# Patient Record
Sex: FEMALE | Race: WHITE | NOT HISPANIC OR LATINO | Employment: PART TIME
[De-identification: names, ages, dates, MRNs, and addresses within clinical notes are randomized per-mention and may not be internally consistent; named-entity substitution may affect disease eponyms.]

---

## 2017-06-17 ENCOUNTER — HEALTH MAINTENANCE LETTER (OUTPATIENT)
Age: 37
End: 2017-06-17

## 2017-06-26 ENCOUNTER — OFFICE VISIT (OUTPATIENT)
Dept: INTERNAL MEDICINE | Facility: CLINIC | Age: 37
End: 2017-06-26
Payer: COMMERCIAL

## 2017-06-26 VITALS
HEIGHT: 64 IN | WEIGHT: 293 LBS | OXYGEN SATURATION: 99 % | RESPIRATION RATE: 22 BRPM | DIASTOLIC BLOOD PRESSURE: 80 MMHG | HEART RATE: 79 BPM | TEMPERATURE: 99.1 F | BODY MASS INDEX: 50.02 KG/M2 | SYSTOLIC BLOOD PRESSURE: 110 MMHG

## 2017-06-26 DIAGNOSIS — F17.200 TOBACCO USE DISORDER: ICD-10-CM

## 2017-06-26 DIAGNOSIS — F32.0 MAJOR DEPRESSIVE DISORDER, SINGLE EPISODE, MILD (H): Primary | ICD-10-CM

## 2017-06-26 DIAGNOSIS — J45.20 INTERMITTENT ASTHMA, UNCOMPLICATED: ICD-10-CM

## 2017-06-26 PROCEDURE — 99213 OFFICE O/P EST LOW 20 MIN: CPT | Performed by: INTERNAL MEDICINE

## 2017-06-26 RX ORDER — PRAZOSIN HYDROCHLORIDE 1 MG/1
1 CAPSULE ORAL AT BEDTIME
COMMUNITY
End: 2017-07-18

## 2017-06-26 RX ORDER — LURASIDONE HYDROCHLORIDE 40 MG/1
40 TABLET, FILM COATED ORAL DAILY
COMMUNITY
End: 2017-07-18

## 2017-06-26 NOTE — MR AVS SNAPSHOT
After Visit Summary   6/26/2017    Cristina Dean    MRN: [id number]           Patient Information     Date Of Birth          1980        Visit Information        Provider Department      6/26/2017 3:20 PM Jermain Jaramillo MD [facility]        Today's Diagnoses     Depression    -  1    Intermittent asthma, uncomplicated        Tobacco use disorder           Follow-ups after your visit        Additional Services     MENTAL HEALTH REFERRAL       Your provider has referred you to: [facility]: [facility] - Counseling (Individual/Couples/Family) - [facility] [phone]   [email or web address] [facility]/   *Patient will be contacted by [facility] scheduling partner, [facility]), to schedule an appointment.  Patients may also call [facility] to schedule.    All scheduling is subject to the client's specific insurance plan & benefits, provider/location availability, and provider clinical specialities.  Please arrive 15 minutes early for your first appointment and bring your completed paperwork.    Please be aware that coverage of these services is subject to the terms and limitations of your health insurance plan.  Call member services at your health plan with any benefit or coverage questions.            MENTAL HEALTH REFERRAL       Your provider has referred you to: [facility]: [facility] [phone]   [email or web address] within [facility]/   *Referral from [facility] Primary Care Provider required - Consultation Model - medication management & future refills will be returned to [facility] PCP upon completion of evaluation  *Please call to schedule an appointment.    All scheduling is subject to the client's specific insurance plan & benefits, provider/location availability, and provider clinical specialities.  Please arrive 15  "minutes early for your first appointment and bring your completed paperwork.    Please be aware that coverage of these services is subject to the terms and limitations of your health insurance plan.  Call member services at your health plan with any benefit or coverage questions.                  Follow-up notes from your care team     Return if symptoms worsen or fail to improve.      Who to contact     If you have questions or need follow up information about today's clinic visit or your schedule please contact [facility] directly at [phone].  Normal or non-critical lab and imaging results will be communicated to you by Tumrihart, letter or phone within 4 business days after the clinic has received the results. If you do not hear from us within 7 days, please contact the clinic through Tumrihart or phone. If you have a critical or abnormal lab result, we will notify you by phone as soon as possible.  Submit refill requests through Webyog or call your pharmacy and they will forward the refill request to us. Please allow 3 business days for your refill to be completed.          Additional Information About Your Visit        TumriharPublicEngines Information     Webyog lets you send messages to your doctor, view your test results, renew your prescriptions, schedule appointments and more. To sign up, go to [email or web address] . Click on \"Log in\" on the left side of the screen, which will take you to the Welcome page. Then click on \"Sign up Now\" on the right side of the page.     You will be asked to enter the access code listed below, as well as some personal information. Please follow the directions to create your username and password.     Your access code is: [email or web address]  Expires: 2017  3:43 PM     Your access code will  in 90 days. If you need help or a new code, please call your [place] clinic or [phone].        Care EveryWhere ID     This is your Care EveryWhere ID. This could " "be used by other organizations to access your [facility] medical records  [id number]        Your Vitals Were     Pulse Temperature Respirations Height Last Period Pulse Oximetry    79 99.1  F (37.3  C) (Oral) 22 5' 4\" (1.626 m) 06/03/2017 99%    BMI (Body Mass Index)                   56.54 kg/m2            Blood Pressure from Last 3 Encounters:   06/26/17 110/80   12/04/15 142/85   09/04/15 142/89    Weight from Last 3 Encounters:   06/26/17 (!) 329 lb 6.4 oz (149.4 kg)   12/04/15 (!) 324 lb 11.2 oz (147.3 kg)   08/24/12 (!) 301 lb (136.5 kg)              We Performed the Following     MENTAL HEALTH REFERRAL     MENTAL HEALTH REFERRAL        Primary Care Provider Office Phone # Fax #    Joesph Olson [phone]       [facility] [address] [facility] [address]        Equal Access to Services     LARRY BULL : Hadii aad ku hadasho Soomaali, waaxda luqadaha, qaybta kaalmada adeegyada, waxay idiin haymansin jacob jones . So [facility] [phone].    ATENCIÓN: Si habla español, tiene a magaña disposición servicios gratuitos de asistencia lingüística. Llame al [phone].    We comply with applicable federal civil rights laws and [place] laws. We do not discriminate on the basis of race, color, national origin, age, disability sex, sexual orientation or gender identity.            Thank you!     Thank you for choosing [facility]  for your care. Our goal is always to provide you with excellent care. Hearing back from our patients is one way we can continue to improve our services. Please take a few minutes to complete the written survey that you may receive in the mail after your visit with us. Thank you!             Your Updated Medication List - Protect others around you: Learn how to safely use, store and throw away your medicines at www.disposemymeds.org.          This list is accurate as of: 6/26/17  3:43 PM.  Always use your most recent med " list.                   Brand Name Dispense Instructions for use Diagnosis    acyclovir 800 MG tablet    ZOVIRAX    35 tablet    Take 1 tablet (800 mg) by mouth 5 times daily    Herpes zoster without complication       albuterol 108 (90 BASE) MCG/ACT Inhaler    PROAIR HFA/PROVENTIL HFA/VENTOLIN HFA    1 Inhaler    Inhale 2 puffs into the lungs 4 times daily as needed for shortness of breath / dyspnea.    Intermittent asthma       diphenoxylate-atropine 2.5-0.025 MG per tablet    LOMOTIL    30 tablet    Take 2 tablets by mouth 4 times daily as needed for diarrhea.        LATUDA 40 MG Tabs tablet   Generic drug:  lurasidone      Take 40 mg by mouth daily        loratadine 10 MG tablet    CLARITIN    30 tablet    Take 1 tablet (10 mg) by mouth daily    Macular erythematous rash       prazosin 1 MG capsule    MINIPRESS     Take 1 mg by mouth At Bedtime

## 2017-06-26 NOTE — NURSING NOTE
"Chief Complaint   Patient presents with     Establish Care     Referral     Derm and Mental Health       Initial /80  Pulse 79  Temp 99.1  F (37.3  C) (Oral)  Resp 22  Ht 5' 4\" (1.626 m)  Wt (!) 329 lb 6.4 oz (149.4 kg)  LMP 06/03/2017  SpO2 99%  BMI 56.54 kg/m2 Estimated body mass index is 56.54 kg/(m^2) as calculated from the following:    Height as of this encounter: 5' 4\" (1.626 m).    Weight as of this encounter: 329 lb 6.4 oz (149.4 kg).  Blood pressure completed using cuff size: large    "

## 2017-06-26 NOTE — LETTER
[facility]  [address]   [address]  Tel. [phone]  Fax [phone]      Cristina Dean  [address] [address]            To Whom it May Concern:    Cristina Dean missed work due to recent illness.  Please excuse her from work during this time frame.  Cristina may return to work without restriction as per her work schedule..    Please contact me for questions or concerns.    Sincerely,       Jermain Jaramillo MD  [facility] INTERNAL MEDICINE        6/26/17
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
## 2017-07-18 DIAGNOSIS — F32.0 MAJOR DEPRESSIVE DISORDER, SINGLE EPISODE, MILD (H): Primary | ICD-10-CM

## 2017-07-18 RX ORDER — LURASIDONE HYDROCHLORIDE 40 MG/1
40 TABLET, FILM COATED ORAL DAILY
Qty: 30 TABLET | Refills: 0 | Status: SHIPPED | OUTPATIENT
Start: 2017-07-18 | End: 2017-08-07

## 2017-07-18 RX ORDER — PRAZOSIN HYDROCHLORIDE 1 MG/1
1 CAPSULE ORAL AT BEDTIME
Qty: 30 CAPSULE | Refills: 0 | Status: SHIPPED | OUTPATIENT
Start: 2017-07-18 | End: 2017-08-07

## 2017-07-18 NOTE — TELEPHONE ENCOUNTER
[place] [facility] [place] and prescribed these meds for her depression.      Has appt with psych on 8/2/17, soonest she could get in.  Will run out of meds before then and asking for refill from pcp

## 2017-08-07 ENCOUNTER — OFFICE VISIT (OUTPATIENT)
Dept: PSYCHIATRY | Facility: CLINIC | Age: 37
End: 2017-08-07
Attending: INTERNAL MEDICINE
Payer: COMMERCIAL

## 2017-08-07 VITALS
OXYGEN SATURATION: 96 % | SYSTOLIC BLOOD PRESSURE: 128 MMHG | BODY MASS INDEX: 50.02 KG/M2 | WEIGHT: 293 LBS | HEIGHT: 64 IN | TEMPERATURE: 98.4 F | HEART RATE: 96 BPM | DIASTOLIC BLOOD PRESSURE: 82 MMHG

## 2017-08-07 DIAGNOSIS — F41.1 GAD (GENERALIZED ANXIETY DISORDER): Primary | ICD-10-CM

## 2017-08-07 DIAGNOSIS — F32.0 MAJOR DEPRESSIVE DISORDER, SINGLE EPISODE, MILD (H): ICD-10-CM

## 2017-08-07 DIAGNOSIS — F39 MOOD DISORDER (H): ICD-10-CM

## 2017-08-07 PROBLEM — F33.9 DEPRESSION, MAJOR, RECURRENT (H): Status: ACTIVE | Noted: 2017-06-16

## 2017-08-07 PROCEDURE — 90792 PSYCH DIAG EVAL W/MED SRVCS: CPT | Performed by: NURSE PRACTITIONER

## 2017-08-07 RX ORDER — LURASIDONE HYDROCHLORIDE 80 MG/1
80 TABLET, FILM COATED ORAL DAILY
Qty: 30 TABLET | Refills: 1 | Status: SHIPPED | OUTPATIENT
Start: 2017-08-07 | End: 2017-10-02

## 2017-08-07 RX ORDER — GABAPENTIN 300 MG/1
300 CAPSULE ORAL 3 TIMES DAILY
Qty: 90 CAPSULE | Refills: 1 | Status: SHIPPED | OUTPATIENT
Start: 2017-08-07 | End: 2017-10-02

## 2017-08-07 ASSESSMENT — ANXIETY QUESTIONNAIRES
2. NOT BEING ABLE TO STOP OR CONTROL WORRYING: NEARLY EVERY DAY
1. FEELING NERVOUS, ANXIOUS, OR ON EDGE: NEARLY EVERY DAY
7. FEELING AFRAID AS IF SOMETHING AWFUL MIGHT HAPPEN: MORE THAN HALF THE DAYS
3. WORRYING TOO MUCH ABOUT DIFFERENT THINGS: NEARLY EVERY DAY
IF YOU CHECKED OFF ANY PROBLEMS ON THIS QUESTIONNAIRE, HOW DIFFICULT HAVE THESE PROBLEMS MADE IT FOR YOU TO DO YOUR WORK, TAKE CARE OF THINGS AT HOME, OR GET ALONG WITH OTHER PEOPLE: EXTREMELY DIFFICULT
5. BEING SO RESTLESS THAT IT IS HARD TO SIT STILL: MORE THAN HALF THE DAYS
6. BECOMING EASILY ANNOYED OR IRRITABLE: NEARLY EVERY DAY
GAD7 TOTAL SCORE: 18

## 2017-08-07 ASSESSMENT — PATIENT HEALTH QUESTIONNAIRE - PHQ9
SUM OF ALL RESPONSES TO PHQ QUESTIONS 1-9: 25
5. POOR APPETITE OR OVEREATING: MORE THAN HALF THE DAYS

## 2017-08-07 NOTE — Clinical Note
Agusto Jaramillo Thank you for the Psychiatry referral to the [facility] Psychiatry Service ([facility]). Our psychiatry providers act as a specialty service for Primary Care Providers in the [facility] that seek to optimize medications for unstable patients.  Once medications have been optimized, our providers discharge the patient back to the referring Primary Care Provider for ongoing medication management.  This type of system allows our providers to serve a high volume of patients.   Please see my Impression and Plan.  If you have any questions or concerns, please let me know.  Lisa

## 2017-08-07 NOTE — MR AVS SNAPSHOT
After Visit Summary   8/7/2017    Cristina Dean    MRN: [id number]           Patient Information     Date Of Birth          1980        Visit Information        Provider Department      8/7/2017 2:45 PM Lisa Conrad NP [facility]        Today's Diagnoses     STEPHENIE (generalized anxiety disorder)    -  1    Depression        Mood disorder (H)          Care Instructions    Treatment Plan:    Discontinue Minipress (prazosin).    Increase Latuda (lurasidone) to 60 mg daily for one week, then increase to 80 mg daily. For mood.     Start Neurontin (gabapentin) 300 mg by mouth 3 times per day for anxiety.     Continue all other medications as reviewed per electronic medical record today.     All questions addressed. Education and counseling completed regarding risks and benefits of medications and psychotherapy options.    Safety plan was reviewed. To the Emergency Department as needed or call after hours crisis line at [phone] or [phone].     To schedule individual therapy, call [facility] at [phone].     Continue group therapy as planned.     Schedule an appointment with me in 6-8 weeks or sooner as needed.  Call [facility] at [phone] to schedule.    Follow up with primary care provider as planned or for acute medical concerns.    Call the psychiatric nurse line with medication questions or concerns at [phone].    My Practice Policy was reviewed and signed: YES     MyChart may be used to communicate with your provider, but this is not intended to be used for emergencies.          Follow-ups after your visit        Follow-up notes from your care team     Return in about 6 weeks (around 9/18/2017).      Who to contact     If you have questions or need follow up information about today's clinic visit or your schedule please contact [facility] directly at [phone].  Normal or non-critical lab and imaging  "results will be communicated to you by MyChart, letter or phone within 4 business days after the clinic has received the results. If you do not hear from us within 7 days, please contact the clinic through e[employer] or phone. If you have a critical or abnormal lab result, we will notify you by phone as soon as possible.  Submit refill requests through [employer] or call your pharmacy and they will forward the refill request to us. Please allow 3 business days for your refill to be completed.          Additional Information About Your Visit        efish [employer] Information     [employer] lets you send messages to your doctor, view your test results, renew your prescriptions, schedule appointments and more. To sign up, go to [email or web address] . Click on \"Log in\" on the left side of the screen, which will take you to the Welcome page. Then click on \"Sign up Now\" on the right side of the page.     You will be asked to enter the access code listed below, as well as some personal information. Please follow the directions to create your username and password.     Your access code is: [email or web address]  Expires: 2017  3:43 PM     Your access code will  in 90 days. If you need help or a new code, please call your [place] clinic or [phone].        Care EveryWhere ID     This is your Care EveryWhere ID. This could be used by other organizations to access your [place] medical records  [id number]        Your Vitals Were     Pulse Temperature Height Pulse Oximetry Breastfeeding? BMI (Body Mass Index)    96 98.4  F (36.9  C) (Oral) 5' 4\" (1.626 m) 96% No 57.43 kg/m2       Blood Pressure from Last 3 Encounters:   17 128/82   17 110/80   12/04/15 142/85    Weight from Last 3 Encounters:   17 (!) 334 lb 9.6 oz (151.8 kg)   17 (!) 329 lb 6.4 oz (149.4 kg)   12/04/15 (!) 324 lb 11.2 oz (147.3 kg)              Today, you had the following     No orders found for display         Today's Medication Changes "          These changes are accurate as of: 8/7/17  3:33 PM.  If you have any questions, ask your nurse or doctor.               Start taking these medicines.        Dose/Directions    gabapentin 300 MG capsule   Commonly known as:  NEURONTIN   Used for:  STEPHENIE (generalized anxiety disorder), Mood disorder (H)   Started by:  Lisa Conrad NP        Dose:  300 mg   Take 1 capsule (300 mg) by mouth 3 times daily   Quantity:  90 capsule   Refills:  1         These medicines have changed or have updated prescriptions.        Dose/Directions    lurasidone 80 MG Tabs tablet   Commonly known as:  LATUDA   This may have changed:    - medication strength  - how much to take   Used for:  Major depressive disorder, single episode, mild (H)   Changed by:  Lisa Conrad NP        Dose:  80 mg   Take 1 tablet (80 mg) by mouth daily   Quantity:  30 tablet   Refills:  1            Where to get your medicines      These medications were sent to [facility] [address] [place] [address] [address] [place] [address] [address] [facility] [address]    Hours:  24-hours Phone:  [phone]     gabapentin 300 MG capsule    lurasidone 80 MG Tabs tablet                Primary Care Provider Office Phone # Fax #    Jermain Jaramillo [phone]       [facility] [address] [facility] [address]        Equal Access to Services     LARRY BULL AH: Hadii елена ku hadasho Soomaali, waaxda luqadaha, qaybta kaalmada adeegyada, shayy rush. So [facility] [phone].    ATENCIÓN: Si habla español, tiene a magaña disposición servicios gratuitos de asistencia lingüística. Reva knight [phone].    We comply with applicable federal civil rights laws and [place] laws. We do not discriminate on the basis of race, color, national origin, age, disability sex, sexual orientation or gender identity.            Thank you!     Thank you for choosing [facility]  [facility]  for your care. Our goal is always to provide you with excellent care. Hearing back from our patients is one way we can continue to improve our services. Please take a few minutes to complete the written survey that you may receive in the mail after your visit with us. Thank you!             Your Updated Medication List - Protect others around you: Learn how to safely use, store and throw away your medicines at www.disposemymeds.org.          This list is accurate as of: 8/7/17  3:33 PM.  Always use your most recent med list.                   Brand Name Dispense Instructions for use Diagnosis    acyclovir 800 MG tablet    ZOVIRAX    35 tablet    Take 1 tablet (800 mg) by mouth 5 times daily    Herpes zoster without complication       albuterol 108 (90 BASE) MCG/ACT Inhaler    PROAIR HFA/PROVENTIL HFA/VENTOLIN HFA    1 Inhaler    Inhale 2 puffs into the lungs 4 times daily as needed for shortness of breath / dyspnea.    Intermittent asthma       gabapentin 300 MG capsule    NEURONTIN    90 capsule    Take 1 capsule (300 mg) by mouth 3 times daily    STEPHENIE (generalized anxiety disorder), Mood disorder (H)       lurasidone 80 MG Tabs tablet    LATUDA    30 tablet    Take 1 tablet (80 mg) by mouth daily    Major depressive disorder, single episode, mild (H)

## 2017-08-07 NOTE — NURSING NOTE
"Chief Complaint   Patient presents with     Referral     Dr Dumont for anxiety and depression       Initial /82 (BP Location: Left arm, Patient Position: Sitting, Cuff Size: Adult Large)  Pulse 96  Temp 98.4  F (36.9  C) (Oral)  Ht 5' 4\" (1.626 m)  Wt (!) 334 lb 9.6 oz (151.8 kg)  SpO2 96%  Breastfeeding? No  BMI 57.43 kg/m2 Estimated body mass index is 57.43 kg/(m^2) as calculated from the following:    Height as of this encounter: 5' 4\" (1.626 m).    Weight as of this encounter: 334 lb 9.6 oz (151.8 kg).  Medication Reconciliation: complete   Bryon Cazares MA    "
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
## 2017-08-07 NOTE — PATIENT INSTRUCTIONS
Treatment Plan:    Discontinue Minipress (prazosin).    Increase Latuda (lurasidone) to 60 mg daily for one week, then increase to 80 mg daily. For mood.     Start Neurontin (gabapentin) 300 mg by mouth 3 times per day for anxiety.     Continue all other medications as reviewed per electronic medical record today.     All questions addressed. Education and counseling completed regarding risks and benefits of medications and psychotherapy options.    Safety plan was reviewed. To the Emergency Department as needed or call after hours crisis line at [phone] or [phone].     To schedule individual therapy, call [facility] at [phone].     Continue group therapy as planned.     Schedule an appointment with me in 6-8 weeks or sooner as needed.  Call [facility] at [phone] to schedule.    Follow up with primary care provider as planned or for acute medical concerns.    Call the psychiatric nurse line with medication questions or concerns at [phone].    My Practice Policy was reviewed and signed: YES     Esehart may be used to communicate with your provider, but this is not intended to be used for emergencies.

## 2017-08-08 ASSESSMENT — ANXIETY QUESTIONNAIRES: GAD7 TOTAL SCORE: 18

## 2017-10-02 ENCOUNTER — OFFICE VISIT (OUTPATIENT)
Dept: PSYCHIATRY | Facility: CLINIC | Age: 37
End: 2017-10-02
Payer: COMMERCIAL

## 2017-10-02 VITALS
HEART RATE: 84 BPM | OXYGEN SATURATION: 95 % | WEIGHT: 293 LBS | TEMPERATURE: 99 F | SYSTOLIC BLOOD PRESSURE: 122 MMHG | HEIGHT: 64 IN | DIASTOLIC BLOOD PRESSURE: 78 MMHG | BODY MASS INDEX: 50.02 KG/M2

## 2017-10-02 DIAGNOSIS — F39 MOOD DISORDER (H): ICD-10-CM

## 2017-10-02 DIAGNOSIS — F41.1 GAD (GENERALIZED ANXIETY DISORDER): ICD-10-CM

## 2017-10-02 DIAGNOSIS — F32.0 MAJOR DEPRESSIVE DISORDER, SINGLE EPISODE, MILD (H): ICD-10-CM

## 2017-10-02 PROCEDURE — 99214 OFFICE O/P EST MOD 30 MIN: CPT | Performed by: NURSE PRACTITIONER

## 2017-10-02 RX ORDER — GABAPENTIN 600 MG/1
600 TABLET ORAL 3 TIMES DAILY
Qty: 90 TABLET | Refills: 1 | Status: SHIPPED | OUTPATIENT
Start: 2017-10-02 | End: 2017-12-04

## 2017-10-02 RX ORDER — LURASIDONE HYDROCHLORIDE 80 MG/1
80 TABLET, FILM COATED ORAL DAILY
Qty: 90 TABLET | Refills: 1 | Status: SHIPPED | OUTPATIENT
Start: 2017-10-02 | End: 2018-01-18

## 2017-10-02 ASSESSMENT — ANXIETY QUESTIONNAIRES
GAD7 TOTAL SCORE: 6
7. FEELING AFRAID AS IF SOMETHING AWFUL MIGHT HAPPEN: NOT AT ALL
GAD7 TOTAL SCORE: 6
5. BEING SO RESTLESS THAT IT IS HARD TO SIT STILL: SEVERAL DAYS
3. WORRYING TOO MUCH ABOUT DIFFERENT THINGS: SEVERAL DAYS
7. FEELING AFRAID AS IF SOMETHING AWFUL MIGHT HAPPEN: NOT AT ALL
6. BECOMING EASILY ANNOYED OR IRRITABLE: SEVERAL DAYS
4. TROUBLE RELAXING: SEVERAL DAYS
GAD7 TOTAL SCORE: 6
1. FEELING NERVOUS, ANXIOUS, OR ON EDGE: SEVERAL DAYS
2. NOT BEING ABLE TO STOP OR CONTROL WORRYING: SEVERAL DAYS

## 2017-10-02 ASSESSMENT — PATIENT HEALTH QUESTIONNAIRE - PHQ9
SUM OF ALL RESPONSES TO PHQ QUESTIONS 1-9: 11
10. IF YOU CHECKED OFF ANY PROBLEMS, HOW DIFFICULT HAVE THESE PROBLEMS MADE IT FOR YOU TO DO YOUR WORK, TAKE CARE OF THINGS AT HOME, OR GET ALONG WITH OTHER PEOPLE: VERY DIFFICULT
SUM OF ALL RESPONSES TO PHQ QUESTIONS 1-9: 11

## 2017-10-02 NOTE — PATIENT INSTRUCTIONS
Treatment Plan:    Increase Neurontin (gabapentin) to 600 mg by mouth 3 times per day for anxiety.     Continue Latuda (lurasidone) 80 mg by mouth daily. For mood. Take with food.     Continue all other medications as reviewed per electronic medical record today.     Safety plan was reviewed. To the Emergency Department as needed or call after hours crisis line at [phone] or [phone].     To schedule individual or family therapy, call [facility] at [phone].     Schedule an appointment with me in 8 weeks or sooner as needed. Call [facility] at [phone] to schedule.    Follow up with primary care provider as planned or for acute medical concerns.    Call the psychiatric nurse line with medication questions or concerns at [phone].    My Practice Policy was reviewed and signed: YES     MyChart may be used to communicate with your provider, but this is not intended to be used for emergencies.

## 2017-10-02 NOTE — PROGRESS NOTES
"    Outpatient Psychiatric Progress Note    Name: Cristina Dean   : 1980                    Primary Care Provider: Jermain Jaramillo MD - last visit 2017  Therapist: None     PHQ-9 scores:  PHQ-9 SCORE 2013 2017 10/2/2017   Total Score MyChart 22 25 11 (Moderate depression)       STEPHENIE-7 scores:  STEPHENIE-7 SCORE 2012 2017 10/2/2017   Total Score 15 18 (minimal anxiety)     Answers for HPI/ROS submitted by the patient on 10/2/2017   If you checked off any problems, how difficult have these problems made it for you to do your work, take care of things at home, or get along with other people?: Very difficult    Patient Identification:  Patient is a 37 year old year old,   White  female  who presents for return visit with me.  Patient is currently employed part time. Patient attended the session alone. Patient prefers to be called: \"Cristina\".    Interim History:    I last saw Cristina Dean for outpatient psychiatry Consultation on 2017.    During that appointment, we Discontinue Minipress (prazosin).    Increase Latuda (lurasidone) to 60 mg daily for one week, then increase to 80 mg daily. For mood.    Start Neurontin (gabapentin) 300 mg by mouth 3 times per day for anxiety.      Current medications include:   Current Outpatient Prescriptions   Medication Sig     lurasidone (LATUDA) 80 MG TABS tablet Take 1 tablet (80 mg) by mouth daily     gabapentin (NEURONTIN) 300 MG capsule Take 1 capsule (300 mg) by mouth 3 times daily     acyclovir (ZOVIRAX) 800 MG tablet Take 1 tablet (800 mg) by mouth 5 times daily     albuterol (PROVENTIL HFA: VENTOLIN HFA) 108 (90 BASE) MCG/ACT inhaler Inhale 2 puffs into the lungs 4 times daily as needed for shortness of breath / dyspnea.     No current facility-administered medications for this visit.        The [place] Prescription Monitoring Program has been reviewed and there are no concerns about diversionary activity for controlled " "substances at this time.  Neurontin (gabapentin) 300 mg 90 tabs filled 8/7 and 9/12/2017 from me.     I was able to review most recent Primary Care Provider, specialty provider, and therapy visit notes that I have access to.     Cristina Dean reports mood has been: \"little better, not as emotional\"  Asked her  about his feelings and he reports he notes she has been better.  Work has been \"a struggle\" but has not missed work.   Anxiety has been: \"still worry a lot, but not as bad\" not as overwhelming or constant. Denies recent panic.   Sleep has been: \"more restless\" no nightmares recently. Just not sleeping as well.   Energy level is low. Still overeats daily.   PHQ9 and GAD7 scores were reviewed today.   Medication side effects: Denies  Current stressors include: Relationship Difficulties, Symptoms, \"Everything\", Financial Difficulties  Coping mechanisms and supports include: Family, Friends, Grief Group, Restoration, and Workplace    Past medication trials include but are not limited to:   Paxil (paroxetine) \"made me suicidal\"  Seroquel (quetiapine) \"was a zombie\"  Latuda (lurasidone)   Minipress (prazosin)   Neurontin (gabapentin)   Desyrel/Olepto (trazodone) was good for sleeping issues  Benadryl (diphenhydramine) was good for sleeping issues  Cannot remember other medications she has been on in the past    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    Social History:  Current Living situation:  [place], [place] with Spouse/Partner. Feels safe at home.  Current use of drugs or " "alcohol: Alcohol and Cannabis. Does not drink as much lately due to how it makes her feel- too tired to sleep. Still drinks about 3 - 5 products per week. Reports uses Marijuana to cope with anxiety- sometimes works and sometimes does not- reports still helps for relaxing.       Tobacco use: Yes Cigarettes when drinking  Caffeine:  Yes  3 cups/day of coffee- only on work days    Vital Signs:   /78 (BP Location: Right arm, Patient Position: Sitting, Cuff Size: Adult Large)  Pulse 84  Temp 99  F (37.2  C) (Oral)  Ht 5' 4\" (1.626 m)  Wt (!) 337 lb 3.2 oz (153 kg)  LMP 09/03/2017 (Approximate)  SpO2 95%  Breastfeeding? No  BMI 57.88 kg/m2    Labs:  Most recent laboratory results reviewed and no new labs.     Review of Systems:  10 systems (general, cardiovascular, respiratory, eyes, ENT, endocrine, GI, , M/S, neurological) were reviewed. Most pertinent finding(s) is/are: weight gain continues. The remaining systems are all unremarkable.    Mental Status Examination:  Appearance:  awake, alert, adequately groomed, appeared stated age, no apparent distress, morbidly obese, early, alone  Attitude:  cooperative   Eye Contact:  fair and wears glasses  Gait and Station: Normal, No assistive Devices used and No dizziness or falls  Psychomotor Behavior:  no evidence of tardive dyskinesia, dystonia, or tics  Oriented to:  time, person, and place  Attention Span and Concentration:  Easily distracted per report at work only, adequate to interview today  Speech:  clear, coherent, regular rate, regular rhythm and fluent  Mood: \"better\"  Affect:  mood congruent  Associations:  no loose associations  Thought Process:  logical, linear and goal oriented  Thought Content:  no evidence of psychotic thought, passive suicidal ideation present, no homicidal ideation and Appropriate to Interview  Recent and Remote Memory:  intact. Uses memory reminders. Not formally assessed. No amnesia.  Fund of Knowledge: " "appropriate  Insight:  adequate  Judgment:  fair- adequate to safety  Impulse Control:  fair     Suicide Risk Assessment:  Today Cristina Dean reports chronic thoughts of \"I don't want to be here anymore, but I do not want to kill myself\". In addition, there are notable risk factors for self-harm, including age, anxiety, substance abuse, suicidal ideation, anger/rage, hopelessness and mood change. However, risk is mitigated by commitment to family, spiritual/Denominational beliefs, absence of past attempts, ability to volunteer a safety plan, history of seeking help when needed, future oriented, no access to firearms or weapons, denies suicidal intent or plan, no family history of suicide and denies homicidal ideation, intent, or plan. Therefore, based on all available evidence including the factors cited above, Cristina Dean does not appear to be at imminent risk for self-harm, does not meet criteria for a 72-hr hold, and therefore remains appropriate for ongoing outpatient level of care.  A thorough assessment of risk factors related to suicide and self-harm have been reviewed and are noted above. The patient convincingly denies suicidality on several occasions. Local community safety resources reviewed and printed for patient to use if needed. There was no deceit detected, and the patient presented in a manner that was believable.      DSM5  Diagnosis:  296.33 (F33.2) Major Depressive Disorder, Recurrent Episode, Severe With anxious distress- moderate severity                                              Rule out Bipolar Disorder  300.02 (F41.1) Generalized Anxiety Disorder                        Rule out 307.51 (F50.8) Binge-Eating Disorder                         Rule out 301.83 (F60.3) Borderline Personality Disorder   Alcohol Use Disorder, partial remission  Cannabis Abuse   Obesity per BMI of 57.88    Medical comorbidities include:   Patient Active Problem List    Diagnosis Date Noted     Depression, " major, recurrent (H) 06/16/2017     Priority: Medium     CARDIOVASCULAR SCREENING; LDL GOAL LESS THAN 130 08/07/2012     Priority: Medium     Morbid obesity due to excess calories (H) 08/01/2012     Priority: Medium     BMI 53       Depression      Priority: Medium     STEPHENIE (generalized anxiety disorder)      Priority: Medium     Intermittent asthma      Priority: Medium     triggers include fall and spring allergy seasons       Herpes 07/09/2012     Priority: Medium     Bipolar affective disorder (H) 02/25/2009     Priority: Medium     Panic disorder with agoraphobia 02/25/2009     Priority: Medium       Psychosocial & Contextual Factors:  Occupational Difficulties, Financial Difficulties and Relationship Difficulties    Assessment:  Cristina Dean reports mood improvement, but anxiety remains. Medication side effects and alternatives were reviewed. Health promotion activities recommended and reviewed today. All questions addressed. Education and counseling completed regarding risks and benefits of medications and psychotherapy options.    With history of alcohol dependence and frequent Marijuana use, I will avoid benzodiazepine medications at this time. Can trial Neurontin (gabapentin) for mood stability and anxiety assistance. If needed, could trial Buspar (buspirone) or Topamax (topiramate). For more activation, may consider adding Wellbutrin (buproprion).     Discussed health promotion as has not had a physical recently, needs some updated immunizations, and needs a pap. May consider changing to a female provider in her clinic. Recommendations provided today.     Treatment Plan:    Increase Neurontin (gabapentin) to 600 mg by mouth 3 times per day for anxiety.     Continue Latuda (lurasidone) 80 mg by mouth daily. For mood. Take with food.     Continue all other medications as reviewed per electronic medical record today.     Safety plan was reviewed. To the Emergency Department as needed or call after hours  crisis line at [phone] or [phone].     To schedule individual or family therapy, call [facility] at [phone].     Schedule an appointment with me in 8 weeks or sooner as needed. Call [facility] at [phone] to schedule.    Follow up with primary care provider as planned or for acute medical concerns.    Call the psychiatric nurse line with medication questions or concerns at [phone].    My Practice Policy was reviewed and signed: YES     Atrecahart may be used to communicate with your provider, but this is not intended to be used for emergencies.    Administrative Billing:   Time spent with patient was 30 minutes and greater than 50% of time or 20 minutes was spent in counseling and coordination of care.    Patient Status:  Patient will continue to be seen for ongoing consultation and stabilization.    Signed:   Lisa Conrad, PhD, APRN, CNP   Psychiatry

## 2017-10-02 NOTE — NURSING NOTE
"Chief Complaint   Patient presents with     Consult       Initial /78 (BP Location: Right arm, Patient Position: Sitting, Cuff Size: Adult Large)  Pulse 84  Temp 99  F (37.2  C) (Oral)  Ht 5' 4\" (1.626 m)  Wt (!) 337 lb 3.2 oz (153 kg)  LMP 09/03/2017 (Approximate)  SpO2 95%  Breastfeeding? No  BMI 57.88 kg/m2 Estimated body mass index is 57.88 kg/(m^2) as calculated from the following:    Height as of this encounter: 5' 4\" (1.626 m).    Weight as of this encounter: 337 lb 3.2 oz (153 kg).  Medication Reconciliation: complete   Augusto ABDALLA      "

## 2017-10-02 NOTE — MR AVS SNAPSHOT
After Visit Summary   10/2/2017    Cristina Dean    MRN: [id number]           Patient Information     Date Of Birth          1980        Visit Information        Provider Department      10/2/2017 12:45 PM Lisa Conrad NP [facility]        Today's Diagnoses     Depression        STEPHENIE (generalized anxiety disorder)        Mood disorder (H)          Care Instructions    Treatment Plan:    Increase Neurontin (gabapentin) to 600 mg by mouth 3 times per day for anxiety.     Continue Latuda (lurasidone) 80 mg by mouth daily. For mood. Take with food.     Continue all other medications as reviewed per electronic medical record today.     Safety plan was reviewed. To the Emergency Department as needed or call after hours crisis line at [phone] or [phone].     To schedule individual or family therapy, call [facility] at [phone].     Schedule an appointment with me in 8 weeks or sooner as needed. Call [facility] at [phone] to schedule.    Follow up with primary care provider as planned or for acute medical concerns.    Call the psychiatric nurse line with medication questions or concerns at [phone].    My Practice Policy was reviewed and signed: YES     Spireonhart may be used to communicate with your provider, but this is not intended to be used for emergencies.          Follow-ups after your visit        Follow-up notes from your care team     Return in about 8 weeks (around 11/27/2017).      Who to contact     If you have questions or need follow up information about today's clinic visit or your schedule please contact [facility] directly at [phone].  Normal or non-critical lab and imaging results will be communicated to you by MyChart, letter or phone within 4 business days after the clinic has received the results. If you do not hear from us within 7 days, please contact the clinic through Xand or  "phone. If you have a critical or abnormal lab result, we will notify you by phone as soon as possible.  Submit refill requests through [employer] or call your pharmacy and they will forward the refill request to us. Please allow 3 business days for your refill to be completed.          Additional Information About Your Visit        mEgohart Information     [employer] lets you send messages to your doctor, view your test results, renew your prescriptions, schedule appointments and more. To sign up, go to [email or web address] . Click on \"Log in\" on the left side of the screen, which will take you to the Welcome page. Then click on \"Sign up Now\" on the right side of the page.     You will be asked to enter the access code listed below, as well as some personal information. Please follow the directions to create your username and password.     Your access code is: [email or web address]  Expires: 2017  1:14 PM     Your access code will  in 90 days. If you need help or a new code, please call your [facility] clinic or [phone].        Care EveryWhere ID     This is your Care EveryWhere ID. This could be used by other organizations to access your [facility] medical records  [id number]        Your Vitals Were     Pulse Temperature Height Last Period Pulse Oximetry Breastfeeding?    84 99  F (37.2  C) (Oral) 5' 4\" (1.626 m) 2017 (Approximate) 95% No    BMI (Body Mass Index)                   57.88 kg/m2            Blood Pressure from Last 3 Encounters:   10/02/17 122/78   17 128/82   17 110/80    Weight from Last 3 Encounters:   10/02/17 (!) 337 lb 3.2 oz (153 kg)   17 (!) 334 lb 9.6 oz (151.8 kg)   17 (!) 329 lb 6.4 oz (149.4 kg)              Today, you had the following     No orders found for display         Today's Medication Changes          These changes are accurate as of: 10/2/17  1:14 PM.  If you have any questions, ask your nurse or doctor.               These medicines have changed " or have updated prescriptions.        Dose/Directions    * gabapentin 300 MG capsule   Commonly known as:  NEURONTIN   This may have changed:  Another medication with the same name was added. Make sure you understand how and when to take each.   Used for:  STEPHENIE (generalized anxiety disorder), Mood disorder (H)   Changed by:  Lisa Conrad NP        Dose:  300 mg   Take 1 capsule (300 mg) by mouth 3 times daily   Quantity:  90 capsule   Refills:  1       * gabapentin 600 MG tablet   Commonly known as:  NEURONTIN   This may have changed:  You were already taking a medication with the same name, and this prescription was added. Make sure you understand how and when to take each.   Used for:  STEPHENIE (generalized anxiety disorder)   Changed by:  Lisa Cornad NP        Dose:  600 mg   Take 1 tablet (600 mg) by mouth 3 times daily Increased dose. For anxiety.   Quantity:  90 tablet   Refills:  1       lurasidone 80 MG Tabs tablet   Commonly known as:  LATUDA   This may have changed:  additional instructions   Used for:  Major depressive disorder, single episode, mild (H)   Changed by:  Lisa Conrad NP        Dose:  80 mg   Take 1 tablet (80 mg) by mouth daily Take with food.   Quantity:  90 tablet   Refills:  1       * Notice:  This list has 2 medication(s) that are the same as other medications prescribed for you. Read the directions carefully, and ask your doctor or other care provider to review them with you.         Where to get your medicines      These medications were sent to [facility] [address] [place] [address] [address] AT [facility] [address], [facility] [address]    Hours:  24-hours Phone:  [phone]     gabapentin 600 MG tablet    lurasidone 80 MG Tabs tablet                Primary Care Provider Office Phone # Fax #    Jermain Jaramillo [phone]       [address] [facility] [address]        Equal Access to Services     LARRY BULL AH:  Hadii aad ku hadlalitoo Soomaali, waaxda luqadaha, qaybta kaalmada whitney, shayy rush. So [facility] [phone].    ATENCIÓN: Si edwardo panchal, tiene a magaña disposición servicios gratuitos de asistencia lingüística. Llame al [phone].    We comply with applicable federal civil rights laws and [place] laws. We do not discriminate on the basis of race, color, national origin, age, disability, sex, sexual orientation, or gender identity.            Thank you!     Thank you for choosing [facility]  for your care. Our goal is always to provide you with excellent care. Hearing back from our patients is one way we can continue to improve our services. Please take a few minutes to complete the written survey that you may receive in the mail after your visit with us. Thank you!             Your Updated Medication List - Protect others around you: Learn how to safely use, store and throw away your medicines at www.disposemymeds.org.          This list is accurate as of: 10/2/17  1:14 PM.  Always use your most recent med list.                   Brand Name Dispense Instructions for use Diagnosis    acyclovir 800 MG tablet    ZOVIRAX    35 tablet    Take 1 tablet (800 mg) by mouth 5 times daily    Herpes zoster without complication       albuterol 108 (90 BASE) MCG/ACT Inhaler    PROAIR HFA/PROVENTIL HFA/VENTOLIN HFA    1 Inhaler    Inhale 2 puffs into the lungs 4 times daily as needed for shortness of breath / dyspnea.    Intermittent asthma       * gabapentin 300 MG capsule    NEURONTIN    90 capsule    Take 1 capsule (300 mg) by mouth 3 times daily    STEPHENIE (generalized anxiety disorder), Mood disorder (H)       * gabapentin 600 MG tablet    NEURONTIN    90 tablet    Take 1 tablet (600 mg) by mouth 3 times daily Increased dose. For anxiety.    STEPHENIE (generalized anxiety disorder)       lurasidone 80 MG Tabs tablet    LATUDA    90 tablet    Take 1 tablet (80 mg) by mouth daily Take  with food.    Major depressive disorder, single episode, mild (H)       * Notice:  This list has 2 medication(s) that are the same as other medications prescribed for you. Read the directions carefully, and ask your doctor or other care provider to review them with you.

## 2017-10-03 ASSESSMENT — ANXIETY QUESTIONNAIRES: GAD7 TOTAL SCORE: 6

## 2017-10-03 ASSESSMENT — PATIENT HEALTH QUESTIONNAIRE - PHQ9: SUM OF ALL RESPONSES TO PHQ QUESTIONS 1-9: 11

## 2017-10-09 ENCOUNTER — OFFICE VISIT (OUTPATIENT)
Dept: OBGYN | Facility: CLINIC | Age: 37
End: 2017-10-09
Payer: COMMERCIAL

## 2017-10-09 VITALS
DIASTOLIC BLOOD PRESSURE: 84 MMHG | BODY MASS INDEX: 50.02 KG/M2 | HEART RATE: 84 BPM | WEIGHT: 293 LBS | SYSTOLIC BLOOD PRESSURE: 128 MMHG | HEIGHT: 64 IN

## 2017-10-09 DIAGNOSIS — Z11.3 SCREEN FOR STD (SEXUALLY TRANSMITTED DISEASE): ICD-10-CM

## 2017-10-09 DIAGNOSIS — Z12.4 SCREENING FOR CERVICAL CANCER: ICD-10-CM

## 2017-10-09 DIAGNOSIS — Z00.00 ROUTINE GENERAL MEDICAL EXAMINATION AT A HEALTH CARE FACILITY: Primary | ICD-10-CM

## 2017-10-09 LAB — HIV 1+2 AB+HIV1 P24 AG SERPL QL IA: NONREACTIVE

## 2017-10-09 PROCEDURE — 87389 HIV-1 AG W/HIV-1&-2 AB AG IA: CPT | Performed by: ADVANCED PRACTICE MIDWIFE

## 2017-10-09 PROCEDURE — 36415 COLL VENOUS BLD VENIPUNCTURE: CPT | Performed by: ADVANCED PRACTICE MIDWIFE

## 2017-10-09 PROCEDURE — 86780 TREPONEMA PALLIDUM: CPT | Performed by: ADVANCED PRACTICE MIDWIFE

## 2017-10-09 PROCEDURE — 87491 CHLMYD TRACH DNA AMP PROBE: CPT | Performed by: ADVANCED PRACTICE MIDWIFE

## 2017-10-09 PROCEDURE — 99385 PREV VISIT NEW AGE 18-39: CPT | Performed by: ADVANCED PRACTICE MIDWIFE

## 2017-10-09 PROCEDURE — 87591 N.GONORRHOEAE DNA AMP PROB: CPT | Performed by: ADVANCED PRACTICE MIDWIFE

## 2017-10-09 PROCEDURE — G0145 SCR C/V CYTO,THINLAYER,RESCR: HCPCS | Performed by: ADVANCED PRACTICE MIDWIFE

## 2017-10-09 NOTE — MR AVS SNAPSHOT
After Visit Summary   10/9/2017    Cristina Dean    MRN: [id number]           Patient Information     Date Of Birth          1980        Visit Information        Provider Department      10/9/2017 9:00 AM Rosi Villalta APRN CNM [facility]        Today's Diagnoses     Routine general medical examination at a health care facility    -  1    Screen for STD (sexually transmitted disease)        Screening for cervical cancer           Follow-ups after your visit        Follow-up notes from your care team     Return in about 1 year (around 10/9/2018) for Physical Exam.      Your next 10 appointments already scheduled     Oct 23, 2017  8:20 AM CDT   PHYSICAL with Jermain Jaramillo MD   [facility] ([facility])    [address] [address] [phone]            Dec 04, 2017 12:45 PM CST   Return Visit with Lisa Conrad NP   [facility] ([facility])    [address]  [address]  [facility] [address] [phone]              Who to contact     If you have questions or need follow up information about today's clinic visit or your schedule please contact [facility] directly at [phone].  Normal or non-critical lab and imaging results will be communicated to you by Akredohart, letter or phone within 4 business days after the clinic has received the results. If you do not hear from us within 7 days, please contact the clinic through Akredohart or phone. If you have a critical or abnormal lab result, we will notify you by phone as soon as possible.  Submit refill requests through Hibernater or call your pharmacy and they will forward the refill request to us. Please allow 3 business days for your refill to be completed.          Additional Information About Your Visit        AkredoharMetaModix Information     Hibernater lets you send messages  "to your doctor, view your test results, renew your prescriptions, schedule appointments and more. To sign up, go to [email or web address] . Click on \"Log in\" on the left side of the screen, which will take you to the Welcome page. Then click on \"Sign up Now\" on the right side of the page.     You will be asked to enter the access code listed below, as well as some personal information. Please follow the directions to create your username and password.     Your access code is: [email or web address]  Expires: 2017  1:14 PM     Your access code will  in 90 days. If you need help or a new code, please call your [facility] clinic or [phone].        Care EveryWhere ID     This is your Care EveryWhere ID. This could be used by other organizations to access your [facility] medical records  [id number]        Your Vitals Were     Pulse Height Last Period BMI (Body Mass Index)          84 5' 4\" (1.626 m) 10/03/2017 (Exact Date) 57.81 kg/m2         Blood Pressure from Last 3 Encounters:   10/09/17 128/84   10/02/17 122/78   17 128/82    Weight from Last 3 Encounters:   10/09/17 (!) 336 lb 12.8 oz (152.8 kg)   10/02/17 (!) 337 lb 3.2 oz (153 kg)   17 (!) 334 lb 9.6 oz (151.8 kg)              We Performed the Following     Anti Treponema     Chlamydia trachomatis PCR     HIV Antigen Antibody Combo     Neisseria gonorrhoeae PCR     PAP imaged thin layer screen        Primary Care Provider Office Phone # Fax #    Jermain Jaramillo [phone]       [address] [place] [address]        Equal Access to Services     LARRY BULL : Vadim Gonzalez, colleen marques, anya kaalmashauna olson, shayy rush. So [facility] [phone].    ATENCIÓN: Si habla español, tiene a magaña disposición servicios gratuitos de asistencia lingüística. Llame al [phone].    We comply with applicable federal civil rights laws and [place] laws. We do not discriminate on " the basis of race, color, national origin, age, disability, sex, sexual orientation, or gender identity.            Thank you!     Thank you for choosing [facility]  for your care. Our goal is always to provide you with excellent care. Hearing back from our patients is one way we can continue to improve our services. Please take a few minutes to complete the written survey that you may receive in the mail after your visit with us. Thank you!             Your Updated Medication List - Protect others around you: Learn how to safely use, store and throw away your medicines at www.disposemymeds.org.          This list is accurate as of: 10/9/17 11:40 AM.  Always use your most recent med list.                   Brand Name Dispense Instructions for use Diagnosis    acyclovir 800 MG tablet    ZOVIRAX    35 tablet    Take 1 tablet (800 mg) by mouth 5 times daily    Herpes zoster without complication       albuterol 108 (90 BASE) MCG/ACT Inhaler    PROAIR HFA/PROVENTIL HFA/VENTOLIN HFA    1 Inhaler    Inhale 2 puffs into the lungs 4 times daily as needed for shortness of breath / dyspnea.    Intermittent asthma       gabapentin 600 MG tablet    NEURONTIN    90 tablet    Take 1 tablet (600 mg) by mouth 3 times daily Increased dose. For anxiety.    STEPHENIE (generalized anxiety disorder)       lurasidone 80 MG Tabs tablet    LATUDA    90 tablet    Take 1 tablet (80 mg) by mouth daily Take with food.    Major depressive disorder, single episode, mild (H)

## 2017-10-09 NOTE — NURSING NOTE
"Chief Complaint   Patient presents with     Gyn Exam     Pap smear, patient has physical scheduled with PCP for next week, she will get flu shot and tdap at that time. No questions or concerns       Initial /84 (BP Location: Right arm, Patient Position: Chair, Cuff Size: Adult Large)  Pulse 84  Ht 5' 4\" (1.626 m)  Wt (!) 336 lb 12.8 oz (152.8 kg)  LMP 10/03/2017 (Exact Date)  BMI 57.81 kg/m2 Estimated body mass index is 57.81 kg/(m^2) as calculated from the following:    Height as of this encounter: 5' 4\" (1.626 m).    Weight as of this encounter: 336 lb 12.8 oz (152.8 kg).  Medication Reconciliation: complete     Jed Nazario CMA      "

## 2017-10-09 NOTE — LETTER
October 17, 2017      Cristina Dean  [address] [address]    Dear MsMarlinJermaine,      I am happy to inform you that your recent cervical cancer screening test (PAP smear) was normal.      Preventative screenings such as this help to ensure your health for years to come. You should repeat a pap smear in 3 years, unless otherwise directed.      You will still need to return to the clinic every year for your annual exam and other preventive tests.     Please contact the clinic at [phone] if you have further questions.       Sincerely,      ELIN Villarreal CNM/kristina

## 2017-10-09 NOTE — PROGRESS NOTES
Cristina is a 37 year old  female who presents for annual exam.     Besides routine health maintenance, she has no other health concerns today .  Has a softball sized cyst/mass on her right mid back, says that she is seeing PCP next week to discuss the cyst. Has hx of large lipoma right flank, 2015.    HPI:  Here for Pap and GYN annual   Menses are regular q 28-30 days and normal lasting 5 days.   Menses flow: normal.    Patient's last menstrual period was 10/03/2017 (exact date)..   Using none for contraception.    She is not currently considering pregnancy. She is not interested in birth control options.     REPRODUCTIVE/GYNECOLOGIC HISTORY:  Cristina is sexually active with male partner(s) and is currently in monogamous relationship.   STI testing offered?  Accepted  History of abnormal Pap smear:  No  SOCIAL HISTORY  Abuse: does not report having previously been physical or sexually abused.    Do you feel safe in your environment? YES    She  reports that she has been smoking.  She has a 7.50 pack-year smoking history. She has never used smokeless tobacco.  Tobacco Cessation Action Plan: Information offered: Patient not interested at this time    Last PHQ-9 score on record =   PHQ-9 SCORE 10/2/2017   Total Score -   Total Score MyChart 11 (Moderate depression)   Total Score 11     Last GAD7 score on record =   STEPHENIE-7 SCORE 10/2/2017   Total Score -   Total Score 6 (mild anxiety)   Total Score 6       HEALTH MAINTENANCE:  Cholesterol: (  Cholesterol   Date Value Ref Range Status   2003 235 (H) <200 mg/dL Final     Comment:     Cholesterol Reference Range:   <200  The NCEP recommends further         evaluation of:         1.  Patients with cholesterol             greater than 200 mg/dL             if additional risk factors             are present.         2.  All patients with a             cholesterol greater than             240 mg/dL.    History of abnormal lipids: Yes, seeing PCP next week for fasting  visit   Mammo: no . History of abnormal Mammo: Not applicable.  Regular Self Breast Exams: Yes  TSH: (  TSH   Date Value Ref Range Status   2012 1.88 0.4 - 5.0 mU/L Final    )  Pap; (No results found for: PAP )  Immunizations up to date: yes  (Gardasil, Tdap, Flu)  Health maintenance updated:  yes    HEALTHY HABITS  Eating habits: eats regular meals  Calcium/Vitamin D intake: source:  dairy Adequate?   Exercise: How often do you exercise? 1-3 times/week;Walking  Have you had an eye exam in the last two years? YES  Do you routinely see the dentist once or twice yearly? 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, unspecified      Depression      Herpes      Intermittent asthma     triggers include  and spring allergy seasons     Obesity 12    BMI 53     Past Surgical History:   Procedure Laterality Date     TONSILLECTOMY  1984     Family History   Problem Relation Age of Onset     CANCER Father      acute leukemia (in remission)     Neurologic Disorder Father      neuropathy from chemotherapy     DIABETES Father      type II DM     Family History Negative Sister      Ovarian Cancer Paternal Aunt      Ovarian Cancer Paternal Aunt      Ovarian Cancer Paternal Aunt      Social History     Social History     Marital status:      Spouse name: N/A     Number of children: N/A     Years of education: N/A     Social History Main Topics     Smoking status: Current Every Day Smoker     Packs/day: 0.50     Years: 15.00     Smokeless tobacco: Never Used      Comment: ~ 1 pack per week - started at age 14      Alcohol use Yes      Comment: socially     Drug use: No     Sexual activity: Yes      "Partners: Male     Birth control/ protection: Condom     Other Topics Concern     None     Social History Narrative       Current Outpatient Prescriptions:      lurasidone (LATUDA) 80 MG TABS tablet, Take 1 tablet (80 mg) by mouth daily Take with food., Disp: 90 tablet, Rfl: 1     gabapentin (NEURONTIN) 600 MG tablet, Take 1 tablet (600 mg) by mouth 3 times daily Increased dose. For anxiety., Disp: 90 tablet, Rfl: 1     acyclovir (ZOVIRAX) 800 MG tablet, Take 1 tablet (800 mg) by mouth 5 times daily (Patient not taking: Reported on 10/9/2017), Disp: 35 tablet, Rfl: 0     albuterol (PROVENTIL HFA: VENTOLIN HFA) 108 (90 BASE) MCG/ACT inhaler, Inhale 2 puffs into the lungs 4 times daily as needed for shortness of breath / dyspnea. (Patient not taking: Reported on 10/9/2017), Disp: 1 Inhaler, Rfl: prn     Allergies   Allergen Reactions     No Known Drug Allergies        Past medical, surgical, social and family history were reviewed and updated in EPIC.    ROS:   C: NEGATIVE for fever, chills, change in weight  INTEGUMENTARY/SKIN: POSITIVE for x2 mole, patient concerned, cyst/lump on right mid back   E: NEGATIVE for vision changes or irritation  ENT: NEGATIVE for ear, mouth and throat problems  R: NEGATIVE for significant cough or SOB  B: NEGATIVE for masses, tenderness or discharge  CV: NEGATIVE for chest pain, palpitations or peripheral edema  GI: NEGATIVE for nausea, abdominal pain, heartburn, or change in bowel habits  : NEGATIVE for unusual urinary or vaginal symptoms. Periods are regular.  M: NEGATIVE for significant arthralgias or myalgia  N: NEGATIVE for weakness, dizziness or paresthesias  P: NEGATIVE for changes in mood or affect    PHYSICAL EXAM:  /84 (BP Location: Right arm, Patient Position: Chair, Cuff Size: Adult Large)  Pulse 84  Ht 5' 4\" (1.626 m)  Wt (!) 336 lb 12.8 oz (152.8 kg)  LMP 10/03/2017 (Exact Date)  BMI 57.81 kg/m2   BMI: Body mass index is 57.81 kg/(m^2).  Constitutional: " healthy, alert and no distress  Neck: symmetrical, thyroid normal size, no masses present, no lymphadenopathy present.   Cardiovascular: RRR, no murmurs present  Respiratory: breathing unlabored, lungs CTA bilaterally, softball sized cyst/lump on right mid back soft non tender  Breast:normal without masses, tenderness or nipple discharge and no palpable axillary masses or adenopathy  Gastrointestinal: abdomen soft, non-tender, bowel sounds present  PELVIC EXAM:  Vulva: No lesions, no adenopathy, BUS WNL  Vagina: Moist, pink, discharge normal  well rugated, no lesions  Cervix:smooth, pink, no visible lesions  Uterus: Normal size, non-tender, mobile  Ovaries: No masses palpated  Rectal exam: deferred    ASSESSMENT/PLAN:  (Z00.00) Routine general medical examination at a health care facility  (primary encounter diagnosis)  Comment: Has abnormal lump on right mid back, patient is seeing PCP next week.  Encouraged her to have this looked at. Has hx of large lipoma on this side of her back. She is also concerned about two moles, look not suspicious but encouraged her to see a dermatologist.   Plan: return to clinic 1 year for annual exam     (Z11.3) Screen for STD (sexually transmitted disease)  Comment: results pending   Plan: Anti Treponema, HIV Antigen Antibody Combo,         Chlamydia trachomatis PCR, Neisseria         gonorrhoeae PCR        Will provide appropriate follow up PRN     (Z12.4) Screening for cervical cancer  Comment: results pending   Plan: PAP imaged thin layer screen       Results pending, will notify patient of results and provide appropriate follow up PRN           Results for orders placed or performed during the hospital encounter of 09/04/15   US Abdomen Limited    Narrative    US ABDOMEN LIMITED  9/4/2015 3:37 PM    HISTORY: Right flank subcutaneous mass.    COMPARISON: None.    FINDINGS: 13.5 x 12.9 x 5.3 cm echogenic subcutaneous lesion in the  right flank in the region of the patient's  palpable lump. This has  echotexture consistent with fat and is consistent with a large lipoma.  No other significant abnormality identified.      Impression    IMPRESSION: Large probable lipoma in the subcutaneous tissues of the  right flank in the region of the patient's palpable lump.    SILVIA MURRY MD     COUNSELING:   Reviewed preventive health counseling, as reflected in patient instructions       Regular exercise       Healthy diet/nutrition       Contraception   reports that she has been smoking.  She has a 7.50 pack-year smoking history. She has never used smokeless tobacco.  Tobacco Cessation Action Plan: Information offered: Patient not interested at this time    Return to clinic 1 year for well woman exam.   ELIN Ken, CNM

## 2017-10-10 LAB
C TRACH DNA SPEC QL NAA+PROBE: NEGATIVE
N GONORRHOEA DNA SPEC QL NAA+PROBE: NEGATIVE
SPECIMEN SOURCE: NORMAL
SPECIMEN SOURCE: NORMAL
T PALLIDUM IGG+IGM SER QL: NEGATIVE

## 2017-10-11 LAB
COPATH REPORT: NORMAL
PAP: NORMAL

## 2017-10-23 ENCOUNTER — OFFICE VISIT (OUTPATIENT)
Dept: INTERNAL MEDICINE | Facility: CLINIC | Age: 37
End: 2017-10-23
Payer: COMMERCIAL

## 2017-10-23 VITALS
HEIGHT: 64 IN | SYSTOLIC BLOOD PRESSURE: 126 MMHG | HEART RATE: 87 BPM | WEIGHT: 293 LBS | TEMPERATURE: 98 F | DIASTOLIC BLOOD PRESSURE: 76 MMHG | BODY MASS INDEX: 50.02 KG/M2 | OXYGEN SATURATION: 95 %

## 2017-10-23 DIAGNOSIS — E66.01 MORBID OBESITY DUE TO EXCESS CALORIES (H): ICD-10-CM

## 2017-10-23 DIAGNOSIS — Z13.6 CARDIOVASCULAR SCREENING; LDL GOAL LESS THAN 130: ICD-10-CM

## 2017-10-23 DIAGNOSIS — F33.9 RECURRENT MAJOR DEPRESSIVE DISORDER, REMISSION STATUS UNSPECIFIED (H): ICD-10-CM

## 2017-10-23 DIAGNOSIS — D17.30 LIPOMA OF SKIN AND SUBCUTANEOUS TISSUE: ICD-10-CM

## 2017-10-23 DIAGNOSIS — Z23 NEED FOR TDAP VACCINATION: ICD-10-CM

## 2017-10-23 DIAGNOSIS — J45.20 INTERMITTENT ASTHMA WITHOUT COMPLICATION, UNSPECIFIED ASTHMA SEVERITY: ICD-10-CM

## 2017-10-23 DIAGNOSIS — Z00.00 ENCOUNTER FOR ROUTINE ADULT HEALTH EXAMINATION WITHOUT ABNORMAL FINDINGS: Primary | ICD-10-CM

## 2017-10-23 DIAGNOSIS — Z23 NEED FOR PROPHYLACTIC VACCINATION AND INOCULATION AGAINST INFLUENZA: ICD-10-CM

## 2017-10-23 LAB
ALBUMIN SERPL-MCNC: 3.5 G/DL (ref 3.4–5)
ALP SERPL-CCNC: 62 U/L (ref 40–150)
ALT SERPL W P-5'-P-CCNC: 34 U/L (ref 0–50)
ANION GAP SERPL CALCULATED.3IONS-SCNC: 5 MMOL/L (ref 3–14)
AST SERPL W P-5'-P-CCNC: 20 U/L (ref 0–45)
BILIRUB SERPL-MCNC: 0.4 MG/DL (ref 0.2–1.3)
BUN SERPL-MCNC: 11 MG/DL (ref 7–30)
CALCIUM SERPL-MCNC: 9 MG/DL (ref 8.5–10.1)
CHLORIDE SERPL-SCNC: 105 MMOL/L (ref 94–109)
CHOLEST SERPL-MCNC: 231 MG/DL
CO2 SERPL-SCNC: 28 MMOL/L (ref 20–32)
CREAT SERPL-MCNC: 0.8 MG/DL (ref 0.52–1.04)
GFR SERPL CREATININE-BSD FRML MDRD: 80 ML/MIN/1.7M2
GLUCOSE SERPL-MCNC: 100 MG/DL (ref 70–99)
HDLC SERPL-MCNC: 39 MG/DL
HGB BLD-MCNC: 14.6 G/DL (ref 11.7–15.7)
LDLC SERPL CALC-MCNC: 145 MG/DL
NONHDLC SERPL-MCNC: 192 MG/DL
POTASSIUM SERPL-SCNC: 4.1 MMOL/L (ref 3.4–5.3)
PROT SERPL-MCNC: 7.1 G/DL (ref 6.8–8.8)
SODIUM SERPL-SCNC: 138 MMOL/L (ref 133–144)
TRIGL SERPL-MCNC: 233 MG/DL

## 2017-10-23 PROCEDURE — 90686 IIV4 VACC NO PRSV 0.5 ML IM: CPT | Performed by: INTERNAL MEDICINE

## 2017-10-23 PROCEDURE — 99213 OFFICE O/P EST LOW 20 MIN: CPT | Mod: 25 | Performed by: INTERNAL MEDICINE

## 2017-10-23 PROCEDURE — 90471 IMMUNIZATION ADMIN: CPT | Performed by: INTERNAL MEDICINE

## 2017-10-23 PROCEDURE — 90715 TDAP VACCINE 7 YRS/> IM: CPT | Performed by: INTERNAL MEDICINE

## 2017-10-23 PROCEDURE — 80053 COMPREHEN METABOLIC PANEL: CPT | Performed by: INTERNAL MEDICINE

## 2017-10-23 PROCEDURE — 90472 IMMUNIZATION ADMIN EACH ADD: CPT | Performed by: INTERNAL MEDICINE

## 2017-10-23 PROCEDURE — 85018 HEMOGLOBIN: CPT | Performed by: INTERNAL MEDICINE

## 2017-10-23 PROCEDURE — 80061 LIPID PANEL: CPT | Performed by: INTERNAL MEDICINE

## 2017-10-23 PROCEDURE — 99395 PREV VISIT EST AGE 18-39: CPT | Mod: 25 | Performed by: INTERNAL MEDICINE

## 2017-10-23 PROCEDURE — 36415 COLL VENOUS BLD VENIPUNCTURE: CPT | Performed by: INTERNAL MEDICINE

## 2017-10-23 RX ORDER — ALBUTEROL SULFATE 90 UG/1
2 AEROSOL, METERED RESPIRATORY (INHALATION) 4 TIMES DAILY PRN
Qty: 1 INHALER | Status: SHIPPED | OUTPATIENT
Start: 2017-10-23 | End: 2020-01-08

## 2017-10-23 ASSESSMENT — PATIENT HEALTH QUESTIONNAIRE - PHQ9: SUM OF ALL RESPONSES TO PHQ QUESTIONS 1-9: 5

## 2017-10-23 NOTE — NURSING NOTE
"Chief Complaint   Patient presents with     Physical       Initial /86  Pulse 87  Temp 98  F (36.7  C) (Oral)  Ht 5' 4\" (1.626 m)  Wt (!) 338 lb 11.2 oz (153.6 kg)  LMP 10/03/2017 (Exact Date)  SpO2 95%  BMI 58.14 kg/m2 Estimated body mass index is 58.14 kg/(m^2) as calculated from the following:    Height as of this encounter: 5' 4\" (1.626 m).    Weight as of this encounter: 338 lb 11.2 oz (153.6 kg).  Medication Reconciliation: complete   Lisa Hooks, LIANNE      "

## 2017-10-23 NOTE — PROGRESS NOTES

## 2017-10-23 NOTE — PROGRESS NOTES
SUBJECTIVE:   CC: Cristina Dean is an 37 year old woman who presents for preventive health visit.     Answers for HPI/ROS submitted by the patient on 10/23/2017   Annual Exam:  Getting at least 3 servings of Calcium per day:: NO  Bi-annual eye exam:: NO  Dental care twice a year:: NO  Sleep apnea or symptoms of sleep apnea:: Excessive snoring  Frequency of exercise:: 1 day/week  Diet:: Regular (no restrictions)  Taking medications regularly:: Yes  Medication side effects:: Not applicable  Additional concerns today:: YES  PHQ-2 Score: 2  Duration of exercise:: Less than 15 minutes      PROBLEMS TO ADD ON...  1. Large cyst on back   2. Skin spot on L upper leg and left cheek     Today's PHQ-2 Score:   PHQ-2 ( 1999 Pfizer) 10/9/2017 8/23/2012   Q1: Little interest or pleasure in doing things 1 0   Q2: Feeling down, depressed or hopeless 1 1   PHQ-2 Score 2 1     Abuse: Current or Past(Physical, Sexual or Emotional)- No  Do you feel safe in your environment - Yes    Social History   Substance Use Topics     Smoking status: Current Every Day Smoker     Packs/day: 0.50     Years: 15.00     Smokeless tobacco: Never Used      Comment: ~ 1 pack per week - started at age 14      Alcohol use Yes      Comment: socially     The patient does not drink >3 drinks per day nor >7 drinks per week.    Reviewed orders with patient.  Reviewed health maintenance and updated orders accordingly - Yes    Pertinent mammograms are reviewed under the imaging tab.  History of abnormal Pap smear: NO - age 30- 65 PAP every 3 years recommended    Reviewed and updated as needed this visit by clinical staff  Tobacco  Allergies  Med Hx  Surg Hx  Fam Hx  Soc Hx        Reviewed and updated as needed this visit by Provider          ROS:  C: NEGATIVE for fever, chills, change in weight  I: NEGATIVE for worrisome rashes, moles or lesions  E: NEGATIVE for vision changes or irritation  ENT: NEGATIVE for ear, mouth and throat problems  R: NEGATIVE  "for significant cough or SOB  B: NEGATIVE for masses, tenderness or discharge  CV: NEGATIVE for chest pain, palpitations or peripheral edema  GI: NEGATIVE for nausea, abdominal pain, heartburn, or change in bowel habits  : NEGATIVE for unusual urinary or vaginal symptoms. Periods are regular.  M: NEGATIVE for significant arthralgias or myalgia  N: NEGATIVE for weakness, dizziness or paresthesias    OBJECTIVE:   /76  Pulse 87  Temp 98  F (36.7  C) (Oral)  Ht 5' 4\" (1.626 m)  Wt (!) 338 lb 11.2 oz (153.6 kg)  LMP 10/03/2017 (Exact Date)  SpO2 95%  BMI 58.14 kg/m2  EXAM:  GENERAL: alert and no distress  EYES: Eyes grossly normal to inspection, PERRL and conjunctivae and sclerae normal  HENT: ear canals and TM's normal, nose and mouth without ulcers or lesions  NECK: no adenopathy, no asymmetry, masses, or scars and thyroid normal to palpation  RESP: lungs clear to auscultation - no rales, rhonchi or wheezes  BREAST: deferred per patient  CV: regular rate and rhythm, normal S1 S2, no S3 or S4, no murmur, click or rub, no peripheral edema and peripheral pulses strong  ABDOMEN: morbidly obese, soft, nontender, no hepatosplenomegaly, no masses and bowel sounds normal  MS: no gross musculoskeletal defects noted, no edema  Large left upper back skin mass, suspect lipoma (10+ cm)  NEURO: No focal changes  PSYCH: mentation appears normal, affect normal/bright    ASSESSMENT/PLAN:   1. Encounter for routine adult health examination without abnormal findings  As ordered  - Hemoglobin  - Comprehensive metabolic panel  - Lipid Profile    2. Morbid obesity due to excess calories (H)  discussed options of care    3. Recurrent major depressive disorder, remission status unspecified (H)  Stable on therapy    4. CARDIOVASCULAR SCREENING; LDL GOAL LESS THAN 130  Labs as fasting  - Lipid Profile    5. Intermittent asthma without complication, unspecified asthma severity  Advised Flu shot, stop smoking  - albuterol (PROAIR " "HFA/PROVENTIL HFA/VENTOLIN HFA) 108 (90 BASE) MCG/ACT Inhaler; Inhale 2 puffs into the lungs 4 times daily as needed for shortness of breath / dyspnea  Dispense: 1 Inhaler; Refill: prn    6. Lipoma of skin and subcutaneous tissue  advised excision per surgery  - GENERAL SURG ADULT REFERRAL  - OFFICE/OUTPT VISIT,EST,LEVL II    COUNSELING:   Reviewed preventive health counseling, as reflected in patient instructions       Regular exercise       Healthy diet/nutrition     reports that she has been smoking.  She has a 7.50 pack-year smoking history. She has never used smokeless tobacco.    Estimated body mass index is 57.81 kg/(m^2) as calculated from the following:    Height as of 10/9/17: 5' 4\" (1.626 m).    Weight as of 10/9/17: 336 lb 12.8 oz (152.8 kg).   Weight management plan: Discussed healthy diet and exercise guidelines and patient will follow up in 12 months in clinic to re-evaluate.    Counseling Resources:  ATP IV Guidelines  Pooled Cohorts Equation Calculator  Breast Cancer Risk Calculator  FRAX Risk Assessment  ICSI Preventive Guidelines  Dietary Guidelines for Americans, 2010  USDA's MyPlate  ASA Prophylaxis  Lung CA Screening    Jermain Jaramillo MD  [facility]    THE MEDICATION LIST HAS BEEN FULLY RECONCILED BY THE M.D. AND THE NURSING STAFF.    "

## 2017-10-23 NOTE — LETTER
My Depression Action Plan  Name: Cristina Dean   Date of Birth 1980  Date: 10/23/2017    My doctor: Jermain Jaramillo   My clinic: [address] [address] [phone]          GREEN    ZONE   Good Control    What it looks like:     Things are going generally well. You have normal up s and down s. You may even feel depressed from time to time, but bad moods usually last less than a day.   What you need to do:  1. Continue to care for yourself (see self care plan)  2. Check your depression survival kit and update it as needed  3. Follow your physician s recommendations including any medication.  4. Do not stop taking medication unless you consult with your physician first.           YELLOW         ZONE Getting Worse    What it looks like:     Depression is starting to interfere with your life.     It may be hard to get out of bed; you may be starting to isolate yourself from others.    Symptoms of depression are starting to last most all day and this has happened for several days.     You may have suicidal thoughts but they are not constant.   What you need to do:     1. Call your care team, your response to treatment will improve if you keep your care team informed of your progress. Yellow periods are signs an adjustment may need to be made.     2. Continue your self-care, even if you have to fake it!    3. Talk to someone in your support network    4. Open up your depression survival kit           RED    ZONE Medical Alert - Get Help    What it looks like:     Depression is seriously interfering with your life.     You may experience these or other symptoms: You can t get out of bed most days, can t work or engage in other necessary activities, you have trouble taking care of basic hygiene, or basic responsibilities, thoughts of suicide or death that will not go away, self-injurious behavior.     What you need to do:  1. Call your  care team and request a same-day appointment. If they are not available (weekends or after hours) call your local crisis line, emergency room or 911.      Electronically signed by: Lisa Hooks, October 23, 2017    Depression Self Care Plan / Survival Kit    Self-Care for Depression  Here s the deal. Your body and mind are really not as separate as most people think.  What you do and think affects how you feel and how you feel influences what you do and think. This means if you do things that people who feel good do, it will help you feel better.  Sometimes this is all it takes.  There is also a place for medication and therapy depending on how severe your depression is, so be sure to consult with your medical provider and/ or Behavioral Health Consultant if your symptoms are worsening or not improving.     In order to better manage my stress, I will:    Exercise  Get some form of exercise, every day. This will help reduce pain and release endorphins, the  feel good  chemicals in your brain. This is almost as good as taking antidepressants!  This is not the same as joining a gym and then never going! (they count on that by the way ) It can be as simple as just going for a walk or doing some gardening, anything that will get you moving.      Hygiene   Maintain good hygiene (Get out of bed in the morning, Make your bed, Brush your teeth, Take a shower, and Get dressed like you were going to work, even if you are unemployed).  If your clothes don't fit try to get ones that do.    Diet  I will strive to eat foods that are good for me, drink plenty of water, and avoid excessive sugar, caffeine, alcohol, and other mood-altering substances.  Some foods that are helpful in depression are: complex carbohydrates, B vitamins, flaxseed, fish or fish oil, fresh fruits and vegetables.    Psychotherapy  I agree to participate in Individual Therapy (if recommended).    Medication  If prescribed medications, I agree to take  them.  Missing doses can result in serious side effects.  I understand that drinking alcohol, or other illicit drug use, may cause potential side effects.  I will not stop my medication abruptly without first discussing it with my provider.    Staying Connected With Others  I will stay in touch with my friends, family members, and my primary care provider/team.    Use your imagination  Be creative.  We all have a creative side; it doesn t matter if it s oil painting, sand castles, or mud pies! This will also kick up the endorphins.    Witness Beauty  (AKA stop and smell the roses) Take a look outside, even in mid-winter. Notice colors, textures. Watch the squirrels and birds.     Service to others  Be of service to others.  There is always someone else in need.  By helping others we can  get out of ourselves  and remember the really important things.  This also provides opportunities for practicing all the other parts of the program.    Humor  Laugh and be silly!  Adjust your TV habits for less news and crime-drama and more comedy.    Control your stress  Try breathing deep, massage therapy, biofeedback, and meditation. Find time to relax each day.     My support system    Clinic Contact:  Phone number:    Contact 1:  Phone number:    Contact 2:  Phone number:    Oriental orthodox/:  Phone number:    Therapist:  Phone number:    Local crisis center:    Phone number:    Other community support:  Phone number:

## 2017-10-23 NOTE — MR AVS SNAPSHOT
After Visit Summary   10/23/2017    Cristina Dean    MRN: [id number]           Patient Information     Date Of Birth          1980        Visit Information        Provider Department      10/23/2017 8:20 AM Jermain Jaramillo MD [facility]        Today's Diagnoses     Encounter for routine adult health examination without abnormal findings    -  1    Morbid obesity due to excess calories (H)        Recurrent major depressive disorder, remission status unspecified (H)        CARDIOVASCULAR SCREENING; LDL GOAL LESS THAN 130        Intermittent asthma without complication, unspecified asthma severity        Lipoma of skin and subcutaneous tissue          Care Instructions      Preventive Health Recommendations  Female Ages 26 - 39  Yearly exam:   See your health care provider every year in order to    Review health changes.     Discuss preventive care.      Review your medicines if you your doctor has prescribed any.    Until age 30: Get a Pap test every three years (more often if you have had an abnormal result).    After age 30: Talk to your doctor about whether you should have a Pap test every 3 years or have a Pap test with HPV screening every 5 years.   You do not need a Pap test if your uterus was removed (hysterectomy) and you have not had cancer.  You should be tested each year for STDs (sexually transmitted diseases), if you're at risk.   Talk to your provider about how often to have your cholesterol checked.  If you are at risk for diabetes, you should have a diabetes test (fasting glucose).  Shots: Get a flu shot each year. Get a tetanus shot every 10 years.   Nutrition:     Eat at least 5 servings of fruits and vegetables each day.    Eat whole-grain bread, whole-wheat pasta and brown rice instead of white grains and rice.    Talk to your provider about Calcium and Vitamin D.     Lifestyle    Exercise at least 150 minutes a week (30 minutes a day, 5 days of the  week). This will help you control your weight and prevent disease.    Limit alcohol to one drink per day.    No smoking.     Wear sunscreen to prevent skin cancer.    See your dentist every six months for an exam and cleaning.            Follow-ups after your visit        Additional Services     GENERAL SURG ADULT REFERRAL       Your provider has referred you to: [place]: [facility] Rex Phelan [phone]   [email or web address]    Please be aware that coverage of these services is subject to the terms and limitations of your health insurance plan.  Call member services at your health plan with any benefit or coverage questions.      Please bring the following with you to your appointment:    (1) Any X-Rays, CTs or MRIs which have been performed.  Contact the facility where they were done to arrange for  prior to your scheduled appointment.   (2) List of current medications   (3) This referral request   (4) Any documents/labs given to you for this referral                  Your next 10 appointments already scheduled     Dec 04, 2017 12:45 PM CST   [facility] Visit with Lisa Conrad NP   [facility]    [address]  [address]  [facility] [address] [phone]              Who to contact     If you have questions or need follow up information about today's clinic visit or your schedule please contact [facility] directly at [phone].  Normal or non-critical lab and imaging results will be communicated to you by MyChart, letter or phone within 4 business days after the clinic has received the results. If you do not hear from us within 7 days, please contact the clinic through MyChart or phone. If you have a critical or abnormal lab result, we will notify you by phone as soon as possible.  Submit refill requests through [employer] or call your pharmacy and they will forward the  "refill request to us. Please allow 3 business days for your refill to be completed.          Additional Information About Your Visit        Opality[employer]Mind[employer]o Information     [employer] lets you send messages to your doctor, view your test results, renew your prescriptions, schedule appointments and more. To sign up, go to [email or web address] . Click on \"Log in\" on the left side of the screen, which will take you to the Welcome page. Then click on \"Sign up Now\" on the right side of the page.     You will be asked to enter the access code listed below, as well as some personal information. Please follow the directions to create your username and password.     Your access code is: [email or web address]  Expires: 2017  1:14 PM     Your access code will  in 90 days. If you need help or a new code, please call your [place] clinic or [phone].        Care EveryWhere ID     This is your Care EveryWhere ID. This could be used by other organizations to access your [place] medical records  [id number]        Your Vitals Were     Pulse Temperature Height Last Period Pulse Oximetry BMI (Body Mass Index)    87 98  F (36.7  C) (Oral) 5' 4\" (1.626 m) 10/03/2017 (Exact Date) 95% 58.14 kg/m2       Blood Pressure from Last 3 Encounters:   10/23/17 126/86   10/09/17 128/84   10/02/17 122/78    Weight from Last 3 Encounters:   10/23/17 (!) 338 lb 11.2 oz (153.6 kg)   10/09/17 (!) 336 lb 12.8 oz (152.8 kg)   10/02/17 (!) 337 lb 3.2 oz (153 kg)              We Performed the Following     Comprehensive metabolic panel     DEPRESSION ACTION PLAN (DAP)     GENERAL SURG ADULT REFERRAL     Hemoglobin     Lipid Profile     OFFICE/OUTPT VISIT,EST,LEVL II          Where to get your medicines      These medications were sent to [facility] [address] [place], [place] - [address] AT [facility] [address], [facility] [address]     Phone:  [phone]     albuterol 108 (90 BASE) MCG/ACT Inhaler          Primary " Care Provider Office Phone # Fax #    Jermain Jaramillo [phone]       [address] [facility] [address]        Equal Access to Services     LARRY BULL : Hadii aad ku cro Solaraali, waaxda luqadaha, qaybta kaalmada adesouleymane, shayy renteriayola adri. So [facility] [phone].    ATENCIÓN: Si habla español, tiene a magaña disposición servicios gratuitos de asistencia lingüística. Llame al [phone].    We comply with applicable federal civil rights laws and [place] laws. We do not discriminate on the basis of race, color, national origin, age, disability, sex, sexual orientation, or gender identity.            Thank you!     Thank you for choosing [facility]  for your care. Our goal is always to provide you with excellent care. Hearing back from our patients is one way we can continue to improve our services. Please take a few minutes to complete the written survey that you may receive in the mail after your visit with us. Thank you!             Your Updated Medication List - Protect others around you: Learn how to safely use, store and throw away your medicines at www.disposemymeds.org.          This list is accurate as of: 10/23/17  8:32 AM.  Always use your most recent med list.                   Brand Name Dispense Instructions for use Diagnosis    albuterol 108 (90 BASE) MCG/ACT Inhaler    PROAIR HFA/PROVENTIL HFA/VENTOLIN HFA    1 Inhaler    Inhale 2 puffs into the lungs 4 times daily as needed for shortness of breath / dyspnea    Intermittent asthma without complication, unspecified asthma severity       gabapentin 600 MG tablet    NEURONTIN    90 tablet    Take 1 tablet (600 mg) by mouth 3 times daily Increased dose. For anxiety.    STEPHENIE (generalized anxiety disorder)       lurasidone 80 MG Tabs tablet    LATUDA    90 tablet    Take 1 tablet (80 mg) by mouth daily Take with food.    Major depressive disorder, single episode, mild (H)

## 2017-10-23 NOTE — LETTER
[facility]  [address] [address]  [phone]      10/23/2017       Cristina Dean  [address] [address]        Dear Cristina,    I am pleased to inform you that your routine blood work including your hemoglobin, sodium, potassium, calcium, kidney and liver function tests are all normal.    Your cholesterol is high and could be treated more aggressively.  Please follow-up with me to discuss your further medication options if you are interested.    Your blood sugar function test is just slightly abnormal and elevated and should be rechecked here in the clinic in 12 months with a follow-up visit with me, fasting.  I will look forward to seeing you at that time and please call to make an appointment.  In the meantime, please work on your diet limiting your carbohydrates and getting some good, regular exercise.    Sincerely,      Jermain Jaramillo MD  Internal Medicine

## 2017-10-24 ASSESSMENT — ASTHMA QUESTIONNAIRES: ACT_TOTALSCORE: 22

## 2017-10-30 ENCOUNTER — OFFICE VISIT (OUTPATIENT)
Dept: SURGERY | Facility: CLINIC | Age: 37
End: 2017-10-30
Payer: COMMERCIAL

## 2017-10-30 VITALS
WEIGHT: 293 LBS | SYSTOLIC BLOOD PRESSURE: 145 MMHG | BODY MASS INDEX: 58.1 KG/M2 | HEART RATE: 86 BPM | TEMPERATURE: 98 F | OXYGEN SATURATION: 93 % | DIASTOLIC BLOOD PRESSURE: 92 MMHG

## 2017-10-30 DIAGNOSIS — R22.2 MASS ON BACK: Primary | ICD-10-CM

## 2017-10-30 PROCEDURE — 99203 OFFICE O/P NEW LOW 30 MIN: CPT | Performed by: SURGERY

## 2017-10-30 NOTE — LETTER
2017    Re: Cristina Dean : 1980    This is a 37-year-old patient I was asked by Dr. Jermain Jaramillo to see regarding a large mass on her right chest/back.  The patient has noticed this over the last couple of years, and has gotten progressively larger.  It is becoming increasingly painful as well.  The patient believes there is been some skin color change over the lesion.     Physical exam:  The patient is in no apparent distress.  She is morbidly obese.  Breathing is nonlabored.  The right back reveals a 20 x 25 cm mass.  This is soft and of fatty consistency.  Skin reveals some mild hyperemia consistent with neovascularization over the mass.     This is a patient with a very large subcutaneous mass.  This is been increasing in size and symptoms. I recommended excision under a general anesthetic.  We discussed the procedure, along with its risks and complications, and detail.  We discussed the fairly strong possibility that a drain would be required. We discussed the possibility of postoperative seroma.  The patient has agreed to proceed.  We will work on getting this scheduled for her in the near future.     Vicente Pérez MD

## 2017-10-30 NOTE — PROGRESS NOTES
This is a 37-year-old patient I was asked by Dr. Jermain Jaramillo to see regarding a large mass on her right chest/back.  The patient has noticed this over the last couple of years, and has gotten progressively larger.  It is becoming increasingly painful as well.  The patient believes there is been some skin color change over the lesion.    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, unspecified      Depression      Herpes      Intermittent asthma     triggers include fall and spring allergy seasons     Obesity 8/1/12    BMI 53     Past Surgical History:   Procedure Laterality Date     TONSILLECTOMY  1984     Family History   Problem Relation Age of Onset     CANCER Father      acute leukemia (in remission)     Neurologic Disorder Father      neuropathy from chemotherapy     DIABETES Father      type II DM     Family History Negative Sister      Ovarian Cancer Paternal Aunt      Ovarian Cancer Paternal Aunt      Ovarian Cancer Paternal Aunt      Social History     Social History     Marital status:      Spouse name: N/A     Number of children: N/A     Years of education: N/A     Occupational History     Not on file.     Social History Main Topics     Smoking status: Current Every Day Smoker     Packs/day: 0.50     Years: 15.00     Smokeless tobacco: Never Used      Comment: ~ 1 pack per week - started at age 14      Alcohol use Yes      Comment: socially     Drug use: No     Sexual activity: Yes     Partners: Male     Birth control/ protection: Condom     Other Topics Concern     Not on file     Social History Narrative     Physical exam:  The patient is in no apparent distress.  She is morbidly obese.  Breathing is nonlabored.  The right back reveals a 20 x 25 cm mass.  This is soft and of fatty consistency.  Skin reveals some mild hyperemia consistent with neovascularization over the mass.    This is a patient with a  very large subcutaneous mass.  This is been increasing in size and symptoms.  I recommended excision under a general anesthetic.  We discussed the procedure, along with its risks and complications, and detail.  We discussed the fairly strong possibility that a drain would be required.  We discussed the possibility of postoperative seroma.  The patient has agreed to proceed.  We will work on getting this scheduled for her in the near future.    Vicente Pérez MD  [facility]    Please route or send letter to:  Primary Care Provider (PCP)

## 2017-10-30 NOTE — MR AVS SNAPSHOT
After Visit Summary   10/30/2017    Cristina Dean    MRN: [id number]           Patient Information     Date Of Birth          1980        Visit Information        Provider Department      10/30/2017 1:30 PM Vicente Pérez MD [facility] [employer] [facility]      Today's Diagnoses     Mass on back    -  1       Follow-ups after your visit        Your next 10 appointments already scheduled     Nov 13, 2017  1:40 PM CST   Office Visit with Jermain Jaramillo MD   [facility]    [address] [id number] [phone]           Bring a current list of meds and any records pertaining to this visit. For Physicals, please bring immunization records and any forms needing to be filled out. Please arrive 10 minutes early to complete paperwork.            Dec 04, 2017 12:45 PM CST   [facility] Visit with Lisa Conrad NP   [facility]    [address]  [address]  [facility] [address] [phone]              Who to contact     If you have questions or need follow up information about today's clinic visit or your schedule please contact [facility] directly at [phone].  Normal or non-critical lab and imaging results will be communicated to you by MyChart, letter or phone within 4 business days after the clinic has received the results. If you do not hear from us within 7 days, please contact the clinic through [email or web address] or phone. If you have a critical or abnormal lab result, we will notify you by phone as soon as possible.  Submit refill requests through [employer] or call your pharmacy and they will forward the refill request to us. Please allow 3 business days for your refill to be completed.          Additional Information About Your Visit        [employer] Information     [employer] lets you send  "messages to your doctor, view your test results, renew your prescriptions, schedule appointments and more. To sign up, go to [email or web address] . Click on \"Log in\" on the left side of the screen, which will take you to the Welcome page. Then click on \"Sign up Now\" on the right side of the page.     You will be asked to enter the access code listed below, as well as some personal information. Please follow the directions to create your username and password.     Your access code is: [email or web address]  Expires: 2017  1:14 PM     Your access code will  in 90 days. If you need help or a new code, please call your [place] clinic or [phone].        Care EveryWhere ID     This is your Care EveryWhere ID. This could be used by other organizations to access your [place] medical records  [id number]        Your Vitals Were     Pulse Temperature Last Period Pulse Oximetry BMI (Body Mass Index)       86 98  F (36.7  C) 10/03/2017 (Exact Date) 93% 58.1 kg/m2        Blood Pressure from Last 3 Encounters:   10/30/17 (!) 145/92   10/23/17 126/76   10/09/17 128/84    Weight from Last 3 Encounters:   10/30/17 (!) 338 lb 8 oz (153.5 kg)   10/23/17 (!) 338 lb 11.2 oz (153.6 kg)   10/09/17 (!) 336 lb 12.8 oz (152.8 kg)              Today, you had the following     No orders found for display       Primary Care Provider Office Phone # Fax #    Jermain Jaramillo [phone]       [address] [facility] [address]        Equal Access to Services     ZAHRA [facility] Hospital[facility]SAR : Vadim Gonzalez, waisamar marques, anya kaalmashauna olson, shayy rush. So [facility] [phone].    ATENCIÓN: Si habla español, tiene a magaña disposición servicios gratuitos de asistencia lingüística. Llame al [phone].    We comply with applicable federal civil rights laws and [place] laws. We do not discriminate on the basis of race, color, national origin, age, disability, sex, sexual " orientation, or gender identity.            Thank you!     Thank you for choosing [facility]  for your care. Our goal is always to provide you with excellent care. Hearing back from our patients is one way we can continue to improve our services. Please take a few minutes to complete the written survey that you may receive in the mail after your visit with us. Thank you!             Your Updated Medication List - Protect others around you: Learn how to safely use, store and throw away your medicines at www.disposemymeds.org.          This list is accurate as of: 10/30/17  2:04 PM.  Always use your most recent med list.                   Brand Name Dispense Instructions for use Diagnosis    albuterol 108 (90 BASE) MCG/ACT Inhaler    PROAIR HFA/PROVENTIL HFA/VENTOLIN HFA    1 Inhaler    Inhale 2 puffs into the lungs 4 times daily as needed for shortness of breath / dyspnea    Intermittent asthma without complication, unspecified asthma severity       gabapentin 600 MG tablet    NEURONTIN    90 tablet    Take 1 tablet (600 mg) by mouth 3 times daily Increased dose. For anxiety.    STEPHENIE (generalized anxiety disorder)       lurasidone 80 MG Tabs tablet    LATUDA    90 tablet    Take 1 tablet (80 mg) by mouth daily Take with food.    Major depressive disorder, single episode, mild (H)

## 2017-11-13 ENCOUNTER — OFFICE VISIT (OUTPATIENT)
Dept: INTERNAL MEDICINE | Facility: CLINIC | Age: 37
End: 2017-11-13
Payer: COMMERCIAL

## 2017-11-13 VITALS
TEMPERATURE: 98.6 F | SYSTOLIC BLOOD PRESSURE: 130 MMHG | HEART RATE: 85 BPM | OXYGEN SATURATION: 95 % | WEIGHT: 293 LBS | BODY MASS INDEX: 58.26 KG/M2 | DIASTOLIC BLOOD PRESSURE: 84 MMHG

## 2017-11-13 DIAGNOSIS — F31.9 BIPOLAR AFFECTIVE DISORDER, REMISSION STATUS UNSPECIFIED (H): ICD-10-CM

## 2017-11-13 DIAGNOSIS — Z13.6 CARDIOVASCULAR SCREENING; LDL GOAL LESS THAN 130: Primary | ICD-10-CM

## 2017-11-13 DIAGNOSIS — E66.01 MORBID OBESITY DUE TO EXCESS CALORIES (H): ICD-10-CM

## 2017-11-13 PROCEDURE — 99214 OFFICE O/P EST MOD 30 MIN: CPT | Performed by: INTERNAL MEDICINE

## 2017-11-13 RX ORDER — ATORVASTATIN CALCIUM 10 MG/1
10 TABLET, FILM COATED ORAL DAILY
Qty: 90 TABLET | Refills: 3 | Status: SHIPPED | OUTPATIENT
Start: 2017-11-13 | End: 2018-11-26

## 2017-11-13 NOTE — MR AVS SNAPSHOT
After Visit Summary   11/13/2017    Cristina Dean    MRN: [id number]           Patient Information     Date Of Birth          1980        Visit Information        Provider Department      11/13/2017 1:40 PM Jermain Jaramillo MD [facility]        Today's Diagnoses     CARDIOVASCULAR SCREENING; LDL GOAL LESS THAN 130    -  1       Follow-ups after your visit        Your next 10 appointments already scheduled     Dec 04, 2017 12:45 PM CST   [facility]    [address]  [address]  [facility] [address]   [phone]            Dec 28, 2017  7:30 AM CST   [facility] Same Day Surgery with Vicente Pérez MD, Antonio D eSouza PA-C   [facility])    [facility] ([facility])   [phone]            Dec 28, 2017   Procedure with Vicente Pérez MD   [facility] (--)    [address]  [facility] [address]   [phone]              Future tests that were ordered for you today     Open Future Orders        Priority Expected Expires Ordered    Hepatic panel Routine 2/1/2018 2/28/2018 11/13/2017    Lipid Profile Routine 2/1/2018 2/28/2018 11/13/2017            Who to contact     If you have questions or need follow up information about today's clinic visit or your schedule please contact [facility] directly at [phone].  Normal or non-critical lab and imaging results will be communicated to you by MyChart, letter or phone within 4 business days after the clinic has received the results. If you do not hear from us within 7 days, please contact the clinic through MyChart or phone. If you have a critical or abnormal lab result, we will notify you by phone as soon as possible.  Submit refill requests through  "MyChart or call your pharmacy and they will forward the refill request to us. Please allow 3 business days for your refill to be completed.          Additional Information About Your Visit        MyChart Information     Fanattachart lets you send messages to your doctor, view your test results, renew your prescriptions, schedule appointments and more. To sign up, go to [email or web address] . Click on \"Log in\" on the left side of the screen, which will take you to the Welcome page. Then click on \"Sign up Now\" on the right side of the page.     You will be asked to enter the access code listed below, as well as some personal information. Please follow the directions to create your username and password.     Your access code is: [email or web address]  Expires: 2017 12:14 PM     Your access code will  in 90 days. If you need help or a new code, please call your [place] clinic or [phone].        Care EveryWhere ID     This is your Care EveryWhere ID. This could be used by other organizations to access your [place] medical records  [id number]        Your Vitals Were     Pulse Temperature Last Period Pulse Oximetry Breastfeeding? BMI (Body Mass Index)    85 98.6  F (37  C) (Oral) 2017 (Exact Date) 95% No 58.26 kg/m2       Blood Pressure from Last 3 Encounters:   17 130/84   10/30/17 (!) 145/92   10/23/17 126/76    Weight from Last 3 Encounters:   17 (!) 339 lb 6.4 oz (154 kg)   10/30/17 (!) 338 lb 8 oz (153.5 kg)   10/23/17 (!) 338 lb 11.2 oz (153.6 kg)                 Today's Medication Changes          These changes are accurate as of: 17  1:52 PM.  If you have any questions, ask your nurse or doctor.               Start taking these medicines.        Dose/Directions    atorvastatin 10 MG tablet   Commonly known as:  LIPITOR   Used for:  CARDIOVASCULAR SCREENING; LDL GOAL LESS THAN 130   Started by:  Jermain Jaramillo MD        Dose:  10 mg   Take 1 tablet (10 mg) by mouth daily "   Quantity:  90 tablet   Refills:  3            Where to get your medicines      These medications were sent to [id number] - [facility] [address] AT [facility] [address], [facility] [address]    Hours:  24-hours Phone:  [phone]     atorvastatin 10 MG tablet                Primary Care Provider Office Phone # Fax #    Jermain Jaramillo [phone]       [address] [facility] [address]        Equal Access to Services     LARRY BULL : Hadii aad ku hadasho Soomaali, waaxda luqadaha, qaybta kaalmada adeegyada, waxay idiin hayaan adeeg kharash lakj ah. So [facility] [phone].    ATENCIÓN: Si habla español, tiene a magaña disposición servicios gratuitos de asistencia lingüística. [facility] [phone].    We comply with applicable federal civil rights laws and [place] laws. We do not discriminate on the basis of race, color, national origin, age, disability, sex, sexual orientation, or gender identity.            Thank you!     Thank you for choosing [facility]  for your care. Our goal is always to provide you with excellent care. Hearing back from our patients is one way we can continue to improve our services. Please take a few minutes to complete the written survey that you may receive in the mail after your visit with us. Thank you!             Your Updated Medication List - Protect others around you: Learn how to safely use, store and throw away your medicines at www.disposemymeds.org.          This list is accurate as of: 11/13/17  1:52 PM.  Always use your most recent med list.                   Brand Name Dispense Instructions for use Diagnosis    albuterol 108 (90 BASE) MCG/ACT Inhaler    PROAIR HFA/PROVENTIL HFA/VENTOLIN HFA    1 Inhaler    Inhale 2 puffs into the lungs 4 times daily as needed for shortness of breath / dyspnea    Intermittent asthma without complication, unspecified asthma severity        atorvastatin 10 MG tablet    LIPITOR    90 tablet    Take 1 tablet (10 mg) by mouth daily    CARDIOVASCULAR SCREENING; LDL GOAL LESS THAN 130       gabapentin 600 MG tablet    NEURONTIN    90 tablet    Take 1 tablet (600 mg) by mouth 3 times daily Increased dose. For anxiety.    STEPHENIE (generalized anxiety disorder)       lurasidone 80 MG Tabs tablet    LATUDA    90 tablet    Take 1 tablet (80 mg) by mouth daily Take with food.    Major depressive disorder, single episode, mild (H)

## 2017-11-13 NOTE — PROGRESS NOTES
SUBJECTIVE:   Cristina Dean is a 37 year old female who presents to clinic today for the following health issues:    Follow up 10/23/17 cholesterol results     LDL Cholesterol Calculated   Date Value Ref Range Status   10/23/2017 145 (H) <100 mg/dL Final     Comment:     Above desirable:  100-129 mg/dl  Borderline High:  130-159 mg/dL  High:             160-189 mg/dL  Very high:       >189 mg/dl       Problem list and histories reviewed & adjusted, as indicated.  Additional history: as documented    Patient Active Problem List   Diagnosis     STEPHENIE (generalized anxiety disorder)     Morbid obesity due to excess calories (H)     CARDIOVASCULAR SCREENING; LDL GOAL LESS THAN 130     Bipolar affective disorder (H)     Herpes     Panic disorder with agoraphobia     Recurrent major depressive disorder, remission status unspecified (H)     Intermittent asthma without complication, unspecified asthma severity     Past Surgical History:   Procedure Laterality Date     TONSILLECTOMY  1984       Social History   Substance Use Topics     Smoking status: Current Every Day Smoker     Packs/day: 0.50     Years: 15.00     Smokeless tobacco: Never Used      Comment: ~ 1 pack per week - started at age 14      Alcohol use Yes      Comment: socially     Family History   Problem Relation Age of Onset     CANCER Father      acute leukemia (in remission)     Neurologic Disorder Father      neuropathy from chemotherapy     DIABETES Father      type II DM     Family History Negative Sister      Ovarian Cancer Paternal Aunt      Ovarian Cancer Paternal Aunt      Ovarian Cancer Paternal Aunt          Current Outpatient Prescriptions   Medication Sig Dispense Refill     albuterol (PROAIR HFA/PROVENTIL HFA/VENTOLIN HFA) 108 (90 BASE) MCG/ACT Inhaler Inhale 2 puffs into the lungs 4 times daily as needed for shortness of breath / dyspnea 1 Inhaler prn     lurasidone (LATUDA) 80 MG TABS tablet Take 1 tablet (80 mg) by mouth daily Take with  food. 90 tablet 1     gabapentin (NEURONTIN) 600 MG tablet Take 1 tablet (600 mg) by mouth 3 times daily Increased dose. For anxiety. 90 tablet 1     Allergies   Allergen Reactions     No Known Drug Allergies      BP Readings from Last 3 Encounters:   10/30/17 (!) 145/92   10/23/17 126/76   10/09/17 128/84    Wt Readings from Last 3 Encounters:   10/30/17 (!) 338 lb 8 oz (153.5 kg)   10/23/17 (!) 338 lb 11.2 oz (153.6 kg)   10/09/17 (!) 336 lb 12.8 oz (152.8 kg)           Reviewed and updated as needed this visit by clinical staff     Reviewed and updated as needed this visit by Provider       ROS:  C: NEGATIVE for fever, chills, change in weight  E/M: NEGATIVE for ear, mouth and throat problems  R: NEGATIVE for significant cough or SOB  CV: NEGATIVE for chest pain, palpitations or peripheral edema  GI: NEGATIVE for nausea, abdominal pain, heartburn, or change in bowel habits  : NEGATIVE for frequency, dysuria, or hematuria  M: NEGATIVE for significant arthralgias or myalgia  H: NEGATIVE for bleeding problems  P: NEGATIVE for changes in mood or affect    OBJECTIVE:                                                    /84  Pulse 85  Temp 98.6  F (37  C) (Oral)  Wt (!) 339 lb 6.4 oz (154 kg)  LMP 11/05/2017 (Exact Date)  SpO2 95%  Breastfeeding? No  BMI 58.26 kg/m2  Body mass index is 58.26 kg/(m^2).  GENERAL: alert and no distress  EYES: Eyes grossly normal to inspection, extraocular movements - intact, and PERRL  HENT: ear canals- normal; TMs- normal; Nose- normal; Mouth- no ulcers, no lesions  NECK: no tenderness, no adenopathy, no asymmetry, no masses, no stiffness; thyroid- normal to palpation  RESP: lungs clear to auscultation - no rales, no rhonchi, no wheezes  CV: regular rates and rhythm, normal S1 S2, no S3 or S4 and no murmur, no click or rub -  ABDOMEN: obese  MS: extremities- no gross deformities noted, no edema  PSYCH: Alert and oriented times 3; speech- coherent , normal rate and volume;  able to articulate logical thoughts, able to abstract reason, no tangential thoughts, no hallucinations or delusions, affect- normal       ASSESSMENT/PLAN:                                                      (Z13.6) CARDIOVASCULAR SCREENING; LDL GOAL LESS THAN 130  (primary encounter diagnosis)  Comment: discussed options of care and treatment options discussed with patient  Plan: elected to start atorvastatin (LIPITOR) 10 MG tablet, Hepatic         panel, Lipid Profile        After discussion of the treatment of hyperlipidemia, a statin-drug is chosen; prescription for Lipitor once daily is written. The patient is informed that this type of drug is usually highly effective to lower LDL cholesterol and is usually very well tolerated. However, statin-type drugs can potentially injure the liver. Blood tests will be required in 3 months Damage to the liver, if detected early, can be reversed by stopping the drug.  Be alert for persistent nausea, abdominal pain, or yellow jaundice, and report such to me directly. Statin drugs may also cause skeletal muscle injury in rare cases. Be alert for pronounced persistent diffuse muscle pain and discontinue the drug immediately should such symptoms develop. Grapefruit juice may increase the blood levels and side effects of some HMG Co-A reductase inhibitors (Zocor, Lipitor and Mevacor but not Pravachol or Lescol). Patient is counseled in this regard.      (E66.01) Morbid obesity due to excess calories (H)  Comment: discussed benefit of weight loss in treating lipids  Plan:     (F31.9) Bipolar affective disorder, remission status unspecified (H)  Comment: stable on therapy as noted  Plan:       See Patient Instructions    Jermain Jaramillo MD  [facility]

## 2017-11-13 NOTE — NURSING NOTE
"Chief Complaint   Patient presents with     Lipids       Initial /84  Pulse 85  Temp 98.6  F (37  C) (Oral)  Wt (!) 339 lb 6.4 oz (154 kg)  LMP 11/05/2017 (Exact Date)  SpO2 95%  Breastfeeding? No  BMI 58.26 kg/m2 Estimated body mass index is 58.26 kg/(m^2) as calculated from the following:    Height as of 10/23/17: 5' 4\" (1.626 m).    Weight as of this encounter: 339 lb 6.4 oz (154 kg).  Medication Reconciliation: complete   Lisa Hooks CMA      "

## 2017-12-04 ENCOUNTER — OFFICE VISIT (OUTPATIENT)
Dept: PSYCHIATRY | Facility: CLINIC | Age: 37
End: 2017-12-04
Payer: COMMERCIAL

## 2017-12-04 VITALS
BODY MASS INDEX: 50.02 KG/M2 | HEART RATE: 88 BPM | OXYGEN SATURATION: 95 % | SYSTOLIC BLOOD PRESSURE: 130 MMHG | DIASTOLIC BLOOD PRESSURE: 78 MMHG | TEMPERATURE: 98.6 F | WEIGHT: 293 LBS | HEIGHT: 64 IN

## 2017-12-04 DIAGNOSIS — F41.1 GAD (GENERALIZED ANXIETY DISORDER): ICD-10-CM

## 2017-12-04 DIAGNOSIS — F33.1 DEPRESSION, MAJOR, RECURRENT, MODERATE (H): Primary | ICD-10-CM

## 2017-12-04 PROBLEM — F10.90 ALCOHOL USE DISORDER: Status: ACTIVE | Noted: 2017-12-04

## 2017-12-04 PROBLEM — F12.10 CANNABIS USE DISORDER, MILD, ABUSE: Status: ACTIVE | Noted: 2017-12-04

## 2017-12-04 PROCEDURE — 99214 OFFICE O/P EST MOD 30 MIN: CPT | Performed by: NURSE PRACTITIONER

## 2017-12-04 RX ORDER — BUPROPION HYDROCHLORIDE 150 MG/1
TABLET ORAL
Qty: 60 TABLET | Refills: 1 | Status: SHIPPED | OUTPATIENT
Start: 2017-12-04 | End: 2018-01-18

## 2017-12-04 RX ORDER — GABAPENTIN 600 MG/1
600 TABLET ORAL 3 TIMES DAILY
Qty: 90 TABLET | Refills: 3 | Status: SHIPPED | OUTPATIENT
Start: 2017-12-04 | End: 2018-01-18 | Stop reason: DRUGHIGH

## 2017-12-04 ASSESSMENT — ANXIETY QUESTIONNAIRES
GAD7 TOTAL SCORE: 12
1. FEELING NERVOUS, ANXIOUS, OR ON EDGE: MORE THAN HALF THE DAYS
4. TROUBLE RELAXING: MORE THAN HALF THE DAYS
7. FEELING AFRAID AS IF SOMETHING AWFUL MIGHT HAPPEN: SEVERAL DAYS
3. WORRYING TOO MUCH ABOUT DIFFERENT THINGS: MORE THAN HALF THE DAYS
GAD7 TOTAL SCORE: 12
2. NOT BEING ABLE TO STOP OR CONTROL WORRYING: MORE THAN HALF THE DAYS
7. FEELING AFRAID AS IF SOMETHING AWFUL MIGHT HAPPEN: SEVERAL DAYS
6. BECOMING EASILY ANNOYED OR IRRITABLE: MORE THAN HALF THE DAYS
GAD7 TOTAL SCORE: 12
5. BEING SO RESTLESS THAT IT IS HARD TO SIT STILL: SEVERAL DAYS

## 2017-12-04 ASSESSMENT — PATIENT HEALTH QUESTIONNAIRE - PHQ9
SUM OF ALL RESPONSES TO PHQ QUESTIONS 1-9: 16
SUM OF ALL RESPONSES TO PHQ QUESTIONS 1-9: 16
10. IF YOU CHECKED OFF ANY PROBLEMS, HOW DIFFICULT HAVE THESE PROBLEMS MADE IT FOR YOU TO DO YOUR WORK, TAKE CARE OF THINGS AT HOME, OR GET ALONG WITH OTHER PEOPLE: VERY DIFFICULT

## 2017-12-04 NOTE — MR AVS SNAPSHOT
After Visit Summary   12/4/2017    Cristina Dean    MRN: [id number]           Patient Information     Date Of Birth          1980        Visit Information        Provider Department      12/4/2017 12:45 PM Lisa Conrad NP [facility]        Today's Diagnoses     Depression, major, recurrent, moderate (H)    -  1    STEPHENIE (generalized anxiety disorder)          Care Instructions    Treatment Plan:    Continue Neurontin (gabapentin) 600 mg by mouth 3 times per day for anxiety.     Continue Latuda (lurasidone) 80 mg by mouth daily. For mood. Take with food.     Start Wellbutrin (buproprion)  mg daily in AM for mood and energy for 2 weeks then increase to 300 mg daily in AM.     Continue all other medications as reviewed per electronic medical record today.     Safety plan reviewed. To the Emergency Department as needed or call after hours crisis line at [phone] or [phone].     To schedule individual or family therapy, call [facility] at [phone].     Schedule an appointment with me in 8-10 weeks or sooner as needed. Call [facility] at [phone] to schedule.    Follow up with primary care provider as planned or for acute medical concerns.    Call the psychiatric nurse line with medication questions or concerns at [phone].    MyChart may be used to communicate with your provider, but this is not intended to be used for emergencies.          Follow-ups after your visit        Follow-up notes from your care team     Return in about 8 weeks (around 1/29/2018).      Your next 10 appointments already scheduled     Dec 28, 2017  7:30 AM [facility] Same Day Surgery with Vicente Pérez MD, Antonio De Souza PA-C   [facility] ([facility])    [facility])   [phone]            Dec 28, 2017   Procedure with  "Vicente Pérez MD   [facility] (--)    [address] [facility] [address] [phone]              Who to contact     If you have questions or need follow up information about today's clinic visit or your schedule please contact [facility] directly at [phone].  Normal or non-critical lab and imaging results will be communicated to you by MyChart, letter or phone within 4 business days after the clinic has received the results. If you do not hear from us within 7 days, please contact the clinic through MyChart or phone. If you have a critical or abnormal lab result, we will notify you by phone as soon as possible.  Submit refill requests through My-Hammer or call your pharmacy and they will forward the refill request to us. Please allow 3 business days for your refill to be completed.          Additional Information About Your Visit        [employer]har[employer]arFind Information     My-Hammer lets you send messages to your doctor, view your test results, renew your prescriptions, schedule appointments and more. To sign up, go to [email or web address] . Click on \"Log in\" on the left side of the screen, which will take you to the Welcome page. Then click on \"Sign up Now\" on the right side of the page.     You will be asked to enter the access code listed below, as well as some personal information. Please follow the directions to create your username and password.     Your access code is: [email or web address]  Expires: 2017 12:14 PM     Your access code will  in 90 days. If you need help or a new code, please call your [place] clinic or [phone].        Care EveryWhere ID     This is your Care EveryWhere ID. This could be used by other organizations to access your [place] medical records  [id number]        Your Vitals Were     Pulse Temperature Height Last Period Pulse Oximetry Breastfeeding?    88 98.6  F (37  C) (Oral) 5' 4\" (1.626 m) 2017 (Exact Date) 95% No    " BMI (Body Mass Index)                   58.82 kg/m2            Blood Pressure from Last 3 Encounters:   12/04/17 130/78   11/13/17 130/84   10/30/17 (!) 145/92    Weight from Last 3 Encounters:   12/04/17 (!) 342 lb 11.2 oz (155.4 kg)   11/13/17 (!) 339 lb 6.4 oz (154 kg)   10/30/17 (!) 338 lb 8 oz (153.5 kg)              Today, you had the following     No orders found for display         Today's Medication Changes          These changes are accurate as of: 12/4/17  1:16 PM.  If you have any questions, ask your nurse or doctor.               Start taking these medicines.        Dose/Directions    buPROPion 150 MG 24 hr tablet   Commonly known as:  WELLBUTRIN XL   Used for:  Depression, major, recurrent, moderate (H), STEPHENIE (generalized anxiety disorder)   Started by:  Lisa Conrad NP        Start 1 tablet daily for 2 weeks, then increase to 2 tablets daily.   Quantity:  60 tablet   Refills:  1         These medicines have changed or have updated prescriptions.        Dose/Directions    gabapentin 600 MG tablet   Commonly known as:  NEURONTIN   This may have changed:  additional instructions   Used for:  STEPHENIE (generalized anxiety disorder)   Changed by:  Lisa Conrad NP        Dose:  600 mg   Take 1 tablet (600 mg) by mouth 3 times daily For anxiety.   Quantity:  90 tablet   Refills:  3            Where to get your medicines      These medications were sent to [facility] [address] [place] [address] [address] AT [place] [address], [facility] [address]    Hours:  24-hours Phone:  [phone]     buPROPion 150 MG 24 hr tablet    gabapentin 600 MG tablet                Primary Care Provider Office Phone # Fax #    Jermain Jaramillo [phone]       [address] [address]        Equal Access to Services     LARRY BULL AH: Vadim mendieta Sojudie, waaxda luqadaha, qaybta kaalmadeleine olson, shayy rush.  So [facility] [phone].    ATENCIÓN: Si habla ansley, tiene a magaña disposición servicios gratuitos de asistencia lingüística. Reva knight [phone].    We comply with applicable federal civil rights laws and [place] laws. We do not discriminate on the basis of race, color, national origin, age, disability, sex, sexual orientation, or gender identity.            Thank you!     Thank you for choosing [facility]  for your care. Our goal is always to provide you with excellent care. Hearing back from our patients is one way we can continue to improve our services. Please take a few minutes to complete the written survey that you may receive in the mail after your visit with us. Thank you!             Your Updated Medication List - Protect others around you: Learn how to safely use, store and throw away your medicines at www.disposemymeds.org.          This list is accurate as of: 12/4/17  1:16 PM.  Always use your most recent med list.                   Brand Name Dispense Instructions for use Diagnosis    albuterol 108 (90 BASE) MCG/ACT Inhaler    PROAIR HFA/PROVENTIL HFA/VENTOLIN HFA    1 Inhaler    Inhale 2 puffs into the lungs 4 times daily as needed for shortness of breath / dyspnea    Intermittent asthma without complication, unspecified asthma severity       atorvastatin 10 MG tablet    LIPITOR    90 tablet    Take 1 tablet (10 mg) by mouth daily    CARDIOVASCULAR SCREENING; LDL GOAL LESS THAN 130       buPROPion 150 MG 24 hr tablet    WELLBUTRIN XL    60 tablet    Start 1 tablet daily for 2 weeks, then increase to 2 tablets daily.    Depression, major, recurrent, moderate (H), STEPHENIE (generalized anxiety disorder)       gabapentin 600 MG tablet    NEURONTIN    90 tablet    Take 1 tablet (600 mg) by mouth 3 times daily For anxiety.    STEPHENIE (generalized anxiety disorder)       lurasidone 80 MG Tabs tablet    LATUDA    90 tablet    Take 1 tablet (80 mg) by mouth daily Take with food.    Major depressive  disorder, single episode, mild (H)

## 2017-12-04 NOTE — PATIENT INSTRUCTIONS
Treatment Plan:    Continue Neurontin (gabapentin) 600 mg by mouth 3 times per day for anxiety.     Continue Latuda (lurasidone) 80 mg by mouth daily. For mood. Take with food.     Start Wellbutrin (buproprion)  mg daily in AM for mood and energy for 2 weeks then increase to 300 mg daily in AM.     Continue all other medications as reviewed per electronic medical record today.     Safety plan reviewed. To the Emergency Department as needed or call after hours crisis line at [phone] or [phone].     To schedule individual or family therapy, call [facility] at [phone].     Schedule an appointment with me in 8-10 weeks or sooner as needed. Call [facility] at [phone] to schedule.    Follow up with primary care provider as planned or for acute medical concerns.    Call the psychiatric nurse line with medication questions or concerns at [phone].    Genophenhart may be used to communicate with your provider, but this is not intended to be used for emergencies.

## 2017-12-04 NOTE — NURSING NOTE
"Chief Complaint   Patient presents with     Consult       Initial /78 (BP Location: Left arm, Patient Position: Sitting, Cuff Size: Adult Large)  Pulse 88  Temp 98.6  F (37  C) (Oral)  Ht 5' 4\" (1.626 m)  Wt (!) 342 lb 11.2 oz (155.4 kg)  LMP 11/05/2017 (Exact Date)  SpO2 95%  Breastfeeding? No  BMI 58.82 kg/m2 Estimated body mass index is 58.82 kg/(m^2) as calculated from the following:    Height as of this encounter: 5' 4\" (1.626 m).    Weight as of this encounter: 342 lb 11.2 oz (155.4 kg).  Medication Reconciliation: complete   Augusto ABDALLA      "

## 2017-12-04 NOTE — PROGRESS NOTES
"    Outpatient Psychiatric Progress Note    Name: Cristina Dean   : 1980                    Primary Care Provider: Jermain Jaramillo MD - last visit 2017  Therapist: None    PHQ-9 scores:  PHQ-9 SCORE 10/2/2017 10/23/2017 2017   Total Score 11 5 16       STEPHENIE-7 scores:  STEPHENIE-7 SCORE 2017 10/2/2017 2017   Total Score 18 6 12     Answers for HPI/ROS submitted by the patient on 2017   If you checked off any problems, how difficult have these problems made it for you to do your work, take care of things at home, or get along with other people?: Very difficult    Patient Identification:  Patient is a 37 year old year old,   White  female  who presents for return visit with me.  Patient is currently employed part time. Patient attended the session alone. Patient prefers to be called: \"Cristina\".    Interim History:    I last saw Cristina Dean for outpatient psychiatry Return Visit on 10/2/2017.     During that appointment, we Increase Neurontin (gabapentin) to 600 mg by mouth 3 times per day for anxiety.     Continue Latuda (lurasidone) 80 mg by mouth daily. For mood. Take with food.      Current medications include:   Current Outpatient Prescriptions   Medication Sig     atorvastatin (LIPITOR) 10 MG tablet Take 1 tablet (10 mg) by mouth daily     albuterol (PROAIR HFA/PROVENTIL HFA/VENTOLIN HFA) 108 (90 BASE) MCG/ACT Inhaler Inhale 2 puffs into the lungs 4 times daily as needed for shortness of breath / dyspnea     lurasidone (LATUDA) 80 MG TABS tablet Take 1 tablet (80 mg) by mouth daily Take with food.     gabapentin (NEURONTIN) 600 MG tablet Take 1 tablet (600 mg) by mouth 3 times daily Increased dose. For anxiety.     No current facility-administered medications for this visit.        The [place] Prescription Monitoring Program has been reviewed and there are no concerns about diversionary activity for controlled substances at this time.      I was able to review " "most recent Primary Care Provider, specialty provider, and therapy visit notes that I have access to. Neurontin (gabapentin) 300 mg 90 tabs filled 8/7, 9/12, 10/2, and 11/13/2017 from me. No other controlled substances.    Cristina Dean reports mood has been: \"not good\" reports her period affects her mood. Patient reports that her period does not always affect her mood this much. Reports irritability has returned. Felt like she was doing good for awhile.   Anxiety has been: \"up there\" specific to her period starting.   Sleep has been: \"more\" no nightmares or bad dreams.   PHQ9 and GAD7 scores were reviewed today.   Medication side effects: Denies  Current stressors include: Relationship Difficulties, Symptoms, \"Everything\", Financial Difficulties  Coping mechanisms and supports include: Family, Friends, Grief Group, [facility], Workplace     Past medication trials include but are not limited to:   Paxil (paroxetine) \"made me suicidal\"  Seroquel (quetiapine) \"was a zombie\"  Latuda (lurasidone)   Minipress (prazosin)   Neurontin (gabapentin)   Desyrel/Olepto (trazodone) was good for sleeping issues  Benadryl (diphenhydramine) was good for sleeping issues  Cannot remember other medications she has been on in the past  Neurontin (gabapentin)     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    Social History:  Current Living situation:  [place], [place] with Spouse/Partner. Feels safe at home.  Current use of drugs or alcohol: Alcohol and Cannabis. Does not drink as much lately due to how it " "makes her feel. Still drinks about 2 products per week. Reports uses Marijuana to cope with anxiety- sometimes works and sometimes does not- reports still helps for relaxing.  Has not used as much due to fatigue.   Tobacco use: Yes Cigarettes when drinking  Caffeine:  Yes  3 cups/day of coffee- only on work days    Vital Signs:   /78 (BP Location: Left arm, Patient Position: Sitting, Cuff Size: Adult Large)  Pulse 88  Temp 98.6  F (37  C) (Oral)  Ht 5' 4\" (1.626 m)  Wt (!) 342 lb 11.2 oz (155.4 kg)  LMP 11/05/2017 (Exact Date)  SpO2 95%  Breastfeeding? No  BMI 58.82 kg/m2    Labs:  Most recent laboratory results reviewed and the pertinent results include:   Office Visit on 10/23/2017   Component Date Value Ref Range Status     Hemoglobin 10/23/2017 14.6  11.7 - 15.7 g/dL Final     Sodium 10/23/2017 138  133 - 144 mmol/L Final     Potassium 10/23/2017 4.1  3.4 - 5.3 mmol/L Final     Chloride 10/23/2017 105  94 - 109 mmol/L Final     Carbon Dioxide 10/23/2017 28  20 - 32 mmol/L Final     Anion Gap 10/23/2017 5  3 - 14 mmol/L Final     Glucose 10/23/2017 100* 70 - 99 mg/dL Final    Fasting specimen     Urea Nitrogen 10/23/2017 11  7 - 30 mg/dL Final     Creatinine 10/23/2017 0.80  0.52 - 1.04 mg/dL Final     GFR Estimate 10/23/2017 80  >60 mL/min/1.7m2 Final    Non  GFR Calc     GFR Estimate If Black 10/23/2017 >90  >60 mL/min/1.7m2 Final    African American GFR Calc     Calcium 10/23/2017 9.0  8.5 - 10.1 mg/dL Final     Bilirubin Total 10/23/2017 0.4  0.2 - 1.3 mg/dL Final     Albumin 10/23/2017 3.5  3.4 - 5.0 g/dL Final     Protein Total 10/23/2017 7.1  6.8 - 8.8 g/dL Final     Alkaline Phosphatase 10/23/2017 62  40 - 150 U/L Final     ALT 10/23/2017 34  0 - 50 U/L Final     AST 10/23/2017 20  0 - 45 U/L Final     Cholesterol 10/23/2017 231* <200 mg/dL Final    Desirable:       <200 mg/dl     Triglycerides 10/23/2017 233* <150 mg/dL Final    Comment: Borderline high:  150-199 " "mg/dl  High:             200-499 mg/dl  Very high:       >499 mg/dl  Fasting specimen       HDL Cholesterol 10/23/2017 39* >49 mg/dL Final     LDL Cholesterol Calculated 10/23/2017 145* <100 mg/dL Final    Comment: Above desirable:  100-129 mg/dl  Borderline High:  130-159 mg/dL  High:             160-189 mg/dL  Very high:       >189 mg/dl       Non HDL Cholesterol 10/23/2017 192* <130 mg/dL Final    Comment: Above Desirable:  130-159 mg/dl  Borderline high:  160-189 mg/dl  High:             190-219 mg/dl  Very high:       >219 mg/dl          Review of Systems:  10 systems (general, cardiovascular, respiratory, eyes, ENT, endocrine, GI, , M/S, neurological) were reviewed. Most pertinent finding(s) is/are: weight gain continues, dry mouth, no rashes. The remaining systems are all unremarkable.     Mental Status Examination:  Appearance:  awake, alert, adequately groomed, appeared stated age, no apparent distress, morbidly obese, early, alone  Attitude:  cooperative   Eye Contact:  fair and wears glasses  Gait and Station: Normal, No assistive Devices used and No dizziness or falls  Psychomotor Behavior:  no evidence of tardive dyskinesia, dystonia, or tics  Oriented to:  time, person, and place  Attention Span and Concentration:  Easily distracted per report at work only, adequate to interview today  Speech:  clear, coherent, regular rate, regular rhythm and fluent  Mood: \"not good\"  Affect:  blunted, tearful   Associations:  no loose associations  Thought Process:  logical, linear and goal oriented  Thought Content:  no evidence of psychotic thought, passive suicidal ideation present, no homicidal ideation and Appropriate to Interview  Recent and Remote Memory:  intact to interview. Uses memory reminders. Not formally assessed. No amnesia.  Fund of Knowledge: appropriate  Insight:  adequate  Judgment:  fair but adequate to safety  Impulse Control:  fair      Suicide Risk Assessment:  Today Cristina Dean " "reports chronic thoughts of \"I don't want to be here anymore, but I do not want to kill myself\". Continues to endorse that she has several days when she thinks she would be better off dead, but denies thoughts of suicide. In addition, there are notable risk factors for self-harm, including age, anxiety, substance abuse, suicidal ideation, anger/rage, hopelessness and mood change. However, risk is mitigated by commitment to family, spiritual/Anglican beliefs, absence of past attempts, ability to volunteer a safety plan, history of seeking help when needed, future oriented, no access to firearms or weapons, denies suicidal intent or plan, no family history of suicide and denies homicidal ideation, intent, or plan. Therefore, based on all available evidence including the factors cited above, Cristina Dean does not appear to be at imminent risk for self-harm, does not meet criteria for a 72-hr hold, and therefore remains appropriate for ongoing outpatient level of care.  A thorough assessment of risk factors related to suicide and self-harm have been reviewed and are noted above. Local community safety resources reviewed and printed for patient to use if needed. There was no deceit detected, and the patient presented in a manner that was believable.       DSM5  Diagnosis:  296.33 (F33.2) Major Depressive Disorder, Recurrent Episode, Severe With anxious distress- moderate severity                       Rule out Bipolar Disorder  300.02 (F41.1) Generalized Anxiety Disorder                        Rule out 307.51 (F50.8) Binge-Eating Disorder                         Rule out 301.83 (F60.3) Borderline Personality Disorder   Alcohol Use Disorder, partial remission  Cannabis Abuse   Obesity per BMI of 58.82    Medical comorbidities include:   Patient Active Problem List    Diagnosis Date Noted     Cannabis use disorder, mild, abuse 12/04/2017     Priority: Medium     Alcohol use disorder (H) 12/04/2017     Priority: " Medium     Bipolar affective disorder, remission status unspecified (H) 11/13/2017     Priority: Medium     Depression, major, recurrent, moderate (H) 10/23/2017     Priority: Medium     Intermittent asthma without complication, unspecified asthma severity 10/23/2017     Priority: Medium     CARDIOVASCULAR SCREENING; LDL GOAL LESS THAN 130 08/07/2012     Priority: Medium     Morbid obesity due to excess calories (H) 08/01/2012     Priority: Medium     BMI 53       STEPHENIE (generalized anxiety disorder)      Priority: Medium     Herpes 07/09/2012     Priority: Medium       Psychosocial & Contextual Factors: Occupational Difficulties, Financial Difficulties and Relationship Difficulties    Assessment:  Cristina Dean reports worsening mood and hypersomnia. Medication side effects and alternatives were reviewed. Health promotion activities recommended and reviewed today. All questions addressed. Education and counseling completed regarding risks and benefits of medications and psychotherapy options. Again reviewed the importance of therapy as patient has ongoing chronic psychosocial stressors that continue to affect her mood and anxiety. Patient reports she is not looking forward to the holidays as she does not like them, but she reports that she gets through them.     With history of alcohol dependence and frequent Marijuana use, I will avoid benzodiazepine medications at this time. Can trial Neurontin (gabapentin) for mood stability and anxiety assistance. If needed, could trial Buspar (buspirone) or Topamax (topiramate). For more activation, will consider adding Wellbutrin (buproprion).     Patient was started on Lipitor by her Primary Care Provider.     Treatment Plan:    Continue Neurontin (gabapentin) 600 mg by mouth 3 times per day for anxiety.     Continue Latuda (lurasidone) 80 mg by mouth daily. For mood. Take with food.     Start Wellbutrin (buproprion)  mg daily in AM for mood and energy for 2 weeks  then increase to 300 mg daily in AM.     Continue all other medications as reviewed per electronic medical record today.     Safety plan reviewed. To the Emergency Department as needed or call after hours crisis line at [phone] or [phone].     To schedule individual or family therapy, call [facility] at [phone].     Schedule an appointment with me in 8-10 weeks or sooner as needed. Call [facility] at [phone] to schedule.    Follow up with primary care provider as planned or for acute medical concerns.    Call the psychiatric nurse line with medication questions or concerns at [phone].    iMov[employer] may be used to communicate with your provider, but this is not intended to be used for emergencies.    Administrative Billing:   Time spent with patient was 30 minutes and greater than 50% of time or 20 minutes was spent in counseling and coordination of care regarding above diagnoses and treatment plan.    Patient Status:  Patient will continue to be seen for ongoing consultation and stabilization.    Signed:   Lisa Conrad, PhD, APRN, CNP   Psychiatry

## 2017-12-05 ASSESSMENT — ANXIETY QUESTIONNAIRES: GAD7 TOTAL SCORE: 12

## 2017-12-05 ASSESSMENT — PATIENT HEALTH QUESTIONNAIRE - PHQ9: SUM OF ALL RESPONSES TO PHQ QUESTIONS 1-9: 16

## 2017-12-21 ENCOUNTER — OFFICE VISIT (OUTPATIENT)
Dept: INTERNAL MEDICINE | Facility: CLINIC | Age: 37
End: 2017-12-21
Payer: COMMERCIAL

## 2017-12-21 VITALS
OXYGEN SATURATION: 96 % | HEART RATE: 89 BPM | WEIGHT: 293 LBS | DIASTOLIC BLOOD PRESSURE: 82 MMHG | TEMPERATURE: 97.7 F | BODY MASS INDEX: 50.02 KG/M2 | HEIGHT: 64 IN | SYSTOLIC BLOOD PRESSURE: 142 MMHG

## 2017-12-21 DIAGNOSIS — F31.9 BIPOLAR AFFECTIVE DISORDER, REMISSION STATUS UNSPECIFIED (H): ICD-10-CM

## 2017-12-21 DIAGNOSIS — E66.01 MORBID OBESITY DUE TO EXCESS CALORIES (H): ICD-10-CM

## 2017-12-21 DIAGNOSIS — F10.90 ALCOHOL USE DISORDER: ICD-10-CM

## 2017-12-21 DIAGNOSIS — D17.30 LIPOMA OF SKIN AND SUBCUTANEOUS TISSUE: ICD-10-CM

## 2017-12-21 DIAGNOSIS — J45.20 INTERMITTENT ASTHMA WITHOUT COMPLICATION, UNSPECIFIED ASTHMA SEVERITY: ICD-10-CM

## 2017-12-21 DIAGNOSIS — F12.10 CANNABIS USE DISORDER, MILD, ABUSE: ICD-10-CM

## 2017-12-21 DIAGNOSIS — Z01.818 PREOP GENERAL PHYSICAL EXAM: Primary | ICD-10-CM

## 2017-12-21 PROCEDURE — 99215 OFFICE O/P EST HI 40 MIN: CPT | Performed by: INTERNAL MEDICINE

## 2017-12-21 NOTE — MR AVS SNAPSHOT
After Visit Summary   12/21/2017    Cristina Dean    MRN: [id number]           Patient Information     Date Of Birth          1980        Visit Information        Provider Department      12/21/2017 2:20 PM [place]ermain Jaram[place] [facility]        Today's Diagnoses     Preop general physical exam    -  1    Lipoma of skin and subcutaneous tissue        Morbid obesity due to excess calories (H)        Alcohol use disorder (H)        Intermittent asthma without complication, unspecified asthma severity        Bipolar affective disorder, remission status unspecified (H)        Cannabis use disorder, mild, abuse          Care Instructions      Before Your Surgery      Call your surgeon if there is any change in your health. This includes signs of a cold or flu (such as a sore throat, runny nose, cough, rash or fever).    Do not smoke, drink alcohol or take over the counter medicine (unless your surgeon or primary care doctor tells you to) for the 24 hours before and after surgery.    If you take prescribed drugs: Follow your doctor s orders about which medicines to take and which to stop until after surgery.    Eating and drinking prior to surgery: follow the instructions from your surgeon    Take a shower or bath the night before surgery. Use the soap your surgeon gave you to gently clean your skin. If you do not have soap from your surgeon, use your regular soap. Do not shave or scrub the surgery site.  Wear clean pajamas and have clean sheets on your bed.           Follow-ups after your visit        Follow-up notes from your care team     Return if symptoms worsen or fail to improve.      Your next 10 appointments already scheduled     Dec 28, 2017  7:30 AM CST   [facility] Same Day Surgery with Vicente Pérez MD, Antonio De Souza PA-C   [facility] ([facility])    [facility]  "([facility])   [phone]            Dec 28, 2017   Procedure with Vicente Pérez MD   [facility] (--)    [address] [facility] [id number] [phone]              Who to contact     If you have questions or need follow up information about today's clinic visit or your schedule please contact [facility] directly at [phone].  Normal or non-critical lab and imaging results will be communicated to you by MyChart, letter or phone within 4 business days after the clinic has received the results. If you do not hear from us within 7 days, please contact the clinic through MyChart or phone. If you have a critical or abnormal lab result, we will notify you by phone as soon as possible.  Submit refill requests through [employer] or call your pharmacy and they will forward the refill request to us. Please allow 3 business days for your refill to be completed.          Additional Information About Your Visit        [facility] Information     [employer] lets you send messages to your doctor, view your test results, renew your prescriptions, schedule appointments and more. To sign up, go to [email or web address] . Click on \"Log in\" on the left side of the screen, which will take you to the Welcome page. Then click on \"Sign up Now\" on the right side of the page.     You will be asked to enter the access code listed below, as well as some personal information. Please follow the directions to create your username and password.     Your access code is: [email or web address]  Expires: 2017 12:14 PM     Your access code will  in 90 days. If you need help or a new code, please call your [facility] clinic or [phone].        Care EveryWhere ID     This is your Care EveryWhere ID. This could be used by other organizations to access your [facility] medical records  [id number]        Your Vitals Were     Pulse Temperature Height Last Period Pulse Oximetry " "Breastfeeding?    89 97.7  F (36.5  C) (Oral) 5' 4\" (1.626 m) 12/20/2017 (Exact Date) 96% No    BMI (Body Mass Index)                   58.46 kg/m2            Blood Pressure from Last 3 Encounters:   12/21/17 142/82   12/04/17 130/78   11/13/17 130/84    Weight from Last 3 Encounters:   12/21/17 (!) 340 lb 9.6 oz (154.5 kg)   12/04/17 (!) 342 lb 11.2 oz (155.4 kg)   11/13/17 (!) 339 lb 6.4 oz (154 kg)              Today, you had the following     No orders found for display       Primary Care Provider Office Phone # Fax #    Jermain Jaramillo [phone]       [address] [place] [phone]        Equal Access to Services     [facility]: Hadii aad ku hadasho Soomaali, waaxda luqadaha, qaybta kaalmada adeegyada, waxay yeein hayaan jacob jones . So [facility] [phone].    ATENCIÓN: Si habla español, tiene a magaña disposición servicios gratuitos de asistencia lingüística. Llame al [phone].    We comply with applicable federal civil rights laws and [place] laws. We do not discriminate on the basis of race, color, national origin, age, disability, sex, sexual orientation, or gender identity.            Thank you!     Thank you for choosing [facility]  for your care. Our goal is always to provide you with excellent care. Hearing back from our patients is one way we can continue to improve our services. Please take a few minutes to complete the written survey that you may receive in the mail after your visit with us. Thank you!             Your Updated Medication List - Protect others around you: Learn how to safely use, store and throw away your medicines at www.disposemymeds.org.          This list is accurate as of: 12/21/17  2:52 PM.  Always use your most recent med list.                   Brand Name Dispense Instructions for use Diagnosis    albuterol 108 (90 BASE) MCG/ACT Inhaler    PROAIR HFA/PROVENTIL HFA/VENTOLIN HFA    1 Inhaler    Inhale 2 puffs " into the lungs 4 times daily as needed for shortness of breath / dyspnea    Intermittent asthma without complication, unspecified asthma severity       atorvastatin 10 MG tablet    LIPITOR    90 tablet    Take 1 tablet (10 mg) by mouth daily    CARDIOVASCULAR SCREENING; LDL GOAL LESS THAN 130       buPROPion 150 MG 24 hr tablet    WELLBUTRIN XL    60 tablet    Start 1 tablet daily for 2 weeks, then increase to 2 tablets daily.    Depression, major, recurrent, moderate (H), STEPHENIE (generalized anxiety disorder)       gabapentin 600 MG tablet    NEURONTIN    90 tablet    Take 1 tablet (600 mg) by mouth 3 times daily For anxiety.    STEPHENIE (generalized anxiety disorder)       lurasidone 80 MG Tabs tablet    LATUDA    90 tablet    Take 1 tablet (80 mg) by mouth daily Take with food.    Major depressive disorder, single episode, mild (H)

## 2017-12-21 NOTE — PROGRESS NOTES
[facility]  [address] [address]  [phone]  Dept: [phone]    PRE-OP EVALUATION:  Today's date: 2017    Cristina Dean (: 1980) presents for pre-operative evaluation assessment as requested by Dr. Pérez.  She requires evaluation and anesthesia risk assessment prior to undergoing surgery/procedure for treatment of back mass.  Proposed procedure: Excision right back mass     Date of Surgery/ Procedure: 17  Time of Surgery/ Procedure: 7:30 am  Hospital/Surgical Facility: [facility]  Primary Physician: Jermain Jaramillo  Type of Anesthesia Anticipated: General    Patient has a Health Care Directive or Living Will:  NO    HPI:                                                      Brief HPI related to upcoming procedure: treatment of back mass.  Proposed procedure: Excision right back mass     Cristina Dean is a 37 year old female here for preoperative assessment for treatment of back mass.  Proposed procedure: Excision right back mass     See problem list for active medical problems.  Problems all longstanding and stable, except as noted/documented.  See ROS for pertinent symptoms related to these conditions.                                                                                                  .  MEDICAL HISTORY:                                                      Patient Active Problem List    Diagnosis Date Noted     Cannabis use disorder, mild, abuse 2017     Priority: Medium     Alcohol use disorder (H) 2017     Priority: Medium     Bipolar affective disorder, remission status unspecified (H) 2017     Priority: Medium     Depression, major, recurrent, moderate (H) 10/23/2017     Priority: Medium     Intermittent asthma without complication, unspecified asthma severity 10/23/2017     Priority: Medium     CARDIOVASCULAR SCREENING; LDL GOAL LESS THAN 130 2012     Priority: Medium     Morbid obesity due to  excess calories (H) 08/01/2012     Priority: Medium     BMI 53       STEPHENIE (generalized anxiety disorder)      Priority: Medium     Herpes 07/09/2012     Priority: Medium      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 90 tablet 3     buPROPion (WELLBUTRIN XL) 150 MG 24 hr tablet Start 1 tablet daily for 2 weeks, then increase to 2 tablets daily. 60 tablet 1     atorvastatin (LIPITOR) 10 MG tablet Take 1 tablet (10 mg) by mouth daily 90 tablet 3     albuterol (PROAIR HFA/PROVENTIL HFA/VENTOLIN HFA) 108 (90 BASE) MCG/ACT Inhaler Inhale 2 puffs into the lungs 4 times daily as needed for shortness of breath / dyspnea 1 Inhaler prn     lurasidone (LATUDA) 80 MG TABS tablet Take 1 tablet (80 mg) by mouth daily Take with food. 90 tablet 1     OTC products: None, except as noted above    Allergies   Allergen Reactions     No Known Drug Allergies       Latex Allergy: NO    Social History   Substance Use Topics     Smoking status: Current Every Day Smoker     Packs/day: 0.50     Years: 15.00     Smokeless tobacco: Never Used      Comment: ~ 1 pack per week - started at age 14      Alcohol use Yes      Comment: socially     History   Drug Use No       REVIEW OF SYSTEMS:                                                    C: NEGATIVE for fever, chills, change in weight  E/M: NEGATIVE for ear, mouth and throat problems  R: NEGATIVE for significant cough or SOB  CV: NEGATIVE for chest pain, palpitations or peripheral edema  GI: NEGATIVE for nausea,  "abdominal pain, heartburn, or change in bowel habits  : NEGATIVE for frequency, dysuria, or hematuria  N: NEGATIVE for weakness, dizziness or paresthesias  H: NEGATIVE for bleeding problems    EXAM:                                                    /82  Pulse 89  Temp 97.7  F (36.5  C) (Oral)  Ht 5' 4\" (1.626 m)  Wt (!) 340 lb 9.6 oz (154.5 kg)  LMP 12/20/2017 (Exact Date)  SpO2 96%  Breastfeeding? No  BMI 58.46 kg/m2    GENERAL APPEARANCE: alert and no distress     EYES: EOMI, PERRL     HENT: ear canals and TM's normal and nose and mouth without ulcers or lesions     NECK: no adenopathy, no asymmetry, masses, or scars and thyroid normal to palpation     RESP: lungs clear to auscultation - no rales, rhonchi or wheezes     CV: regular rates and rhythm, normal S1 S2, no S3 or S4 and no murmur, click or rub     ABDOMEN: obese, morbid     MS: extremities normal- no gross deformities noted.     SKIN: There is a very large subcutaneous soft tissue mass noted to the right mid back along the bra strap line.  This measures roughly 22 x 26 cm in size.  It appears to have a consistency with that of a lipoma or fatty consistency.  There is some mild superficial hyperemia and neovascularization noted over the mass.     NEURO: No focal changes.     PSYCH: mentation appears normal and affect normal/bright    DIAGNOSTICS:                                                    No EKG required for low risk surgery (cataract, skin procedure, breast biopsy, etc)    Recent Labs   Lab Test  10/23/17   0837  07/30/12   1405   HGB  14.6  15.8*   PLT   --   310   NA  138  142   POTASSIUM  4.1  4.2   CR  0.80  0.79      IMPRESSION:                                                    Reason for surgery/procedure: treatment of back mass.  Proposed procedure: Excision right back mass     The proposed surgical procedure is considered LOW risk.   Noted morbid obesity.    REVISED CARDIAC RISK INDEX  The patient has the following " serious cardiovascular risks for perioperative complications such as (MI, PE, VFib and 3  AV Block):  No serious cardiac risks  INTERPRETATION: 1 risks: Class II (low risk - 0.9% complication rate)    The patient has the following additional risks for perioperative complications:  No identified additional risks      ICD-10-CM    1. Preop general physical exam Z01.818    2. Lipoma of skin and subcutaneous tissue D17.30    3. Morbid obesity due to excess calories (H) E66.01    4. Alcohol use disorder (H) F10.99    5. Intermittent asthma without complication, unspecified asthma severity J45.20    6. Bipolar affective disorder, remission status unspecified (H) F31.9    7. Cannabis use disorder, mild, abuse F12.10        (Z01.818) Preop general physical exam  (primary encounter diagnosis)  Comment: Patient is considered of low risk for the surgical procedure despite her noted morbid obesity.  She has had lab work done within 90 days which I feel is adequate for this type of procedure,  her age and low risk.  Plan:     (D17.30) Lipoma of skin and subcutaneous tissue  Comment:   Plan: Surgery as scheduled per Dr. Pérez.    (E66.01) Morbid obesity due to excess calories (H)  Comment: Would note morbid obesity has complicating factor to surgical procedure  Plan:     (F10.99) Alcohol use disorder (H)  Comment:   Plan: Patient denies that she is using any alcohol at present but there is a notable history I would be aware per anesthesia concern.    (J45.20) Intermittent asthma without complication, unspecified asthma severity  Comment: Reports her asthma is stable.  She has been advised to use her albuterol inhaler the morning of her procedure.  Plan:     (F31.9) Bipolar affective disorder, remission status unspecified (H)  Comment: Currently stable on medical therapy as defined per her psychiatrist.  Plan: We will hold her dosing the a.m. of procedure and resume postoperatively.    (F12.10) Cannabis use disorder, mild,  abuse  Comment: Of note the patient states that she currently is still smoking marijuana on a daily basis and would make awareness of this per anesthesia concerns  Plan:         RECOMMENDATIONS:                                                      --Consult hospital rounder / IM to assist post-op medical management    --Patient is to take all scheduled medications on the day of surgery EXCEPT for modifications listed below.    APPROVAL GIVEN to proceed with proposed procedure, without further diagnostic evaluation       Signed Electronically by: Jermain Jaramillo MD    Copy of this evaluation report is provided to requesting physician, Dr. Pérez.    [place] Preop Guidelines

## 2017-12-27 NOTE — H&P (VIEW-ONLY)
[facility]  [address] [address]  [phone]  Dept: [phone]    PRE-OP EVALUATION:  Today's date: 2017    Cristina Dean (: 1980) presents for pre-operative evaluation assessment as requested by Dr. Pérez.  She requires evaluation and anesthesia risk assessment prior to undergoing surgery/procedure for treatment of back mass.  Proposed procedure: Excision right back mass     Date of Surgery/ Procedure: 17  Time of Surgery/ Procedure: 7:30 am  Hospital/Surgical Facility: [facility]  Primary Physician: Jermain Jaramillo  Type of Anesthesia Anticipated: General    Patient has a Health Care Directive or Living Will:  NO    HPI:                                                      Brief HPI related to upcoming procedure: treatment of back mass.  Proposed procedure: Excision right back mass     Cristina Dean is a 37 year old female here for preoperative assessment for treatment of back mass.  Proposed procedure: Excision right back mass     See problem list for active medical problems.  Problems all longstanding and stable, except as noted/documented.  See ROS for pertinent symptoms related to these conditions.                                                                                                  .  MEDICAL HISTORY:                                                      Patient Active Problem List    Diagnosis Date Noted     Cannabis use disorder, mild, abuse 2017     Priority: Medium     Alcohol use disorder (H) 2017     Priority: Medium     Bipolar affective disorder, remission status unspecified (H) 2017     Priority: Medium     Depression, major, recurrent, moderate (H) 10/23/2017     Priority: Medium     Intermittent asthma without complication, unspecified asthma severity 10/23/2017     Priority: Medium     CARDIOVASCULAR SCREENING; LDL GOAL LESS THAN 130 2012     Priority: Medium     Morbid obesity due to  excess calories (H) 08/01/2012     Priority: Medium     BMI 53       STEPHENIE (generalized anxiety disorder)      Priority: Medium     Herpes 07/09/2012     Priority: Medium      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 90 tablet 3     buPROPion (WELLBUTRIN XL) 150 MG 24 hr tablet Start 1 tablet daily for 2 weeks, then increase to 2 tablets daily. 60 tablet 1     atorvastatin (LIPITOR) 10 MG tablet Take 1 tablet (10 mg) by mouth daily 90 tablet 3     albuterol (PROAIR HFA/PROVENTIL HFA/VENTOLIN HFA) 108 (90 BASE) MCG/ACT Inhaler Inhale 2 puffs into the lungs 4 times daily as needed for shortness of breath / dyspnea 1 Inhaler prn     lurasidone (LATUDA) 80 MG TABS tablet Take 1 tablet (80 mg) by mouth daily Take with food. 90 tablet 1     OTC products: None, except as noted above    Allergies   Allergen Reactions     No Known Drug Allergies       Latex Allergy: NO    Social History   Substance Use Topics     Smoking status: Current Every Day Smoker     Packs/day: 0.50     Years: 15.00     Smokeless tobacco: Never Used      Comment: ~ 1 pack per week - started at age 14      Alcohol use Yes      Comment: socially     History   Drug Use No       REVIEW OF SYSTEMS:                                                    C: NEGATIVE for fever, chills, change in weight  E/M: NEGATIVE for ear, mouth and throat problems  R: NEGATIVE for significant cough or SOB  CV: NEGATIVE for chest pain, palpitations or peripheral edema  GI: NEGATIVE for nausea,  "abdominal pain, heartburn, or change in bowel habits  : NEGATIVE for frequency, dysuria, or hematuria  N: NEGATIVE for weakness, dizziness or paresthesias  H: NEGATIVE for bleeding problems    EXAM:                                                    /82  Pulse 89  Temp 97.7  F (36.5  C) (Oral)  Ht 5' 4\" (1.626 m)  Wt (!) 340 lb 9.6 oz (154.5 kg)  LMP 12/20/2017 (Exact Date)  SpO2 96%  Breastfeeding? No  BMI 58.46 kg/m2    GENERAL APPEARANCE: alert and no distress     EYES: EOMI, PERRL     HENT: ear canals and TM's normal and nose and mouth without ulcers or lesions     NECK: no adenopathy, no asymmetry, masses, or scars and thyroid normal to palpation     RESP: lungs clear to auscultation - no rales, rhonchi or wheezes     CV: regular rates and rhythm, normal S1 S2, no S3 or S4 and no murmur, click or rub     ABDOMEN: obese, morbid     MS: extremities normal- no gross deformities noted.     SKIN: There is a very large subcutaneous soft tissue mass noted to the right mid back along the bra strap line.  This measures roughly 22 x 26 cm in size.  It appears to have a consistency with that of a lipoma or fatty consistency.  There is some mild superficial hyperemia and neovascularization noted over the mass.     NEURO: No focal changes.     PSYCH: mentation appears normal and affect normal/bright    DIAGNOSTICS:                                                    No EKG required for low risk surgery (cataract, skin procedure, breast biopsy, etc)    Recent Labs   Lab Test  10/23/17   0837  07/30/12   1405   HGB  14.6  15.8*   PLT   --   310   NA  138  142   POTASSIUM  4.1  4.2   CR  0.80  0.79      IMPRESSION:                                                    Reason for surgery/procedure: treatment of back mass.  Proposed procedure: Excision right back mass     The proposed surgical procedure is considered LOW risk.   Noted morbid obesity.    REVISED CARDIAC RISK INDEX  The patient has the following " serious cardiovascular risks for perioperative complications such as (MI, PE, VFib and 3  AV Block):  No serious cardiac risks  INTERPRETATION: 1 risks: Class II (low risk - 0.9% complication rate)    The patient has the following additional risks for perioperative complications:  No identified additional risks      ICD-10-CM    1. Preop general physical exam Z01.818    2. Lipoma of skin and subcutaneous tissue D17.30    3. Morbid obesity due to excess calories (H) E66.01    4. Alcohol use disorder (H) F10.99    5. Intermittent asthma without complication, unspecified asthma severity J45.20    6. Bipolar affective disorder, remission status unspecified (H) F31.9    7. Cannabis use disorder, mild, abuse F12.10        (Z01.818) Preop general physical exam  (primary encounter diagnosis)  Comment: Patient is considered of low risk for the surgical procedure despite her noted morbid obesity.  She has had lab work done within 90 days which I feel is adequate for this type of procedure,  her age and low risk.  Plan:     (D17.30) Lipoma of skin and subcutaneous tissue  Comment:   Plan: Surgery as scheduled per Dr. Pérez.    (E66.01) Morbid obesity due to excess calories (H)  Comment: Would note morbid obesity has complicating factor to surgical procedure  Plan:     (F10.99) Alcohol use disorder (H)  Comment:   Plan: Patient denies that she is using any alcohol at present but there is a notable history I would be aware per anesthesia concern.    (J45.20) Intermittent asthma without complication, unspecified asthma severity  Comment: Reports her asthma is stable.  She has been advised to use her albuterol inhaler the morning of her procedure.  Plan:     (F31.9) Bipolar affective disorder, remission status unspecified (H)  Comment: Currently stable on medical therapy as defined per her psychiatrist.  Plan: We will hold her dosing the a.m. of procedure and resume postoperatively.    (F12.10) Cannabis use disorder, mild,  abuse  Comment: Of note the patient states that she currently is still smoking marijuana on a daily basis and would make awareness of this per anesthesia concerns  Plan:         RECOMMENDATIONS:                                                      --Consult hospital rounder / IM to assist post-op medical management    --Patient is to take all scheduled medications on the day of surgery EXCEPT for modifications listed below.    APPROVAL GIVEN to proceed with proposed procedure, without further diagnostic evaluation       Signed Electronically by: Jermain Jaramillo MD    Copy of this evaluation report is provided to requesting physician, Dr. Pérez.    [place] Preop Guidelines

## 2017-12-28 ENCOUNTER — ANESTHESIA EVENT (OUTPATIENT)
Dept: SURGERY | Facility: CLINIC | Age: 37
End: 2017-12-28
Payer: COMMERCIAL

## 2017-12-28 ENCOUNTER — HOSPITAL ENCOUNTER (OUTPATIENT)
Facility: CLINIC | Age: 37
Discharge: HOME | End: 2017-12-28
Attending: SURGERY | Admitting: SURGERY
Payer: COMMERCIAL

## 2017-12-28 ENCOUNTER — ANESTHESIA (OUTPATIENT)
Dept: SURGERY | Facility: CLINIC | Age: 37
End: 2017-12-28
Payer: COMMERCIAL

## 2017-12-28 ENCOUNTER — APPOINTMENT (OUTPATIENT)
Dept: SURGERY | Facility: PHYSICIAN GROUP | Age: 37
End: 2017-12-28
Payer: COMMERCIAL

## 2017-12-28 VITALS
RESPIRATION RATE: 16 BRPM | SYSTOLIC BLOOD PRESSURE: 144 MMHG | WEIGHT: 293 LBS | BODY MASS INDEX: 50.02 KG/M2 | TEMPERATURE: 97.2 F | DIASTOLIC BLOOD PRESSURE: 86 MMHG | HEIGHT: 64 IN | OXYGEN SATURATION: 94 %

## 2017-12-28 DIAGNOSIS — R22.2 MASS OF SUBCUTANEOUS TISSUE OF BACK: Primary | ICD-10-CM

## 2017-12-28 LAB — HCG UR QL: NEGATIVE

## 2017-12-28 PROCEDURE — 88304 TISSUE EXAM BY PATHOLOGIST: CPT | Performed by: SURGERY

## 2017-12-28 PROCEDURE — 25000128 H RX IP 250 OP 636: Performed by: ANESTHESIOLOGY

## 2017-12-28 PROCEDURE — 88304 TISSUE EXAM BY PATHOLOGIST: CPT | Mod: 26 | Performed by: SURGERY

## 2017-12-28 PROCEDURE — 27210995 ZZH RX 272: Performed by: SURGERY

## 2017-12-28 PROCEDURE — 36000052 ZZH SURGERY LEVEL 2 EA 15 ADDTL MIN: Performed by: SURGERY

## 2017-12-28 PROCEDURE — 25000128 H RX IP 250 OP 636: Performed by: SURGERY

## 2017-12-28 PROCEDURE — 25000125 ZZHC RX 250: Performed by: NURSE ANESTHETIST, CERTIFIED REGISTERED

## 2017-12-28 PROCEDURE — 71000012 ZZH RECOVERY PHASE 1 LEVEL 1 FIRST HR: Performed by: SURGERY

## 2017-12-28 PROCEDURE — 25000566 ZZH SEVOFLURANE, EA 15 MIN: Performed by: SURGERY

## 2017-12-28 PROCEDURE — 25000132 ZZH RX MED GY IP 250 OP 250 PS 637: Performed by: SURGERY

## 2017-12-28 PROCEDURE — 21933 EXC BACK TUM DEEP 5 CM/>: CPT | Performed by: SURGERY

## 2017-12-28 PROCEDURE — 25000125 ZZHC RX 250: Performed by: SURGERY

## 2017-12-28 PROCEDURE — 71000027 ZZH RECOVERY PHASE 2 EACH 15 MINS: Performed by: SURGERY

## 2017-12-28 PROCEDURE — 37000009 ZZH ANESTHESIA TECHNICAL FEE, EACH ADDTL 15 MIN: Performed by: SURGERY

## 2017-12-28 PROCEDURE — 36000050 ZZH SURGERY LEVEL 2 1ST 30 MIN: Performed by: SURGERY

## 2017-12-28 PROCEDURE — 25000132 ZZH RX MED GY IP 250 OP 250 PS 637: Performed by: ANESTHESIOLOGY

## 2017-12-28 PROCEDURE — 27210794 ZZH OR GENERAL SUPPLY STERILE: Performed by: SURGERY

## 2017-12-28 PROCEDURE — 81025 URINE PREGNANCY TEST: CPT | Performed by: ANESTHESIOLOGY

## 2017-12-28 PROCEDURE — 37000008 ZZH ANESTHESIA TECHNICAL FEE, 1ST 30 MIN: Performed by: SURGERY

## 2017-12-28 PROCEDURE — 25000128 H RX IP 250 OP 636: Performed by: NURSE ANESTHETIST, CERTIFIED REGISTERED

## 2017-12-28 PROCEDURE — 40000306 ZZH STATISTIC PRE PROC ASSESS II: Performed by: SURGERY

## 2017-12-28 RX ORDER — BUPIVACAINE HYDROCHLORIDE 5 MG/ML
INJECTION, SOLUTION EPIDURAL; INTRACAUDAL PRN
Status: DISCONTINUED | OUTPATIENT
Start: 2017-12-28 | End: 2017-12-28 | Stop reason: HOSPADM

## 2017-12-28 RX ORDER — FENTANYL CITRATE 50 UG/ML
25-50 INJECTION, SOLUTION INTRAMUSCULAR; INTRAVENOUS
Status: DISCONTINUED | OUTPATIENT
Start: 2017-12-28 | End: 2017-12-28 | Stop reason: HOSPADM

## 2017-12-28 RX ORDER — SODIUM CHLORIDE, SODIUM LACTATE, POTASSIUM CHLORIDE, CALCIUM CHLORIDE 600; 310; 30; 20 MG/100ML; MG/100ML; MG/100ML; MG/100ML
INJECTION, SOLUTION INTRAVENOUS CONTINUOUS
Status: DISCONTINUED | OUTPATIENT
Start: 2017-12-28 | End: 2017-12-28 | Stop reason: HOSPADM

## 2017-12-28 RX ORDER — DEXAMETHASONE SODIUM PHOSPHATE 4 MG/ML
4 INJECTION, SOLUTION INTRA-ARTICULAR; INTRALESIONAL; INTRAMUSCULAR; INTRAVENOUS; SOFT TISSUE EVERY 10 MIN PRN
Status: DISCONTINUED | OUTPATIENT
Start: 2017-12-28 | End: 2017-12-28 | Stop reason: HOSPADM

## 2017-12-28 RX ORDER — HYDROMORPHONE HYDROCHLORIDE 1 MG/ML
.3-.5 INJECTION, SOLUTION INTRAMUSCULAR; INTRAVENOUS; SUBCUTANEOUS EVERY 10 MIN PRN
Status: DISCONTINUED | OUTPATIENT
Start: 2017-12-28 | End: 2017-12-28 | Stop reason: HOSPADM

## 2017-12-28 RX ORDER — LABETALOL HYDROCHLORIDE 5 MG/ML
10 INJECTION, SOLUTION INTRAVENOUS
Status: DISCONTINUED | OUTPATIENT
Start: 2017-12-28 | End: 2017-12-28 | Stop reason: HOSPADM

## 2017-12-28 RX ORDER — HYDRALAZINE HYDROCHLORIDE 20 MG/ML
2.5-5 INJECTION INTRAMUSCULAR; INTRAVENOUS EVERY 10 MIN PRN
Status: DISCONTINUED | OUTPATIENT
Start: 2017-12-28 | End: 2017-12-28 | Stop reason: HOSPADM

## 2017-12-28 RX ORDER — CEFAZOLIN SODIUM 1 G/3ML
1 INJECTION, POWDER, FOR SOLUTION INTRAMUSCULAR; INTRAVENOUS SEE ADMIN INSTRUCTIONS
Status: DISCONTINUED | OUTPATIENT
Start: 2017-12-28 | End: 2017-12-28 | Stop reason: HOSPADM

## 2017-12-28 RX ORDER — MAGNESIUM HYDROXIDE 1200 MG/15ML
LIQUID ORAL PRN
Status: DISCONTINUED | OUTPATIENT
Start: 2017-12-28 | End: 2017-12-28 | Stop reason: HOSPADM

## 2017-12-28 RX ORDER — DIMENHYDRINATE 50 MG/ML
25 INJECTION, SOLUTION INTRAMUSCULAR; INTRAVENOUS
Status: DISCONTINUED | OUTPATIENT
Start: 2017-12-28 | End: 2017-12-28 | Stop reason: HOSPADM

## 2017-12-28 RX ORDER — OXYCODONE HYDROCHLORIDE 5 MG/1
5-10 TABLET ORAL
Qty: 30 TABLET | Refills: 0 | Status: SHIPPED | OUTPATIENT
Start: 2017-12-28 | End: 2018-01-18

## 2017-12-28 RX ORDER — PROPOFOL 10 MG/ML
INJECTION, EMULSION INTRAVENOUS PRN
Status: DISCONTINUED | OUTPATIENT
Start: 2017-12-28 | End: 2017-12-28

## 2017-12-28 RX ORDER — LIDOCAINE 40 MG/G
CREAM TOPICAL
Status: DISCONTINUED | OUTPATIENT
Start: 2017-12-28 | End: 2017-12-28 | Stop reason: HOSPADM

## 2017-12-28 RX ORDER — METOCLOPRAMIDE HYDROCHLORIDE 5 MG/ML
10 INJECTION INTRAMUSCULAR; INTRAVENOUS EVERY 6 HOURS PRN
Status: DISCONTINUED | OUTPATIENT
Start: 2017-12-28 | End: 2017-12-28 | Stop reason: HOSPADM

## 2017-12-28 RX ORDER — ONDANSETRON 4 MG/1
4 TABLET, ORALLY DISINTEGRATING ORAL EVERY 30 MIN PRN
Status: DISCONTINUED | OUTPATIENT
Start: 2017-12-28 | End: 2017-12-28 | Stop reason: HOSPADM

## 2017-12-28 RX ORDER — DEXAMETHASONE SODIUM PHOSPHATE 4 MG/ML
INJECTION, SOLUTION INTRA-ARTICULAR; INTRALESIONAL; INTRAMUSCULAR; INTRAVENOUS; SOFT TISSUE PRN
Status: DISCONTINUED | OUTPATIENT
Start: 2017-12-28 | End: 2017-12-28

## 2017-12-28 RX ORDER — NEOSTIGMINE METHYLSULFATE 1 MG/ML
VIAL (ML) INJECTION PRN
Status: DISCONTINUED | OUTPATIENT
Start: 2017-12-28 | End: 2017-12-28

## 2017-12-28 RX ORDER — PROMETHAZINE HYDROCHLORIDE 25 MG/ML
12.5 INJECTION, SOLUTION INTRAMUSCULAR; INTRAVENOUS
Status: DISCONTINUED | OUTPATIENT
Start: 2017-12-28 | End: 2017-12-28 | Stop reason: HOSPADM

## 2017-12-28 RX ORDER — KETOROLAC TROMETHAMINE 30 MG/ML
30 INJECTION, SOLUTION INTRAMUSCULAR; INTRAVENOUS EVERY 6 HOURS PRN
Status: DISCONTINUED | OUTPATIENT
Start: 2017-12-28 | End: 2017-12-28 | Stop reason: HOSPADM

## 2017-12-28 RX ORDER — ONDANSETRON 2 MG/ML
4 INJECTION INTRAMUSCULAR; INTRAVENOUS EVERY 30 MIN PRN
Status: DISCONTINUED | OUTPATIENT
Start: 2017-12-28 | End: 2017-12-28 | Stop reason: HOSPADM

## 2017-12-28 RX ORDER — MEPERIDINE HYDROCHLORIDE 25 MG/ML
12.5 INJECTION INTRAMUSCULAR; INTRAVENOUS; SUBCUTANEOUS
Status: DISCONTINUED | OUTPATIENT
Start: 2017-12-28 | End: 2017-12-28 | Stop reason: HOSPADM

## 2017-12-28 RX ORDER — OXYCODONE HYDROCHLORIDE 5 MG/1
5 TABLET ORAL
Status: COMPLETED | OUTPATIENT
Start: 2017-12-28 | End: 2017-12-28

## 2017-12-28 RX ORDER — ACETAMINOPHEN 325 MG/1
975 TABLET ORAL ONCE
Status: COMPLETED | OUTPATIENT
Start: 2017-12-28 | End: 2017-12-28

## 2017-12-28 RX ORDER — NALOXONE HYDROCHLORIDE 0.4 MG/ML
.1-.4 INJECTION, SOLUTION INTRAMUSCULAR; INTRAVENOUS; SUBCUTANEOUS
Status: DISCONTINUED | OUTPATIENT
Start: 2017-12-28 | End: 2017-12-28 | Stop reason: HOSPADM

## 2017-12-28 RX ORDER — GLYCOPYRROLATE 0.2 MG/ML
INJECTION, SOLUTION INTRAMUSCULAR; INTRAVENOUS PRN
Status: DISCONTINUED | OUTPATIENT
Start: 2017-12-28 | End: 2017-12-28

## 2017-12-28 RX ORDER — CEFAZOLIN SODIUM 1 G/50ML
3 SOLUTION INTRAVENOUS
Status: COMPLETED | OUTPATIENT
Start: 2017-12-28 | End: 2017-12-28

## 2017-12-28 RX ORDER — LIDOCAINE HYDROCHLORIDE 10 MG/ML
INJECTION, SOLUTION INFILTRATION; PERINEURAL PRN
Status: DISCONTINUED | OUTPATIENT
Start: 2017-12-28 | End: 2017-12-28

## 2017-12-28 RX ORDER — FENTANYL CITRATE 50 UG/ML
INJECTION, SOLUTION INTRAMUSCULAR; INTRAVENOUS PRN
Status: DISCONTINUED | OUTPATIENT
Start: 2017-12-28 | End: 2017-12-28

## 2017-12-28 RX ORDER — METOCLOPRAMIDE 10 MG/1
10 TABLET ORAL EVERY 6 HOURS PRN
Status: DISCONTINUED | OUTPATIENT
Start: 2017-12-28 | End: 2017-12-28 | Stop reason: HOSPADM

## 2017-12-28 RX ADMIN — ROCURONIUM BROMIDE 50 MG: 10 INJECTION INTRAVENOUS at 07:57

## 2017-12-28 RX ADMIN — MIDAZOLAM 2 MG: 1 INJECTION INTRAMUSCULAR; INTRAVENOUS at 07:45

## 2017-12-28 RX ADMIN — FENTANYL CITRATE 150 MCG: 50 INJECTION, SOLUTION INTRAMUSCULAR; INTRAVENOUS at 07:57

## 2017-12-28 RX ADMIN — GLYCOPYRROLATE 0.2 MG: 0.2 INJECTION, SOLUTION INTRAMUSCULAR; INTRAVENOUS at 07:57

## 2017-12-28 RX ADMIN — SODIUM CHLORIDE, POTASSIUM CHLORIDE, SODIUM LACTATE AND CALCIUM CHLORIDE: 600; 310; 30; 20 INJECTION, SOLUTION INTRAVENOUS at 08:30

## 2017-12-28 RX ADMIN — DEXAMETHASONE SODIUM PHOSPHATE 4 MG: 4 INJECTION, SOLUTION INTRA-ARTICULAR; INTRALESIONAL; INTRAMUSCULAR; INTRAVENOUS; SOFT TISSUE at 07:57

## 2017-12-28 RX ADMIN — ROCURONIUM BROMIDE 20 MG: 10 INJECTION INTRAVENOUS at 08:30

## 2017-12-28 RX ADMIN — Medication 3 MG: at 08:45

## 2017-12-28 RX ADMIN — SODIUM CHLORIDE, POTASSIUM CHLORIDE, SODIUM LACTATE AND CALCIUM CHLORIDE: 600; 310; 30; 20 INJECTION, SOLUTION INTRAVENOUS at 07:41

## 2017-12-28 RX ADMIN — GLYCOPYRROLATE 0.3 MG: 0.2 INJECTION, SOLUTION INTRAMUSCULAR; INTRAVENOUS at 08:45

## 2017-12-28 RX ADMIN — Medication 3 G: at 07:41

## 2017-12-28 RX ADMIN — OXYCODONE HYDROCHLORIDE 5 MG: 5 TABLET ORAL at 09:49

## 2017-12-28 RX ADMIN — FENTANYL CITRATE 25 MCG: 50 INJECTION, SOLUTION INTRAMUSCULAR; INTRAVENOUS at 10:26

## 2017-12-28 RX ADMIN — PROPOFOL 200 MG: 10 INJECTION, EMULSION INTRAVENOUS at 07:57

## 2017-12-28 RX ADMIN — FENTANYL CITRATE 50 MCG: 50 INJECTION, SOLUTION INTRAMUSCULAR; INTRAVENOUS at 09:36

## 2017-12-28 RX ADMIN — FENTANYL CITRATE 50 MCG: 50 INJECTION, SOLUTION INTRAMUSCULAR; INTRAVENOUS at 08:30

## 2017-12-28 RX ADMIN — LIDOCAINE HYDROCHLORIDE 50 MG: 10 INJECTION, SOLUTION INFILTRATION; PERINEURAL at 07:57

## 2017-12-28 RX ADMIN — FENTANYL CITRATE 50 MCG: 50 INJECTION, SOLUTION INTRAMUSCULAR; INTRAVENOUS at 08:00

## 2017-12-28 RX ADMIN — ACETAMINOPHEN 975 MG: 325 TABLET, FILM COATED ORAL at 10:26

## 2017-12-28 RX ADMIN — FENTANYL CITRATE 50 MCG: 50 INJECTION, SOLUTION INTRAMUSCULAR; INTRAVENOUS at 09:46

## 2017-12-28 NOTE — IP AVS SNAPSHOT
MRN:[id number]                      After Visit Summary   12/28/2017    Cristina Dean    MRN: [id number]           Thank you!     Thank you for choosing [facility] for your care. Our goal is always to provide you with excellent care. Hearing back from our patients is one way we can continue to improve our services. Please take a few minutes to complete the written survey that you may receive in the mail after you visit. If you would like to speak to someone directly about your visit please contact Patient Relations at [phone]. Thank you!          Patient Information     Date Of Birth          1980        About your hospital stay     You were admitted on:  December 28, 2017 You last received care in the:  [facility] PreOP/PostOP    You were discharged on:  December 28, 2017       Who to Call     For medical emergencies, please call 911.  For non-urgent questions about your medical care, please call your primary care provider or clinic, [phone]  For questions related to your surgery, please call your surgery clinic        Attending Provider     Provider Specialty    Vicente Pérez MD General Surgery       Primary Care Provider Office Phone # Fax #    Jermain Jaramillo [phone]      Further instructions from your care team       HOME CARE FOLLOWING MINOR SURGERY  BRANDON Richardson, RADHA Dominguez D. Maurer, LAVINIA Mills    RESULTS:  If a biopsy of tissue was done, you may call for your final pathology report after 1p.m. two working days after surgery.  Your results will also be reviewed with you at your postoperative appointment.    INCISIONAL CARE:    If you have a dressing in place, keep clean and dry for 48 hours; you may replace the gauze if it becomes soiled.    After 48 hours you may remove the dressing and shower.  Do not submerse incision in water for 1 week.    If you have a Dermabond dressing (a type  of skin glue), you may shower immediately.    Sutures which are beneath the skin will absorb and do not need to be removed.    Sutures you can see should be removed at your surgeon's office in 10-14 days at the latest.    If present, leave the steri-strips (white paper tapes) in place for 14 days after surgery.    If present, leave Dermabond glue in place until it wears/flakes off.    You may expect a small amount of drainage from your incision.    A lump/ridge under the incision is normal and will gradually resolve.  If it becomes red or very uncomfortable, contact the nurse at your surgeon's office to discuss whether this needs to be evaluated.    DRAIN:  The drain should be removed when there is less than 30 cc (1 ounce) coming out per day for 2 days.  Please call the office to arrange removal at [phone].    ACTIVITY:  Cautiously resume exercise and strenuous activities such as jogging, tennis, aerobics, etc. Also, be careful of stretching activities which affect the area of surgery for two weeks.    DIET:  No restrictions.  Increased fluid intake is recommended. While taking pain medications, increase dietary fiber or add a fiber supplementation like Metamucil or Citrucel to help prevent constipation - a possible side effect of pain medications.    DISCOMFORT:  Local anesthetic placed at surgery should provide relief for 4-8 hours.  Begin taking pain pills before discomfort is severe.  Take the pain medication with some food, when possible, to minimize side effects.  Intermittent use of ice packs may help during the first 48 hours.  Expect gradual improvement.  You may slowly transition to use of over-the-counter medications for pain relief when you are no longer requiring narcotics for pain control.    RETURN APPOINTMENT:  Schedule a follow-up visit 2-3 weeks post-op.  Office Phone:  [phone]     CONTACT US IF THE FOLLOWING DEVELOPS:   1. A fever that is above 101     2. If there is a large amount of  drainage, bleeding, or swelling.   3. Severe pain that is not relieved by your prescription.   4. Drainage that is thick, cloudy, yellow, green or white.   5. Any other questions not answered by  Frequently Asked Questions  sheet.      FREQUENTLY ASKED QUESTIONS:    Q:  How should my incision look?    A:  Normally your incision will appear slightly swollen with light redness directly along the incision itself as it heals.  It may feel like a bump or ridge as the healing/scarring happens, and over time (3-4 months) this bump or ridge feeling should slowly go away.  In general, clear or pink watery drainage can be normal at first as your incision heals, but should decrease over time.    Q:  How do I know if my incision is infected?  A:  Look at your incision for signs of infection, like redness around the incision spreading to surrounding skin, or drainage of cloudy or foul-smelling drainage.  If you feel warm, check your temperature to see if you are running a fever.    **If any of these things occur, please notify the nurse at our office.  We may need you to come into the office for an incision check.      Q:  How do I take care of my incision?  A:  If you have a dressing in place - Starting the day after surgery, replace the dressing 1-2 times a day until there is no further drainage from the incision.  At that time, a dressing is no longer needed.  Try to minimize tape on the skin if irritation is occurring at the tape sites.  If you have significant irritation from tape on the skin, please call the office to discuss other method of dressing your incision.    Small pieces of tape called  steri-strips  may be present directly overlying your incision; these may be removed 10 days after surgery unless otherwise specified by your surgeon.  If these tapes start to loosen at the ends, you may trim them back until they fall off or are removed.    A:  If you had  Dermabond  tissue glue used as a dressing (this causes your  incision to look shiny with a clear covering over it) - This type of dressing wears off with time and does not require more dressings over the top unless it is draining around the glue as it wears off.  Do not apply ointments or lotions over the incisions until the glue has completely worn off.    Q:  There is a piece of tape or a sticky  lead  still on my skin.  Can I remove this?  A:  Sometimes the sticky  leads  used for monitoring during surgery or for evaluation in the emergency department are not all removed while you are in the hospital.  These sometimes have a tab or metal dot on them.  You can easily remove these on your own, like taking off a band-aid.  If there is a gel substance under the  lead , simply wipe/clean it off with a washcloth or paper towel.      Q:  What can I do to minimize constipation (very hard stools, or lack of stools)?  A:  Stay well hydrated.  Increase your dietary fiber intake or take a fiber supplement -with plenty of water.  Walk around frequently.  You may consider an over-the-counter stool-softener.  Your Pharmacist can assist you with choosing one that is stocked at your pharmacy.  Constipation is also one of the most common side effects of pain medication.  If you are using pain medication, be pro-active and try to PREVENT problems with constipation by taking the steps above BEFORE constipation becomes a problem.    Q:  What do I do if I need more pain medications?  A:  Call the office to receive refills.  Be aware that certain pain meds cannot be called into a pharmacy and actually require a paper prescription.  A change may be made in your pain med as you progress thru your recovery period or if you have side effects to certain meds.    --Pain meds are NOT refilled after 5pm on weekdays, and NOT AT ALL on the weekends, so please look ahead to prevent problems.      Q:  Why am I having a hard time sleeping now that I am at home?  A:  Many medications you receive while you are  in the hospital can impact your sleep for a number of days after your surgery/hospitalization.  Decreased level of activity and naps during the day may also make sleeping at night difficult.  Try to minimize day-time naps, and get up frequently during the day to walk around your home during your recovery time.  Sleep aides may be of some help, but are not recommended for long-term use.      Q:  I am having some back discomfort.  What should I do?  A:  This may be related to certain positioning that was required for your surgery, extended periods of time in bed, or other changes in your overall activity level.  You may try ice, heat, acetaminophen, or ibuprofen to treat this temporarily.  Note that many pain medications have acetaminophen in them and would state this on the prescription bottle.  Be sure not to exceed the maximum of 4000mg per day of acetaminophen.     **If the pain you are having does not resolve, is severe, or is a flare of back pain you have had on other occasions prior to surgery, please contact your primary physician for further recommendations or for an appointment to be examined at their office.    Q:  Why am I having headaches?  A:  Headaches can be caused by many things:  caffeine withdrawal, use of pain meds, dehydration, high blood pressure, lack of sleep, over-activity/exhaustion, flare-up of usual migraine headaches.  If you feel this is related to muscle tension (a band-like feeling around the head, or a pressure at the low-back of the head) you may try ice or heat to this area.  You may need to drink more fluids (try electrolyte drink like Gatorade), rest, or take your usual migraine medications.   **If your headaches do not resolve, worsen, are accompanied by other symptoms, or if your blood pressure is high, please call your primary physician for recommendation and/or examination.    Q:  I am unable to urinate.  What do I do?  A:  A small percentage of people can have difficulty  urinating initially after surgery.  This includes being able to urinate only a very small amount at a time and feeling discomfort or pressure in the very low abdomen.  This is called  urinary retention , and is actually an urgent situation.  Proceed to your nearest Emergency department for evaluation (not an Urgent Care Center).  Sometimes the bladder does not work correctly after certain medications you receive during surgery, or related to certain procedures.  You may need to have a catheter placed until your bladder recovers.  When planning to go to an Emergency department, it may help to call the ER to let them know you are coming in for this problem after a surgery.  This may help you get in quicker to be evaluated.  **If you have symptoms of a urinary tract infection, please contact your primary physician for the proper evaluation and treatment.          If you have other questions, please call the office Monday thru Friday between 8am and 5pm to discuss with the nurse or physician assistant.  #[phone]    There is a surgeon ON CALL on weekday evenings and over the weekend in case of urgent need only, and may be contacted at the same number.    If you are having an emergency, call 911 or proceed to your nearest emergency department.    GENERAL ANESTHESIA OR SEDATION ADULT DISCHARGE INSTRUCTIONS   SPECIAL PRECAUTIONS FOR 24 HOURS AFTER SURGERY    IT IS NOT UNUSUAL TO FEEL LIGHT-HEADED OR FAINT, UP TO 24 HOURS AFTER SURGERY OR WHILE TAKING PAIN MEDICATION.  IF YOU HAVE THESE SYMPTOMS; SIT FOR A FEW MINUTES BEFORE STANDING AND HAVE SOMEONE ASSIST YOU WHEN YOU GET UP TO WALK OR USE THE BATHROOM.    YOU SHOULD REST AND RELAX FOR THE NEXT 24 HOURS AND YOU MUST MAKE ARRANGEMENTS TO HAVE SOMEONE STAY WITH YOU FOR AT LEAST 24 HOURS AFTER YOUR DISCHARGE.  AVOID HAZARDOUS AND STRENUOUS ACTIVITIES.  DO NOT MAKE IMPORTANT DECISIONS FOR 24 HOURS.    DO NOT DRIVE ANY VEHICLE OR OPERATE MECHANICAL EQUIPMENT FOR 24 HOURS  "FOLLOWING THE END OF YOUR SURGERY.  EVEN THOUGH YOU MAY FEEL NORMAL, YOUR REACTIONS MAY BE AFFECTED BY THE MEDICATION YOU HAVE RECEIVED.    DO NOT DRINK ALCOHOLIC BEVERAGES FOR 24 HOURS FOLLOWING YOUR SURGERY.    DRINK CLEAR LIQUIDS (APPLE JUICE, GINGER ALE, 7-UP, BROTH, ETC.).  PROGRESS TO YOUR REGULAR DIET AS YOU FEEL ABLE.    YOU MAY HAVE A DRY MOUTH, A SORE THROAT, MUSCLES ACHES OR TROUBLE SLEEPING.  THESE SHOULD GO AWAY AFTER 24 HOURS.    CALL YOUR DOCTOR FOR ANY OF THE FOLLOWING:  SIGNS OF INFECTION (FEVER, GROWING TENDERNESS AT THE SURGERY SITE, A LARGE AMOUNT OF DRAINAGE OR BLEEDING, SEVERE PAIN, FOUL-SMELLING DRAINAGE, REDNESS OR SWELLING.    IT HAS BEEN OVER 8 TO 10 HOURS SINCE SURGERY AND YOU ARE STILL NOT ABLE TO URINATE (PASS WATER).           Maximum acetaminophen (Tylenol) dose from all sources should not exceed 4 grams (4000 mg) per day.  You had 975 mg today at 1027 am. You may take tylenol again at 2:20 pm            Pending Results     Date and Time Order Name Status Description    12/28/2017 0835 Surgical pathology exam In process             Admission Information     Date & Time Provider Department Dept. Phone    12/28/2017 Vicente Pérez MD [facility] PreOP/PostOP [phone]      Your Vitals Were     Blood Pressure Temperature Respirations Height Weight Last Period    139/82 97.2  F (36.2  C) (Temporal) 16 1.626 m (5' 4.02\") 155.6 kg (343 lb) 12/20/2017 (Exact Date)    Pulse Oximetry BMI (Body Mass Index)                94% 58.85 kg/m2          Bitstamp Information     Bit[employer]amp lets you send messages to your doctor, view your test results, renew your prescriptions, schedule appointments and more. To sign up, go to [email or web address] . Click on \"Log in\" on the left side of the screen, which will take you to the Welcome page. Then click on \"Sign up Now\" on the right side of the page.     You will be asked to enter the access code listed below, as well as some personal information. " Please follow the directions to create your username and password.     Your access code is: [email or web address]  Expires: 2017 12:14 PM     Your access code will  in 90 days. If you need help or a new code, please call your [facility] clinic or [phone].        Care EveryWhere ID     This is your Care EveryWhere ID. This could be used by other organizations to access your [facility] medical records  [id number]        Equal Access to Services     LARRY BULL : Hadii aad ku hadasho Soomaali, waaxda luqadaha, qaybta kaalmada adeegyada, waxay yeein haymansin jacob jones . So [facility] [phone].    ATENCIÓN: Si habla español, tiene a magaña disposición servicios gratuitos de asistencia lingüística. Llame al [phone].    We comply with applicable federal civil rights laws and [place] laws. We do not discriminate on the basis of race, color, national origin, age, disability, sex, sexual orientation, or gender identity.               Review of your medicines      START taking        Dose / Directions    oxyCODONE IR 5 MG tablet   Commonly known as:  ROXICODONE   Used for:  Mass of subcutaneous tissue of back        Dose:  5-10 mg   Take 1-2 tablets (5-10 mg) by mouth every 3 hours as needed for pain or other (Moderate to Severe)   Quantity:  30 tablet   Refills:  0         CONTINUE these medicines which have NOT CHANGED        Dose / Directions    albuterol 108 (90 BASE) MCG/ACT Inhaler   Commonly known as:  PROAIR HFA/PROVENTIL HFA/VENTOLIN HFA   Used for:  Intermittent asthma without complication, unspecified asthma severity        Dose:  2 puff   Inhale 2 puffs into the lungs 4 times daily as needed for shortness of breath / dyspnea   Quantity:  1 Inhaler   Refills:  prn       atorvastatin 10 MG tablet   Commonly known as:  LIPITOR   Used for:  CARDIOVASCULAR SCREENING; LDL GOAL LESS THAN 130        Dose:  10 mg   Take 1 tablet (10 mg) by mouth daily   Quantity:  90 tablet   Refills:  3       buPROPion 150  MG 24 hr tablet   Commonly known as:  WELLBUTRIN XL   Used for:  Depression, major, recurrent, moderate (H), STEPHENIE (generalized anxiety disorder)        Start 1 tablet daily for 2 weeks, then increase to 2 tablets daily.   Quantity:  60 tablet   Refills:  1       gabapentin 600 MG tablet   Commonly known as:  NEURONTIN   Used for:  STEPHENIE (generalized anxiety disorder)        Dose:  600 mg   Take 1 tablet (600 mg) by mouth 3 times daily For anxiety.   Quantity:  90 tablet   Refills:  3       lurasidone 80 MG Tabs tablet   Commonly known as:  LATUDA   Used for:  Major depressive disorder, single episode, mild (H)        Dose:  80 mg   Take 1 tablet (80 mg) by mouth daily Take with food.   Quantity:  90 tablet   Refills:  1            Where to get your medicines      Some of these will need a paper prescription and others can be bought over the counter. Ask your nurse if you have questions.     Bring a paper prescription for each of these medications     oxyCODONE IR 5 MG tablet                Protect others around you: Learn how to safely use, store and throw away your medicines at www.disposemymeds.org.             Medication List: This is a list of all your medications and when to take them. Check marks below indicate your daily home schedule. Keep this list as a reference.      Medications           Morning Afternoon Evening Bedtime As Needed    albuterol 108 (90 BASE) MCG/ACT Inhaler   Commonly known as:  PROAIR HFA/PROVENTIL HFA/VENTOLIN HFA   Inhale 2 puffs into the lungs 4 times daily as needed for shortness of breath / dyspnea                                atorvastatin 10 MG tablet   Commonly known as:  LIPITOR   Take 1 tablet (10 mg) by mouth daily                                buPROPion 150 MG 24 hr tablet   Commonly known as:  WELLBUTRIN XL   Start 1 tablet daily for 2 weeks, then increase to 2 tablets daily.                                gabapentin 600 MG tablet   Commonly known as:  NEURONTIN   Take 1  tablet (600 mg) by mouth 3 times daily For anxiety.                                lurasidone 80 MG Tabs tablet   Commonly known as:  LATUDA   Take 1 tablet (80 mg) by mouth daily Take with food.                                oxyCODONE IR 5 MG tablet   Commonly known as:  ROXICODONE   Take 1-2 tablets (5-10 mg) by mouth every 3 hours as needed for pain or other (Moderate to Severe)   Last time this was given:  5 mg on 12/28/2017  9:49 AM                                          More Information        Discharge Instructions: Caring for Your [place]im-[place] Drainage Tube  Your doctor discharges you with a [place] drainage tube. Doctors commonly leave this drain within the abdominal cavity after surgery. It helps drain and collect blood and body fluid after surgery. This can prevent swelling and reduces the risk for infection. The tube is held in place by a few stitches. It is covered with a bandage. Your doctor will remove the drain when he or she determines you no longer need it.  Home care    Don t sleep on the same side as the tube.    Secure the tube and bag inside your clothing with a safety pin. This helps keep the tube from being pulled out.    Empty your drain at least twice a day. Empty it more often if the drain is full. Wash  and dry your hands before emptying the drain.    Lift the opening on the drain.    Drain the fluid into a measuring cup.    Record the amount of fluid each time you empty the drain. Include the date and time it was emptied. Share this information with your doctor on your next visit.    Squeeze the bulb with your hands until you hear air coming out of the bulb if your doctor has instructed you to do so (sometimes the bulb is used as a reservoir without suction). Check with your doctor about specific drain instructions.    Close the opening.    Change the dressing around the tube every day.    Wash your hands.    Remove the old bandage.    Wash your hands again.    Wet a cotton  swab and clean the skin around the incision and tube site. Use normal saline solution (salt and water). Or, you can use warm, soapy water.    Put a new bandage on the incision and tube site. Make the bandage large enough to cover the whole incision area.    Tape the bandage in place.    Keep the bandage and tube site dry when you shower. Ask your healthcare provider about the best way to do this.     Stripping  the tube helps keep blood clots from blocking the tube. Ask your nurse how often you should strip the tube. Stripping may not be needed, depending on where and why your doctor placed the tube. It may even be dangerous in some cases.     Hold the tubing where it leaves the skin, with one hand. This keeps it from pulling on the skin.    Pinch the tubing with the thumb and first finger of your other hand.    Slowly and firmly pull your thumb and first finger down the tubing. You may find it helpful to hold an alcohol swab between your fingers and the tube to lubricate the tubing.    If the pulling hurts or feels like it s coming out of the skin, stop. Begin again more gently.  Follow-up care  Make a follow-up appointment as directed by our staff.     When to seek medical care  Call your healthcare provider right away if you have any of the following:    New or increased pain around the tube    Redness, swelling, or warmth around the incision or tube    Drainage that is foul-smelling    Vomiting    Fever of 100.4 F (38 C)    Fluid leaking around the tube    Incision seems not to be healing    Stitches become loose    Tube falls out or breaks    Drainage that changes from light pink to dark red    Blood clots in the drainage bulb    A sudden increase or decrease in the amount of drainage (over 30 mL)   Date Last Reviewed: 2/1/2017 2000-2017 [employer]. [address] [place] [address], [place] [address]. All rights reserved. This information is not intended as a substitute for professional medical care.  Always follow your healthcare professional's instructions.                Step-by-Step:  How to Empty Your Drain After Surgery    Date Last Reviewed: 10/1/2016    [id number] [employer]. [address] [place] [address], [place] [address]. All rights reserved. This information is not intended as a substitute for professional medical care. Always follow your healthcare professional's instructions.

## 2017-12-28 NOTE — ANESTHESIA PREPROCEDURE EVALUATION
Anesthesia Evaluation     .             ROS/MED HX    ENT/Pulmonary:     (+)Intermittent asthma Treatment: Inhaler prn,  , . .    Neurologic:       Cardiovascular:  - neg cardiovascular ROS       METS/Exercise Tolerance:     Hematologic:  - neg hematologic  ROS       Musculoskeletal:   (+) , , other musculoskeletal- back lipoma      GI/Hepatic:  - neg GI/hepatic ROS       Renal/Genitourinary:  - ROS Renal section negative       Endo:     (+) Obesity, .      Psychiatric:     (+) psychiatric history anxiety, bipolar and depression      Infectious Disease:  - neg infectious disease ROS       Malignancy:      - no malignancy   Other:    (+) No chance of pregnancy C-spine cleared: N/A, no H/O Chronic Pain,no other significant disability                    Physical Exam  Normal systems: cardiovascular, pulmonary and dental    Airway   Mallampati: II  TM distance: >3 FB  Neck ROM: full    Dental     Cardiovascular       Pulmonary                     Anesthesia Plan      History & Physical Review  History and physical reviewed and following examination; no interval change.    ASA Status:  3 .    NPO Status:  > 8 hours    Plan for General and ETT with Intravenous induction. Maintenance will be Balanced.    PONV prophylaxis:  Ondansetron (or other 5HT-3) and Dexamethasone or Solumedrol       Postoperative Care  Postoperative pain management:  IV analgesics.      Consents  Anesthetic plan, risks, benefits and alternatives discussed with:  Patient.  Use of blood products discussed: Yes.   Use of blood products discussed with Patient.  Consented to blood products.  .                          .

## 2017-12-28 NOTE — ANESTHESIA POSTPROCEDURE EVALUATION
Patient: Cristina Dean    Procedure(s):  Excision Right Back Mass - Wound Class: I-Clean    Diagnosis:Right back mass  Diagnosis Additional Information: Right back mass (15 x 21 cm)    Anesthesia Type:  General, ETT    Note:  Anesthesia Post Evaluation    Patient location during evaluation: PACU  Patient participation: Able to fully participate in evaluation  Level of consciousness: awake and alert  Pain management: adequate  Airway patency: patent  Cardiovascular status: acceptable  Respiratory status: acceptable  Hydration status: acceptable  PONV: controlled     Anesthetic complications: None          Last vitals:  Vitals:    12/28/17 1026 12/28/17 1035 12/28/17 1056   BP:   144/86   Resp: 16  16   Temp:      SpO2: 94% 95% 94%         Electronically Signed By: Lucas Dobson MD  December 28, 2017  1:43 PM

## 2017-12-28 NOTE — ANESTHESIA CARE TRANSFER NOTE
Patient: Cristina Dean    Procedure(s):  Excision Right Back Mass - Wound Class: I-Clean    Diagnosis: Right back mass  Diagnosis Additional Information: No value filed.    Anesthesia Type:   General, ETT     Note:  Airway :Face Mask  Patient transferred to:PACU  Comments: Did wellHandoff Report: Identifed the Patient, Identified the Reponsible Provider, Reviewed the pertinent medical history, Discussed the surgical course, Reviewed Intra-OP anesthesia mangement and issues during anesthesia, Set expectations for post-procedure period and Allowed opportunity for questions and acknowledgement of understanding      Vitals: (Last set prior to Anesthesia Care Transfer)    CRNA VITALS  12/28/2017 0834 - 12/28/2017 0909      12/28/2017             Pulse: 100    SpO2: (!)  89 %                Electronically Signed By: ELIN Birch CRNA  December 28, 2017  9:09 AM

## 2017-12-28 NOTE — IP AVS SNAPSHOT
[facility] PreOP/PostOP    [address]    [facility] [address]    Phone:  [phone]    Fax:  [phone]                                       After Visit Summary   12/28/2017    Cristina Dean    MRN: [id number]           After Visit Summary Signature Page     I have received my discharge instructions, and my questions have been answered. I have discussed any challenges I see with this plan with the nurse or doctor.    ..........................................................................................................................................  Patient/Patient Representative Signature      ..........................................................................................................................................  Patient Representative Print Name and Relationship to Patient    ..................................................               ................................................  Date                                            Time    ..........................................................................................................................................  Reviewed by Signature/Title    ...................................................              ..............................................  Date                                                            Time

## 2017-12-28 NOTE — DISCHARGE INSTRUCTIONS
HOME CARE FOLLOWING MINOR SURGERY  BRANDON Richardson E. Gavin, N. Guttormson, D. Maurer, LAVINIA Mills    RESULTS:  If a biopsy of tissue was done, you may call for your final pathology report after 1p.m. two working days after surgery.  Your results will also be reviewed with you at your postoperative appointment.    INCISIONAL CARE:    If you have a dressing in place, keep clean and dry for 48 hours; you may replace the gauze if it becomes soiled.    After 48 hours you may remove the dressing and shower.  Do not submerse incision in water for 1 week.    If you have a Dermabond dressing (a type of skin glue), you may shower immediately.    Sutures which are beneath the skin will absorb and do not need to be removed.    Sutures you can see should be removed at your surgeon's office in 10-14 days at the latest.    If present, leave the steri-strips (white paper tapes) in place for 14 days after surgery.    If present, leave Dermabond glue in place until it wears/flakes off.    You may expect a small amount of drainage from your incision.    A lump/ridge under the incision is normal and will gradually resolve.  If it becomes red or very uncomfortable, contact the nurse at your surgeon's office to discuss whether this needs to be evaluated.    DRAIN:  The drain should be removed when there is less than 30 cc (1 ounce) coming out per day for 2 days.  Please call the office to arrange removal at [phone].    ACTIVITY:  Cautiously resume exercise and strenuous activities such as jogging, tennis, aerobics, etc. Also, be careful of stretching activities which affect the area of surgery for two weeks.    DIET:  No restrictions.  Increased fluid intake is recommended. While taking pain medications, increase dietary fiber or add a fiber supplementation like Metamucil or Citrucel to help prevent constipation - a possible side effect of pain medications.    DISCOMFORT:  Local anesthetic placed at  surgery should provide relief for 4-8 hours.  Begin taking pain pills before discomfort is severe.  Take the pain medication with some food, when possible, to minimize side effects.  Intermittent use of ice packs may help during the first 48 hours.  Expect gradual improvement.  You may slowly transition to use of over-the-counter medications for pain relief when you are no longer requiring narcotics for pain control.    RETURN APPOINTMENT:  Schedule a follow-up visit 2-3 weeks post-op.  Office Phone:  [phone]     CONTACT US IF THE FOLLOWING DEVELOPS:   1. A fever that is above 101     2. If there is a large amount of drainage, bleeding, or swelling.   3. Severe pain that is not relieved by your prescription.   4. Drainage that is thick, cloudy, yellow, green or white.   5. Any other questions not answered by  Frequently Asked Questions  sheet.      FREQUENTLY ASKED QUESTIONS:    Q:  How should my incision look?    A:  Normally your incision will appear slightly swollen with light redness directly along the incision itself as it heals.  It may feel like a bump or ridge as the healing/scarring happens, and over time (3-4 months) this bump or ridge feeling should slowly go away.  In general, clear or pink watery drainage can be normal at first as your incision heals, but should decrease over time.    Q:  How do I know if my incision is infected?  A:  Look at your incision for signs of infection, like redness around the incision spreading to surrounding skin, or drainage of cloudy or foul-smelling drainage.  If you feel warm, check your temperature to see if you are running a fever.    **If any of these things occur, please notify the nurse at our office.  We may need you to come into the office for an incision check.      Q:  How do I take care of my incision?  A:  If you have a dressing in place - Starting the day after surgery, replace the dressing 1-2 times a day until there is no further drainage from the  incision.  At that time, a dressing is no longer needed.  Try to minimize tape on the skin if irritation is occurring at the tape sites.  If you have significant irritation from tape on the skin, please call the office to discuss other method of dressing your incision.    Small pieces of tape called  steri-strips  may be present directly overlying your incision; these may be removed 10 days after surgery unless otherwise specified by your surgeon.  If these tapes start to loosen at the ends, you may trim them back until they fall off or are removed.    A:  If you had  Dermabond  tissue glue used as a dressing (this causes your incision to look shiny with a clear covering over it) - This type of dressing wears off with time and does not require more dressings over the top unless it is draining around the glue as it wears off.  Do not apply ointments or lotions over the incisions until the glue has completely worn off.    Q:  There is a piece of tape or a sticky  lead  still on my skin.  Can I remove this?  A:  Sometimes the sticky  leads  used for monitoring during surgery or for evaluation in the emergency department are not all removed while you are in the hospital.  These sometimes have a tab or metal dot on them.  You can easily remove these on your own, like taking off a band-aid.  If there is a gel substance under the  lead , simply wipe/clean it off with a washcloth or paper towel.      Q:  What can I do to minimize constipation (very hard stools, or lack of stools)?  A:  Stay well hydrated.  Increase your dietary fiber intake or take a fiber supplement -with plenty of water.  Walk around frequently.  You may consider an over-the-counter stool-softener.  Your Pharmacist can assist you with choosing one that is stocked at your pharmacy.  Constipation is also one of the most common side effects of pain medication.  If you are using pain medication, be pro-active and try to PREVENT problems with constipation by  taking the steps above BEFORE constipation becomes a problem.    Q:  What do I do if I need more pain medications?  A:  Call the office to receive refills.  Be aware that certain pain meds cannot be called into a pharmacy and actually require a paper prescription.  A change may be made in your pain med as you progress thru your recovery period or if you have side effects to certain meds.    --Pain meds are NOT refilled after 5pm on weekdays, and NOT AT ALL on the weekends, so please look ahead to prevent problems.      Q:  Why am I having a hard time sleeping now that I am at home?  A:  Many medications you receive while you are in the hospital can impact your sleep for a number of days after your surgery/hospitalization.  Decreased level of activity and naps during the day may also make sleeping at night difficult.  Try to minimize day-time naps, and get up frequently during the day to walk around your home during your recovery time.  Sleep aides may be of some help, but are not recommended for long-term use.      Q:  I am having some back discomfort.  What should I do?  A:  This may be related to certain positioning that was required for your surgery, extended periods of time in bed, or other changes in your overall activity level.  You may try ice, heat, acetaminophen, or ibuprofen to treat this temporarily.  Note that many pain medications have acetaminophen in them and would state this on the prescription bottle.  Be sure not to exceed the maximum of 4000mg per day of acetaminophen.     **If the pain you are having does not resolve, is severe, or is a flare of back pain you have had on other occasions prior to surgery, please contact your primary physician for further recommendations or for an appointment to be examined at their office.    Q:  Why am I having headaches?  A:  Headaches can be caused by many things:  caffeine withdrawal, use of pain meds, dehydration, high blood pressure, lack of sleep,  over-activity/exhaustion, flare-up of usual migraine headaches.  If you feel this is related to muscle tension (a band-like feeling around the head, or a pressure at the low-back of the head) you may try ice or heat to this area.  You may need to drink more fluids (try electrolyte drink like Gatorade), rest, or take your usual migraine medications.   **If your headaches do not resolve, worsen, are accompanied by other symptoms, or if your blood pressure is high, please call your primary physician for recommendation and/or examination.    Q:  I am unable to urinate.  What do I do?  A:  A small percentage of people can have difficulty urinating initially after surgery.  This includes being able to urinate only a very small amount at a time and feeling discomfort or pressure in the very low abdomen.  This is called  urinary retention , and is actually an urgent situation.  Proceed to your nearest Emergency department for evaluation (not an Urgent Care Center).  Sometimes the bladder does not work correctly after certain medications you receive during surgery, or related to certain procedures.  You may need to have a catheter placed until your bladder recovers.  When planning to go to an Emergency department, it may help to call the ER to let them know you are coming in for this problem after a surgery.  This may help you get in quicker to be evaluated.  **If you have symptoms of a urinary tract infection, please contact your primary physician for the proper evaluation and treatment.          If you have other questions, please call the office Monday thru Friday between 8am and 5pm to discuss with the nurse or physician assistant.  #[phone]    There is a surgeon ON CALL on weekday evenings and over the weekend in case of urgent need only, and may be contacted at the same number.    If you are having an emergency, call 911 or proceed to your nearest emergency department.    GENERAL ANESTHESIA OR SEDATION ADULT  DISCHARGE INSTRUCTIONS   SPECIAL PRECAUTIONS FOR 24 HOURS AFTER SURGERY    IT IS NOT UNUSUAL TO FEEL LIGHT-HEADED OR FAINT, UP TO 24 HOURS AFTER SURGERY OR WHILE TAKING PAIN MEDICATION.  IF YOU HAVE THESE SYMPTOMS; SIT FOR A FEW MINUTES BEFORE STANDING AND HAVE SOMEONE ASSIST YOU WHEN YOU GET UP TO WALK OR USE THE BATHROOM.    YOU SHOULD REST AND RELAX FOR THE NEXT 24 HOURS AND YOU MUST MAKE ARRANGEMENTS TO HAVE SOMEONE STAY WITH YOU FOR AT LEAST 24 HOURS AFTER YOUR DISCHARGE.  AVOID HAZARDOUS AND STRENUOUS ACTIVITIES.  DO NOT MAKE IMPORTANT DECISIONS FOR 24 HOURS.    DO NOT DRIVE ANY VEHICLE OR OPERATE MECHANICAL EQUIPMENT FOR 24 HOURS FOLLOWING THE END OF YOUR SURGERY.  EVEN THOUGH YOU MAY FEEL NORMAL, YOUR REACTIONS MAY BE AFFECTED BY THE MEDICATION YOU HAVE RECEIVED.    DO NOT DRINK ALCOHOLIC BEVERAGES FOR 24 HOURS FOLLOWING YOUR SURGERY.    DRINK CLEAR LIQUIDS (APPLE JUICE, GINGER ALE, 7-UP, BROTH, ETC.).  PROGRESS TO YOUR REGULAR DIET AS YOU FEEL ABLE.    YOU MAY HAVE A DRY MOUTH, A SORE THROAT, MUSCLES ACHES OR TROUBLE SLEEPING.  THESE SHOULD GO AWAY AFTER 24 HOURS.    CALL YOUR DOCTOR FOR ANY OF THE FOLLOWING:  SIGNS OF INFECTION (FEVER, GROWING TENDERNESS AT THE SURGERY SITE, A LARGE AMOUNT OF DRAINAGE OR BLEEDING, SEVERE PAIN, FOUL-SMELLING DRAINAGE, REDNESS OR SWELLING.    IT HAS BEEN OVER 8 TO 10 HOURS SINCE SURGERY AND YOU ARE STILL NOT ABLE TO URINATE (PASS WATER).           Maximum acetaminophen (Tylenol) dose from all sources should not exceed 4 grams (4000 mg) per day.  You had 975 mg today at 1027 am. You may take tylenol again at 2:20 pm

## 2017-12-29 LAB — COPATH REPORT: NORMAL

## 2018-01-02 ENCOUNTER — TELEPHONE (OUTPATIENT)
Dept: SURGERY | Facility: CLINIC | Age: 38
End: 2018-01-02

## 2018-01-02 ENCOUNTER — OFFICE VISIT (OUTPATIENT)
Dept: SURGERY | Facility: CLINIC | Age: 38
End: 2018-01-02
Payer: COMMERCIAL

## 2018-01-02 VITALS
WEIGHT: 293 LBS | HEIGHT: 64 IN | DIASTOLIC BLOOD PRESSURE: 84 MMHG | HEART RATE: 100 BPM | SYSTOLIC BLOOD PRESSURE: 132 MMHG | OXYGEN SATURATION: 98 % | BODY MASS INDEX: 50.02 KG/M2 | RESPIRATION RATE: 16 BRPM

## 2018-01-02 DIAGNOSIS — Z48.03 CHANGE OR REMOVAL OF DRAINS: Primary | ICD-10-CM

## 2018-01-02 PROCEDURE — 99024 POSTOP FOLLOW-UP VISIT: CPT | Performed by: PHYSICIAN ASSISTANT

## 2018-01-02 NOTE — TELEPHONE ENCOUNTER
Name of caller: Patient    Reason for Call:  Possible HA drain pull    Surgeon:  Dr. Pérez    Recent Surgery:  Yes.    If yes, when & what type:  12/28/17 Excision of large right back mass      Best phone number to reach pt at is: [phone]  Ok to leave a message with medical info? Yes    Pharmacy preferred (if calling for a refill): na

## 2018-01-02 NOTE — TELEPHONE ENCOUNTER
"GENERAL SURGERY NURSE PHONE TRIAGE   Cristina Dean    MRN# [id number]  AGE:  37 year old  YOB: 1980  [phone]      Surgeon: Dr. Pérez  Surgical Assist:  Antonio De Souza PA-C     Surgery type: Excision of large right back mass     Surgery Date: December / 28 / 2017     POD: 5     CHIEF CONCERN:  Drain     HISTORY OF PRESENT ILLNESS:   RN Phone Triage    CHIEF CONCERN: Request to have HA drain removed.    Patient reports she was instructed to call for appointment to have drain removed  When output is less than 30 ml for 24 hours x 2 days.  Reports 15 ml for the last 2 days.  \"light red in color\"  Patient has not been stripping tubing, but has been compressing drain bulb.    PLAN   Appointment with clinic [facility] today at 2 pm. Assess incision, wound for possible drain removal.  Sybil Batres RN      "

## 2018-01-02 NOTE — PROGRESS NOTES
[facility] Note 1/2/2018  Subjective:  Cristina Dean is here for her first postoperative visit.  She underwent excision of lipoma by Dr. Pérez on December/28; this was confirmed on final pathology.  She is now 5 days postop.  There was a Khurram drain placed at the time of repair.  Output has decreased appropriately and is ready for removal.  She is fairly anxious and tearful about getting the drain removed.  Her recent pain medications include: OTC prn.        Objective:  Inc - skin glue intact, healing well, no erythema, minimal bruising  Drain - serous fluid in bulb.  Mild erythema/irritation noted around drain exit point, no leakage around drain.  Entire Khurram drain removed without difficulty.  Bacitracin/gauze dressing applied.    Assessment:  Drain removal; s/p excision lipoma.    Plan:  I reviewed care of the previous drain site with the patient.  I also reviewed recommendations for continued use of pain meds as needed.  Minimize overuse of right UE to minimize risk of fluid collection.  Cristina will RTC prn for post-operative appointment.      Mara Pratt PA-C    Please route or send letter to:  *None*

## 2018-01-02 NOTE — LETTER
2018    Re: Cristina Dean - 1980    Cristina Dean is here for her first postoperative visit.  She underwent excision of lipoma by Dr. Pérez on ; this was confirmed on final pathology.  She is now 5 days postop.  There was a Khurram drain placed at the time of repair.  Output has decreased appropriately and is ready for removal.  She is fairly anxious and tearful about getting the drain removed.  Her recent pain medications include: OTC prn.         Objective:  Inc - skin glue intact, healing well, no erythema, minimal bruising  Drain - serous fluid in bulb.  Mild erythema/irritation noted around drain exit point, no leakage around drain.  Entire Khurram drain removed without difficulty.  Bacitracin/gauze dressing applied.     Assessment:  Drain removal; s/p excision lipoma.     Plan:  I reviewed care of the previous drain site with the patient.  I also reviewed recommendations for continued use of pain meds as needed.  Minimize overuse of right UE to minimize risk of fluid collection.  Cristina will RTC prn for post-operative appointment.       Mara Pratt PA-C

## 2018-01-02 NOTE — MR AVS SNAPSHOT
"              After Visit Summary   2018    Cristina Dean    MRN: [id number]           Patient Information     Date Of Birth          1980        Visit Information        Provider Department      2018 2:00 PM Mara Pratt PA-C [facility] General Surgery       Follow-ups after your visit        Follow-up notes from your care team     Return if symptoms worsen or fail to improve.      Who to contact     If you have questions or need follow up information about today's clinic visit or your schedule please contact [facility] directly at [phone].  Normal or non-critical lab and imaging results will be communicated to you by ValuNethart, letter or phone within 4 business days after the clinic has received the results. If you do not hear from us within 7 days, please contact the clinic through ValuNethart or phone. If you have a critical or abnormal lab result, we will notify you by phone as soon as possible.  Submit refill requests through 2Checkout or call your pharmacy and they will forward the refill request to us. Please allow 3 business days for your refill to be completed.          Additional Information About Your Visit        MyChart Information     2Checkout lets you send messages to your doctor, view your test results, renew your prescriptions, schedule appointments and more. To sign up, go to [email or web address] . Click on \"Log in\" on the left side of the screen, which will take you to the Welcome page. Then click on \"Sign up Now\" on the right side of the page.     You will be asked to enter the access code listed below, as well as some personal information. Please follow the directions to create your username and password.     Your access code is: [email or web address]  Expires: 2018  2:20 PM     Your access code will  in 90 days. If you need help or a new code, please call your [place] clinic or [phone].      " "  Care EveryWhere ID     This is your Care EveryWhere ID. This could be used by other organizations to access your [place] medical records  [id number]        Your Vitals Were     Pulse Respirations Height Last Period Pulse Oximetry BMI (Body Mass Index)    100 16 5' 4\" (1.626 m) 12/20/2017 (Exact Date) 98% 58.88 kg/m2       Blood Pressure from Last 3 Encounters:   01/02/18 132/84   12/28/17 144/86   12/21/17 142/82    Weight from Last 3 Encounters:   01/02/18 (!) 343 lb (155.6 kg)   12/28/17 (!) 343 lb (155.6 kg)   12/21/17 (!) 340 lb 9.6 oz (154.5 kg)              Today, you had the following     No orders found for display       Primary Care Provider Office Phone # Fax #    Jermain Jaramillo [phone]       [address] [place] [id number]        Equal Access to Services     [facility] Joseph[facility] : Hadii aad ku hadasho Soomaali, waaxda luqadaha, qaybta kaalmada adeegyada, waxay yeein haymercy jones . So [facility] [phone].    ATENCIÓN: Si habla español, tiene a magaña disposición servicios gratuitos de asistencia lingüística. Ll[facility] [phone].    We comply with applicable federal civil rights laws and [place] laws. We do not discriminate on the basis of race, color, national origin, age, disability, sex, sexual orientation, or gender identity.            Thank you!     Thank you for choosing [facility]  for your care. Our goal is always to provide you with excellent care. Hearing back from our patients is one way we can continue to improve our services. Please take a few minutes to complete the written survey that you may receive in the mail after your visit with us. Thank you!             Your Updated Medication List - Protect others around you: Learn how to safely use, store and throw away your medicines at www.disposemymeds.org.          This list is accurate as of: 1/2/18  2:20 PM.  Always use your most recent med list.                   Brand Name " Dispense Instructions for use Diagnosis    albuterol 108 (90 BASE) MCG/ACT Inhaler    PROAIR HFA/PROVENTIL HFA/VENTOLIN HFA    1 Inhaler    Inhale 2 puffs into the lungs 4 times daily as needed for shortness of breath / dyspnea    Intermittent asthma without complication, unspecified asthma severity       atorvastatin 10 MG tablet    LIPITOR    90 tablet    Take 1 tablet (10 mg) by mouth daily    CARDIOVASCULAR SCREENING; LDL GOAL LESS THAN 130       buPROPion 150 MG 24 hr tablet    WELLBUTRIN XL    60 tablet    Start 1 tablet daily for 2 weeks, then increase to 2 tablets daily.    Depression, major, recurrent, moderate (H), STEPHENIE (generalized anxiety disorder)       gabapentin 600 MG tablet    NEURONTIN    90 tablet    Take 1 tablet (600 mg) by mouth 3 times daily For anxiety.    STEPHENIE (generalized anxiety disorder)       lurasidone 80 MG Tabs tablet    LATUDA    90 tablet    Take 1 tablet (80 mg) by mouth daily Take with food.    Major depressive disorder, single episode, mild (H)       oxyCODONE IR 5 MG tablet    ROXICODONE    30 tablet    Take 1-2 tablets (5-10 mg) by mouth every 3 hours as needed for pain or other (Moderate to Severe)    Mass of subcutaneous tissue of back

## 2018-01-18 ENCOUNTER — OFFICE VISIT (OUTPATIENT)
Dept: PSYCHIATRY | Facility: CLINIC | Age: 38
End: 2018-01-18
Payer: COMMERCIAL

## 2018-01-18 VITALS
OXYGEN SATURATION: 96 % | BODY MASS INDEX: 58.7 KG/M2 | SYSTOLIC BLOOD PRESSURE: 118 MMHG | TEMPERATURE: 98.4 F | DIASTOLIC BLOOD PRESSURE: 78 MMHG | WEIGHT: 293 LBS | HEART RATE: 95 BPM

## 2018-01-18 DIAGNOSIS — F41.1 GAD (GENERALIZED ANXIETY DISORDER): ICD-10-CM

## 2018-01-18 DIAGNOSIS — F33.1 DEPRESSION, MAJOR, RECURRENT, MODERATE (H): ICD-10-CM

## 2018-01-18 DIAGNOSIS — F32.0 MAJOR DEPRESSIVE DISORDER, SINGLE EPISODE, MILD (H): ICD-10-CM

## 2018-01-18 PROCEDURE — 99214 OFFICE O/P EST MOD 30 MIN: CPT | Performed by: NURSE PRACTITIONER

## 2018-01-18 RX ORDER — LURASIDONE HYDROCHLORIDE 80 MG/1
80 TABLET, FILM COATED ORAL DAILY
Qty: 30 TABLET | Refills: 3 | Status: SHIPPED | OUTPATIENT
Start: 2018-01-18 | End: 2018-07-22

## 2018-01-18 RX ORDER — GABAPENTIN 800 MG/1
800 TABLET ORAL 3 TIMES DAILY
Qty: 90 TABLET | Refills: 3 | Status: SHIPPED | OUTPATIENT
Start: 2018-01-18 | End: 2018-05-10

## 2018-01-18 RX ORDER — BUPROPION HYDROCHLORIDE 300 MG/1
300 TABLET ORAL EVERY MORNING
Qty: 30 TABLET | Refills: 3 | Status: SHIPPED | OUTPATIENT
Start: 2018-01-18 | End: 2018-05-10

## 2018-01-18 ASSESSMENT — ANXIETY QUESTIONNAIRES
GAD7 TOTAL SCORE: 15
GAD7 TOTAL SCORE: 15
2. NOT BEING ABLE TO STOP OR CONTROL WORRYING: NEARLY EVERY DAY
4. TROUBLE RELAXING: MORE THAN HALF THE DAYS
7. FEELING AFRAID AS IF SOMETHING AWFUL MIGHT HAPPEN: MORE THAN HALF THE DAYS
1. FEELING NERVOUS, ANXIOUS, OR ON EDGE: SEVERAL DAYS
3. WORRYING TOO MUCH ABOUT DIFFERENT THINGS: NEARLY EVERY DAY
GAD7 TOTAL SCORE: 15
7. FEELING AFRAID AS IF SOMETHING AWFUL MIGHT HAPPEN: MORE THAN HALF THE DAYS
5. BEING SO RESTLESS THAT IT IS HARD TO SIT STILL: SEVERAL DAYS
6. BECOMING EASILY ANNOYED OR IRRITABLE: NEARLY EVERY DAY

## 2018-01-18 ASSESSMENT — PATIENT HEALTH QUESTIONNAIRE - PHQ9
SUM OF ALL RESPONSES TO PHQ QUESTIONS 1-9: 17
SUM OF ALL RESPONSES TO PHQ QUESTIONS 1-9: 17
10. IF YOU CHECKED OFF ANY PROBLEMS, HOW DIFFICULT HAVE THESE PROBLEMS MADE IT FOR YOU TO DO YOUR WORK, TAKE CARE OF THINGS AT HOME, OR GET ALONG WITH OTHER PEOPLE: EXTREMELY DIFFICULT

## 2018-01-18 NOTE — PROGRESS NOTES
"    Outpatient Psychiatric Progress Note    Name: Cristina Dean   : 1980                    Primary Care Provider: Jermain Jaramillo MD - last visit 2017  Therapist: None    PHQ-9 scores:  PHQ-9 SCORE 10/23/2017 2017 2018   Total Score 5 16 17       STEPHENIE-7 scores:  STEPHENIE-7 SCORE 10/2/2017 2017 2018   Total Score 6 12 15     Answers for HPI/ROS submitted by the patient on 2018   If you checked off any problems, how difficult have these problems made it for you to do your work, take care of things at home, or get along with other people?: Extremely difficult    Patient Identification:  Patient is a 37 year old year old,   White  female  who presents for return visit with me.  Patient is currently employed part time. Patient attended the session alone. Patient prefers to be called: \"Cristina\".    Interim History:    I last saw Cristina Dean for outpatient psychiatry Return Visit on 2017.     During that appointment, we Continue Neurontin (gabapentin) 600 mg by mouth 3 times per day for anxiety.     Continue Latuda (lurasidone) 80 mg by mouth daily. For mood. Take with food.     Start Wellbutrin (buproprion)  mg daily in AM for mood and energy for 2 weeks then increase to 300 mg daily in AM.     Current medications include:   Current Outpatient Prescriptions   Medication Sig     gabapentin (NEURONTIN) 600 MG tablet Take 1 tablet (600 mg) by mouth 3 times daily For anxiety.     buPROPion (WELLBUTRIN XL) 150 MG 24 hr tablet Start 1 tablet daily for 2 weeks, then increase to 2 tablets daily.     atorvastatin (LIPITOR) 10 MG tablet Take 1 tablet (10 mg) by mouth daily     albuterol (PROAIR HFA/PROVENTIL HFA/VENTOLIN HFA) 108 (90 BASE) MCG/ACT Inhaler Inhale 2 puffs into the lungs 4 times daily as needed for shortness of breath / dyspnea     lurasidone (LATUDA) 80 MG TABS tablet Take 1 tablet (80 mg) by mouth daily Take with food.     oxyCODONE IR " "(ROXICODONE) 5 MG tablet Take 1-2 tablets (5-10 mg) by mouth every 3 hours as needed for pain or other (Moderate to Severe) (Patient not taking: Reported on 1/2/2018)     No current facility-administered medications for this visit.        The [place] Prescription Monitoring Program has been reviewed and there are no concerns about diversionary activity for controlled substances at this time.  Neurontin (gabapentin) 600 mg 90 tabs filled 11/13/2017, 12/06/2018, and 1/8/2018 from me. Oxycodone 30 tabs filled 12/18/20107 after surgery.     I was able to review most recent Primary Care Provider, specialty provider, and therapy visit notes that I have access to.     Cristina Dean reports mood has been: \"not good, more depressed\" no overt hypomania or yadi  Anxiety has been: \"a lot worse\" no panic.   Sleep has been: \"restless\" no nightrmares  PHQ9 and GAD7 scores were reviewed today.   Medication side effects: Denies  Current stressors include: Relationship Difficulties, Symptoms, \"Everything\", Financial Difficulties  Coping mechanisms and supports include: Family, Friends, Grief Group, Faith, Workplace, Crying      Past medication trials include but are not limited to:   Paxil (paroxetine) \"made me suicidal\"  Seroquel (quetiapine) \"was a zombie\"  Latuda (lurasidone)   Minipress (prazosin)   Neurontin (gabapentin)   Desyrel/Olepto (trazodone) was good for sleeping issues  Benadryl (diphenhydramine) was good for sleeping issues  Cannot remember other medications she has been on in the past  Neurontin (gabapentin)   Wellbutrin (buproprion)     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 She also has no past medical history of Chronic infection; Complication of anesthesia; Diabetes (H); History of blood transfusion; Malignant hyperthermia; Other chronic pain; PONV (postoperative nausea and vomiting); Sleep apnea; or Thyroid disease.    Social History:  Current Living situation:  [place], [place] with Spouse/Partner. Feels safe at home.  Current use of drugs or alcohol: Alcohol and Cannabis. Does not drink as much lately due to how it makes her feel. Still drinks about 2 products per week. Reports uses Marijuana to cope with anxiety- sometimes works and sometimes does not- reports still helps for relaxing.    Tobacco use: Yes Cigarettes when drinking  Caffeine:  Yes  3 cups/day of coffee- only on work days    Vital Signs:   /78 (BP Location: Right arm, Patient Position: Chair, Cuff Size: Adult Large)  Pulse 95  Temp 98.4  F (36.9  C) (Oral)  Wt (!) 342 lb (155.1 kg)  LMP 12/20/2017 (Exact Date)  SpO2 96%  BMI 58.7 kg/m2    Labs:  Most recent laboratory results reviewed and no new labs.     Review of Systems:  10 systems (general, cardiovascular, respiratory, eyes, ENT, endocrine, GI, , M/S, neurological) were reviewed. Most pertinent finding(s) is/are: dry mouth. The remaining systems are all unremarkable.      Mental Status Examination:  Appearance:  awake, alert, adequately groomed, appeared stated age, no apparent distress, morbidly obese, early, alone  Attitude:  cooperative   Eye Contact:  fair and wears glasses  Gait and Station: Normal, No assistive Devices used and No dizziness or falls  Psychomotor Behavior:  no evidence of tardive dyskinesia, dystonia, or tics  Oriented to:  time, person, and place  Attention Span and Concentration:  Easily distracted per report at work only, adequate to interview today  Speech:  clear, coherent, regular rate, regular rhythm and fluent  Mood:  "\"more depressed\"  Affect:  blunted, tearful   Associations:  no loose associations  Thought Process:  logical, linear and goal oriented  Thought Content:  no evidence of psychotic thought, passive suicidal ideation present, no homicidal ideation and Appropriate to Interview  Recent and Remote Memory:  intact to interview. Uses memory reminders. Not formally assessed. No amnesia.  Fund of Knowledge: appropriate  Insight:  adequate  Judgment:  fair but adequate to safety  Impulse Control:  fair      Suicide Risk Assessment:  Today Cristina Dean reports chronic thoughts of \"I don't want to be here anymore, but I do not want to kill myself\". Continues to endorse that she has several days when she thinks she would be better off dead, but denies thoughts of suicide. In addition, there are notable risk factors for self-harm, including age, anxiety, substance abuse, suicidal ideation, anger/rage, hopelessness and mood change. However, risk is mitigated by commitment to family, spiritual/Christian beliefs, absence of past attempts, ability to volunteer a safety plan, history of seeking help when needed, future oriented, no access to firearms or weapons, denies suicidal intent or plan, no family history of suicide and denies homicidal ideation, intent, or plan. Therefore, based on all available evidence including the factors cited above, Cristina Dean does not appear to be at imminent risk for self-harm, does not meet criteria for a 72-hr hold, and therefore remains appropriate for ongoing outpatient level of care.  A thorough assessment of risk factors related to suicide and self-harm have been reviewed and are noted above. Local community safety resources reviewed and printed for patient to use if needed. There was no deceit detected, and the patient presented in a manner that was believable.       DSM5  Diagnosis:  296.33 (F33.2) Major Depressive Disorder, Recurrent Episode, Severe With anxious distress- moderate " severity                       Rule out Bipolar Disorder  300.02 (F41.1) Generalized Anxiety Disorder                        Rule out 307.51 (F50.8) Binge-Eating Disorder                         Rule out 301.83 (F60.3) Borderline Personality Disorder   Alcohol Use Disorder, partial remission  Cannabis Abuse   Obesity per BMI of 58.70    Medical comorbidities include:   Patient Active Problem List    Diagnosis Date Noted     Cannabis use disorder, mild, abuse 12/04/2017     Priority: Medium     Alcohol use disorder (H) 12/04/2017     Priority: Medium     Bipolar affective disorder, remission status unspecified (H) 11/13/2017     Priority: Medium     Depression, major, recurrent, moderate (H) 10/23/2017     Priority: Medium     Intermittent asthma without complication, unspecified asthma severity 10/23/2017     Priority: Medium     CARDIOVASCULAR SCREENING; LDL GOAL LESS THAN 130 08/07/2012     Priority: Medium     Morbid obesity due to excess calories (H) 08/01/2012     Priority: Medium     BMI 53       STEPHENIE (generalized anxiety disorder)      Priority: Medium     Herpes 07/09/2012     Priority: Medium       Psychosocial & Contextual Factors:  Occupational Difficulties, Financial Difficulties and Relationship Difficulties    Assessment:  Cristina Dean reports worsening depression. Medication side effects and alternatives were reviewed. Health promotion activities recommended and reviewed today. All questions addressed. Education and counseling completed regarding risks and benefits of medications and psychotherapy options.    With history of alcohol dependence and frequent Marijuana use, I will avoid benzodiazepine medications at this time. Can trial Neurontin (gabapentin) for mood stability and anxiety assistance. If needed, could trial Buspar (buspirone) or Topamax (topiramate).     Patient reports that she cannot fill her medications.  Change to one month at a time for finances and send to pharmacy.   Has not  been able to  Wellbutrin (buproprion) in 4 days. Patient is not sure why she cannot get medications refilled. Patient has refills. Reviewed her updated pharmacy and refilled medications. Also recommended patient contact us if she needs a prior authorization for any medications. Patient is stressed and more depressed related to frustrations with appointments and medications. Active listening provided today. Patient has to go back to work today in [place]. Letter was created to show up late today due to appointment time. Discussed return back to Primary Care Provider.     Treatment Plan:    Continue Latuda (lurasidone) 80 mg by mouth daily. For mood. Take with food.     Continue Wellbutrin (buproprion)  mg by mouth daily.    Increase Neurontin (gabapentin) to 800 mg by mouth 3 times per day for anxiety.     Continue all other medications as reviewed per electronic medical record today.     Safety plan reviewed. To the Emergency Department as needed or call after hours crisis line at [phone] or [phone].     To schedule individual or family therapy, call [facility] at [phone].   Thank you for our work together in the Psychiatry Collaborative Care Model at [facility]. This is our last visit and I am returning your care back to your Primary Care Provider Jermain Jaramillo MD . If you are not doing well, please contact your Primary Care Provider office.     Follow up with primary care provider as planned or for acute medical concerns.    [employer] may be used to communicate with your provider, but this is not intended to be used for emergencies.    Administrative Billing:   Time spent with patient was 20 minutes and greater than 50% of time or 15 minutes was spent in counseling and coordination of care regarding above diagnoses and treatment plan.    Patient Status:  The patient is being returned to the referring provider for ongoing care and medication prescribing.   The patient can be referred back to this service for further consultation as needed.    Signed:   Lisa Conrad, PhD, APRN, CNP   Psychiatry

## 2018-01-18 NOTE — LETTER
[facility]  [address]  [address]  [place]  [address]  [phone]      January 18, 2018      Please excuse Ms. Cristina Dean from work today due to medical appointment.    Please contact me with concerns.      Sincerely,          Lisa Conrad, PhD, APRN, CNP

## 2018-01-18 NOTE — NURSING NOTE
"Chief Complaint   Patient presents with     RECHECK     pt crying upset, wanting refills        Initial /78 (BP Location: Right arm, Patient Position: Chair, Cuff Size: Adult Large)  Pulse 95  Temp 98.4  F (36.9  C) (Oral)  Wt (!) 342 lb (155.1 kg)  LMP 12/20/2017 (Exact Date)  SpO2 96%  BMI 58.7 kg/m2 Estimated body mass index is 58.7 kg/(m^2) as calculated from the following:    Height as of 1/2/18: 5' 4\" (1.626 m).    Weight as of this encounter: 342 lb (155.1 kg).  Medication Reconciliation: complete    "

## 2018-01-18 NOTE — PATIENT INSTRUCTIONS
Treatment Plan:    Continue Latuda (lurasidone) 80 mg by mouth daily. For mood. Take with food.     Continue Wellbutrin (buproprion)  mg by mouth daily.    Increase Neurontin (gabapentin) to 800 mg by mouth 3 times per day for anxiety.     Continue all other medications as reviewed per electronic medical record today.     Safety plan reviewed. To the Emergency Department as needed or call after hours crisis line at [phone] or [phone].     To schedule individual or family therapy, call [facility] at [phone].   Thank you for our work together in the Psychiatry Collaborative Care Model at [facility]. This is our last visit and I am returning your care back to your Primary Care Provider Jermain Jaramillo MD . If you are not doing well, please contact your Primary Care Provider office.     Follow up with primary care provider as planned or for acute medical concerns.    MyChart may be used to communicate with your provider, but this is not intended to be used for emergencies.

## 2018-01-18 NOTE — MR AVS SNAPSHOT
After Visit Summary   1/18/2018    Cristina Dean    MRN: [id number]           Patient Information     Date Of Birth          1980        Visit Information        Provider Department      1/18/2018 9:45 AM Lisa Conrad NP [facility]        Today's Diagnoses     Depression, major, recurrent, moderate (H)        STEPHENIE (generalized anxiety disorder)        Depression          Care Instructions    Treatment Plan:    Continue Latuda (lurasidone) 80 mg by mouth daily. For mood. Take with food.     Continue Wellbutrin (buproprion)  mg by mouth daily.    Increase Neurontin (gabapentin) to 800 mg by mouth 3 times per day for anxiety.     Continue all other medications as reviewed per electronic medical record today.     Safety plan reviewed. To the Emergency Department as needed or call after hours crisis line at [phone] or [phone].     To schedule individual or family therapy, call [facility] at [phone].   Thank you for our work together in the Psychiatry Collaborative Care Model at [facility]. This is our last visit and I am returning your care back to your Primary Care Provider Jermain Jaramillo MD . If you are not doing well, please contact your Primary Care Provider office.     Follow up with primary care provider as planned or for acute medical concerns.    [employer] may be used to communicate with your provider, but this is not intended to be used for emergencies.          Follow-ups after your visit        Who to contact     If you have questions or need follow up information about today's clinic visit or your schedule please contact [facility] directly at [phone].  Normal or non-critical lab and imaging results will be communicated to you by MyChart, letter or phone within 4 business days after the clinic has received the results. If you do not hear from us within 7 days, please contact the clinic through  "[facility] [employer] [facility] or phone. If you have a critical or abnormal lab result, we will notify you by phone as soon as possible.  Submit refill requests through [employer] or call your pharmacy and they will forward the refill request to us. Please allow 3 business days for your refill to be completed.          Additional Information About Your Visit        [facility] [employer] Information     [employer] lets you send messages to your doctor, view your test results, renew your prescriptions, schedule appointments and more. To sign up, go to [email or web address] . Click on \"Log in\" on the left side of the screen, which will take you to the Welcome page. Then click on \"Sign up Now\" on the right side of the page.     You will be asked to enter the access code listed below, as well as some personal information. Please follow the directions to create your username and password.     Your access code is: [email or web address]  Expires: 2018  2:20 PM     Your access code will  in 90 days. If you need help or a new code, please call your [place] clinic or [phone].        Care EveryWhere ID     This is your Care EveryWhere ID. This could be used by other organizations to access your [place] medical records  [id number]        Your Vitals Were     Pulse Temperature Last Period Pulse Oximetry BMI (Body Mass Index)       95 98.4  F (36.9  C) (Oral) 2017 (Exact Date) 96% 58.7 kg/m2        Blood Pressure from Last 3 Encounters:   18 118/78   18 132/84   17 144/86    Weight from Last 3 Encounters:   18 (!) 342 lb (155.1 kg)   18 (!) 343 lb (155.6 kg)   17 (!) 343 lb (155.6 kg)              Today, you had the following     No orders found for display         Today's Medication Changes          These changes are accurate as of: 18 10:16 AM.  If you have any questions, ask your nurse or doctor.               These medicines have changed or have updated prescriptions.        Dose/Directions    buPROPion 300 MG " 24 hr tablet   Commonly known as:  WELLBUTRIN XL   This may have changed:    - medication strength  - how much to take  - how to take this  - when to take this  - additional instructions   Used for:  Depression, major, recurrent, moderate (H), STEPHENIE (generalized anxiety disorder)   Changed by:  Lisa Conrad NP        Dose:  300 mg   Take 1 tablet (300 mg) by mouth every morning   Quantity:  30 tablet   Refills:  3       gabapentin 800 MG tablet   Commonly known as:  NEURONTIN   This may have changed:    - medication strength  - how much to take  - additional instructions   Used for:  STEPHENIE (generalized anxiety disorder)   Changed by:  Lisa Conrad NP        Dose:  800 mg   Take 1 tablet (800 mg) by mouth 3 times daily Increased dose.   Quantity:  90 tablet   Refills:  3            Where to get your medicines      These medications were sent to [facility]/pharmacy #[id number] - [place] [id number] [address]     Phone:  [phone]     buPROPion 300 MG 24 hr tablet    gabapentin 800 MG tablet    lurasidone 80 MG Tabs tablet                Primary Care Provider Office Phone # Fax #    Jermain Jaramillo [phone]       [address] [facility] [address]        Equal Access to Services     LARRY BULL : Hadii aad ku hadasho Soomaali, waaxda luqadaha, qaybta kaalmada adeegyada, shayy rush. So [facility] [phone].    ATENCIÓN: Si habla español, tiene a magaña disposición servicios gratuitos de asistencia lingüística. [facility] [phone].    We comply with applicable federal civil rights laws and [place] laws. We do not discriminate on the basis of race, color, national origin, age, disability, sex, sexual orientation, or gender identity.            Thank you!     Thank you for choosing [facility]  for your care. Our goal is always to provide you with excellent care. Hearing back from our patients is one way we  can continue to improve our services. Please take a few minutes to complete the written survey that you may receive in the mail after your visit with us. Thank you!             Your Updated Medication List - Protect others around you: Learn how to safely use, store and throw away your medicines at www.disposemymeds.org.          This list is accurate as of: 1/18/18 10:16 AM.  Always use your most recent med list.                   Brand Name Dispense Instructions for use Diagnosis    albuterol 108 (90 BASE) MCG/ACT Inhaler    PROAIR HFA/PROVENTIL HFA/VENTOLIN HFA    1 Inhaler    Inhale 2 puffs into the lungs 4 times daily as needed for shortness of breath / dyspnea    Intermittent asthma without complication, unspecified asthma severity       atorvastatin 10 MG tablet    LIPITOR    90 tablet    Take 1 tablet (10 mg) by mouth daily    CARDIOVASCULAR SCREENING; LDL GOAL LESS THAN 130       buPROPion 300 MG 24 hr tablet    WELLBUTRIN XL    30 tablet    Take 1 tablet (300 mg) by mouth every morning    Depression, major, recurrent, moderate (H), STEPHENIE (generalized anxiety disorder)       gabapentin 800 MG tablet    NEURONTIN    90 tablet    Take 1 tablet (800 mg) by mouth 3 times daily Increased dose.    STEPHENIE (generalized anxiety disorder)       lurasidone 80 MG Tabs tablet    LATUDA    30 tablet    Take 1 tablet (80 mg) by mouth daily Take with food.    Major depressive disorder, single episode, mild (H)

## 2018-01-19 ASSESSMENT — ANXIETY QUESTIONNAIRES: GAD7 TOTAL SCORE: 15

## 2018-01-19 ASSESSMENT — PATIENT HEALTH QUESTIONNAIRE - PHQ9: SUM OF ALL RESPONSES TO PHQ QUESTIONS 1-9: 17

## 2018-02-21 DIAGNOSIS — Z13.6 CARDIOVASCULAR SCREENING; LDL GOAL LESS THAN 130: ICD-10-CM

## 2018-02-21 LAB
ALBUMIN SERPL-MCNC: 3.6 G/DL (ref 3.4–5)
ALP SERPL-CCNC: 83 U/L (ref 40–150)
ALT SERPL W P-5'-P-CCNC: 26 U/L (ref 0–50)
AST SERPL W P-5'-P-CCNC: 16 U/L (ref 0–45)
BILIRUB DIRECT SERPL-MCNC: <0.1 MG/DL (ref 0–0.2)
BILIRUB SERPL-MCNC: 0.3 MG/DL (ref 0.2–1.3)
CHOLEST SERPL-MCNC: 152 MG/DL
HDLC SERPL-MCNC: 41 MG/DL
LDLC SERPL CALC-MCNC: 63 MG/DL
NONHDLC SERPL-MCNC: 111 MG/DL
PROT SERPL-MCNC: 7 G/DL (ref 6.8–8.8)
TRIGL SERPL-MCNC: 238 MG/DL

## 2018-02-21 PROCEDURE — 80061 LIPID PANEL: CPT | Performed by: INTERNAL MEDICINE

## 2018-02-21 PROCEDURE — 36415 COLL VENOUS BLD VENIPUNCTURE: CPT | Performed by: INTERNAL MEDICINE

## 2018-02-21 PROCEDURE — 80076 HEPATIC FUNCTION PANEL: CPT | Performed by: INTERNAL MEDICINE

## 2018-03-08 ENCOUNTER — TELEPHONE (OUTPATIENT)
Dept: PSYCHIATRY | Facility: CLINIC | Age: 38
End: 2018-03-08

## 2018-03-08 NOTE — TELEPHONE ENCOUNTER
Reason for call:  Other   Patient called regarding (reason for call): prescription  Additional comments: Patient called and said that her insurance recently changed and they are no longer covering the rx for Latuda.  I let the patient know that she was referred back to her PCP for all ongoing medication management.  She said she called her PCP and they cannot do anything because Lisa was the last person to prescribe Latuda.  The pharmacy is submitting an appeal to her insurance company, but they told her it would take up to 5 days.  She very concerned about being off the medication for that long.  She was hoping to speak with Lisa or a nurse about the situation.      Phone number to reach patient:  Home number on file [phone] (home)    Best Time:  any    Can we leave a detailed message on this number?  YES

## 2018-03-14 NOTE — TELEPHONE ENCOUNTER
Central Prior Authorization Team   Phone: [phone]      PA Initiation    Medication: LATUDA  Insurance Company: [facility] - Phone [phone] Fax [phone]  Pharmacy Filling the Rx: [id number] - [place], [place] - [address]  Filling Pharmacy Phone: [phone]  Filling Pharmacy Fax: [phone]  Start Date: 3/14/2018

## 2018-03-14 NOTE — TELEPHONE ENCOUNTER
RN spoke to pharmacy. They do not complete PA's, this needs to be done by the care team's PA staff. Patient is currently under the care of her PCP. Will route to PCP's PA team.

## 2018-03-14 NOTE — TELEPHONE ENCOUNTER
Please note as stated prior this patient had this medicine prescribed by mental health provider.  Alternate care in regards to medication or PAs need to be done through her primary mental health provider we are not the prescribing physician

## 2018-03-16 NOTE — TELEPHONE ENCOUNTER
Excellent work, thank you Bonita!    RN also attempted to call patient. Call went directly to .  Message left with the news of PA approval.

## 2018-03-16 NOTE — TELEPHONE ENCOUNTER
Called insurance rep again to ask if we could treat this as an urgent request from original request date of 3/14/2018.  She is checking with her supervisor and asked if I could send in more notes on patient's tried/failed medications.  I sent in the information in Jil's note below.  I also gave insurance rep my direct number so she could inform me when PA request was processed.

## 2018-03-16 NOTE — TELEPHONE ENCOUNTER
Original request was sent to correct insurance.  Spoke with insurance rep, [phone], to inquire on PA request.  She stated PA request was received on 3/14/2018 and review could take up to 15 days.  I explained this was an urgent request and asked for it to be expedited.  Insurance rep spoke to her supervisor who stated they could put the request in as urgent from today's date then it will take 72 hours.

## 2018-03-16 NOTE — TELEPHONE ENCOUNTER
Insurance rep called back to inform me they will put in an override so patient can fill for 3 months while they review the PA request.  Spoke with [facility], pharmacist processed through insurance for a $35 copay and stated medication will be ready for  in an hour.  Unable to reach patient.  VM left for patient to call her pharmacy and if she had any questions to call PA team.  Will document in this telephone encounter when final determination is received from insurance.

## 2018-03-16 NOTE — TELEPHONE ENCOUNTER
"Patient called to check on the status of the PA. It was done on 3/14/2018 however we have not received a response.  RN called pharmacy. PA was was submitted to incorrect plan. [facility] doesn't have patient's current plan information.  RN obtained this info from the pharmacy.    RN spoke to patient. She was distraught, as evidenced by continuous sobbing throughout the several minute call. She has been off Latuda for almost a week. She stated her 's employer changed insurance providers.     Patient's current drug coverage:    [employer] [phone]  ID: [id number] Person Code [id number]  BIN [id number]  Latuda 80 mg 30 for 30    Will route to PA team with request to expedite this PA. Patient has been on this medication for several months and needs to continue therapy.     Past medication trials include but are not limited to:   Paxil (paroxetine) \"made me suicidal\"  Seroquel (quetiapine) \"was a zombie\"  Latuda (lurasidone)   Minipress (prazosin)   Neurontin (gabapentin)   Desyrel/Olepto (trazodone) was good for sleeping issues  Benadryl (diphenhydramine) was good for sleeping issues  Cannot remember other medications she has been on in the past  Neurontin (gabapentin)   Wellbutrin (buproprion)        "

## 2018-03-26 NOTE — TELEPHONE ENCOUNTER
Prior Authorization Approval    Authorization Effective Date: 3/16/2018  Authorization Expiration Date: 3/16/2019  Medication: LATUDA  Approved Dose/Quantity:    Reference #: [id number]   Insurance Company: [facility] [employer] [facility] - Phone [phone] Fax [phone]  Expected CoPay: $35     CoPay Card Available:      Foundation Assistance Needed:    Which Pharmacy is filling the prescription (Not needed for infusion/clinic administered): [id number] - [place], [place]  [address] [place] [address]  Pharmacy Notified: Yes  Patient Notified: Yes    PATIENT NOTIFIED.  NO DETERMINATION FAX FROM INSURANCE.

## 2018-05-10 ENCOUNTER — TELEPHONE (OUTPATIENT)
Dept: PSYCHIATRY | Facility: CLINIC | Age: 38
End: 2018-05-10

## 2018-05-10 DIAGNOSIS — F41.1 GAD (GENERALIZED ANXIETY DISORDER): ICD-10-CM

## 2018-05-10 DIAGNOSIS — F33.1 DEPRESSION, MAJOR, RECURRENT, MODERATE (H): ICD-10-CM

## 2018-05-10 RX ORDER — BUPROPION HYDROCHLORIDE 300 MG/1
300 TABLET ORAL EVERY MORNING
Qty: 90 TABLET | Refills: 1 | Status: SHIPPED | OUTPATIENT
Start: 2018-05-10 | End: 2018-12-08

## 2018-05-10 RX ORDER — GABAPENTIN 800 MG/1
800 TABLET ORAL 3 TIMES DAILY
Qty: 90 TABLET | Refills: 3 | Status: SHIPPED | OUTPATIENT
Start: 2018-05-10 | End: 2018-10-05

## 2018-05-10 NOTE — TELEPHONE ENCOUNTER
Gabapentin 800 mg      Last Written Prescription Date:  1/18/2018  Last Fill Quantity: 30,   # refills: 3  Last Office Visit: 12/21/2017  Future Office visit:       Routing refill request to provider for review/approval because:  Drug not on the [facility], [facility] or [facility] refill protocol or controlled substance    buPROPion (WELLBUTRIN XL) 300 MG 24 hr tablet     Last Written Prescription Date:  01/18/2018  Last Fill Quantity: 30,   # refills: 3  Last Office Visit: 12/21/2017  Future Office visit:        Routing refill request to provider for review/approval because:  Drug not on the [facility], [facility] or [facility] refill protocol or controlled substance

## 2018-05-10 NOTE — TELEPHONE ENCOUNTER
If this is a refill request then these medications need to be entered according to refill protocol for cosign

## 2018-05-10 NOTE — TELEPHONE ENCOUNTER
Contact patient and advised she should contact Primary Care provider for refills.  Forwarded message to PCP.

## 2018-05-10 NOTE — TELEPHONE ENCOUNTER
Reason for call:  Other   Patient called regarding (reason for call): prescription  Additional comments: Requesting refill of gabapentin.  Will need refill soon for welbutrin, as well.  [facility] pharmacy made this request a week ago and there has been no follow up.    Phone number to reach patient:  Home number on file [phone] (home)    Best Time:  anytime    Can we leave a detailed message on this number?  YES

## 2018-06-15 ENCOUNTER — APPOINTMENT (OUTPATIENT)
Dept: GENERAL RADIOLOGY | Facility: CLINIC | Age: 38
End: 2018-06-15
Payer: COMMERCIAL

## 2018-06-15 ENCOUNTER — HOSPITAL ENCOUNTER (EMERGENCY)
Facility: CLINIC | Age: 38
Discharge: HOME | End: 2018-06-15
Attending: EMERGENCY MEDICINE | Admitting: EMERGENCY MEDICINE
Payer: COMMERCIAL

## 2018-06-15 VITALS
BODY MASS INDEX: 51.91 KG/M2 | OXYGEN SATURATION: 95 % | TEMPERATURE: 97.8 F | SYSTOLIC BLOOD PRESSURE: 136 MMHG | DIASTOLIC BLOOD PRESSURE: 72 MMHG | WEIGHT: 293 LBS | RESPIRATION RATE: 10 BRPM | HEIGHT: 63 IN

## 2018-06-15 DIAGNOSIS — R07.89 ATYPICAL CHEST PAIN: ICD-10-CM

## 2018-06-15 LAB
ANION GAP SERPL CALCULATED.3IONS-SCNC: 6 MMOL/L (ref 3–14)
B-HCG SERPL-ACNC: <1 IU/L (ref 0–5)
BASOPHILS # BLD AUTO: 0.1 10E9/L (ref 0–0.2)
BASOPHILS NFR BLD AUTO: 0.4 %
BUN SERPL-MCNC: 17 MG/DL (ref 7–30)
CALCIUM SERPL-MCNC: 8.7 MG/DL (ref 8.5–10.1)
CHLORIDE SERPL-SCNC: 106 MMOL/L (ref 94–109)
CO2 SERPL-SCNC: 26 MMOL/L (ref 20–32)
CREAT SERPL-MCNC: 0.94 MG/DL (ref 0.52–1.04)
DIFFERENTIAL METHOD BLD: ABNORMAL
EOSINOPHIL # BLD AUTO: 0.2 10E9/L (ref 0–0.7)
EOSINOPHIL NFR BLD AUTO: 1.7 %
ERYTHROCYTE [DISTWIDTH] IN BLOOD BY AUTOMATED COUNT: 13.8 % (ref 10–15)
GFR SERPL CREATININE-BSD FRML MDRD: 66 ML/MIN/1.7M2
GLUCOSE SERPL-MCNC: 94 MG/DL (ref 70–99)
HCT VFR BLD AUTO: 45.1 % (ref 35–47)
HGB BLD-MCNC: 14.6 G/DL (ref 11.7–15.7)
IMM GRANULOCYTES # BLD: 0 10E9/L (ref 0–0.4)
IMM GRANULOCYTES NFR BLD: 0.3 %
LYMPHOCYTES # BLD AUTO: 2.8 10E9/L (ref 0.8–5.3)
LYMPHOCYTES NFR BLD AUTO: 19.7 %
MCH RBC QN AUTO: 30.2 PG (ref 26.5–33)
MCHC RBC AUTO-ENTMCNC: 32.4 G/DL (ref 31.5–36.5)
MCV RBC AUTO: 93 FL (ref 78–100)
MONOCYTES # BLD AUTO: 0.9 10E9/L (ref 0–1.3)
MONOCYTES NFR BLD AUTO: 6.6 %
NEUTROPHILS # BLD AUTO: 9.9 10E9/L (ref 1.6–8.3)
NEUTROPHILS NFR BLD AUTO: 71.3 %
NRBC # BLD AUTO: 0 10*3/UL
NRBC BLD AUTO-RTO: 0 /100
PLATELET # BLD AUTO: 352 10E9/L (ref 150–450)
POTASSIUM SERPL-SCNC: 4.7 MMOL/L (ref 3.4–5.3)
RBC # BLD AUTO: 4.84 10E12/L (ref 3.8–5.2)
SODIUM SERPL-SCNC: 138 MMOL/L (ref 133–144)
TROPONIN I SERPL-MCNC: <0.015 UG/L (ref 0–0.04)
TROPONIN I SERPL-MCNC: <0.015 UG/L (ref 0–0.04)
WBC # BLD AUTO: 14 10E9/L (ref 4–11)

## 2018-06-15 PROCEDURE — 84484 ASSAY OF TROPONIN QUANT: CPT | Performed by: EMERGENCY MEDICINE

## 2018-06-15 PROCEDURE — 99285 EMERGENCY DEPT VISIT HI MDM: CPT | Mod: 25

## 2018-06-15 PROCEDURE — 85025 COMPLETE CBC W/AUTO DIFF WBC: CPT | Performed by: PHYSICIAN ASSISTANT

## 2018-06-15 PROCEDURE — 80048 BASIC METABOLIC PNL TOTAL CA: CPT | Performed by: PHYSICIAN ASSISTANT

## 2018-06-15 PROCEDURE — 93005 ELECTROCARDIOGRAM TRACING: CPT | Mod: 76

## 2018-06-15 PROCEDURE — 93005 ELECTROCARDIOGRAM TRACING: CPT

## 2018-06-15 PROCEDURE — 84702 CHORIONIC GONADOTROPIN TEST: CPT | Performed by: PHYSICIAN ASSISTANT

## 2018-06-15 PROCEDURE — 84484 ASSAY OF TROPONIN QUANT: CPT | Performed by: PHYSICIAN ASSISTANT

## 2018-06-15 PROCEDURE — 25000128 H RX IP 250 OP 636: Performed by: EMERGENCY MEDICINE

## 2018-06-15 PROCEDURE — 71046 X-RAY EXAM CHEST 2 VIEWS: CPT

## 2018-06-15 PROCEDURE — 96374 THER/PROPH/DIAG INJ IV PUSH: CPT

## 2018-06-15 RX ORDER — LORAZEPAM 2 MG/ML
1 INJECTION INTRAMUSCULAR ONCE
Status: COMPLETED | OUTPATIENT
Start: 2018-06-15 | End: 2018-06-15

## 2018-06-15 RX ADMIN — LORAZEPAM 1 MG: 2 INJECTION INTRAMUSCULAR; INTRAVENOUS at 18:43

## 2018-06-15 ASSESSMENT — ENCOUNTER SYMPTOMS
NUMBNESS: 0
FEVER: 0
FREQUENCY: 0
WHEEZING: 0
CHEST TIGHTNESS: 0
PALPITATIONS: 0
NAUSEA: 0
VOMITING: 0
DIAPHORESIS: 1
SHORTNESS OF BREATH: 0
WEAKNESS: 1
CHILLS: 0

## 2018-06-15 NOTE — ED AVS SNAPSHOT
[facility] Emergency Department    [address]    [facility] [address]    Phone:  [phone]    Fax:  [phone]                                       Cristina Dean   MRN: [id number]    Department:  [facility] Emergency Department   Date of Visit:  6/15/2018           After Visit Summary Signature Page     I have received my discharge instructions, and my questions have been answered. I have discussed any challenges I see with this plan with the nurse or doctor.    ..........................................................................................................................................  Patient/Patient Representative Signature      ..........................................................................................................................................  Patient Representative Print Name and Relationship to Patient    ..................................................               ................................................  Date                                            Time    ..........................................................................................................................................  Reviewed by Signature/Title    ...................................................              ..............................................  Date                                                            Time

## 2018-06-15 NOTE — ED AVS SNAPSHOT
[facility] Emergency Department    [address]    [facility] [address]    Phone:  [phone]    Fax:  [phone]                                       Cristina Dean   MRN: [id number]    Department:  [facility] Emergency Department   Date of Visit:  6/15/2018           Patient Information     Date Of Birth          1980        Your diagnoses for this visit were:     Atypical chest pain        You were seen by Jesse Garrido MD.      Follow-up Information     Go to [facility] Emergency Department.    Specialty:  EMERGENCY MEDICINE    Why:  If symptoms worsen    Contact information:    [address]  [facility] [id number] [phone]        Follow up with Jermain Jaramillo MD In 1 week.    Specialty:  Internal Medicine    Why:  For recheck    Contact information:    [address] [facility] [id number] [phone]          Discharge Instructions       Complete the outpatient stress test for further evaluation of your chest pain. Follow-up with your primary care provider within the next week for recheck. Return to the ED for worsening chest pain, shortness of breath, or new concerns.     Discharge Instructions  Chest Pain    You have been seen today for chest pain or discomfort.  At this time, your doctor has found no signs that your chest pain is due to a serious or life-threatening condition, (or you have declined more testing and/or admission to the hospital). However, sometimes there is a serious problem that does not show up right away. Your evaluation today may not be complete and you may need further testing and evaluation.     You need to follow-up with your regular doctor within 3 days.    Return to the Emergency Department if:    Your chest pain changes, gets worse, starts to happen more often, or comes with less activity.    You are short of breath.    You get very weak or tired.    You pass out or  faint.    You have any new symptoms, like fever, cough, numb legs, or you cough up blood.    You have anything else that worries you.    Until you follow-up with your regular doctor please do the following:    Take one aspirin daily unless you have an allergy or are told not to by your doctor.    If a stress test appointment has been made, go to the appointment.    If you have questions, contact your regular doctor.    If your doctor today has told you to follow-up with your regular doctor, it is very important that you make an appointment with your clinic and go to the appointment.  If you do not follow-up with your primary doctor, it may result in missing an important development which could result in permanent injury or disability and/or lasting pain.  If there is any problem keeping your appointment, call your doctor or return to the Emergency Department.    If you were given a prescription for medicine here today, be sure to read all of the information (including the package insert) that comes with your prescription.  This will include important information about the medicine, its side effects, and any warnings that you need to know about.  The pharmacist who fills the prescription can provide more information and answer questions you may have about the medicine.  If you have questions or concerns that the pharmacist cannot address, please call or return to the Emergency Department.     Opioid Medication Information    Pain medications are among the most commonly prescribed medicines, so we are including this information for all our patients. If you did not receive pain medication or get a prescription for pain medicine, you can ignore it.     You may have been given a prescription for an opioid (narcotic) pain medicine and/or have received a pain medicine while here in the Emergency Department. These medicines can make you drowsy or impaired. You must not drive, operate dangerous equipment, or engage in any  other dangerous activities while taking these medications. If you drive while taking these medications, you could be arrested for DUI, or driving under the influence. Do not drink any alcohol while you are taking these medications.     Opioid pain medications can cause addiction. If you have a history of chemical dependency of any type, you are at a higher risk of becoming addicted to pain medications.  Only take these prescribed medications to treat your pain when all other options have been tried. Take it for as short a time and as few doses as possible. Store your pain pills in a secure place, as they are frequently stolen and provide a dangerous opportunity for children or visitors in your house to start abusing these powerful medications. We will not replace any lost or stolen medicine.  As soon as your pain is better, you should flush all your remaining medication.     Many prescription pain medications contain Tylenol  (acetaminophen), including Vicodin , Tylenol #3 , Norco , Lortab , and Percocet .  You should not take any extra pills of Tylenol  if you are using these prescription medications or you can get very sick.  Do not ever take more than 3000 mg of acetaminophen in any 24 hour period.    All opioids tend to cause constipation. Drink plenty of water and eat foods that have a lot of fiber, such as fruits, vegetables, prune juice, apple juice and high fiber cereal.  Take a laxative if you don t move your bowels at least every other day. Miralax , Milk of Magnesia, Colace , or Senna  can be used to keep you regular.      Remember that you can always come back to the Emergency Department if you are not able to see your regular doctor in the amount of time listed above, if you get any new symptoms, or if there is anything that worries you.          Discharge References/Attachments     CHEST PAIN, UNCERTAIN CAUSE (ENGLISH)      Your next 10 appointments already scheduled     Jun 18, 2018  1:40 PM CDT    Office Visit with Varsha Aguirre PA-C   [facility] ([facility])    [address] [address] [phone]           Bring a current list of meds and any records pertaining to this visit. For Physicals, please bring immunization records and any forms needing to be filled out. Please arrive 10 minutes early to complete paperwork.              24 Hour Appointment Hotline       To make an appointment at any [facility] call [phone] ([phone]). If you don't have a family doctor or clinic, we will help you find one. [place] clinics are conveniently located to serve the needs of you and your family.          ED Discharge Orders     Exercise Stress Echocardiogram       Administration of IV contrast will be tailored to this examination per the appropriate written protocol listed in the Echocardiography department Protocol Book, or by the supervising Cardiologist. This may result in an order change.    Use of contrast is at the discretion of the supervising Cardiologist.                     Review of your medicines      Our records show that you are taking the medicines listed below. If these are incorrect, please call your family doctor or clinic.        Dose / Directions Last dose taken    albuterol 108 (90 Base) MCG/ACT Inhaler   Commonly known as:  PROAIR HFA/PROVENTIL HFA/VENTOLIN HFA   Dose:  2 puff   Quantity:  1 Inhaler        Inhale 2 puffs into the lungs 4 times daily as needed for shortness of breath / dyspnea   Refills:  prn        atorvastatin 10 MG tablet   Commonly known as:  LIPITOR   Dose:  10 mg   Quantity:  90 tablet        Take 1 tablet (10 mg) by mouth daily   Refills:  3        buPROPion 300 MG 24 hr tablet   Commonly known as:  WELLBUTRIN XL   Dose:  300 mg   Quantity:  90 tablet        Take 1 tablet (300 mg) by mouth every morning   Refills:  1        gabapentin 800 MG tablet   Commonly known as:   NEURONTIN   Dose:  800 mg   Quantity:  90 tablet        Take 1 tablet (800 mg) by mouth 3 times daily Increased dose.   Refills:  3        lurasidone 80 MG Tabs tablet   Commonly known as:  LATUDA   Dose:  80 mg   Quantity:  30 tablet        Take 1 tablet (80 mg) by mouth daily Take with food.   Refills:  3                Procedures and tests performed during your visit     Procedure/Test Number of Times Performed    Basic metabolic panel 1    CBC with platelets differential 1    EKG 12 lead 1    EKG 12-lead, tracing only 1    HCG quantitative pregnancy 1    Peripheral IV catheter 1    Troponin I 2    XR Chest 2 Views 1      Orders Needing Specimen Collection     None      Pending Results     Date and Time Order Name Status Description    6/15/2018 1646 EKG 12 lead Preliminary             Pending Culture Results     No orders found from 6/13/2018 to 6/16/2018.            Pending Results Instructions     If you had any lab results that were not finalized at the time of your Discharge, you can call the ED Lab Result RN at [phone]. You will be contacted by this team for any positive Lab results or changes in treatment. The nurses are available 7 days a week from 10A to 6:30P.  You can leave a message 24 hours per day and they will return your call.        Test Results From Your Hospital Stay        6/15/2018  5:19 PM      Component Results     Component Value Ref Range & Units Status    WBC 14.0 (H) 4.0 - 11.0 10e9/L Final    RBC Count 4.84 3.8 - 5.2 10e12/L Final    Hemoglobin 14.6 11.7 - 15.7 g/dL Final    Hematocrit 45.1 35.0 - 47.0 % Final    MCV 93 78 - 100 fl Final    MCH 30.2 26.5 - 33.0 pg Final    MCHC 32.4 31.5 - 36.5 g/dL Final    RDW 13.8 10.0 - 15.0 % Final    Platelet Count 352 150 - 450 10e9/L Final    Diff Method Automated Method  Final    % Neutrophils 71.3 % Final    % Lymphocytes 19.7 % Final    % Monocytes 6.6 % Final    % Eosinophils 1.7 % Final    % Basophils 0.4 % Final    % Immature  Granulocytes 0.3 % Final    Nucleated RBCs 0 0 /100 Final    Absolute Neutrophil 9.9 (H) 1.6 - 8.3 10e9/L Final    Absolute Lymphocytes 2.8 0.8 - 5.3 10e9/L Final    Absolute Monocytes 0.9 0.0 - 1.3 10e9/L Final    Absolute Eosinophils 0.2 0.0 - 0.7 10e9/L Final    Absolute Basophils 0.1 0.0 - 0.2 10e9/L Final    Abs Immature Granulocytes 0.0 0 - 0.4 10e9/L Final    Absolute Nucleated RBC 0.0  Final         6/15/2018  5:47 PM      Component Results     Component Value Ref Range & Units Status    Sodium 138 133 - 144 mmol/L Final    Potassium 4.7 3.4 - 5.3 mmol/L Final    Specimen slightly hemolyzed, potassium may be falsely elevated    Chloride 106 94 - 109 mmol/L Final    Carbon Dioxide 26 20 - 32 mmol/L Final    Anion Gap 6 3 - 14 mmol/L Final    Glucose 94 70 - 99 mg/dL Final    Urea Nitrogen 17 7 - 30 mg/dL Final    Creatinine 0.94 0.52 - 1.04 mg/dL Final    GFR Estimate 66 >60 mL/min/1.7m2 Final    Non  GFR Calc    GFR Estimate If Black 80 >60 mL/min/1.7m2 Final    African American GFR Calc    Calcium 8.7 8.5 - 10.1 mg/dL Final         6/15/2018  5:47 PM      Component Results     Component Value Ref Range & Units Status    HCG Quantitative Serum <1 0 - 5 IU/L Final         6/15/2018  6:53 PM      Narrative     CHEST TWO VIEWS  6/15/2018 5:53 PM     COMPARISON: None.    HISTORY: Chest pain, shortness of breath.      FINDINGS: The cardiac silhouette, pulmonary vasculature, lungs and  pleural spaces are within normal limits.        Impression     IMPRESSION: Clear lungs.    SILVIA ANGELES MD         6/15/2018  5:47 PM      Component Results     Component Value Ref Range & Units Status    Troponin I ES <0.015 0.000 - 0.045 ug/L Final    The 99th percentile for upper reference range is 0.045 ug/L.  Troponin values   in the range of 0.045 - 0.120 ug/L may be associated with risks of adverse   clinical events.           6/15/2018  8:14 PM      Component Results     Component Value Ref Range & Units  Status    Troponin I ES <0.015 0.000 - 0.045 ug/L Final    The 99th percentile for upper reference range is 0.045 ug/L.  Troponin values   in the range of 0.045 - 0.120 ug/L may be associated with risks of adverse   clinical events.                  Clinical Quality Measure: Blood Pressure Screening     Your blood pressure was checked while you were in the emergency department today. The last reading we obtained was  BP: (!) 140/91 . Please read the guidelines below about what these numbers mean and what you should do about them.  If your systolic blood pressure (the top number) is less than 120 and your diastolic blood pressure (the bottom number) is less than 80, then your blood pressure is normal. There is nothing more that you need to do about it.  If your systolic blood pressure (the top number) is 120-139 or your diastolic blood pressure (the bottom number) is 80-89, your blood pressure may be higher than it should be. You should have your blood pressure rechecked within a year by a primary care provider.  If your systolic blood pressure (the top number) is 140 or greater or your diastolic blood pressure (the bottom number) is 90 or greater, you may have high blood pressure. High blood pressure is treatable, but if left untreated over time it can put you at risk for heart attack, stroke, or kidney failure. You should have your blood pressure rechecked by a primary care provider within the next 4 weeks.  If your provider in the emergency department today gave you specific instructions to follow-up with your doctor or provider even sooner than that, you should follow that instruction and not wait for up to 4 weeks for your follow-up visit.        Thank you for choosing [facility]       Thank you for choosing [facility] for your care. Our goal is always to provide you with excellent care. Hearing back from our patients is one way we can continue to improve our services. Please take a few minutes to complete the  "written survey that you may receive in the mail after you visit with us. Thank you!        [employer] Information     [employer] lets you send messages to your doctor, view your test results, renew your prescriptions, schedule appointments and more. To sign up, go to [email or web address] . Click on \"Log in\" on the left side of the screen, which will take you to the Welcome page. Then click on \"Sign up Now\" on the right side of the page.     You will be asked to enter the access code listed below, as well as some personal information. Please follow the directions to create your username and password.     Your access code is: [email or web address]  Expires: 2018  4:50 PM     Your access code will  in 90 days. If you need help or a new code, please call your [facility] clinic or [phone].        Care EveryWhere ID     This is your Care EveryWhere ID. This could be used by other organizations to access your [facility] medical records  [id number]        Equal Access to Services     ZAHRA BULL : Hadii елена hankinso Sojudie, waaxda luqadaha, qaybta kaalmashauna olson, shayy jones . So [facility] [phone].    ATENCIÓN: Si habla español, tiene a magaña disposición servicios gratuitos de asistencia lingüística. Llame al [phone].    We comply with applicable federal civil rights laws and [place] laws. We do not discriminate on the basis of race, color, national origin, age, disability, sex, sexual orientation, or gender identity.            After Visit Summary       This is your record. Keep this with you and show to your community pharmacist(s) and doctor(s) at your next visit.                  "

## 2018-06-15 NOTE — ED TRIAGE NOTES
"Pt presents to ED with chest pain that started 45 min ago. Pain sharp that was worse with inhalation.  States \"I couldn't breathe while this was going on.\"  Also reports sweating profusely when it happened.  The pain has been intermittent, denies any at this time.  Endorses chest pain a few weeks ago that she was not seen for.  ABCs intact, alert and orientated.  "

## 2018-06-15 NOTE — ED PROVIDER NOTES
History     Chief Complaint:  Chest Pain     HPI   Cristina Dean is a 38 year old female with history of hyperlipidemia, generalized anxiety disorder, depression, asthma, bipolar disorder, alcohol abuse, cannabis disorder, who presents with sharp left-sided chest pain that started 45 minutes prior to arrival.  Patient states that she was sitting, resting at home when the pain began suddenly.  Discomfort was rated 10/10 and lasted for approximately 45 seconds.  Pain was nonradiating, but the patient had associated shortness of breath and diaphoresis. A moment of generalized weakness was also present.  She took nothing for her discomfort and came immediately to the ED.  She has never had an episode similar in the past.  She does note that a few weeks ago she had intermittent chest discomfort in the same location with less intensity.  Episodes would last a few seconds and then resolve did not seem to be related to activity. Denies nausea, vomiting, abdominal pain.  Denies recent fever, chills, cough, illness. The patient has a history of hyperlipidemia but denies history of hypertension or previous cardiac events. Denies recent travel, injury/surgery, calf pain or swelling. She reports family history of myocardial infarction with her maternal grandparents. Denies cardiac event history in her parents. Denies personal or family history of DVT/PE.       The patient reports that this pain does not appear similar to her prior episodes of asthma exacerbations or panic attacks.  She endorses tobacco use, 3 cigarettes per day.  Also endorses cannabis use, daily.  Reports occasional alcohol use.    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  She has a 7.50 pack-year smoking history. She has never used smokeless tobacco. She reports that she drinks alcohol. She reports that she uses illicit drugs, including Marijuana.    PCP: Jermain Jaramillo     Review of Systems   Constitutional: Positive for diaphoresis. Negative for chills and fever.   Respiratory: Negative for chest tightness, shortness of breath and wheezing.    Cardiovascular: Positive for chest pain. Negative for palpitations and leg swelling.   Gastrointestinal: Negative for nausea and vomiting.   Genitourinary: Negative for frequency and urgency.   Skin: Negative for rash.   Neurological: Positive for weakness. Negative for syncope and numbness.   All other systems reviewed and are negative.    Physical Exam     Patient Vitals for the past 24 hrs:   BP Temp Temp src Heart Rate Resp SpO2 Height Weight   06/15/18 1900 (!) 140/91 - - 73 - 97 % - -   06/15/18 1845 (!) 164/93 - - 70 - 98 % - -   06/15/18 1830 153/83 - - 73 - 95 % - -   06/15/18 1815 139/89 - - 69 - 99 % - -   06/15/18 1800 147/79 - - - - 99 % - -   06/15/18 1730 155/86 - - 65 10 - - -   06/15/18 1715 (!) 155/103 - - 76 9 97 % - -   06/15/18 1700 (!) 184/107 - - 70 - 98 % - -   06/15/18 1653 (!) 186/101 97.8  F (36.6  C) Oral 78 18 98 % 1.6 m (5' 3\") 145.2 kg (320 lb)      Physical Exam   General: Obese habitus. Is tearful upon exam but appropriate answering questions. Oriented x 3, normal mood and affect.  Skin: Good turgor, no rash, no unusual bruising or prominent lesions.  HEENT: Head: Normocephalic, atraumatic, no visible masses. Eyes: Visual acuity intact, conjunctiva clear, sclera non-icteric.  Cardiac: No cardiomegaly or thrills; regular rate and rhythm, no murmur or gallop.   Lungs: Clear to auscultation and percussion   Abdomen: Bowel " sounds normal, no tenderness, organomegaly, masses, or hernia. No guarding or rebound tenderness.   Musculoskeletal: Normal gait and station. No calf tenderness or swelling.   Neurologic: Sensation to pain, touch, and proprioception normal.   Psychiatric: Intact recent and remote memory, judgment and insight, normal mood and affect.     Emergency Department Course     ECG 1:  ECG taken at 1654, ECG read at 1721  Normal sinus rhythm  Normal ECG  Rate 73 bpm. NM interval 148 ms. QRS duration 82 ms. QT/QTc 390/429 ms. P-R-T axes 33 22 40.    ECG 2:  ECG taken at 2007, ECG read at 2011  Normal sinus rhythm   Normal ECG   Rate 68 bpm. NM interval 164 ms. QRS duration 84 ms. QT/QTc 410/435 ms. P-R-T axes 35 24 44.    Imaging:  Radiology findings were communicated with the patient who voiced understanding of the findings.    XR Chest 2 Views  Clear lungs.  Reading per radiology.     Laboratory:  Laboratory findings were communicated with the patient who voiced understanding of the findings.    CBC: WBC 14.0 (H), HGB 14.6,   BMP: WNL (Creatinine 0.94)  HCG quantitative pregnancy: <1  Troponin (Collected 1709): <0.015   Troponin (Collected 1949): <0.015     Interventions  1840 Ativan 1 mg IV     Emergency Department Course:     Nursing notes and vitals reviewed.     I performed an exam of the patient as documented above.     IV was inserted and blood was drawn for laboratory testing, results above.     The patient was sent for an x-ray while in the emergency department, results above.    1823 I checked on and updated the patient. Dr. Garrido evaluated the patient. We discussed the repeat troponin and ECG, three hours after initial testing.     2025 I reevaluated the patient and discussed her discharge.     I personally reviewed the laboratory, imaging, and EKG results with the patient and answered all related questions prior to discharge.    Impression & Plan      Medical Decision Making:  Cristina Dean is a 38  year old female who presents to the emergency department today with an episode of chest pain that began 45 minutes prior to her arrival.  Associated with the chest pain included shortness of breath, diaphoresis, generalized weakness.  Further details of the patient's history can be seen in HPI.  Differential diagnosis included CAD, MI, panic attack, aortic dissection, asthma attack, pneumothorax, costochondritis, pericarditis, etc. Upon exam the patient, was hypertensive and tearful.  There were no abnormalities seen on exam.  Due to the patient's presentation, cardiac workup was pursued.  Initial ECG was within normal limits.  Initial troponin was also negative.  CBC showed mild leukocytosis, this is likely due to a stress reaction as she has no other signs of acute infection.  BMP was within normal limits.  Chest x-ray was negative.  The patient's heart score qualified her as low risk, score of 3 with risk factors of hyperlipidemia, obesity, smoking.  Due to the patient's risk factors, I felt that repeat ECG and troponin at 3 hours was necessary.  Upon further conversation throughout the patient's stay, she did admit to increased recent anxiety. It is possible that some of her chest discomfort is due to anxiety.  She was provided and Ativan here in the ED. She felt much more calm after the ativan and had no repeat episodes of chest pain. Repeat troponin and ECG were within normal limits. Tropon in had to elevation changes. Her blood pressure remained slightly elevated but decreased from her pressures at there arrival. At this point, I felt comfortable with her discharge, close follow-up with her PCP, an outpatient stress test. She will follow-up with her primary in the next week. The patient can also have blood pressure recheck at her primary. She was told to return to the ED if chest pain worsens, other symptoms develop, or new concerns arise.  All questions were answered prior to the patient's discharge.  She  agreed with the plan above.    Diagnosis:    ICD-10-CM    1. Atypical chest pain R07.89 Troponin I     Disposition:   The patient was discharged home.     Discharge Medications:  None - outpatient cardiac echo stress test referral placed    Scribe Disclosure:  I, Linda Inge, am serving as a scribe at 5:45 PM on 6/15/2018 to document services personally performed by Pauly Fowler PA-C, based on my observations and the provider's statements to me.    Pauly Morin PA  06/15/18 2040      Emergency Department Attending Supervision Note  6/16/2018  1:58 AM      I evaluated this patient in conjunction with Pauly Fowler PA      Briefly, the patient presented with intermittent non-exertional chest pain for the past couple of weeks. She had a more severe, longer (45 seconds or so) episode this afternoon.     No evidence of palpitations, syncope or other cardiac dysrhythmia.  Differential was broad.  We consider possible ACS, however workup with EKG and delta troponins are negative.  Given time since onset of symptoms, I do not think the patient needs to be admitted for further sets of enzymes. Will arrange EKG shows no evidence for pericarditis.     Chest x-ray shows no evidence for pneumonia, pneumothorax, pulmonary edema, pleural effusion, rib fracture, cardiomegaly.  Mediastinum is normal on the x-ray and the patient has no pain through the back and symmetric pulses on exam so I doubt aortic dissection. With only intermittent symptoms, stable vitals, no hypoxia, PE seems exceedingly unlikely.     Pt also somewhat anxious here in the ER. Endorses recent increasing anxiety. Family also wondering if anxiety playing a role. Seems possible that anxiety may have some role in her chest pains.      Diagnosis  1. Chest pain     Jesse Garrido MD  06/16/18 3282

## 2018-06-16 LAB
INTERPRETATION ECG - MUSE: NORMAL
INTERPRETATION ECG - MUSE: NORMAL

## 2018-06-16 NOTE — DISCHARGE INSTRUCTIONS
Complete the outpatient stress test for further evaluation of your chest pain. Follow-up with your primary care provider within the next week for recheck. Return to the ED for worsening chest pain, shortness of breath, or new concerns.     Discharge Instructions  Chest Pain    You have been seen today for chest pain or discomfort.  At this time, your doctor has found no signs that your chest pain is due to a serious or life-threatening condition, (or you have declined more testing and/or admission to the hospital). However, sometimes there is a serious problem that does not show up right away. Your evaluation today may not be complete and you may need further testing and evaluation.     You need to follow-up with your regular doctor within 3 days.    Return to the Emergency Department if:    Your chest pain changes, gets worse, starts to happen more often, or comes with less activity.    You are short of breath.    You get very weak or tired.    You pass out or faint.    You have any new symptoms, like fever, cough, numb legs, or you cough up blood.    You have anything else that worries you.    Until you follow-up with your regular doctor please do the following:    Take one aspirin daily unless you have an allergy or are told not to by your doctor.    If a stress test appointment has been made, go to the appointment.    If you have questions, contact your regular doctor.    If your doctor today has told you to follow-up with your regular doctor, it is very important that you make an appointment with your clinic and go to the appointment.  If you do not follow-up with your primary doctor, it may result in missing an important development which could result in permanent injury or disability and/or lasting pain.  If there is any problem keeping your appointment, call your doctor or return to the Emergency Department.    If you were given a prescription for medicine here today, be sure to read all of the information  (including the package insert) that comes with your prescription.  This will include important information about the medicine, its side effects, and any warnings that you need to know about.  The pharmacist who fills the prescription can provide more information and answer questions you may have about the medicine.  If you have questions or concerns that the pharmacist cannot address, please call or return to the Emergency Department.     Opioid Medication Information    Pain medications are among the most commonly prescribed medicines, so we are including this information for all our patients. If you did not receive pain medication or get a prescription for pain medicine, you can ignore it.     You may have been given a prescription for an opioid (narcotic) pain medicine and/or have received a pain medicine while here in the Emergency Department. These medicines can make you drowsy or impaired. You must not drive, operate dangerous equipment, or engage in any other dangerous activities while taking these medications. If you drive while taking these medications, you could be arrested for DUI, or driving under the influence. Do not drink any alcohol while you are taking these medications.     Opioid pain medications can cause addiction. If you have a history of chemical dependency of any type, you are at a higher risk of becoming addicted to pain medications.  Only take these prescribed medications to treat your pain when all other options have been tried. Take it for as short a time and as few doses as possible. Store your pain pills in a secure place, as they are frequently stolen and provide a dangerous opportunity for children or visitors in your house to start abusing these powerful medications. We will not replace any lost or stolen medicine.  As soon as your pain is better, you should flush all your remaining medication.     Many prescription pain medications contain Tylenol  (acetaminophen), including  Vicodin , Tylenol #3 , Norco , Lortab , and Percocet .  You should not take any extra pills of Tylenol  if you are using these prescription medications or you can get very sick.  Do not ever take more than 3000 mg of acetaminophen in any 24 hour period.    All opioids tend to cause constipation. Drink plenty of water and eat foods that have a lot of fiber, such as fruits, vegetables, prune juice, apple juice and high fiber cereal.  Take a laxative if you don t move your bowels at least every other day. Miralax , Milk of Magnesia, Colace , or Senna  can be used to keep you regular.      Remember that you can always come back to the Emergency Department if you are not able to see your regular doctor in the amount of time listed above, if you get any new symptoms, or if there is anything that worries you.

## 2018-06-21 ENCOUNTER — HOSPITAL ENCOUNTER (OUTPATIENT)
Dept: CARDIOLOGY | Facility: CLINIC | Age: 38
Discharge: HOME | End: 2018-06-21
Attending: EMERGENCY MEDICINE | Admitting: EMERGENCY MEDICINE
Payer: COMMERCIAL

## 2018-06-21 ENCOUNTER — OFFICE VISIT (OUTPATIENT)
Dept: INTERNAL MEDICINE | Facility: CLINIC | Age: 38
End: 2018-06-21
Payer: COMMERCIAL

## 2018-06-21 VITALS
DIASTOLIC BLOOD PRESSURE: 84 MMHG | WEIGHT: 293 LBS | OXYGEN SATURATION: 92 % | SYSTOLIC BLOOD PRESSURE: 120 MMHG | RESPIRATION RATE: 17 BRPM | TEMPERATURE: 98.7 F | HEART RATE: 79 BPM | BODY MASS INDEX: 61.4 KG/M2

## 2018-06-21 DIAGNOSIS — E66.01 MORBID OBESITY DUE TO EXCESS CALORIES (H): ICD-10-CM

## 2018-06-21 DIAGNOSIS — R07.89 ATYPICAL CHEST PAIN: ICD-10-CM

## 2018-06-21 DIAGNOSIS — F33.1 DEPRESSION, MAJOR, RECURRENT, MODERATE (H): ICD-10-CM

## 2018-06-21 DIAGNOSIS — R07.89 ATYPICAL CHEST PAIN: Primary | ICD-10-CM

## 2018-06-21 LAB — TSH SERPL DL<=0.005 MIU/L-ACNC: 1.92 MU/L (ref 0.4–4)

## 2018-06-21 PROCEDURE — 93321 DOPPLER ECHO F-UP/LMTD STD: CPT | Mod: 26 | Performed by: INTERNAL MEDICINE

## 2018-06-21 PROCEDURE — 25500064 ZZH RX 255 OP 636: Performed by: EMERGENCY MEDICINE

## 2018-06-21 PROCEDURE — 93018 CV STRESS TEST I&R ONLY: CPT | Performed by: INTERNAL MEDICINE

## 2018-06-21 PROCEDURE — 36415 COLL VENOUS BLD VENIPUNCTURE: CPT | Performed by: INTERNAL MEDICINE

## 2018-06-21 PROCEDURE — 93016 CV STRESS TEST SUPVJ ONLY: CPT | Performed by: INTERNAL MEDICINE

## 2018-06-21 PROCEDURE — 93350 STRESS TTE ONLY: CPT | Mod: 26 | Performed by: INTERNAL MEDICINE

## 2018-06-21 PROCEDURE — 93325 DOPPLER ECHO COLOR FLOW MAPG: CPT | Mod: 26 | Performed by: INTERNAL MEDICINE

## 2018-06-21 PROCEDURE — 93325 DOPPLER ECHO COLOR FLOW MAPG: CPT | Mod: TC

## 2018-06-21 PROCEDURE — 99214 OFFICE O/P EST MOD 30 MIN: CPT | Performed by: INTERNAL MEDICINE

## 2018-06-21 PROCEDURE — 84443 ASSAY THYROID STIM HORMONE: CPT | Performed by: INTERNAL MEDICINE

## 2018-06-21 RX ADMIN — HUMAN ALBUMIN MICROSPHERES AND PERFLUTREN 7 ML: 10; .22 INJECTION, SOLUTION INTRAVENOUS at 08:45

## 2018-06-21 ASSESSMENT — PAIN SCALES - GENERAL: PAINLEVEL: NO PAIN (0)

## 2018-06-21 NOTE — MR AVS SNAPSHOT
After Visit Summary   6/21/2018    Cristina Dean    MRN: [id number]           Patient Information     Date Of Birth          1980        Visit Information        Provider Department      6/21/2018 1:40 PM Jermain Jaramillo MD [facility]        Today's Diagnoses     Atypical chest pain    -  1    Morbid obesity due to excess calories (H)        Depression, major, recurrent, moderate (H)           Follow-ups after your visit        Additional Services     BARIATRIC ADULT REFERRAL       Your provider has referred you to: [facility]: [facility]  [place] [phone]  [email or web address]    Please be aware that coverage of these services is subject to the terms and limitations of your health insurance plan.  Call member services at your health plan with any benefit or coverage questions.      Please bring the following with you to your appointment:      (1) List of current medications   (2) This referral request   (3) Any documents/labs given to you for this referral            MENTAL HEALTH REFERRAL  - Adult; Psychiatry and Medication Management; Psychiatry; [facility]: Psychiatry Clinic [phone]; We will contact you to schedule the appointment or please call with any questions       All scheduling is subject to the client's specific insurance plan & benefits, provider/location availability, and provider clinical specialities.  Please arrive 15 minutes early for your first appointment and bring your completed paperwork.    Please be aware that coverage of these services is subject to the terms and limitations of your health insurance plan.  Call member services at your health plan with any benefit or coverage questions.                            Follow-up notes from your care team     Return if symptoms worsen or fail to improve.      Future tests that were  "ordered for you today     Open Future Orders        Priority Expected Expires Ordered    ECHO STRESS WITH OPTISON ASAP  2018 6/15/2018            Who to contact     If you have questions or need follow up information about today's clinic visit or your schedule please contact [facility] directly at [phone].  Normal or non-critical lab and imaging results will be communicated to you by MyChart, letter or phone within 4 business days after the clinic has received the results. If you do not hear from us within 7 days, please contact the clinic through [employer] or phone. If you have a critical or abnormal lab result, we will notify you by phone as soon as possible.  Submit refill requests through [employer] or call your pharmacy and they will forward the refill request to us. Please allow 3 business days for your refill to be completed.          Additional Information About Your Visit        MyChart Information     [employer] lets you send messages to your doctor, view your test results, renew your prescriptions, schedule appointments and more. To sign up, go to [email or web address] . Click on \"Log in\" on the left side of the screen, which will take you to the Welcome page. Then click on \"Sign up Now\" on the right side of the page.     You will be asked to enter the access code listed below, as well as some personal information. Please follow the directions to create your username and password.     Your access code is: [email or web address]  Expires: 2018  4:50 PM     Your access code will  in 90 days. If you need help or a new code, please call your [place] clinic or [phone].        Care EveryWhere ID     This is your Care EveryWhere ID. This could be used by other organizations to access your [place] medical records  [id number]        Your Vitals Were     Pulse Temperature Respirations Last Period Pulse Oximetry Breastfeeding?    79 98.7  F (37.1  C) (Oral) 17 2018 92% No "    BMI (Body Mass Index)                   61.4 kg/m2            Blood Pressure from Last 3 Encounters:   06/21/18 120/84   06/15/18 136/72   01/18/18 118/78    Weight from Last 3 Encounters:   06/21/18 (!) 346 lb 9.6 oz (157.2 kg)   06/15/18 320 lb (145.2 kg)   01/18/18 (!) 342 lb (155.1 kg)              We Performed the Following     BARIATRIC ADULT REFERRAL     MENTAL HEALTH REFERRAL  - Adult; Psychiatry and Medication Management; Psychiatry; [facility]: Psychiatry Clinic [phone]; We will contact you to schedule the appointment or please call with any questions     TSH with free T4 reflex        Primary Care Provider Office Phone # Fax #    Jermain Jaramillo [phone] [address] [place] [phone]        Equal Access to Services     [facility] HospitalCÉSAR : Hadii aad ku hadasho Sojudie, waaxda luqadaha, qaybta kaalmada jacobyashauna, shayy jones . So [facility] [phone].    ATENCIÓN: Si habla español, tiene a magaña disposición servicios gratuitos de asistencia lingüística. Llame al [phone].    We comply with applicable federal civil rights laws and [place] laws. We do not discriminate on the basis of race, color, national origin, age, disability, sex, sexual orientation, or gender identity.            Thank you!     [place] you [place] [facility]. Our goal is always to provide you with excellent care. Hearing back from our patients is one way we can continue to improve our services. Please take a few minutes to complete the written survey that you may receive in the mail after your visit with us. Thank you!             Your Updated Medication List - Protect others around you: Learn how to safely use, store and throw away your medicines at www.disposemymeds.org.          This list is accurate as of 6/21/18  1:40 PM.  Always use your most recent med list.                   Brand Name Dispense Instructions for use  Diagnosis    albuterol 108 (90 Base) MCG/ACT Inhaler    PROAIR HFA/PROVENTIL HFA/VENTOLIN HFA    1 Inhaler    Inhale 2 puffs into the lungs 4 times daily as needed for shortness of breath / dyspnea    Intermittent asthma without complication, unspecified asthma severity       atorvastatin 10 MG tablet    LIPITOR    90 tablet    Take 1 tablet (10 mg) by mouth daily    CARDIOVASCULAR SCREENING; LDL GOAL LESS THAN 130       buPROPion 300 MG 24 hr tablet    WELLBUTRIN XL    90 tablet    Take 1 tablet (300 mg) by mouth every morning    Depression, major, recurrent, moderate (H), STEPHENIE (generalized anxiety disorder)       gabapentin 800 MG tablet    NEURONTIN    90 tablet    Take 1 tablet (800 mg) by mouth 3 times daily Increased dose.    STEPHENIE (generalized anxiety disorder)       lurasidone 80 MG Tabs tablet    LATUDA    30 tablet    Take 1 tablet (80 mg) by mouth daily Take with food.    Major depressive disorder, single episode, mild (H)

## 2018-06-21 NOTE — PROGRESS NOTES
SUBJECTIVE:   Cristina Dean is a 38 year old female who presents to clinic today for the following health issues:    ED/UC Followup:    Facility:  [facility] ER  Date of visit: 6/15/18  Reason for visit: chest pain  Current Status: Stable. Feeling very fatigued since ER visit        Medical Decision Making:  Cristina Dean is a 38 year old female who presents to the emergency department today with an episode of chest pain that began 45 minutes prior to her arrival.  Associated with the chest pain included shortness of breath, diaphoresis, generalized weakness.  Further details of the patient's history can be seen in HPI.  Differential diagnosis included CAD, MI, panic attack, aortic dissection, asthma attack, pneumothorax, costochondritis, pericarditis, etc. Upon exam the patient, was hypertensive and tearful.  There were no abnormalities seen on exam.  Due to the patient's presentation, cardiac workup was pursued.  Initial ECG was within normal limits.  Initial troponin was also negative.  CBC showed mild leukocytosis, this is likely due to a stress reaction as she has no other signs of acute infection.  BMP was within normal limits.  Chest x-ray was negative.  The patient's heart score qualified her as low risk, score of 3 with risk factors of hyperlipidemia, obesity, smoking.  Due to the patient's risk factors, I felt that repeat ECG and troponin at 3 hours was necessary.  Upon further conversation throughout the patient's stay, she did admit to increased recent anxiety. It is possible that some of her chest discomfort is due to anxiety.  She was provided and Ativan here in the ED. She felt much more calm after the ativan and had no repeat episodes of chest pain. Repeat troponin and ECG were within normal limits. Tropon in had to elevation changes. Her blood pressure remained slightly elevated but decreased from her pressures at there arrival. At this point, I felt comfortable with her discharge, close  follow-up with her PCP, an outpatient stress test. She will follow-up with her primary in the next week. The patient can also have blood pressure recheck at her primary. She was told to return to the ED if chest pain worsens, other symptoms develop, or new concerns arise.  All questions were answered prior to the patient's discharge.  She agreed with the plan above.     Diagnosis:      ICD-10-CM     1. Atypical chest pain R07.89 Troponin I      Patient is here today for follow-up after having a stress test done this morning was normal.  It is that she is not doing as well as she could be doing in regards to her depression and apparently was seen by the [facility] provider not too long ago and sent back to me for primary care issues.  She states that since seeing the primary psychiatry provider she has not been doing as well as she could as per PHQ 9.  Patient is tearful at times and states that some of her issues have been related to her underlying mental health concerns as well as her weight problems.  Her PHQ 9 score is 20 and she has no obvious active thoughts or plan for harm to herself or others.    Problem list and histories reviewed & adjusted, as indicated.  Additional history: as documented    Patient Active Problem List   Diagnosis     STEPHENIE (generalized anxiety disorder)     Morbid obesity due to excess calories (H)     CARDIOVASCULAR SCREENING; LDL GOAL LESS THAN 130     Herpes     Depression, major, recurrent, moderate (H)     Intermittent asthma without complication, unspecified asthma severity     Bipolar affective disorder, remission status unspecified (H)     Cannabis use disorder, mild, abuse     Alcohol use disorder (H)     Past Surgical History:   Procedure Laterality Date     EXCISE MASS BACK Right 12/28/2017    Procedure: EXCISE MASS BACK;  Excision Right Back Mass;  Surgeon: Vicente Pérez MD;  Location: [facility] OR     TONSILLECTOMY  1984       Social History   Substance Use Topics     Smoking  status: Current Every Day Smoker     Packs/day: 0.50     Years: 15.00     Smokeless tobacco: Never Used      Comment: ~ 1 pack per week - started at age 14      Alcohol use Yes      Comment: 6 drinks per month     Family History   Problem Relation Age of Onset     Cancer Father      acute leukemia (in remission)     Neurologic Disorder Father      neuropathy from chemotherapy     Diabetes Father      type II DM     Family History Negative Sister      Ovarian Cancer Paternal Aunt      Ovarian Cancer Paternal Aunt      Ovarian Cancer Paternal Aunt          Current Outpatient Prescriptions   Medication Sig Dispense Refill     albuterol (PROAIR HFA/PROVENTIL HFA/VENTOLIN HFA) 108 (90 BASE) MCG/ACT Inhaler Inhale 2 puffs into the lungs 4 times daily as needed for shortness of breath / dyspnea 1 Inhaler prn     atorvastatin (LIPITOR) 10 MG tablet Take 1 tablet (10 mg) by mouth daily 90 tablet 3     buPROPion (WELLBUTRIN XL) 300 MG 24 hr tablet Take 1 tablet (300 mg) by mouth every morning 90 tablet 1     gabapentin (NEURONTIN) 800 MG tablet Take 1 tablet (800 mg) by mouth 3 times daily Increased dose. 90 tablet 3     lurasidone (LATUDA) 80 MG TABS tablet Take 1 tablet (80 mg) by mouth daily Take with food. 30 tablet 3     Allergies   Allergen Reactions     No Known Drug Allergies      BP Readings from Last 3 Encounters:   06/15/18 136/72   01/18/18 118/78   01/02/18 132/84    Wt Readings from Last 3 Encounters:   06/15/18 320 lb (145.2 kg)   01/18/18 (!) 342 lb (155.1 kg)   01/02/18 (!) 343 lb (155.6 kg)            Reviewed and updated as needed this visit by clinical staff       Reviewed and updated as needed this visit by Provider       ROS:  CONSTITUTIONAL: NEGATIVE for fever, chills, change in weight  EYES: NEGATIVE for vision changes or irritation  ENT/MOUTH: NEGATIVE for ear, mouth and throat problems  RESP: NEGATIVE for significant cough or SOB  CV: NEGATIVE for  palpitations or peripheral edema  GI: NEGATIVE for  nausea, abdominal pain, heartburn, or change in bowel habits  : NEGATIVE for frequency, dysuria, or hematuria  MUSCULOSKELETAL: NEGATIVE for significant arthralgias or myalgia  HEME: NEGATIVE for bleeding problems    OBJECTIVE:                                                    /84  Pulse 79  Temp 98.7  F (37.1  C) (Oral)  Resp 17  Wt (!) 346 lb 9.6 oz (157.2 kg)  LMP 06/14/2018  SpO2 92%  Breastfeeding? No  BMI 61.4 kg/m2  Body mass index is 61.4 kg/(m^2).  GENERAL: alert   EYES: Eyes grossly normal to inspection, extraocular movements - intact, and PERRL  HENT: ear canals- normal; TMs- normal; Nose- normal; Mouth- no ulcers, no lesions  NECK: no tenderness, no adenopathy, no asymmetry, no masses, no stiffness; thyroid- normal to palpation  Obese.  RESP: lungs clear to auscultation - no rales, no rhonchi, no wheezes  CV: regular rates and rhythm, normal S1 S2, no S3 or S4 and no click or rub   MS: extremities- no gross deformities noted  NEURO: no focal changes  PSYCH: flat affect, tearful.  No SI.       Stress test done:  Interpretation Summary  This was a normal stress echocardiogram with no evidence of stress-induced ischemia.  Fair exercise tolerance ( 05:01 Vance protocol / 7.0 Mets)    Exercise was stopped due to fatigue.  The patient exhibited no chest pain during exercise.  There was a borderline hypertensive BP response to exercise.  RPP 31,400.  Target Heart Rate was achieved.  A low workload was achieved.  The Duke treadmill score was low risk ( >5 Duke score).  There was no chest pain or significant ST changes with exercise.  This was a normal stress echocardiogram with no evidence of stress-induced ischemia.  The visual ejection fraction is estimated at >70%.  Left ventricular cavity size decreases with exercise.  Global LV systolic function augments with exercise.  Normal resting wall motion and no stress-induced wall motion abnormality.    ASSESSMENT/PLAN:                                                       (R07.89) Atypical chest pain  (primary encounter diagnosis)  Comment: Obvious his symptoms at present time with negative stress test.  Suggest observation  Plan:     (E66.01) Morbid obesity due to excess calories (H)  Comment: Encouraged weight loss and discuss options available suggest [place] [facility] clinic for further assess  Plan: BARIATRIC ADULT REFERRAL, TSH with free T4         reflex            (F33.1) Depression, major, recurrent, moderate (H)  Comment: This patient really needs to be seen by her primary mental health provider.  He has been on numerous medications in the past and has a complex mental health issue and needs to be managed by a primary mental health provider.  I have sent a referral again on the patient's behalf and will forward a copy of this message to her last mental health provider seen.  Plan: MENTAL HEALTH REFERRAL  - Adult; Psychiatry and        Medication Management; Psychiatry; [facility]:         Psychiatry Clinic [phone]; We will         contact you to schedule the appointment or         please call with any questions, TSH with free         T4 reflex          See Patient Instructions    Jermain Jaramillo MD  [facility]    THE MEDICATION LIST HAS BEEN FULLY RECONCILED BY THE M.D. AND THE NURSING STAFF.      25 minutes spent with this patient, face to face, discussing treatment options for listed problems above as well as side effects of appropriate medications.  Counseling time extended beyond 50% of the clinic visit.  Medication dosing, treatment plan and follow-up were discussed. Also reviewed need for primary care testing for patient.

## 2018-06-21 NOTE — LETTER
[facility]  [address] [id number] [phone]      6/21/2018       Cristina Dean  [address] [place] [id number]        Dear Cristina,    Your thyroid function tests look good and thus I would not change anything at this point.    Sincerely,      Jermain Jaramillo MD  Internal Medicine

## 2018-06-21 NOTE — Clinical Note
Lisa Evans was seen in the clinic today for follow-up from an ER visit for chest pain.  She has had a negative stress test and her primary issues today really center around her mental health issues which have not been very well controlled.  She has a complex mental health history and really is best served by probably seeing somebody back in the clinic for follow-up with a discussion of need for further intervention and therapy.  I would appreciate some contact with her via you or your staff to arrange a follow-up appointment.  Thanks  Jermain Jaramillo MD  [facility] internal medicine

## 2018-06-22 ASSESSMENT — PATIENT HEALTH QUESTIONNAIRE - PHQ9: SUM OF ALL RESPONSES TO PHQ QUESTIONS 1-9: 20

## 2018-06-22 ASSESSMENT — ASTHMA QUESTIONNAIRES: ACT_TOTALSCORE: 20

## 2018-07-22 DIAGNOSIS — F32.0 MAJOR DEPRESSIVE DISORDER, SINGLE EPISODE, MILD (H): ICD-10-CM

## 2018-07-23 RX ORDER — LURASIDONE HYDROCHLORIDE 80 MG/1
TABLET, FILM COATED ORAL
Qty: 30 TABLET | Refills: 3 | Status: SHIPPED | OUTPATIENT
Start: 2018-07-23 | End: 2018-12-02

## 2018-07-23 NOTE — TELEPHONE ENCOUNTER
Patient was returned to referring provider for ongoing medication management after appointment on 01.18.2018.  Will forward to PCP to review and address refill.

## 2018-07-30 ENCOUNTER — APPOINTMENT (OUTPATIENT)
Dept: SURGERY | Facility: CLINIC | Age: 38
End: 2018-07-30
Payer: COMMERCIAL

## 2018-07-30 ENCOUNTER — OFFICE VISIT (OUTPATIENT)
Dept: SURGERY | Facility: CLINIC | Age: 38
End: 2018-07-30
Payer: COMMERCIAL

## 2018-07-30 VITALS
OXYGEN SATURATION: 93 % | WEIGHT: 293 LBS | SYSTOLIC BLOOD PRESSURE: 130 MMHG | TEMPERATURE: 98.5 F | RESPIRATION RATE: 12 BRPM | HEART RATE: 75 BPM | HEIGHT: 63 IN | BODY MASS INDEX: 51.91 KG/M2 | DIASTOLIC BLOOD PRESSURE: 84 MMHG

## 2018-07-30 DIAGNOSIS — N39.3 STRESS INCONTINENCE: Primary | ICD-10-CM

## 2018-07-30 DIAGNOSIS — E66.01 MORBID OBESITY DUE TO EXCESS CALORIES (H): ICD-10-CM

## 2018-07-30 DIAGNOSIS — E78.5 HYPERLIPIDEMIA, UNSPECIFIED HYPERLIPIDEMIA TYPE: ICD-10-CM

## 2018-07-30 DIAGNOSIS — R06.83 SNORING: ICD-10-CM

## 2018-07-30 DIAGNOSIS — J45.20 INTERMITTENT ASTHMA WITHOUT COMPLICATION, UNSPECIFIED ASTHMA SEVERITY: ICD-10-CM

## 2018-07-30 PROCEDURE — 99203 OFFICE O/P NEW LOW 30 MIN: CPT | Performed by: PHYSICIAN ASSISTANT

## 2018-07-30 NOTE — MR AVS SNAPSHOT
After Visit Summary   7/30/2018    Cristina Dean    MRN: [id number]           Patient Information     Date Of Birth          1980        Visit Information        Provider Department      7/30/2018 1:00 PM Concha Zavala PA-C [facility]      Today's Diagnoses     Stress incontinence    -  1    Intermittent asthma without complication, unspecified asthma severity        Morbid obesity due to excess calories (H)        Hyperlipidemia, unspecified hyperlipidemia type        Snoring           Follow-ups after your visit        Who to contact     If you have questions or need follow up information about today's clinic visit or your schedule please contact [facility] directly at [phone].  Normal or non-critical lab and imaging results will be communicated to you by Taamkruhart, letter or phone within 4 business days after the clinic has received the results. If you do not hear from us within 7 days, please contact the clinic through [employer] or phone. If you have a critical or abnormal lab result, we will notify you by phone as soon as possible.  Submit refill requests through [employer] or call your pharmacy and they will forward the refill request to us. Please allow 3 business days for your refill to be completed.          Additional Information About Your Visit        MyChart Information     [employer] gives you secure access to your electronic health record. If you see a primary care provider, you can also send messages to your care team and make appointments. If you have questions, please call your primary care clinic.  If you do not have a primary care provider, please call [phone] and they will assist you.        Care EveryWhere ID     This is your Care EveryWhere ID. This could be used by other organizations to access your [facility] medical records  [id number]        Your  "Vitals Were     Pulse Temperature Respirations Height Pulse Oximetry BMI (Body Mass Index)    75 98.5  F (36.9  C) (Oral) 12 5' 3\" (1.6 m) 93% 60.05 kg/m2       Blood Pressure from Last 3 Encounters:   07/30/18 130/84   06/21/18 120/84   06/15/18 136/72    Weight from Last 3 Encounters:   07/30/18 (!) 339 lb (153.8 kg)   06/21/18 (!) 346 lb 9.6 oz (157.2 kg)   06/15/18 320 lb (145.2 kg)              Today, you had the following     No orders found for display       Primary Care Provider Office Phone # Fax #    Jermain Jaramillo [phone]       [address] [place] [id number]        Equal Access to Services     ZAHRA BULL : Hadii aad ku hadasho Soomaali, waaxda luqadaha, qaybta kaalmada jacobyashauna, shayy jones . So [facility] [phone].    ATENCIÓN: Si habla español, tiene a magaña disposición servicios gratuitos de asistencia lingüística. Reva al [phone].    We comply with applicable federal civil rights laws and [place] laws. We do not discriminate on the basis of race, color, national origin, age, disability, sex, sexual orientation, or gender identity.            Thank you!     Thank you for choosing [facility]  for your care. Our goal is always to provide you with excellent care. Hearing back from our patients is one way we can continue to improve our services. Please take a few minutes to complete the written survey that you may receive in the mail after your visit with us. Thank you!             Your Updated Medication List - Protect others around you: Learn how to safely use, store and throw away your medicines at www.disposemymeds.org.          This list is accurate as of 7/30/18  4:04 PM.  Always use your most recent med list.                   Brand Name Dispense Instructions for use Diagnosis    albuterol 108 (90 Base) MCG/ACT Inhaler    PROAIR HFA/PROVENTIL HFA/VENTOLIN HFA    1 Inhaler    Inhale 2 puffs into the " lungs 4 times daily as needed for shortness of breath / dyspnea    Intermittent asthma without complication, unspecified asthma severity       atorvastatin 10 MG tablet    LIPITOR    90 tablet    Take 1 tablet (10 mg) by mouth daily    CARDIOVASCULAR SCREENING; LDL GOAL LESS THAN 130       buPROPion 300 MG 24 hr tablet    WELLBUTRIN XL    90 tablet    Take 1 tablet (300 mg) by mouth every morning    Depression, major, recurrent, moderate (H), STEPHENIE (generalized anxiety disorder)       gabapentin 800 MG tablet    NEURONTIN    90 tablet    Take 1 tablet (800 mg) by mouth 3 times daily Increased dose.    STEPHENIE (generalized anxiety disorder)       LATUDA 80 MG Tabs tablet   Generic drug:  lurasidone     30 tablet    TAKE 1 TABLET (80 MG) BY MOUTH DAILY TAKE WITH FOOD.    Major depressive disorder, single episode, mild (H)
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
## 2018-07-30 NOTE — Clinical Note
Thank you for referring this patient to our bariatric clinic for an initial evaluation. After evaluation and screening, Cristina was found to have symptoms of binge eating disorder.  I have referred her for a formal eating disorder evaluation before continuing our process.   She does have daily symptoms of heartburn.  We discussed starting ranitidine 150 mg twice daily.  She was wondering if you would be willing to start her on this?  She is going to try some OTC in the interim.    Here is a copy of my visit.    Sincerely,   Concha Zavala PA-C

## 2018-10-05 DIAGNOSIS — F41.1 GAD (GENERALIZED ANXIETY DISORDER): ICD-10-CM

## 2018-10-05 RX ORDER — GABAPENTIN 800 MG/1
TABLET ORAL
Qty: 90 TABLET | Refills: 3 | Status: SHIPPED | OUTPATIENT
Start: 2018-10-05 | End: 2019-02-13

## 2018-10-05 NOTE — TELEPHONE ENCOUNTER
Requested Prescriptions   Pending Prescriptions Disp Refills     gabapentin (NEURONTIN) 800 MG tablet [Pharmacy Med Name: GABAPENTIN 800 MG TABLET] 90 tablet 3     Sig: TAKE 1 TABLET (800 MG) BY MOUTH 3 TIMES DAILY INCREASED DOSE.    There is no refill protocol information for this order        Last Written Prescription Date:  5/10/2018  Last Fill Quantity: 90,  # refills: 3   Last office visit: 1/18/2018 with prescribing provider:  6/21/2018   Future Office Visit:

## 2018-10-29 ENCOUNTER — OFFICE VISIT (OUTPATIENT)
Dept: URGENT CARE | Facility: URGENT CARE | Age: 38
End: 2018-10-29
Payer: COMMERCIAL

## 2018-10-29 ENCOUNTER — RADIANT APPOINTMENT (OUTPATIENT)
Dept: GENERAL RADIOLOGY | Facility: CLINIC | Age: 38
End: 2018-10-29
Attending: FAMILY MEDICINE
Payer: COMMERCIAL

## 2018-10-29 VITALS
TEMPERATURE: 98.5 F | RESPIRATION RATE: 16 BRPM | HEART RATE: 88 BPM | SYSTOLIC BLOOD PRESSURE: 118 MMHG | DIASTOLIC BLOOD PRESSURE: 94 MMHG | OXYGEN SATURATION: 95 %

## 2018-10-29 DIAGNOSIS — R05.8 PRODUCTIVE COUGH: ICD-10-CM

## 2018-10-29 DIAGNOSIS — J45.901 EXACERBATION OF ASTHMA, UNSPECIFIED ASTHMA SEVERITY, UNSPECIFIED WHETHER PERSISTENT: Primary | ICD-10-CM

## 2018-10-29 DIAGNOSIS — Z87.09 HISTORY OF ASTHMA: ICD-10-CM

## 2018-10-29 DIAGNOSIS — R07.0 THROAT PAIN: ICD-10-CM

## 2018-10-29 DIAGNOSIS — Z72.0 TOBACCO ABUSE: ICD-10-CM

## 2018-10-29 DIAGNOSIS — R04.2 BLOOD IN SPUTUM: ICD-10-CM

## 2018-10-29 LAB
DEPRECATED S PYO AG THROAT QL EIA: NORMAL
SPECIMEN SOURCE: NORMAL

## 2018-10-29 PROCEDURE — 87081 CULTURE SCREEN ONLY: CPT | Performed by: FAMILY MEDICINE

## 2018-10-29 PROCEDURE — 94640 AIRWAY INHALATION TREATMENT: CPT | Performed by: FAMILY MEDICINE

## 2018-10-29 PROCEDURE — 87880 STREP A ASSAY W/OPTIC: CPT | Performed by: FAMILY MEDICINE

## 2018-10-29 PROCEDURE — 99214 OFFICE O/P EST MOD 30 MIN: CPT | Mod: 25 | Performed by: FAMILY MEDICINE

## 2018-10-29 PROCEDURE — 71046 X-RAY EXAM CHEST 2 VIEWS: CPT | Mod: FY

## 2018-10-29 RX ORDER — PREDNISONE 20 MG/1
20 TABLET ORAL 2 TIMES DAILY
Qty: 8 TABLET | Refills: 0 | Status: SHIPPED | OUTPATIENT
Start: 2018-10-30 | End: 2019-02-19

## 2018-10-29 RX ORDER — AZITHROMYCIN 250 MG/1
TABLET, FILM COATED ORAL
Qty: 6 TABLET | Refills: 0 | Status: SHIPPED | OUTPATIENT
Start: 2018-10-29 | End: 2019-02-19

## 2018-10-29 RX ORDER — PREDNISONE 20 MG/1
TABLET ORAL
Qty: 2 TABLET | Refills: 0
Start: 2018-10-29 | End: 2019-02-19

## 2018-10-29 RX ORDER — ALBUTEROL SULFATE 0.83 MG/ML
SOLUTION RESPIRATORY (INHALATION)
Qty: 1 VIAL | Refills: 0
Start: 2018-10-29 | End: 2019-02-28

## 2018-10-29 NOTE — LETTER
[facility]  [address] [id number] [phone]      October 29, 2018    RE:  Cristina Dean                                                                                                                                                       [address] [facility] [address]            To whom it may concern:    Cristina Dean is under my professional care for Medical.   She  may return to work with the following: No working or lifting restrictions on or about 10-31-18.          Sincerely,        Jermain Ramos    [facility]

## 2018-10-29 NOTE — MR AVS SNAPSHOT
After Visit Summary   10/29/2018    Cristina Dean    MRN: [id number]           Patient Information     Date Of Birth          1980        Visit Information        Provider Department      10/29/2018 9:50 AM Jermain Ramos,  [facility]        Today's Diagnoses     Exacerbation of asthma, unspecified asthma severity, unspecified whether persistent    -  1    Productive cough        Blood in sputum        Tobacco abuse        Throat pain        History of asthma           Follow-ups after your visit        Who to contact     If you have questions or need follow up information about today's clinic visit or your schedule please contact [facility] directly at [phone].  Normal or non-critical lab and imaging results will be communicated to you by MyChart, letter or phone within 4 business days after the clinic has received the results. If you do not hear from us within 7 days, please contact the clinic through [employer] or phone. If you have a critical or abnormal lab result, we will notify you by phone as soon as possible.  Submit refill requests through [employer] or call your pharmacy and they will forward the refill request to us. Please allow 3 business days for your refill to be completed.          Additional Information About Your Visit        MyChart Information     [employer] gives you secure access to your electronic health record. If you see a primary care provider, you can also send messages to your care team and make appointments. If you have questions, please call your primary care clinic.  If you do not have a primary care provider, please call [phone] and they will assist you.        Care EveryWhere ID     This is your Care EveryWhere ID. This could be used by other organizations to access your [facility] medical records  [id number]        Your Vitals Were     Pulse Temperature Respirations Pulse Oximetry          88 98.5   F (36.9  C) (Oral) 16 95%         Blood Pressure from Last 3 Encounters:   10/29/18 (!) 118/94   07/30/18 130/84   06/21/18 120/84    Weight from Last 3 Encounters:   07/30/18 (!) 339 lb (153.8 kg)   06/21/18 (!) 346 lb 9.6 oz (157.2 kg)   06/15/18 320 lb (145.2 kg)              We Performed the Following     Beta strep group A culture     INHALATION/NEBULIZER TREATMENT, INITIAL     Strep, Rapid Screen     XR Chest 2 Views          Today's Medication Changes          These changes are accurate as of 10/29/18 11:38 AM.  If you have any questions, ask your nurse or doctor.               Start taking these medicines.        Dose/Directions    azithromycin 250 MG tablet   Commonly known as:  ZITHROMAX   Used for:  Productive cough, Blood in sputum   Started by:  Jermain Ramos DO        Two tablets first day, then one tablet daily for four days.   Quantity:  6 tablet   Refills:  0       * predniSONE 20 MG tablet   Commonly known as:  DELTASONE   Used for:  Exacerbation of asthma, unspecified asthma severity, unspecified whether persistent   Started by:  Jermain Ramos DO        40mg po in clinic   Quantity:  2 tablet   Refills:  0       * predniSONE 20 MG tablet   Commonly known as:  DELTASONE   Used for:  Exacerbation of asthma, unspecified asthma severity, unspecified whether persistent   Started by:  Jermain Ramos DO        Dose:  20 mg   Start taking on:  10/30/2018   Take 1 tablet (20 mg) by mouth 2 times daily for 4 days   Quantity:  8 tablet   Refills:  0       * Notice:  This list has 2 medication(s) that are the same as other medications prescribed for you. Read the directions carefully, and ask your doctor or other care provider to review them with you.      These medicines have changed or have updated prescriptions.        Dose/Directions    * albuterol 108 (90 Base) MCG/ACT inhaler   Commonly known as:  PROAIR HFA/PROVENTIL HFA/VENTOLIN HFA   This may have changed:  Another medication with the  same name was added. Make sure you understand how and when to take each.   Used for:  Intermittent asthma without complication, unspecified asthma severity   Changed by:  Jermain Ramos DO        Dose:  2 puff   Inhale 2 puffs into the lungs 4 times daily as needed for shortness of breath / dyspnea   Quantity:  1 Inhaler   Refills:  prn       * albuterol (2.5 MG/3ML) 0.083% neb solution   This may have changed:  You were already taking a medication with the same name, and this prescription was added. Make sure you understand how and when to take each.   Used for:  Exacerbation of asthma, unspecified asthma severity, unspecified whether persistent   Changed by:  Jermain Ramos DO        1 neb in clinic   Quantity:  1 vial   Refills:  0       * Notice:  This list has 2 medication(s) that are the same as other medications prescribed for you. Read the directions carefully, and ask your doctor or other care provider to review them with you.         Where to get your medicines      These medications were sent to [facility]/pharmacy [address] [place], [place] - [address] [address]     Phone:  [phone]     azithromycin 250 MG tablet    predniSONE 20 MG tablet         Some of these will need a paper prescription and others can be bought over the counter.  Ask your nurse if you have questions.     You don't need a prescription for these medications     albuterol (2.5 MG/3ML) 0.083% neb solution    predniSONE 20 MG tablet                Primary Care Provider Office Phone # Fax #    Jermain Jaramillo [phone]       [address] [place] [address]        Equal Access to Services     [facility]: Hadii елена steele hadasho Soomaali, waaxda luqadaha, qaybta kaalmada adesouleymane, shayy jones . [facility] [phone].    ATENCIÓN: Si habla español, tiene a magaña disposición servicios gratuitos de asistencia lingüística. Llame al  [phone].    We comply with applicable federal civil rights laws and [place] laws. We do not discriminate on the basis of race, color, national origin, age, disability, sex, sexual orientation, or gender identity.            Thank you!     Thank you for choosing [facility]  for your care. Our goal is always to provide you with excellent care. Hearing back from our patients is one way we can continue to improve our services. Please take a few minutes to complete the written survey that you may receive in the mail after your visit with us. Thank you!             Your Updated Medication List - Protect others around you: Learn how to safely use, store and throw away your medicines at www.disposemymeds.org.          This list is accurate as of 10/29/18 11:38 AM.  Always use your most recent med list.                   Brand Name Dispense Instructions for use Diagnosis    * albuterol 108 (90 Base) MCG/ACT inhaler    PROAIR HFA/PROVENTIL HFA/VENTOLIN HFA    1 Inhaler    Inhale 2 puffs into the lungs 4 times daily as needed for shortness of breath / dyspnea    Intermittent asthma without complication, unspecified asthma severity       * albuterol (2.5 MG/3ML) 0.083% neb solution     1 vial    1 neb in clinic    Exacerbation of asthma, unspecified asthma severity, unspecified whether persistent       atorvastatin 10 MG tablet    LIPITOR    90 tablet    Take 1 tablet (10 mg) by mouth daily    CARDIOVASCULAR SCREENING; LDL GOAL LESS THAN 130       azithromycin 250 MG tablet    ZITHROMAX    6 tablet    Two tablets first day, then one tablet daily for four days.    Productive cough, Blood in sputum       buPROPion 300 MG 24 hr tablet    WELLBUTRIN XL    90 tablet    Take 1 tablet (300 mg) by mouth every morning    Depression, major, recurrent, moderate (H), STEPHENIE (generalized anxiety disorder)       gabapentin 800 MG tablet    NEURONTIN    90 tablet    TAKE 1 TABLET (800 MG) BY MOUTH 3 TIMES  DAILY INCREASED DOSE.    STEPHENIE (generalized anxiety disorder)       LATUDA 80 MG Tabs tablet   Generic drug:  lurasidone     30 tablet    TAKE 1 TABLET (80 MG) BY MOUTH DAILY TAKE WITH FOOD.    Major depressive disorder, single episode, mild (H)       * predniSONE 20 MG tablet    DELTASONE    2 tablet    40mg po in clinic    Exacerbation of asthma, unspecified asthma severity, unspecified whether persistent       * predniSONE 20 MG tablet   Start taking on:  10/30/2018    DELTASONE    8 tablet    Take 1 tablet (20 mg) by mouth 2 times daily for 4 days    Exacerbation of asthma, unspecified asthma severity, unspecified whether persistent       * Notice:  This list has 4 medication(s) that are the same as other medications prescribed for you. Read the directions carefully, and ask your doctor or other care provider to review them with you.

## 2018-10-29 NOTE — PROGRESS NOTES
SUBJECTIVE: Cristina Dean is a 38 year old female presenting with a chief complaint of cough  and wheeze.  Onset of symptoms was 8-9 day(s) ago.  Course of illness is worsening.    Severity moderate  Current and Associated symptoms: runny nose, stuffy nose, cough - productive and facial pain/pressure  Treatment measures tried include Inhaler (name: alb).  Predisposing factors include tobacco use and HX of asthma.    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, unspecified      Depression      Herpes      Intermittent asthma     triggers include fall and spring allergy seasons     Obesity 8/1/12    BMI 53     Urinary incontinence 2/1/2017     Allergies   Allergen Reactions     No Known Drug Allergies      Social History   Substance Use Topics     Smoking status: Former Smoker     Packs/day: 0.50     Years: 15.00     Types: Cigarettes     Start date: 6/1/2018     Quit date: 6/1/2018     Smokeless tobacco: Never Used      Comment: ~ 1 pack per week - started at age 14      Alcohol use Yes      Comment: 6 drinks per month       ROS:  SKIN: no rash  GI: no vomiting    OBJECTIVE:  BP (!) 118/94  Pulse 88  Temp 98.5  F (36.9  C) (Oral)  Resp 16  SpO2 95%GENERAL APPEARANCE: healthy, alert and no distress  EYES: EOMI,  PERRL, conjunctiva clear  HENT: ear canals and TM's normal.  Nose and mouth without ulcers, erythema or lesions  NECK: supple, nontender, no lymphadenopathy  RESP: expiratory wheezes throughout  CV: regular rates and rhythm, normal S1 S2, no murmur noted  SKIN: no suspicious lesions or rashes    Xray without acute findings, read by Jermain Ramos D.O.      ICD-10-CM    1. Exacerbation of asthma, unspecified asthma severity, unspecified whether persistent J45.901 albuterol (2.5 MG/3ML) 0.083% neb solution     INHALATION/NEBULIZER TREATMENT, INITIAL     predniSONE (DELTASONE) 20 MG tablet     predniSONE (DELTASONE) 20  MG tablet   2. Productive cough R05 XR Chest 2 Views     azithromycin (ZITHROMAX) 250 MG tablet   3. Blood in sputum R04.2 XR Chest 2 Views     azithromycin (ZITHROMAX) 250 MG tablet   4. Tobacco abuse Z72.0 XR Chest 2 Views   5. Throat pain R07.0 Strep, Rapid Screen     Beta strep group A culture   6. History of asthma Z87.09 XR Chest 2 Views     Felt better after neb and Prednisone  Declines nebulizer for home, has inhaler  Quit tobacco  Fluids/Rest, f/u if worse/not any better

## 2018-10-30 LAB
BACTERIA SPEC CULT: NORMAL
SPECIMEN SOURCE: NORMAL

## 2018-11-26 DIAGNOSIS — Z13.6 CARDIOVASCULAR SCREENING; LDL GOAL LESS THAN 130: ICD-10-CM

## 2018-11-26 RX ORDER — ATORVASTATIN CALCIUM 10 MG/1
10 TABLET, FILM COATED ORAL DAILY
Qty: 90 TABLET | Refills: 1 | Status: SHIPPED | OUTPATIENT
Start: 2018-11-26 | End: 2019-05-09

## 2018-11-26 NOTE — TELEPHONE ENCOUNTER
"Prescription approved per [facility] Refill Protocol.    Requested Prescriptions   Pending Prescriptions Disp Refills     atorvastatin (LIPITOR) 10 MG tablet 90 tablet 3     Sig: Take 1 tablet (10 mg) by mouth daily    Statins Protocol Passed    11/26/2018  4:25 PM       Passed - LDL on file in past 12 months    Recent Labs   Lab Test  02/21/18   0855   LDL  63            Passed - No abnormal creatine kinase in past 12 months    No lab results found.            Passed - Recent (12 mo) or future (30 days) visit within the authorizing provider's specialty    Patient had office visit in the last 12 months or has a visit in the next 30 days with authorizing provider or within the authorizing provider's specialty.  See \"Patient Info\" tab in inbasket, or \"Choose Columns\" in Meds & Orders section of the refill encounter.             Passed - Patient is age 18 or older       Passed - No active pregnancy on record       Passed - No positive pregnancy test in past 12 months          "

## 2018-12-02 DIAGNOSIS — F32.0 MAJOR DEPRESSIVE DISORDER, SINGLE EPISODE, MILD (H): ICD-10-CM

## 2018-12-02 NOTE — TELEPHONE ENCOUNTER
Requested Prescriptions   Pending Prescriptions Disp Refills     LATUDA 80 MG TABS tablet [Pharmacy Med Name: LATUDA 80 MG TABLET] 30 tablet 3     Sig: TAKE 1 TABLET (80 MG) BY MOUTH DAILY TAKE WITH FOOD.    There is no refill protocol information for this order        Last Written Prescription Date:  7/23/2018  Last Fill Quantity: 30,  # refills: 3   Last office visit: 1/18/2018 with prescribing provider:  1/18/2018   Future Office Visit:

## 2018-12-04 RX ORDER — LURASIDONE HYDROCHLORIDE 80 MG/1
TABLET, FILM COATED ORAL
Qty: 30 TABLET | Refills: 3 | Status: SHIPPED | OUTPATIENT
Start: 2018-12-04 | End: 2019-03-20

## 2018-12-08 DIAGNOSIS — F33.1 DEPRESSION, MAJOR, RECURRENT, MODERATE (H): ICD-10-CM

## 2018-12-08 DIAGNOSIS — F41.1 GAD (GENERALIZED ANXIETY DISORDER): ICD-10-CM

## 2018-12-08 NOTE — TELEPHONE ENCOUNTER
"Requested Prescriptions   Pending Prescriptions Disp Refills     buPROPion (WELLBUTRIN XL) 300 MG 24 hr tablet [Pharmacy Med Name: BUPROPION HCL  MG TABLET]  Last Written Prescription Date:  5/10/18  Last Fill Quantity: 90 TABLET,  # refills: 1   Last office visit: 10/29/2018 with prescribing provider:  GAUTAM   Future Office Visit:     90 tablet 1     Sig: TAKE 1 TABLET (300 MG) BY MOUTH EVERY MORNING    SSRIs Protocol Failed    12/8/2018 12:50 AM       Failed - PHQ-9 score less than 5 in past 6 months    Please review last PHQ-9 score.   PHQ-9 SCORE 12/4/2017 1/18/2018 6/21/2018   PHQ-9 Total Score - - -   PHQ-9 Total Score MyChart 16 (Moderately severe depression) 17 (Moderately severe depression) -   PHQ-9 Total Score 16 17 20     STEPHENIE-7 SCORE 10/2/2017 12/4/2017 1/18/2018   Total Score - - -   Total Score 6 (mild anxiety) 12 (moderate anxiety) 15 (severe anxiety)   Total Score 6 12 15                Passed - Medication is Bupropion    If the medication is Bupropion (Wellbutrin), and the patient is taking for smoking cessation; OK to refill.         Passed - Patient is age 18 or older       Passed - No active pregnancy on record       Passed - No positive pregnancy test in last 12 months       Passed - Recent (6 mo) or future (30 days) visit within the authorizing provider's specialty    Patient had office visit in the last 6 months or has a visit in the next 30 days with authorizing provider or within the authorizing provider's specialty.  See \"Patient Info\" tab in inbasket, or \"Choose Columns\" in Meds & Orders section of the refill encounter.              "

## 2018-12-11 RX ORDER — BUPROPION HYDROCHLORIDE 300 MG/1
TABLET ORAL
Qty: 90 TABLET | Refills: 1 | Status: SHIPPED | OUTPATIENT
Start: 2018-12-11 | End: 2019-06-29

## 2019-01-14 ENCOUNTER — OFFICE VISIT (OUTPATIENT)
Dept: INTERNAL MEDICINE | Facility: CLINIC | Age: 39
End: 2019-01-14
Payer: COMMERCIAL

## 2019-01-14 VITALS
WEIGHT: 293 LBS | HEART RATE: 89 BPM | DIASTOLIC BLOOD PRESSURE: 80 MMHG | OXYGEN SATURATION: 95 % | TEMPERATURE: 99.1 F | SYSTOLIC BLOOD PRESSURE: 118 MMHG | BODY MASS INDEX: 51.91 KG/M2 | HEIGHT: 63 IN

## 2019-01-14 DIAGNOSIS — J45.20 INTERMITTENT ASTHMA WITHOUT COMPLICATION, UNSPECIFIED ASTHMA SEVERITY: ICD-10-CM

## 2019-01-14 DIAGNOSIS — M25.542 ARTHRALGIA OF LEFT HAND: ICD-10-CM

## 2019-01-14 DIAGNOSIS — E66.01 MORBID OBESITY DUE TO EXCESS CALORIES (H): ICD-10-CM

## 2019-01-14 DIAGNOSIS — Z12.31 VISIT FOR SCREENING MAMMOGRAM: ICD-10-CM

## 2019-01-14 DIAGNOSIS — K21.9 GASTROESOPHAGEAL REFLUX DISEASE WITHOUT ESOPHAGITIS: ICD-10-CM

## 2019-01-14 DIAGNOSIS — Z13.6 CARDIOVASCULAR SCREENING; LDL GOAL LESS THAN 130: ICD-10-CM

## 2019-01-14 DIAGNOSIS — F33.1 DEPRESSION, MAJOR, RECURRENT, MODERATE (H): Primary | ICD-10-CM

## 2019-01-14 PROCEDURE — 99214 OFFICE O/P EST MOD 30 MIN: CPT | Performed by: INTERNAL MEDICINE

## 2019-01-14 ASSESSMENT — PATIENT HEALTH QUESTIONNAIRE - PHQ9: SUM OF ALL RESPONSES TO PHQ QUESTIONS 1-9: 6

## 2019-01-14 ASSESSMENT — MIFFLIN-ST. JEOR: SCORE: 2336.95

## 2019-01-14 NOTE — PROGRESS NOTES
SUBJECTIVE:   Cristina Dean is a 38 year old female who presents to clinic today for the following health issues:    She is primarily here due to the fact that she is having some pain and discomfort in her left thumb and forearm area.  This is been ongoing for several weeks.  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  She describes pain and discomfort in the thumb oftentimes radiating up to the left elbow area.      Joint Pain    Onset: 2 TO 3 WEEKS    Description:   Location: left elbow  Character: Gnawing    Intensity: moderate    Progression of Symptoms: worse    Accompanying Signs & Symptoms:  Other symptoms: radiation of pain to THUMB    History:   Previous similar pain: no       Precipitating factors:   Trauma or overuse: YES    Alleviating factors:  Improved by: Aleeve    Therapies Tried and outcome: Aleve dulls the pain    Patient would also like a referral to a Psychiatrist  To discuss the Medication Latuda as apparently her insurance is changing and not covering her Latuda and she wants to apparently see the rheumatologist so they can discuss other options that are available at this point.  She states her final insurance change will occur around March    Problem list and histories reviewed & adjusted, as indicated.  Additional history: as documented    Patient Active Problem List   Diagnosis     STEPHENIE (generalized anxiety disorder)     Morbid obesity due to excess calories (H)     CARDIOVASCULAR SCREENING; LDL GOAL LESS THAN 130     Herpes     Depression, major, recurrent, moderate (H)     Intermittent asthma without complication, unspecified asthma severity     Bipolar affective disorder, remission status unspecified (H)     Cannabis use disorder, mild, abuse     Alcohol use disorder     Past Surgical History:   Procedure Laterality  Date     EXCISE MASS BACK Right 2017    Procedure: EXCISE MASS BACK;  Excision Right Back Mass;  Surgeon: Vicente Pérez MD;  Location: [facility] OR     TONSILLECTOMY         Social History     Tobacco Use     Smoking status: Former Smoker     Packs/day: 0.50     Years: 15.00     Pack years: 7.50     Types: Cigarettes     Start date: 2018     Last attempt to quit: 2018     Years since quittin.6     Smokeless tobacco: Never Used     Tobacco comment: ~ 1 pack per week - started at age 14    Substance Use Topics     Alcohol use: Yes     Comment: 6 drinks per month     Family History   Problem Relation Age of Onset     Cancer Father         acute leukemia (in remission)     Neurologic Disorder Father         neuropathy from chemotherapy     Diabetes Father         type II DM     Other Cancer Father      Depression Father      Anxiety Disorder Father      Family History Negative Sister      Ovarian Cancer Paternal Aunt      Ovarian Cancer Paternal Aunt      Ovarian Cancer Paternal Aunt          Current Outpatient Medications   Medication Sig Dispense Refill     albuterol (PROAIR HFA/PROVENTIL HFA/VENTOLIN HFA) 108 (90 BASE) MCG/ACT Inhaler Inhale 2 puffs into the lungs 4 times daily as needed for shortness of breath / dyspnea 1 Inhaler prn     atorvastatin (LIPITOR) 10 MG tablet Take 1 tablet (10 mg) by mouth daily 90 tablet 1     buPROPion (WELLBUTRIN XL) 300 MG 24 hr tablet TAKE 1 TABLET (300 MG) BY MOUTH EVERY MORNING 90 tablet 1     gabapentin (NEURONTIN) 800 MG tablet TAKE 1 TABLET (800 MG) BY MOUTH 3 TIMES DAILY INCREASED DOSE. 90 tablet 3     LATUDA 80 MG TABS tablet TAKE 1 TABLET (80 MG) BY MOUTH DAILY TAKE WITH FOOD. 30 tablet 3     albuterol (2.5 MG/3ML) 0.083% neb solution 1 neb in clinic 1 vial 0     Allergies   Allergen Reactions     No Known Drug Allergies      BP Readings from Last 3 Encounters:   19 118/80   10/29/18 (!) 118/94   18 130/84    Wt Readings from Last 3  "Encounters:   01/14/19 (!) 169.3 kg (373 lb 3.2 oz)   07/30/18 (!) 153.8 kg (339 lb)   06/21/18 (!) 157.2 kg (346 lb 9.6 oz)           Reviewed and updated as needed this visit by clinical staff  Tobacco  Allergies  Meds       Reviewed and updated as needed this visit by Provider         ROS:  CONSTITUTIONAL: NEGATIVE for fever, chills, change in weight  ENT/MOUTH: NEGATIVE for ear, mouth and throat problems  RESP: NEGATIVE for significant cough or SOB  CV: NEGATIVE for chest pain, palpitations or peripheral edema  GI: NEGATIVE for nausea, abdominal pain, + heartburn that is now better controlled with acid blockers that she has purchased over-the-counter, no change in bowel habits  : NEGATIVE for frequency, dysuria, or hematuria  HEME: NEGATIVE for bleeding problems  PSYCHIATRIC: NEGATIVE for changes in mood or affect    OBJECTIVE:                                                    /80   Pulse 89   Temp 99.1  F (37.3  C) (Oral)   Ht 1.6 m (5' 3\")   Wt (!) 169.3 kg (373 lb 3.2 oz)   SpO2 95%   BMI 66.11 kg/m    Body mass index is 66.11 kg/m .  GENERAL:  alert and no distress  RESP: lungs clear to auscultation - no rales, no rhonchi, no wheezes  Morbid obesity is noted  CV: regular rates and rhythm, normal S1 S2, no S3 or S4 and no click or rub -  MS: There is no obvious swelling or joint erythema or classic osteoarthritic or rheumatoid arthritic changes noted to the hands.  There is mild tenderness noted to the left greater than the right thumb at its base.  There is also mild tenderness noted at the left olecranon at the epicondyle space.  NEURO: The strength is relatively preserved although is reduced secondary to pain with rotation internally and externally of the forearm against resistance.  There is point tenderness noted along the tendon of the supinator muscle of the left forearm  PSYCH: Alert and oriented times 3; speech- coherent , normal rate and volume; able to articulate logical thoughts, " able to abstract reason, no tangential thoughts, no hallucinations or delusions, affect- normal       ASSESSMENT/PLAN:                                                      (F33.1) Depression, major, recurrent, moderate (H)  (primary encounter diagnosis)  Comment: Her PHQ 9 score has improved from baseline but I have offered in a referral for her to see 1 of our psychiatrist to determine the need for medication adjustment based on insurance changes.  Plan: MENTAL HEALTH REFERRAL  - Adult; Psychiatry and        Medication Management; Psychiatry; [facility]:         [facility] Psychiatry Service [phone].  Medication management & future         refills will be returned to [facility] PCP upon         completion of evaluation; We alisha...            (E66.01) Morbid obesity due to excess calories (H)  Comment: Encouraged ongoing weight loss as this may be contributing to some of her symptoms.  Plan:     (Z13.6) CARDIOVASCULAR SCREENING; LDL GOAL LESS THAN 130  Comment: Labs have been ordered as fasting and patient will schedule appointment  Plan: Comprehensive metabolic panel, Lipid Profile            (J45.20) Intermittent asthma without complication, unspecified asthma severity  Comment: Overall stable per PHQ 9.  Plan:     (M25.542) Arthralgia of left hand  Comment: Suspect secondary to overuse syndrome due to repetition at work.  Suggested patient make an appointment to see a rheumatologist for determination of need for steroid injection and will obtain baseline labs for reassurance  Plan: Erythrocyte sedimentation rate auto, Rheumatoid        factor        Advised that she may benefit from a forearm strap while working    (K21.9) Gastroesophageal reflux disease without esophagitis  Comment: Stable on anti-acid blockers is used over-the-counter  Plan:     (Z12.31) Visit for screening mammogram  Comment: Advised patient when she turns 39 she should have a baseline mammogram and this was ordered accordingly in  April  Plan: *MA Screening Digital Bilateral          See Patient Instructions    Jermain Jaramillo MD  [facility]    THE MEDICATION LIST HAS BEEN FULLY RECONCILED BY THE [facility]. AND THE NURSING STAFF.

## 2019-01-15 ENCOUNTER — OFFICE VISIT (OUTPATIENT)
Dept: RHEUMATOLOGY | Facility: CLINIC | Age: 39
End: 2019-01-15
Payer: COMMERCIAL

## 2019-01-15 VITALS
WEIGHT: 293 LBS | SYSTOLIC BLOOD PRESSURE: 128 MMHG | HEART RATE: 94 BPM | BODY MASS INDEX: 51.91 KG/M2 | HEIGHT: 63 IN | RESPIRATION RATE: 18 BRPM | OXYGEN SATURATION: 95 % | DIASTOLIC BLOOD PRESSURE: 88 MMHG | TEMPERATURE: 98.5 F

## 2019-01-15 DIAGNOSIS — Z13.6 CARDIOVASCULAR SCREENING; LDL GOAL LESS THAN 130: ICD-10-CM

## 2019-01-15 DIAGNOSIS — M77.12 LATERAL EPICONDYLITIS OF LEFT ELBOW: Primary | ICD-10-CM

## 2019-01-15 DIAGNOSIS — M25.542 ARTHRALGIA OF LEFT HAND: ICD-10-CM

## 2019-01-15 LAB
ALBUMIN SERPL-MCNC: 3.4 G/DL (ref 3.4–5)
ALP SERPL-CCNC: 64 U/L (ref 40–150)
ALT SERPL W P-5'-P-CCNC: 28 U/L (ref 0–50)
ANION GAP SERPL CALCULATED.3IONS-SCNC: 2 MMOL/L (ref 3–14)
AST SERPL W P-5'-P-CCNC: 9 U/L (ref 0–45)
BILIRUB SERPL-MCNC: 0.3 MG/DL (ref 0.2–1.3)
BUN SERPL-MCNC: 10 MG/DL (ref 7–30)
CALCIUM SERPL-MCNC: 9.1 MG/DL (ref 8.5–10.1)
CHLORIDE SERPL-SCNC: 103 MMOL/L (ref 94–109)
CHOLEST SERPL-MCNC: 178 MG/DL
CO2 SERPL-SCNC: 33 MMOL/L (ref 20–32)
CREAT SERPL-MCNC: 0.78 MG/DL (ref 0.52–1.04)
ERYTHROCYTE [SEDIMENTATION RATE] IN BLOOD BY WESTERGREN METHOD: 8 MM/H (ref 0–20)
GFR SERPL CREATININE-BSD FRML MDRD: >90 ML/MIN/{1.73_M2}
GLUCOSE SERPL-MCNC: 114 MG/DL (ref 70–99)
HDLC SERPL-MCNC: 42 MG/DL
LDLC SERPL CALC-MCNC: 75 MG/DL
NONHDLC SERPL-MCNC: 136 MG/DL
POTASSIUM SERPL-SCNC: 4.3 MMOL/L (ref 3.4–5.3)
PROT SERPL-MCNC: 6.9 G/DL (ref 6.8–8.8)
SODIUM SERPL-SCNC: 138 MMOL/L (ref 133–144)
TRIGL SERPL-MCNC: 303 MG/DL

## 2019-01-15 PROCEDURE — 80053 COMPREHEN METABOLIC PANEL: CPT | Performed by: INTERNAL MEDICINE

## 2019-01-15 PROCEDURE — 99203 OFFICE O/P NEW LOW 30 MIN: CPT | Performed by: INTERNAL MEDICINE

## 2019-01-15 PROCEDURE — 80061 LIPID PANEL: CPT | Performed by: INTERNAL MEDICINE

## 2019-01-15 PROCEDURE — 85652 RBC SED RATE AUTOMATED: CPT | Performed by: INTERNAL MEDICINE

## 2019-01-15 PROCEDURE — 36415 COLL VENOUS BLD VENIPUNCTURE: CPT | Performed by: INTERNAL MEDICINE

## 2019-01-15 PROCEDURE — 86431 RHEUMATOID FACTOR QUANT: CPT | Performed by: INTERNAL MEDICINE

## 2019-01-15 ASSESSMENT — MIFFLIN-ST. JEOR: SCORE: 2299.76

## 2019-01-15 ASSESSMENT — ROUTINE ASSESSMENT OF PATIENT INDEX DATA (RAPID3)
TOTAL RAPID3 SCORE: 13
RAPID3 INTERPRETATION: HIGH > 12.0

## 2019-01-15 ASSESSMENT — ASTHMA QUESTIONNAIRES: ACT_TOTALSCORE: 20

## 2019-01-15 NOTE — PATIENT INSTRUCTIONS
- Icing three times daily X 3 weeks.  - tennis elbow brace  - physical therapy referral.  - Aleve 440mg twice daily OTC as needed.      Patient Education     Understanding Lateral Epicondylitis    Tendons are strong bands of tissue that connect muscles to bones. Lateral epicondylitis affects the tendons that connect muscles in the forearm to the lateral epicondyle. This is the bony knob on the outer side of the elbow. The condition occurs if the extensor tendons of the wrist become red and swollen (irritated). This can cause pain in the elbow, forearm, and wrist. Because the condition is sometimes caused by playing tennis, it is also known as  tennis elbow.   How to say it  [id number]-adam [id number]   What causes lateral epicondylitis?  The condition most often occurs because of overuse. This can be from any activity that repeatedly puts stress on the forearm extensor muscles or tendons and wrist. For instance, playing tennis, lifting weights, cutting meat, painting, and typing can all cause the condition. Wear and tear of the tendons from aging or an injury to the tendons can also cause the condition.  Symptoms of lateral epicondylitis  The most common symptom is pain. You may feel it on the outer side of the elbow and down the back of the forearm. It may be worse when moving or using the elbow, forearm, or wrist. You may also feel pain when gripping or lifting things.  Treatment for lateral epicondylitis  Treatments may include:    Resting the elbow, forearm, and wrist. You ll need to avoid movements that can make your symptoms worse. You also may need to avoid certain sports and types of work for a time. This helps relieve symptoms and prevent further damage to the tendons.    Changing the action that caused the problem. For instance, if the tendons were damaged from playing tennis, it may help to change your playing technique or use different equipment. This helps prevent further damage to the  tendons.    Using cold packs. Putting an ice pack on the injured area can help reduce pain and swelling.    Taking pain medicines. Taking prescription or over-the-counter pain medicines may help reduce pain and swelling.      Wearing a brace. This helps reduce strain on the muscles and tendons in the forearm, which may relieve symptoms. It is very important to wear the brace properly.    Doing exercises and physical therapy. These help improve strength and range of motion in the elbow, forearm, and wrist.    Getting shots of medicine into the injured area. These may help relieve symptoms for a time.    Having surgery. This may be an option if other treatments fail to relieve symptoms. In many cases, the surgeon removes the damaged tissue.  Possible complications of lateral epicondylitis  If the tendons involved don t heal properly, symptoms may return or get worse. To help prevent this, follow your treatment plan as directed.  When to call your healthcare provider  Call your healthcare provider right away if you have any of these:    Fever of 100.4 F (38 C) or higher, or as directed    Redness, swelling, or warmth in the elbow or forearm that gets worse    Symptoms that don t get better with treatment, or get worse    New symptoms   Date Last Reviewed: 3/10/2016    9142-4314 [employer]. [address] [place] [address]. All rights reserved. This information is not intended as a substitute for professional medical care. Always follow your healthcare professional's instructions.           Patient Education     Treating Tennis Elbow    Your treatment will depend on how inflamed your tendon is. The goal is to relieve your symptoms and help you regain full use of your elbow.  Rest and medicine  Wearing a tennis elbow splint allows the inflamed tendon to rest. It must be worn properly. It should be placed down the arm past the painful area of the elbow. If it is directly over the inflamed tendon,  it can worsen the symptoms. This brace can help the tendon heal. Using your other hand or changing your  also takes stress off the tendon. Oral nonsteroidal anti-inflammatory medicines (NSAIDs) and ice can relieve pain and reduce swelling.  Exercises and therapy  Your healthcare provider may give you an exercise program. He or she may refer you to a physical therapist. The physical therapist will teach you how to gently stretch and strengthen the muscles around your elbow.  Anti-inflammatory injections  Your healthcare provider may give you injections of an anti-inflammatory medicine, such as cortisone. This helps reduce swelling. You may have more pain at first. But in a few days, your elbow should feel better.  If surgery is needed  If your symptoms persist for a long time, or other treatments don t work, your healthcare provider may recommend surgery. Surgery repairs the inflamed tendon.   Date Last Reviewed: 1/1/2018 2000-2018 [employer]. [address] [place] [address], [place] [address]. All rights reserved. This information is not intended as a substitute for professional medical care. Always follow your healthcare professional's instructions.

## 2019-01-15 NOTE — NURSING NOTE
"Chief Complaint   Patient presents with     Consult       Initial There were no vitals taken for this visit. Estimated body mass index is 66.11 kg/m  as calculated from the following:    Height as of 1/14/19: 1.6 m (5' 3\").    Weight as of 1/14/19: 169.3 kg (373 lb 3.2 oz).  Medication Reconciliation: complete    Have you ever seen a rheumatologist No Who NA When NA  Joint pain history  Onset: 3 weeks  Involved joints: Left elbow  Pain scale:  4/10     Wakes the patient from sleep : Yes  Morning stiffness:Yes for unlimitied minutes  Meds used : Aleve    Interim history  Since last visit:  1. Infections - No  2. New symptoms/medical problem - No  3. Any side effects from Rheum medications -NA  3. ER visits/Hospitalizations/surgeries - No  4. Last PCP visit: 1/14/19  Wt Readings from Last 4 Encounters:   01/14/19 (!) 169.3 kg (373 lb 3.2 oz)   07/30/18 (!) 153.8 kg (339 lb)   06/21/18 (!) 157.2 kg (346 lb 9.6 oz)   06/15/18 145.2 kg (320 lb)     BP Readings from Last 3 Encounters:   01/14/19 118/80   10/29/18 (!) 118/94   07/30/18 130/84       "

## 2019-01-15 NOTE — PROGRESS NOTES
[facility] - Rheumatology Clinic Visit     Cristina Dean MRN# [id number]   YOB: 1980    Primary care provider: Jermain Jaramillo  Carlos 15, 2019          Assessment and Plan:   # Left Acute lateral epicondylitis of elbow (' elbow)    Patient is a  and has been doing this for 22 years. 5-7 hours a day and 4 days a week. Also does house cleaning one day a week. The lateral epicondylitis is likely her activity related. No evidence of systemic inflammatory autoimmune rheumatic condition. No indication for blood work today. However if not improving with following recommendations in 4 weeks, then investigate further for other causes such as inflammatory conditions.     - Icing three times daily X 3 weeks.  - tennis elbow brace  - physical therapy referral.  - Aleve 440mg twice daily OTC as needed.      Patient alerted that if this is left untreated, can become a chronic problem difficult to manage and affecting her job abilities. Local cortisone injection PRN as needed.     The labs from patient records are reviewed.     I will be back in touch with the patient through mychart/letter when results are available.     Patient agrees with the above mentioned treatment plan.     Most Recent Immunizations   Administered Date(s) Administered     Influenza Vaccine IM 3yrs+ 4 Valent IIV4 10/23/2017     TDAP Vaccine (Adacel) 10/23/2017     Tdap (Adacel,Boostrix) 06/17/2004       Orders Placed This Encounter   Procedures     [facility], [facility], AND [facility] REFERRAL       Return in about 4 weeks (around 2/12/2019).    There are no discontinued medications.  Current Outpatient Medications   Medication Sig Dispense Refill     albuterol (PROAIR HFA/PROVENTIL HFA/VENTOLIN HFA) 108 (90 BASE) MCG/ACT Inhaler Inhale 2 puffs into the lungs 4 times daily as needed for shortness of breath / dyspnea 1 Inhaler prn     atorvastatin (LIPITOR) 10 MG tablet Take 1 tablet (10 mg) by mouth daily 90 tablet 1     buPROPion  (WELLBUTRIN XL) 300 MG 24 hr tablet TAKE 1 TABLET (300 MG) BY MOUTH EVERY MORNING 90 tablet 1     gabapentin (NEURONTIN) 800 MG tablet TAKE 1 TABLET (800 MG) BY MOUTH 3 TIMES DAILY INCREASED DOSE. 90 tablet 3     LATUDA 80 MG TABS tablet TAKE 1 TABLET (80 MG) BY MOUTH DAILY TAKE WITH FOOD. 30 tablet 3     albuterol (2.5 MG/3ML) 0.083% neb solution 1 neb in clinic 1 vial 0       Kirit Méndez MD  [facility]          Active Problem List:     Patient Active Problem List    Diagnosis Date Noted     Cannabis use disorder, mild, abuse 12/04/2017     Priority: Medium     Alcohol use disorder 12/04/2017     Priority: Medium     Bipolar affective disorder, remission status unspecified (H) 11/13/2017     Priority: Medium     Depression, major, recurrent, moderate (H) 10/23/2017     Priority: Medium     Intermittent asthma without complication, unspecified asthma severity 10/23/2017     Priority: Medium     CARDIOVASCULAR SCREENING; LDL GOAL LESS THAN 130 08/07/2012     Priority: Medium     Morbid obesity due to excess calories (H) 08/01/2012     Priority: Medium     BMI 53       STEPHENIE (generalized anxiety disorder)      Priority: Medium     Herpes 07/09/2012     Priority: Medium            History of Present Illness:     Chief Complaint   Patient presents with     Consult     January 15, 2019  Have you ever seen a rheumatologist No Who NA When NA  Joint pain history  Onset: 3 weeks  Involved joints: Left elbow  Pain scale:  4/10     Wakes the patient from sleep : Yes  Morning stiffness:Yes for unlimitied minutes  Meds used : Aleve     Interim history  Since last visit:  1. Infections - No  2. New symptoms/medical problem - No  3. Any side effects from Rheum medications -NA  3. ER visits/Hospitalizations/surgeries - No  4. Last PCP visit: 1/14/19      Wt Readings from Last 4 Encounters:   01/15/19 (!) 165.6 kg (365 lb)   01/14/19 (!) 169.3 kg (373 lb 3.2 oz)   07/30/18 (!) 153.8 kg (339 lb)   06/21/18 (!)  157.2 kg (346 lb 9.6 oz)     H/o asthma    No h/o fevers, rash, swollen glands  No h/o gout  No family or personal history of psoriasis, ulcerative colitis or chron's disease. No h/o iritis or glaucoma.   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, unspecified      Depression      Herpes      Intermittent asthma     triggers include  and spring allergy seasons     Obesity 12    BMI 53     Urinary incontinence 2017     Past Surgical History:   Procedure Laterality Date     EXCISE MASS BACK Right 2017    Procedure: EXCISE MASS BACK;  Excision Right Back Mass;  Surgeon: Vicente Pérez MD;  Location: [facility] OR     TONSILLECTOMY              Social History:     Social History     Occupational History     Not on file   Tobacco Use     Smoking status: Former Smoker     Packs/day: 0.50     Years: 15.00     Pack years: 7.50     Types: Cigarettes     Start date: 2018     Last attempt to quit: 2018     Years since quittin.6     Smokeless tobacco: Never Used     Tobacco comment: ~ 1 pack per week - started at age 14    Substance and Sexual Activity     Alcohol use: Yes     Comment: 6 drinks per month     Drug use: Yes     Types: Marijuana     Comment: daily     Sexual activity: Yes     Partners: Male     Birth control/protection: Condom            Family History:     Family History   Problem Relation Age of Onset     Cancer Father         acute leukemia (in remission)     Neurologic Disorder Father          "neuropathy from chemotherapy     Diabetes Father         type II DM     Other Cancer Father      Depression Father      Anxiety Disorder Father      Family History Negative Sister      Ovarian Cancer Paternal Aunt      Ovarian Cancer Paternal Aunt      Ovarian Cancer Paternal Aunt             Allergies:     Allergies   Allergen Reactions     No Known Drug Allergies             Medications:     Current Outpatient Medications   Medication Sig Dispense Refill     albuterol (PROAIR HFA/PROVENTIL HFA/VENTOLIN HFA) 108 (90 BASE) MCG/ACT Inhaler Inhale 2 puffs into the lungs 4 times daily as needed for shortness of breath / dyspnea 1 Inhaler prn     atorvastatin (LIPITOR) 10 MG tablet Take 1 tablet (10 mg) by mouth daily 90 tablet 1     buPROPion (WELLBUTRIN XL) 300 MG 24 hr tablet TAKE 1 TABLET (300 MG) BY MOUTH EVERY MORNING 90 tablet 1     gabapentin (NEURONTIN) 800 MG tablet TAKE 1 TABLET (800 MG) BY MOUTH 3 TIMES DAILY INCREASED DOSE. 90 tablet 3     LATUDA 80 MG TABS tablet TAKE 1 TABLET (80 MG) BY MOUTH DAILY TAKE WITH FOOD. 30 tablet 3     albuterol (2.5 MG/3ML) 0.083% neb solution 1 neb in clinic 1 vial 0            Physical Exam:   Blood pressure 128/88, pulse 94, temperature 98.5  F (36.9  C), temperature source Oral, resp. rate 18, height 1.6 m (5' 3\"), weight (!) 165.6 kg (365 lb), SpO2 95 %, not currently breastfeeding.  Wt Readings from Last 4 Encounters:   01/15/19 (!) 165.6 kg (365 lb)   01/14/19 (!) 169.3 kg (373 lb 3.2 oz)   07/30/18 (!) 153.8 kg (339 lb)   06/21/18 (!) 157.2 kg (346 lb 9.6 oz)       Constitutional: well-developed, appearing stated age; cooperative  Eyes: normal conjunctiva, sclera  ENT: nl external ears, nose, lips.No mucous membrane lesions, normal saliva pool  Neck: no cervical lymphadenopathy  Resp: lungs clear to auscultation in the bases,   CV: RRR, no added sounds  GI: Abdomen soft and no tenderness  : not tested  Lymph: no cervical, supraclavicular or epitrochlear nodes  MS: "   Left elbow lateral epicondyle tenderness present. Tenderness in lateral epicondyle elicited with extension of the left wrist. No elbow joint tenderness or synovitis. No forearm edema.   All shoulder, wrist, MCP/PIP/DIP, hip, knee, ankle, and foot MTP/IP joints were examined and  found without active synovitis or major deformity. Full ROM.  No dactylitis,  tenosynovitis, enthesopathy.  Skin: no rash in exposed areas  Psych: nl judgement, orientation, memory, affect.         Data:         Kirit Méndez MD    [facility]

## 2019-01-16 LAB — RHEUMATOID FACT SER NEPH-ACNC: <20 IU/ML (ref 0–20)

## 2019-02-04 ENCOUNTER — THERAPY VISIT (OUTPATIENT)
Dept: OCCUPATIONAL THERAPY | Facility: CLINIC | Age: 39
End: 2019-02-04
Payer: COMMERCIAL

## 2019-02-04 DIAGNOSIS — M25.522 ELBOW PAIN, LEFT: ICD-10-CM

## 2019-02-04 DIAGNOSIS — M77.12 LATERAL EPICONDYLITIS OF LEFT ELBOW: ICD-10-CM

## 2019-02-04 PROCEDURE — 97140 MANUAL THERAPY 1/> REGIONS: CPT | Mod: GO | Performed by: OCCUPATIONAL THERAPIST

## 2019-02-04 PROCEDURE — 97165 OT EVAL LOW COMPLEX 30 MIN: CPT | Mod: GO | Performed by: OCCUPATIONAL THERAPIST

## 2019-02-04 PROCEDURE — 97110 THERAPEUTIC EXERCISES: CPT | Mod: GO | Performed by: OCCUPATIONAL THERAPIST

## 2019-02-04 NOTE — PROGRESS NOTES
Hand Therapy Initial Evaluation    Current Date:  2/4/2019          Diagnosis: L LEP  Onset: 6 weeks ago  MD order:  1/15/19         Davide  Cristina CHIDI Dean is a 38 year old Right hand dominant female.    Patient reports symptoms of pain, stiffness/loss of motion and weakness/loss of strength of the left elbow which occurred due to unknown etiology, possibly dt overuse with work. Since onset symptoms are Gradually getting worse.  Special tests:  none.  Previous treatment: none.    General health as reported by patient is good.  Pertinent medical history includes:Asthma, Depression, Mental Illness, Overweight, Smoking.  Medical allergies:none.  Surgical history: other.  Medication history: Anti-depressants.    Occupational Profile Information:  Current occupation is  at [employer]  Currently working in normal job without restrictions  Job Tasks: Lifting, Carrying, Prolonged Standing, Pushing, Pulling  Prior functional level:  independent-shared household chores  Barriers include:none  Mobility: No difficulty  Transportation: drives  Leisure activities/hobbies: playing cards, board games    Identified Performance Deficits:  bathing/showering, dressing, functional mobility, hygiene and grooming, driving and community mobility, home establishment and management, sleep and work    Red flags:  none    Functional Outcome Measure:   Upper Extremity Functional Index Score:  SCORE:   Column Totals: 34/80  (A lower score indicates greater disability.)      Objective  Pain Level Report: On scale 0-10/10  Date 2/4/2019    side Left    At Rest 1/10    At Worst 5/10      Report of Pain:  Location:  Lateral/posterior elbow  Pain Quality:  Aching and Nagging  Frequency: constant    Pain is worst:  daytime or nighttime  Exacerbated by:  Twisting, turning, use of thumb-pressure through  Relieved by:  rest    ROM:  Elbow-slightly limited flex on left  Wrist   Date  2/4/2019   Side Right Left   Ext 60 58   Flex 58 28      Special Testing:  Tenderness: Pain level report on scale 0-10/10  Date 2/4/2019    Side Left    Lateral Epicondyle 0    PIN 3    ECRB Origin 2    ECRB Insertion 0    Radial Head 0    Ext Wad 2      Palpation: tightness noted in wrist flexors and extensors    Palpation Radial Nerve Path: Pain level report on scale 0-10/10  Date 2/4/2019    Side Left    Supraspinatus 0    Triangular Interval 0    Spiral Groove 1    Radial Head 0    DSRN 0      Resisted Testing: MMT on scale 0-5/5, Pain level report on scale 0-10/10  Date 2/4/2019    Side left    Elbow Ext 4/5, 3/10    Elbow Flex 5/5, 3/10    Sup 4/5, 2/10    Pro 4/5, 0    Wrist Ext 3/5, 1    Wrist Flex 3-/5, 0    Wrist Ext c elbow extended 3/10 without resistance    Middle Finger test 4/10      Strength: (Measured in pounds, pain scale 0-10/10)   with Elbow at 90: measured in pounds  Date 2/4/2019      Trials Left Right Left Right Left Right Left Right   1 35 72         2           3           Avg           Pain 4             with Elbow Extended: measured in pounds  Date 2/4/2019      Trials Left Right Left Right Left Right Left Right   1 23 65         2           3           Avg           Pain 5            Assessment:  Patient presents with symptoms consistent with diagnosis of left Lateral Epicondylitis,  with conservative intervention.     Patient's limitations or Problem List includes:  Pain, Decreased ROM/motion, Weakness, Decreased  and Tightness in musculature of the left elbow and wrist which interferes with the patient's ability to perform Self Care Tasks, Work Tasks, Sleep Patterns, Recreational Activities, Household Chores and Driving  as compared to previous level of function.    Rehab Potential:  Excellent - Return to full activity, no limitations    Patient will benefit from skilled Occupational Therapy to increase ROM, flexibility,  strength and forearm strength and decrease pain as well as muscle and neural tension to return to  previous activity level and resume normal daily tasks and to reach their rehab potential.    Chart Review: Brief history including review of medical and/or therapy records relating to the presenting problem and Simple history review with patient  Assessment of Occupational Performance:  5 or more Performance Deficits  Identified Performance Deficits: bathing/showering, dressing, functional mobility, hygiene and grooming, driving and community mobility, home establishment and management, sleep and work    Clinical Decision Making (Complexity): Low complexity    Barriers to Learning:  No barrier    Communication Issues:  Patient appears to be able to clearly communicate and understand verbal and written communication and follow directions correctly.    Treatment Explanation:  The following has been discussed with the patient:    RX ordered/plan of care  Anticipated outcomes  Possible risks and side effects    Frequency:  1 X week, once daily  Duration:  for 8 weeks    Treatment Plan:    Modalities:  US, Iontophoresis and Laser Light   Therapeutic Exercise: AROM, PROM including stretch,  strengthening   Manual Techniques: Joint Mobilization, Friction massage, Myofascial release  Neuro Re-ed: Active/Passive radial nerve glides, posture  Orthosis:  Wrist cock up orthosis, elbow strap  Self-Care:  Education, self care tasks, ergonomics    Discharge Plan:    Achieve all LTG.  Independent in home treatment program.  Reach maximal therapeutic benefit.    Home Program:   PROM with stretch to wrist extensors and flexors  Active radial nerve glides  MFR to the extensor wad  Elbow strap as needed to re-distribute the pull of the extensor wad.  Avoid activities that exacerbate pain in the elbow.  Lift with forearms in neutral position.   Modify lifting at work    Next Visit:  Wrist cock up orthosis for night; if needed  MFR  Passive nerve glides

## 2019-02-13 DIAGNOSIS — F41.1 GAD (GENERALIZED ANXIETY DISORDER): ICD-10-CM

## 2019-02-13 RX ORDER — GABAPENTIN 800 MG/1
TABLET ORAL
Qty: 90 TABLET | Refills: 3 | Status: SHIPPED | OUTPATIENT
Start: 2019-02-13 | End: 2019-06-24

## 2019-02-13 NOTE — TELEPHONE ENCOUNTER
gabapentin      Last Written Prescription Date:  10/5/18  Last Fill Quantity: 90,   # refills: 3  Last Office Visit: 1/14/19  Future Office visit:       Routing refill request to provider for review/approval because:  Drug not on [place] [facility] refill protocol or controlled substance

## 2019-02-18 NOTE — PROGRESS NOTES
[facility] - Rheumatology Clinic Visit     Cristina Dean MRN# [id number]   YOB: 1980    Primary care provider: Jermain Jaramillo  Feb 19, 2019          Assessment and Plan:   # Left Acute lateral epicondylitis of elbow (' elbow)    Patient is a  and has been doing this for 22 years. 5-7 hours a day and 4 days a week. Also does house cleaning one day a week. The lateral epicondylitis is likely her activity related. No evidence of systemic inflammatory autoimmune rheumatic condition. No indication for blood work today. She continues to improve on physical therapy. Continue.     - tennis elbow brace  - physical therapy referral.  - Aleve 440mg twice daily OTC as needed.      Patient alerted that if this is left untreated, can become a chronic problem difficult to manage and affecting her job abilities. Local cortisone injection PRN as needed.     F/u PRN    The labs from patient records are reviewed.     I will be back in touch with the patient through mychart/letter when results are available.     Patient agrees with the above mentioned treatment plan.     Most Recent Immunizations   Administered Date(s) Administered     Influenza Vaccine IM 3yrs+ 4 Valent IIV4 10/23/2017     TDAP Vaccine (Adacel) 10/23/2017     Tdap (Adacel,Boostrix) 06/17/2004       No orders of the defined types were placed in this encounter.      Data Unavailable    Medications Discontinued During This Encounter   Medication Reason     predniSONE (DELTASONE) 20 MG tablet Therapy completed     predniSONE (DELTASONE) 20 MG tablet Therapy completed     azithromycin (ZITHROMAX) 250 MG tablet Therapy completed     Current Outpatient Medications   Medication Sig Dispense Refill     albuterol (PROAIR HFA/PROVENTIL HFA/VENTOLIN HFA) 108 (90 BASE) MCG/ACT Inhaler Inhale 2 puffs into the lungs 4 times daily as needed for shortness of breath / dyspnea 1 Inhaler prn     atorvastatin (LIPITOR) 10 MG tablet Take 1 tablet (10 mg)  by mouth daily 90 tablet 1     buPROPion (WELLBUTRIN XL) 300 MG 24 hr tablet TAKE 1 TABLET (300 MG) BY MOUTH EVERY MORNING 90 tablet 1     gabapentin (NEURONTIN) 800 MG tablet TAKE 1 TABLET (800 MG) BY MOUTH 3 TIMES DAILY INCREASED DOSE. 90 tablet 3     LATUDA 80 MG TABS tablet TAKE 1 TABLET (80 MG) BY MOUTH DAILY TAKE WITH FOOD. 30 tablet 3     albuterol (2.5 MG/3ML) 0.083% neb solution 1 neb in clinic 1 vial 0       Kirit Méndez MD  [facility]          Active Problem List:     Patient Active Problem List    Diagnosis Date Noted     Lateral epicondylitis of left elbow 02/04/2019     Priority: Medium     Elbow pain, left 02/04/2019     Priority: Medium     Cannabis use disorder, mild, abuse 12/04/2017     Priority: Medium     Alcohol use disorder 12/04/2017     Priority: Medium     Bipolar affective disorder, remission status unspecified (H) 11/13/2017     Priority: Medium     Depression, major, recurrent, moderate (H) 10/23/2017     Priority: Medium     Intermittent asthma without complication, unspecified asthma severity 10/23/2017     Priority: Medium     CARDIOVASCULAR SCREENING; LDL GOAL LESS THAN 130 08/07/2012     Priority: Medium     Morbid obesity due to excess calories (H) 08/01/2012     Priority: Medium     BMI 53       STEPHENIE (generalized anxiety disorder)      Priority: Medium     Herpes 07/09/2012     Priority: Medium            History of Present Illness:     Chief Complaint   Patient presents with     RECHECK     January 15, 2019  Have you ever seen a rheumatologist No Who NA When NA  Joint pain history  Onset: 3 weeks  Involved joints: Left elbow  Pain scale:  4/10     Wakes the patient from sleep : Yes  Morning stiffness:Yes for unlimitied minutes  Meds used : Aleve     Interim history  Since last visit:  1. Infections - No  2. New symptoms/medical problem - No  3. Any side effects from Rheum medications -NA  3. ER visits/Hospitalizations/surgeries - No  4. Last PCP visit:  1/14/19      Wt Readings from Last 4 Encounters:   02/19/19 (!) 163.7 kg (361 lb)   01/15/19 (!) 165.6 kg (365 lb)   01/14/19 (!) 169.3 kg (373 lb 3.2 oz)   07/30/18 (!) 153.8 kg (339 lb)     H/o asthma    No h/o fevers, rash, swollen glands  No h/o gout  No family or personal history of psoriasis, ulcerative colitis or chron's disease. No h/o iritis or glaucoma.   Patient denies any raynauds  No h/o persistent chest pain  No h/o persistent vomiting, diarrhea, abdominal pain  No h/o hematochezia, hematuria, hemoptysis  No h/o seizures     February 19, 2019  Have you ever seen a rheumatologist yes Who you When 1/15/19  Joint pain history  Onset: Patient is here for a follow up for elbow, believed to be tennis elbow. States not as intense since started PT. Still hard to lift a glass to mouth, and tasks at work. Does have PT appt tomorrow.  Involved joints: left elbow  Pain scale:  3.5/10     Wakes the patient from sleep : Yes  Morning stiffness:Yes for 20 minutes  Meds used:aleve     Interim history  Since last visit:  1. Infections - No  2. New symptoms/medical problem - No  3. Any side effects from Rheum medications -NA  3. ER visits/Hospitalizations/surgeries - No  4. 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, unspecified      Depression      Herpes      Intermittent asthma     triggers include fall and spring allergy seasons     Obesity 8/1/12    BMI 53     Urinary incontinence 2/1/2017     Past Surgical History:   Procedure Laterality Date     EXCISE MASS BACK Right 12/28/2017    Procedure: EXCISE MASS BACK;  Excision Right Back Mass;  Surgeon: Vicente Pérez MD;  Location:  [facility] OR     TONSILLECTOMY  1984            Social History:     Social History     Occupational History     Not on file   Tobacco Use     Smoking status: Current Every Day Smoker     Packs/day: 0.50     Years: 15.00     Pack years: 7.50     Types: Cigarettes     Start date: 6/1/2018     Smokeless tobacco: Never Used     Tobacco comment: 5 cigs per day   Substance and Sexual Activity     Alcohol use: Yes     Comment: 6 drinks per month     Drug use: Yes     Types: Marijuana     Comment: daily     Sexual activity: Yes     Partners: Male     Birth control/protection: Condom            Family History:     Family History   Problem Relation Age of Onset     Cancer Father         acute leukemia (in remission)     Neurologic Disorder Father         neuropathy from chemotherapy     Diabetes Father         type II DM     Other Cancer Father      Depression Father      Anxiety Disorder Father      Family History Negative Sister      Ovarian Cancer Paternal Aunt      Ovarian Cancer Paternal Aunt      Ovarian Cancer Paternal Aunt             Allergies:     Allergies   Allergen Reactions     No Known Drug Allergies             Medications:     Current Outpatient Medications   Medication Sig Dispense Refill     albuterol (PROAIR HFA/PROVENTIL HFA/VENTOLIN HFA) 108 (90 BASE) MCG/ACT Inhaler Inhale 2 puffs into the lungs 4 times daily as needed for shortness of breath / dyspnea 1 Inhaler prn     atorvastatin (LIPITOR) 10 MG tablet Take 1 tablet (10 mg) by mouth daily 90 tablet 1     buPROPion (WELLBUTRIN XL) 300 MG 24 hr tablet TAKE 1 TABLET (300 MG) BY MOUTH EVERY MORNING 90 tablet 1     gabapentin (NEURONTIN) 800 MG tablet TAKE 1 TABLET (800 MG) BY MOUTH 3 TIMES DAILY INCREASED DOSE. 90 tablet 3     LATUDA 80 MG TABS tablet TAKE 1 TABLET (80 MG) BY MOUTH DAILY TAKE WITH FOOD. 30 tablet 3     albuterol (2.5 MG/3ML) 0.083% neb solution 1 neb in clinic 1 vial 0            Physical Exam:   Blood pressure 136/84, pulse 88, temperature  "98.7  F (37.1  C), temperature source Oral, height 1.6 m (5' 3\"), weight (!) 163.7 kg (361 lb), SpO2 93 %, not currently breastfeeding.  Wt Readings from Last 4 Encounters:   02/19/19 (!) 163.7 kg (361 lb)   01/15/19 (!) 165.6 kg (365 lb)   01/14/19 (!) 169.3 kg (373 lb 3.2 oz)   07/30/18 (!) 153.8 kg (339 lb)       Constitutional: well-developed, appearing stated age; cooperative  MS:   Left elbow lateral epicondyle tenderness present.   Skin: no rash in exposed areas  Psych: nl judgement, orientation, memory, affect.         Data:         Kirit Méndez MD    [facility]    "

## 2019-02-19 ENCOUNTER — OFFICE VISIT (OUTPATIENT)
Dept: RHEUMATOLOGY | Facility: CLINIC | Age: 39
End: 2019-02-19
Payer: COMMERCIAL

## 2019-02-19 VITALS
WEIGHT: 293 LBS | HEART RATE: 88 BPM | OXYGEN SATURATION: 93 % | BODY MASS INDEX: 51.91 KG/M2 | DIASTOLIC BLOOD PRESSURE: 84 MMHG | TEMPERATURE: 98.7 F | HEIGHT: 63 IN | SYSTOLIC BLOOD PRESSURE: 136 MMHG

## 2019-02-19 DIAGNOSIS — M77.12 LATERAL EPICONDYLITIS OF LEFT ELBOW: Primary | ICD-10-CM

## 2019-02-19 PROCEDURE — 99213 OFFICE O/P EST LOW 20 MIN: CPT | Performed by: INTERNAL MEDICINE

## 2019-02-19 ASSESSMENT — ROUTINE ASSESSMENT OF PATIENT INDEX DATA (RAPID3)
RAPID3 INTERPRETATION: MODERATE 6.1-12.0
TOTAL RAPID3 SCORE: 11.8

## 2019-02-19 ASSESSMENT — MIFFLIN-ST. JEOR: SCORE: 2281.62

## 2019-02-19 NOTE — NURSING NOTE
"Chief Complaint   Patient presents with     RECHECK       Initial /84   Pulse 88   Temp 98.7  F (37.1  C) (Oral)   Ht 1.6 m (5' 3\")   Wt (!) 163.7 kg (361 lb)   SpO2 93%   BMI 63.95 kg/m   Estimated body mass index is 63.95 kg/m  as calculated from the following:    Height as of this encounter: 1.6 m (5' 3\").    Weight as of this encounter: 163.7 kg (361 lb).  Medication Reconciliation: complete    Have you ever seen a rheumatologist yes Who you When 1/15/19  Joint pain history  Onset: Patient is here for a follow up for elbow, believed to be tennis elbow. States not as intense since started PT. Still hard to lift a glass to mouth, and tasks at work. Does have PT appt tomorrow.  Involved joints: left elbow  Pain scale:  3.5/10     Wakes the patient from sleep : Yes  Morning stiffness:Yes for 20 minutes  Meds used:aleve    Interim history  Since last visit:  1. Infections - No  2. New symptoms/medical problem - No  3. Any side effects from Rheum medications -NA  3. ER visits/Hospitalizations/surgeries - No  4. Last PCP visit: 1/14/19  Wt Readings from Last 4 Encounters:   02/19/19 (!) 163.7 kg (361 lb)   01/15/19 (!) 165.6 kg (365 lb)   01/14/19 (!) 169.3 kg (373 lb 3.2 oz)   07/30/18 (!) 153.8 kg (339 lb)     BP Readings from Last 3 Encounters:   02/19/19 136/84   01/15/19 128/88   01/14/19 118/80       "

## 2019-02-28 ENCOUNTER — OFFICE VISIT (OUTPATIENT)
Dept: URGENT CARE | Facility: URGENT CARE | Age: 39
End: 2019-02-28
Payer: COMMERCIAL

## 2019-02-28 VITALS
OXYGEN SATURATION: 95 % | BODY MASS INDEX: 61.82 KG/M2 | RESPIRATION RATE: 17 BRPM | WEIGHT: 293 LBS | HEART RATE: 84 BPM | TEMPERATURE: 97.4 F | SYSTOLIC BLOOD PRESSURE: 158 MMHG | DIASTOLIC BLOOD PRESSURE: 104 MMHG

## 2019-02-28 DIAGNOSIS — J45.901 MODERATE ASTHMA WITH EXACERBATION, UNSPECIFIED WHETHER PERSISTENT: ICD-10-CM

## 2019-02-28 DIAGNOSIS — R09.89 CHEST CONGESTION: ICD-10-CM

## 2019-02-28 DIAGNOSIS — R07.0 THROAT PAIN: Primary | ICD-10-CM

## 2019-02-28 DIAGNOSIS — R68.83 CHILLS: ICD-10-CM

## 2019-02-28 LAB
DEPRECATED S PYO AG THROAT QL EIA: NORMAL
FLUAV+FLUBV AG SPEC QL: NEGATIVE
FLUAV+FLUBV AG SPEC QL: NEGATIVE
SPECIMEN SOURCE: NORMAL
SPECIMEN SOURCE: NORMAL

## 2019-02-28 PROCEDURE — 87804 INFLUENZA ASSAY W/OPTIC: CPT | Performed by: PHYSICIAN ASSISTANT

## 2019-02-28 PROCEDURE — 87081 CULTURE SCREEN ONLY: CPT | Performed by: PHYSICIAN ASSISTANT

## 2019-02-28 PROCEDURE — 99214 OFFICE O/P EST MOD 30 MIN: CPT | Performed by: PHYSICIAN ASSISTANT

## 2019-02-28 PROCEDURE — 87880 STREP A ASSAY W/OPTIC: CPT | Performed by: PHYSICIAN ASSISTANT

## 2019-02-28 RX ORDER — AZITHROMYCIN 250 MG/1
TABLET, FILM COATED ORAL
Qty: 6 TABLET | Refills: 0 | Status: SHIPPED | OUTPATIENT
Start: 2019-02-28 | End: 2019-03-20

## 2019-02-28 RX ORDER — PREDNISONE 20 MG/1
20 TABLET ORAL 2 TIMES DAILY
Qty: 10 TABLET | Refills: 0 | Status: SHIPPED | OUTPATIENT
Start: 2019-02-28 | End: 2019-03-20

## 2019-02-28 NOTE — LETTER
[facility] [place] [facility]  [address] [address]  [phone]      February 28, 2019    RE:  Cristina Dean                                                                                                                                                       [address] [facility] [address]            To whom it may concern:    Cristina Dean was seen in the urgent care today for an illness.  She will miss work 2/29 and 3/1.      Sincerely,        Martín Sheehan [facility]

## 2019-02-28 NOTE — LETTER
[facility] [place] [facility]  [address] [address]  [phone]      February 28, 2019    RE:  Cristina Dean                                                                                                                                                       [address] [facility] [address]            To whom it may concern:    Cristina Dean was seen in the urgent care today for an illness.  She will miss work today and tomorrow.       Sincerely,        Martín Sheehan [facility]

## 2019-03-01 LAB
BACTERIA SPEC CULT: NORMAL
SPECIMEN SOURCE: NORMAL

## 2019-03-10 NOTE — PROGRESS NOTES
SUBJECTIVE:   Cristina Dean is a 38 year old female presenting with a chief complaint of stuffy nose, cough - productive, facial pain/pressure and coughing.  Onset of symptoms was 3 day(s) ago.  Course of illness is same.    Severity moderate  Current and Associated symptoms: coughing, wheezing  Treatment measures tried include hx of asthma.  Predisposing factors include recent illness.    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, unspecified      Depression      Herpes      Intermittent asthma     triggers include fall and spring allergy seasons     Obesity 8/1/12    BMI 53     Urinary incontinence 2/1/2017     Family History   Problem Relation Age of Onset     Cancer Father         acute leukemia (in remission)     Neurologic Disorder Father         neuropathy from chemotherapy     Diabetes Father         type II DM     Other Cancer Father      Depression Father      Anxiety Disorder Father      Family History Negative Sister      Ovarian Cancer Paternal Aunt      Ovarian Cancer Paternal Aunt      Ovarian Cancer Paternal Aunt           Allergies   Allergen Reactions     No Known Drug Allergies          Social History     Tobacco Use     Smoking status: Current Every Day Smoker     Packs/day: 0.50     Years: 15.00     Pack years: 7.50     Types: Cigarettes     Start date: 6/1/2018     Smokeless tobacco: Never Used     Tobacco comment: 5 cigs per day   Substance Use Topics     Alcohol use: Yes     Comment: 6 drinks per month       ROS:  CONSTITUTIONAL:NEGATIVE for fever, chills, change in weight  INTEGUMENTARY/SKIN: NEGATIVE for worrisome rashes, moles or lesions  ENT/MOUTH: NEGATIVE for ear, mouth and throat problems  RESP:POSITIVE for cough-non productive and wheezing  CV: NEGATIVE for chest pain, palpitations or peripheral edema  GI: NEGATIVE for nausea, abdominal pain, heartburn, or change in bowel habits  :  NEGATIVE for dysuria  MUSCULOSKELETAL: NEGATIVE for significant arthralgias or myalgia  VASC: NEGATIVE for cool extremiites  NEURO: NEGATIVE for weakness, dizziness or paresthesias    OBJECTIVE  :BP (!) 158/104   Pulse 84   Temp 97.4  F (36.3  C) (Oral)   Resp 17   Wt (!) 158.3 kg (349 lb)   SpO2 95%   BMI 61.82 kg/m    GENERAL APPEARANCE: healthy, alert and no distress  EYES: EOMI,  PERRL, conjunctiva clear  HENT: ear canals and TM's normal.  Nose and mouth without ulcers, erythema or lesions  NECK: supple, nontender, no lymphadenopathy  RESP: expiratory wheezes generalized  CV: regular rates and rhythm, normal S1 S2, no murmur noted  ABDOMEN:  soft, nontender, no HSM or masses and bowel sounds normal  Extremities: no peripheral edema or tenderness, peripheral pulses normal  MS: extremities normal- no gross deformities noted, no erythema, FROM noted in all extremities  NEURO: Normal strength and tone, sensory exam grossly normal,  normal speech and mentation  SKIN: no suspicious lesions or rashes    Results for orders placed or performed in visit on 02/28/19   Influenza A/B antigen   Result Value Ref Range    Influenza A/B Agn Specimen Nutrient agar slant     Influenza A Negative NEG^Negative    Influenza B Negative NEG^Negative   Strep, Rapid Screen   Result Value Ref Range    Specimen Description Throat     Rapid Strep A Screen       NEGATIVE: No Group A streptococcal antigen detected by immunoassay, await culture report.   Beta strep group A culture   Result Value Ref Range    Specimen Description Throat     Culture Micro No beta hemolytic Streptococcus Group A isolated        ASSESSMENT/PLAN      ICD-10-CM    1. Throat pain R07.0 Influenza A/B antigen     Strep, Rapid Screen     Beta strep group A culture   2. Chills R68.83 Influenza A/B antigen     Strep, Rapid Screen   3. Moderate asthma with exacerbation, unspecified whether persistent J45.901 Influenza A/B antigen     Strep, Rapid Screen      predniSONE (DELTASONE) 20 MG tablet     azithromycin (ZITHROMAX) 250 MG tablet   4. Chest congestion R09.89 Influenza A/B antigen     Strep, Rapid Screen     predniSONE (DELTASONE) 20 MG tablet     azithromycin (ZITHROMAX) 250 MG tablet     Orders Placed This Encounter     predniSONE (DELTASONE) 20 MG tablet     azithromycin (ZITHROMAX) 250 MG tablet       Fluids, rest  Prednisone for asthma exacerbation  zpak for congestion  Follow up with PCP as needed  See orders in Epic

## 2019-03-19 NOTE — PROGRESS NOTES
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 Smoking heavily can uncover a psychotic disorder earlier than it would have emerged otherwise. Whether it actually causes psychosis is more controversial.     While high-driving is usually less hazardous than drunk-driving, Marijuana interferes with judgment and perceptions, leading to potential fatalities as well as arrests for DUIs. The highest risk period is during the hour immediately after getting high. Do not smoke and drive.     Withdrawal from regular Marijuana use does occur. A typical Marijuana detox in a heavy user will last 3 to 5 days and may include irritability, insomnia, anxiety, headache, nausea and vomiting, and sometimes diarrhea. Patients should drink plenty of fluids and plan to be out of commission for a while.     We have no medications for treating marijuana use disorder. Dronabinol (Marinol) is a synthetic form of THC that is FDA approved for intractable nausea and AIDS wasting syndrome, but clinical trials have not shown it to be effective for substitution treatment of Marijuana abuse.     Community Resources:    National Suicide Prevention Lifeline: 386.553.8882 (TTY: 305.410.3348). Call anytime for help.  (www.suicidepreventionlifeline.org)  National [place] on Mental Illness (www.pedro luis.org): 351.222.7953 or 549-729-0546.   Mental Health Association (www.mentalhealth.org): 746.524.4225 or 446-666-3013.  [place] Crisis Text Line: Text [place] to 227230  Suicide LifeLine Chat:  suicidepreventionlifeline.org/chat    Administrative Billing:   Time spent with patient was 60 minutes and greater than 50% of time or 40 minutes was spent in counseling and coordination of care regarding above diagnoses and treatment plan.    Patient Status:  Patient will continue to be seen for ongoing consultation and stabilization.    Signed:   Lisa Conrad, PhD, APRN, CNP  Psychiatry

## 2019-03-20 ENCOUNTER — OFFICE VISIT (OUTPATIENT)
Dept: PSYCHIATRY | Facility: CLINIC | Age: 39
End: 2019-03-20
Attending: INTERNAL MEDICINE
Payer: COMMERCIAL

## 2019-03-20 VITALS
TEMPERATURE: 98.4 F | HEART RATE: 89 BPM | OXYGEN SATURATION: 94 % | HEIGHT: 63 IN | WEIGHT: 293 LBS | DIASTOLIC BLOOD PRESSURE: 78 MMHG | RESPIRATION RATE: 20 BRPM | SYSTOLIC BLOOD PRESSURE: 132 MMHG | BODY MASS INDEX: 51.91 KG/M2

## 2019-03-20 DIAGNOSIS — F41.1 GAD (GENERALIZED ANXIETY DISORDER): Primary | ICD-10-CM

## 2019-03-20 DIAGNOSIS — F31.9 BIPOLAR AFFECTIVE DISORDER, REMISSION STATUS UNSPECIFIED (H): ICD-10-CM

## 2019-03-20 DIAGNOSIS — F12.10 CANNABIS USE DISORDER, MILD, ABUSE: ICD-10-CM

## 2019-03-20 PROCEDURE — 90792 PSYCH DIAG EVAL W/MED SRVCS: CPT | Performed by: NURSE PRACTITIONER

## 2019-03-20 RX ORDER — ARIPIPRAZOLE 10 MG/1
10 TABLET ORAL DAILY
Qty: 30 TABLET | Refills: 1 | Status: SHIPPED | OUTPATIENT
Start: 2019-03-20 | End: 2019-05-21

## 2019-03-20 ASSESSMENT — PATIENT HEALTH QUESTIONNAIRE - PHQ9
SUM OF ALL RESPONSES TO PHQ QUESTIONS 1-9: 20
SUM OF ALL RESPONSES TO PHQ QUESTIONS 1-9: 20
10. IF YOU CHECKED OFF ANY PROBLEMS, HOW DIFFICULT HAVE THESE PROBLEMS MADE IT FOR YOU TO DO YOUR WORK, TAKE CARE OF THINGS AT HOME, OR GET ALONG WITH OTHER PEOPLE: EXTREMELY DIFFICULT

## 2019-03-20 ASSESSMENT — ANXIETY QUESTIONNAIRES
7. FEELING AFRAID AS IF SOMETHING AWFUL MIGHT HAPPEN: SEVERAL DAYS
GAD7 TOTAL SCORE: 12
5. BEING SO RESTLESS THAT IT IS HARD TO SIT STILL: SEVERAL DAYS
4. TROUBLE RELAXING: MORE THAN HALF THE DAYS
GAD7 TOTAL SCORE: 12
2. NOT BEING ABLE TO STOP OR CONTROL WORRYING: MORE THAN HALF THE DAYS
6. BECOMING EASILY ANNOYED OR IRRITABLE: MORE THAN HALF THE DAYS
1. FEELING NERVOUS, ANXIOUS, OR ON EDGE: MORE THAN HALF THE DAYS
7. FEELING AFRAID AS IF SOMETHING AWFUL MIGHT HAPPEN: SEVERAL DAYS
3. WORRYING TOO MUCH ABOUT DIFFERENT THINGS: MORE THAN HALF THE DAYS
GAD7 TOTAL SCORE: 12

## 2019-03-20 ASSESSMENT — MIFFLIN-ST. JEOR: SCORE: 2277.08

## 2019-03-20 ASSESSMENT — PAIN SCALES - GENERAL: PAINLEVEL: NO PAIN (0)

## 2019-03-20 NOTE — NURSING NOTE
"Chief Complaint   Patient presents with     Consult     referred by Dr Jaramillo- last seen Lisa 1/18/18, depression acting up and needs a new medication to replace Latuda     initial /78 (BP Location: Right arm, Patient Position: Chair, Cuff Size: Adult Large)   Pulse 89   Temp 98.4  F (36.9  C) (Oral)   Resp 20   Ht 1.6 m (5' 3\")   Wt (!) 163.3 kg (360 lb)   LMP 02/26/2019 (Approximate)   SpO2 94%   BMI 63.77 kg/m   Estimated body mass index is 63.77 kg/m  as calculated from the following:    Height as of this encounter: 1.6 m (5' 3\").    Weight as of this encounter: 163.3 kg (360 lb)..  bp completed using cuff size large    "

## 2019-03-20 NOTE — Clinical Note
Thank you for the Psychiatry referral to the [facility] ([facility]). Our psychiatry providers act as a specialty service for Primary Care Providers in the [facility] that seek to optimize medications for unstable patients.  Once medications have been optimized, our providers discharge the patient back to the referring Primary Care Provider for ongoing medication management.  This type of system allows our providers to serve a high volume of patients. Please see my Impression and Plan. If you have any questions or concerns, please let me know. Lisa

## 2019-03-20 NOTE — PATIENT INSTRUCTIONS
Treatment Plan:    Start Abilify (aripriprazole) 5 mg daily for one week, then increase to 10 mg daily for mood.     In one week, reduce Latuda (lurasidone) to 40 mg daily for 2 weeks then stop.  Take with food.     Continue Wellbutrin (buproprion)  mg by mouth daily.    Continue Neurontin (gabapentin) 800 mg by mouth 3 times per day for anxiety.     Continue all other medications as reviewed per electronic medical record today.     Safety plan reviewed. To the Emergency Department as needed or call after hours crisis line at [phone] or [phone]. [place] Crisis Text [place]: Text [place] to 250176  or  Suicide LifeLine Chat: suicidepreventionlifeline.org/chat/    To schedule individual or family therapy, call [facility] at [phone].     Schedule an appointment with me in 2-4 weeks or sooner as needed.     Follow up with primary care provider as planned or for acute medical concerns.    Call the psychiatric nurse line with medication questions or concerns at [phone].    [employer] may be used to communicate with your provider, but this is not intended to be used for emergencies.

## 2019-03-21 ASSESSMENT — PATIENT HEALTH QUESTIONNAIRE - PHQ9: SUM OF ALL RESPONSES TO PHQ QUESTIONS 1-9: 20

## 2019-03-21 ASSESSMENT — ANXIETY QUESTIONNAIRES: GAD7 TOTAL SCORE: 12

## 2019-04-02 NOTE — PROGRESS NOTES
"    Outpatient Psychiatric Progress Note    Name: Cristina Dean   : 1980                    Primary Care Provider: Jermain Jaramillo MD - last visit 2019  Therapist:  None  [facility]    PHQ-9 scores:  PHQ-9 SCORE 2019 3/20/2019 4/3/2019   PHQ-9 Total Score 6 20 21       STEPHENIE-7 scores:  STEPHENIE-7 SCORE 2018 3/20/2019 4/3/2019   Total Score 15 12 16     Answers for HPI/ROS submitted by the patient on 4/3/2019   If you checked off any problems, how difficult have these problems made it for you to do your work, take care of things at home, or get along with other people?: Extremely difficult  PHQ9 TOTAL SCORE: 21  STEPHENIE 7 TOTAL SCORE: 16    Patient Identification:  Patient is a 38 year old year old,   White American female  who presents for return visit with me.  Patient is currently employed part time. Patient attended the session alone. Patient prefers to be called: \"Cristina\".    Interim History:    I last saw Cristina Dean for outpatient psychiatry Consultation on 3/20/2019.     During that appointment, we Start Abilify (aripriprazole) 5 mg daily for one week, then increase to 10 mg daily for mood.     In one week, reduce Latuda (lurasidone) to 40 mg daily for 2 weeks then stop.  Take with food.     Continue Wellbutrin (buproprion)  mg by mouth daily.    Continue Neurontin (gabapentin) 800 mg by mouth 3 times per day for anxiety.      Current medications include:   Current Outpatient Medications   Medication Sig     albuterol (PROAIR HFA/PROVENTIL HFA/VENTOLIN HFA) 108 (90 BASE) MCG/ACT Inhaler Inhale 2 puffs into the lungs 4 times daily as needed for shortness of breath / dyspnea     ARIPiprazole (ABILIFY) 10 MG tablet Take 1 tablet (10 mg) by mouth daily     atorvastatin (LIPITOR) 10 MG tablet Take 1 tablet (10 mg) by mouth daily     buPROPion (WELLBUTRIN XL) 300 MG 24 hr tablet TAKE 1 TABLET (300 MG) BY MOUTH EVERY MORNING     gabapentin (NEURONTIN) 800 MG tablet " "TAKE 1 TABLET (800 MG) BY MOUTH 3 TIMES DAILY INCREASED DOSE.     No current facility-administered medications for this visit.        The [place] Prescription Monitoring Program has been reviewed and there are no concerns about diversionary activity for controlled substances at this time.      PRESCRIPTIONS  Total Prescriptions: 10  Total Private Pay: 0  Fill Date ID Written Drug Qty Days Prescriber Rx # Pharmacy Refill Daily Dose * Pymt Type   03/19/2019 2 02/13/2019 Gabapentin 800 Mg Tablet 90 30 Ma Valentina [id number] Gra (7408) 1  [facility] [place]  02/13/2019 2 02/13/2019 Gabapentin 800 Mg Tablet 90 30 Ma Valentina [id number] [facility] (7408) 0  [facility] [place]  01/14/2019 2 10/05/2018 Gabapentin 800 Mg Tablet 90 30 Ma Valentina [id number] Gra (7408) 3  [facility] [place]  12/08/2018 2 10/05/2018 Gabapentin 800 Mg Tablet 90 30 Ma Valentina [id number] Gra (7408) 2  [facility] [place]  10/31/2018 1 10/05/2018 Gabapentin 800 Mg Tablet 90 30 Ma Valentina [id number] [facility] (7408) 1  [facility] [place]  10/05/2018 1 10/05/2018 Gabapentin 800 Mg Tablet 90 30 Ma Valentina [id number] [facility] (7408) 0  [facility] [place]  09/02/2018 1 05/10/2018 Gabapentin 800 Mg Tablet 90 30 Ma Valentina [id number] [facility] (7408) 3  [facility] [place]  07/30/2018 1 05/10/2018 Gabapentin 800 Mg Tablet 90 30 Ma Valentina [id number] [facility] (7408) 2  [facility] [place]  07/03/2018 1 05/10/2018 Gabapentin 800 Mg Tablet 90 30 Ma Valentina [id number] Gra (7408) 1  [place] [facility] [place]  05/10/2018 1 05/10/2018 Gabapentin 800 Mg Tablet 90 30 Ma Valentina [id number] Gra (7408) 0  [place]    I was able to review most recent Primary Care Provider, specialty provider, and therapy visit notes that I have access to.     Cristina Dean reports mood has been: \"maybe a little better-more stable\" no hypomania or yadi.   Anxiety has been: \"really anxious- worrying about everything\"  Financial difficulties worse because Patient is missing work as a . Focus has been poor. Also does not want to be around people due to increased anxiety and irritability. Job has been understanding but still not " "able to make money. 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 Consider disability application.   Sleep has been: \"still sleeping all the time\" this is good sleep. No nightmares. Tries not to nap, but often has to after one hour but does not feel rested. No sleep study history but Patient snores.   Continues to feel tired during the day.   PHQ9 and GAD7 scores were reviewed today.   Medication side effects:  taking the Abilify (aripriprazole) in the morning. Denies.   Current stressors include: Relationship Difficulties, Symptoms, Financial Difficulties  Coping mechanisms and supports include: Family, Friends, Alevism history, Workplace, Overeating more lately with sweets, Pet Cat, Sleeping, Isolation, Cigarettes    Previous medication trials include but not limited to:  Paxil (paroxetine) \"made me suicidal\"  Seroquel (quetiapine) \"was a zombie\"  Latuda (lurasidone) 80 mg  Minipress (prazosin) for nightmares  Desyrel/Olepto (trazodone) was good for sleeping issues  Benadryl (diphenhydramine) was good for sleeping issues  Neurontin (gabapentin) 800 mg 3 times per day  Cannot remember other medications she has been on in the past  Abilify (aripriprazole) 10 mg     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 She also has no past medical history of Chronic infection, Complication of anesthesia, Diabetes (H), History of blood transfusion, Malignant hyperthermia, Other chronic pain, PONV (postoperative nausea and " "vomiting), Sleep apnea, or Thyroid disease.    Social History:  Current Living situation: [place], [place] with Spouse/Partner and pet cat. Feels safe at home.  Current use of drugs or alcohol: Alcohol and Cannabis. Does not drink as much lately due to how it makes her feel. Drinking 1 product per month lately because causes too much sedation. Reports uses Marijuana to cope with anxiety- sometimes works and sometimes does not.      Tobacco use: Yes Cigarettes - about 1 pack per week- not every day  Caffeine:  Yes  2-3 cups/day of coffee    Vital Signs:   /70 (BP Location: Left arm, Patient Position: Chair, Cuff Size: Adult Large)   Pulse 100   Temp 98.6  F (37  C) (Oral)   Resp 16   Ht 1.6 m (5' 3\")   Wt (!) 165.1 kg (364 lb)   LMP 02/28/2019 (Approximate)   SpO2 94%   BMI 64.48 kg/m      Labs:  Most recent laboratory results reviewed and no new labs.     Review of Systems:  10 systems (general, cardiovascular, respiratory, eyes, ENT, endocrine, GI, , M/S, neurological) were reviewed. Most pertinent finding(s) is/are: weight gain, seasonal allergies, tension headaches, asthma stable, diarrhea, no rashes, no abnormal or involuntary movements, dry mouth . The remaining systems are all unremarkable.    Mental Status Examination:  Appearance:  awake, alert, adequately groomed, appeared stated age, mild distress, morbidly obese, early, alone  Attitude:  cooperative   Eye Contact:  fair and wears glasses  Gait and Station: Normal, No assistive Devices used and No dizziness or falls  Psychomotor Behavior:  no evidence of tardive dyskinesia, dystonia, or tics  Oriented to:  time, person, and place  Attention Span and Concentration:  Easily distracted  Speech:  clear, coherent, regular rate, regular rhythm and fluent  Mood:  \"depression stable, but anxious\"  Affect:  mood congruent and tearful most of visit  Associations:  no loose associations  Thought Process:  logical, linear and goal oriented  Thought " "Content:  no evidence of psychotic thought, passive suicidal ideation present, no homicidal ideation, Appropriate to Interview  Recent and Remote Memory:  intact to interview. Uses memory reminders. Not formally assessed. No amnesia.  Fund of Knowledge: appropriate  Insight:  adequate  Judgment:  fair but adequate for safety  Impulse Control:  fair at times     Suicide Risk Assessment:  Today Cristina Dean reports chronic thoughts of \"I don't want to be here anymore, but I do not want to kill myself\". Depression has been stable, but feeling worthless.  In addition, there are notable risk factors for self-harm, including age, anxiety, substance abuse, suicidal ideation, anger/rage, hopelessness and mood change. However, risk is mitigated by commitment to family, spiritual/Amish beliefs, absence of past attempts, ability to volunteer a safety plan, history of seeking help when needed, future oriented, no access to firearms or weapons, denies suicidal intent or plan, no family history of suicide and denies homicidal ideation, intent, or plan. Therefore, based on all available evidence including the factors cited above, Cristina Dean does not appear to be at imminent risk for self-harm, does not meet criteria for a 72-hr hold, and therefore remains appropriate for ongoing outpatient level of care.  A thorough assessment of risk factors related to suicide and self-harm have been reviewed and are noted above. The patient convincingly denies suicidality on several occasions. Local community resources reviewed and printed for patient to use if needed. There was no deceit detected, and the patient presented in a manner that was believable.      DSM5  Diagnosis:   296.33 (F33.2) Major Depressive Disorder, Recurrent Episode, Severe With anxious distress                          Rule out Bipolar Disorder  300.02 (F41.1) Generalized Anxiety Disorder              Rule out 307.51 (F50.8) Binge-Eating " Disorder               Rule out 301.83 (F60.3) Borderline Personality Disorder   Alcohol Use Disorder, partial remission  Cannabis Abuse   Obesity per BMI of 64.48      Medical comorbidities include:   Patient Active Problem List    Diagnosis Date Noted     Lateral epicondylitis of left elbow 02/04/2019     Priority: Medium     Elbow pain, left 02/04/2019     Priority: Medium     Cannabis use disorder, mild, abuse 12/04/2017     Priority: Medium     Alcohol use disorder 12/04/2017     Priority: Medium     Bipolar affective disorder, remission status unspecified (H) 11/13/2017     Priority: Medium     Depression, major, recurrent, moderate (H) 10/23/2017     Priority: Medium     Intermittent asthma without complication, unspecified asthma severity 10/23/2017     Priority: Medium     CARDIOVASCULAR SCREENING; LDL GOAL LESS THAN 130 08/07/2012     Priority: Medium     Morbid obesity due to excess calories (H) 08/01/2012     Priority: Medium     BMI 53       STEPHENIE (generalized anxiety disorder)      Priority: Medium     Herpes 07/09/2012     Priority: Medium       Psychosocial & Contextual Factors:  Occupational Difficulties, Financial Difficulties and Relationship Difficulties    Assessment:  Cristina Dean reports more stable mood, but anxiety has been her biggest concern. Medication side effects and alternatives were reviewed. Health promotion activities recommended and reviewed today. All questions addressed. Education and counseling completed regarding risks and benefits of medications and psychotherapy options. Recommend therapy for additional support.    Substance use, trauma, and psychosocial stressors likely exacerbated these symptoms two years ago, leading to inpatient psychiatry stay. Has been stable on Latuda (lurasidone) and tolerating well since, but insurance will not cover it any longer. Minipress (prazosin) has helped with nightmares in the past. Could trial Buspar (buspirone) for anxiety or Topamax  (topiramate) for mood. With heart rate and blood pressure and fatigue, will try Inderal (propranolol). With history of alcohol dependence and frequent Marijuana use, I will avoid benzodiazepine medications at this time. Continue to monitor fatigue. May benefit from sleep consult to rule out Obstructive Sleep Apnea.     Discussed financial difficulties and inability to work due to her symptoms. 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 Patient may also be considering applying for disability and does not where to get started. Discussed care coordination referral to review options available to her. Referral placed today.       Treatment Plan:    Start Inderal (propranolol) 20 mg by mouth up to 4 times per day as needed for anxiety. May consider dose increase if needed.     Continue Abilify (aripriprazole) 10 mg by mouth daily for mood. May consider dose increase if needed.     Continue Wellbutrin (buproprion)  mg by mouth daily in AM.    Continue Neurontin (gabapentin) 800 mg by mouth 3 times per day for anxiety.     Continue all other medications as reviewed per electronic medical record today.     Safety plan reviewed. To the Emergency Department as needed or call after hours crisis line at [phone] or [phone]. [place] Crisis Text Line. Text [place] to 387873 or Suicide LifeLine Chat: suicidepreventionlifeline.org/chat    To schedule individual or family therapy, call [facility] at [phone].     Schedule an appointment with me in 4 weeks or sooner as needed.     Follow up with primary care provider as planned or for acute medical concerns.    Call the psychiatric nurse line with medication questions or concerns at [phone].    MyChart may be used to communicate with your provider, but this is not intended to be used for emergencies.    Administrative Billing:   Time spent with patient was 30 minutes and greater  than 50% of time or 20 minutes was spent in counseling and coordination of care regarding above diagnoses and treatment plan.    Patient Status:  Patient will continue to be seen for ongoing consultation and stabilization.    Signed:   Lisa Conrad, PhD, APRN, CNP   Psychiatry

## 2019-04-03 ENCOUNTER — OFFICE VISIT (OUTPATIENT)
Dept: PSYCHIATRY | Facility: CLINIC | Age: 39
End: 2019-04-03
Payer: COMMERCIAL

## 2019-04-03 ENCOUNTER — PATIENT OUTREACH (OUTPATIENT)
Dept: CARE COORDINATION | Facility: CLINIC | Age: 39
End: 2019-04-03

## 2019-04-03 VITALS
HEIGHT: 63 IN | BODY MASS INDEX: 51.91 KG/M2 | TEMPERATURE: 98.6 F | SYSTOLIC BLOOD PRESSURE: 142 MMHG | WEIGHT: 293 LBS | OXYGEN SATURATION: 94 % | HEART RATE: 100 BPM | RESPIRATION RATE: 16 BRPM | DIASTOLIC BLOOD PRESSURE: 70 MMHG

## 2019-04-03 DIAGNOSIS — F31.9 BIPOLAR AFFECTIVE DISORDER, REMISSION STATUS UNSPECIFIED (H): Primary | ICD-10-CM

## 2019-04-03 DIAGNOSIS — F41.1 GAD (GENERALIZED ANXIETY DISORDER): ICD-10-CM

## 2019-04-03 PROCEDURE — 99214 OFFICE O/P EST MOD 30 MIN: CPT | Performed by: NURSE PRACTITIONER

## 2019-04-03 RX ORDER — PROPRANOLOL HYDROCHLORIDE 20 MG/1
20 TABLET ORAL 4 TIMES DAILY PRN
Qty: 90 TABLET | Refills: 1 | Status: SHIPPED | OUTPATIENT
Start: 2019-04-03 | End: 2019-05-29 | Stop reason: DRUGHIGH

## 2019-04-03 ASSESSMENT — ANXIETY QUESTIONNAIRES
GAD7 TOTAL SCORE: 16
4. TROUBLE RELAXING: MORE THAN HALF THE DAYS
6. BECOMING EASILY ANNOYED OR IRRITABLE: MORE THAN HALF THE DAYS
7. FEELING AFRAID AS IF SOMETHING AWFUL MIGHT HAPPEN: SEVERAL DAYS
5. BEING SO RESTLESS THAT IT IS HARD TO SIT STILL: MORE THAN HALF THE DAYS
7. FEELING AFRAID AS IF SOMETHING AWFUL MIGHT HAPPEN: SEVERAL DAYS
2. NOT BEING ABLE TO STOP OR CONTROL WORRYING: NEARLY EVERY DAY
3. WORRYING TOO MUCH ABOUT DIFFERENT THINGS: NEARLY EVERY DAY
GAD7 TOTAL SCORE: 16
GAD7 TOTAL SCORE: 16
1. FEELING NERVOUS, ANXIOUS, OR ON EDGE: NEARLY EVERY DAY

## 2019-04-03 ASSESSMENT — MIFFLIN-ST. JEOR: SCORE: 2295.22

## 2019-04-03 ASSESSMENT — PATIENT HEALTH QUESTIONNAIRE - PHQ9
SUM OF ALL RESPONSES TO PHQ QUESTIONS 1-9: 21
SUM OF ALL RESPONSES TO PHQ QUESTIONS 1-9: 21
10. IF YOU CHECKED OFF ANY PROBLEMS, HOW DIFFICULT HAVE THESE PROBLEMS MADE IT FOR YOU TO DO YOUR WORK, TAKE CARE OF THINGS AT HOME, OR GET ALONG WITH OTHER PEOPLE: EXTREMELY DIFFICULT

## 2019-04-03 ASSESSMENT — PAIN SCALES - GENERAL: PAINLEVEL: NO PAIN (0)

## 2019-04-03 NOTE — PATIENT INSTRUCTIONS
Treatment Plan:    Start Inderal (propranolol) 20 mg by mouth up to 4 times per day as needed for anxiety. May consider dose increase if needed.     Continue Abilify (aripriprazole) 10 mg by mouth daily for mood. May consider dose increase if needed.     Continue Wellbutrin (buproprion)  mg by mouth daily in AM.    Continue Neurontin (gabapentin) 800 mg by mouth 3 times per day for anxiety.     Continue all other medications as reviewed per electronic medical record today.     Safety plan reviewed. To the Emergency Department as needed or call after hours crisis line at [phone] or [phone]. [place] Crisis Text Line. Text [place] to 328436 or Suicide LifeLine Chat: suicidepreventionlifeline.org/chat    To schedule individual or family therapy, call [facility] at [phone].     Schedule an appointment with me in 4 weeks or sooner as needed.     Follow up with primary care provider as planned or for acute medical concerns.    Call the psychiatric nurse line with medication questions or concerns at [phone].    MyChart may be used to communicate with your provider, but this is not intended to be used for emergencies.

## 2019-04-03 NOTE — LETTER
[facility]IN[facility]  [address] [facility] [address]    April 19, 2019    Cristina Dean  [address] [address]      Dear Cristina,    I am a clinic care coordinator who works with Jermani Jaramillo. I have been trying to reach you recently to introduce Clinic Care Coordination and to see if there was anything I could assist you with.  I wanted to introduce myself and provide you with my contact information so that you can call me with questions or concerns about your health care. Below is a description of clinic care coordination and how I can further assist you.     The clinic care coordinator is a registered nurse and/or  who understand the health care system. The goal of clinic care coordination is to help you manage your health and improve access to the [place] system in the most efficient manner. The registered nurse can assist you in meeting your health care goals by providing education, coordinating services, and strengthening the communication among your providers. The  can assist you with financial, behavioral, psychosocial, chemical dependency, counseling, and/or psychiatric resources.    Please feel free to contact me at [phone], with any questions or concerns. We at [facility] are focused on providing you with the highest-quality healthcare experience possible and that all starts with you.     Sincerely,     Pb Martínez [facility]  Clinic Care Coordinator  [facility]  [phone]  [email or web address]      Enclosed: I have enclosed a copy of a 24 Hour Access Plan. This has helpful phone numbers for you to call when needed. Please keep this in an easy to access place to use as needed.

## 2019-04-03 NOTE — PROGRESS NOTES
Clinic Care Coordination Contact  [facility]/Voicemail    Referral Source: Behavioral Health Clinician  Clinical Data: Care Coordinator Outreach  Outreach attempted x 1.  Spoke with pt very briefly- she was at the hospital for a family member and could not talk now.  Pt requested a call back at a later date.   Plan:. Care Coordinator will try to reach patient again in 1-2 business days.    Pb [facility]  Clinic Care Coordinator  [facility]  [phone]  [email or web address]

## 2019-04-03 NOTE — NURSING NOTE
"Chief Complaint   Patient presents with     Recheck Medication     pt fees the medication changes are working pretty good.      initial /70 (BP Location: Left arm, Patient Position: Chair, Cuff Size: Adult Large)   Pulse 100   Temp 98.6  F (37  C) (Oral)   Resp 16   Ht 1.6 m (5' 3\")   Wt (!) 165.1 kg (364 lb)   LMP 02/28/2019 (Approximate)   SpO2 94%   BMI 64.48 kg/m   Estimated body mass index is 64.48 kg/m  as calculated from the following:    Height as of this encounter: 1.6 m (5' 3\").    Weight as of this encounter: 165.1 kg (364 lb)..  bp completed using cuff size large    "

## 2019-04-03 NOTE — LETTER
Health Care Home - Access Care Plan    About Me:    Patient Name:  Cristina Dean    YOB: 1980  Age:                      38 year old   Shivani MRN:     [id number] Telephone Information:   Home Phone [phone]   Mobile [phone]       Address:  [address] [facility] [facility] [address] Email address:  [email or web address]      Emergency Contact(s)   Name Relationship Lgl Grd Work Phone Home Phone Mobile Phone   1. DANK, RE* Spouse   [phone]             Health Maintenance:      My Access Plan  Medical Emergency 911   Questions or concerns during clinic hours Primary Clinic Line, I will call the clinic directly: [facility] - [phone]   24 Hour Appointment Line [phone] or  [phone]) (toll free)   24 Hour Nurse Line [phone] (toll free)   Questions or concerns outside clinic hours 24 Hour Appointment Line, I will call the after-hours on-call line:   [facility] [phone] or [phone]) (toll-free)   Preferred Urgent Care [facility], [phone]   Preferred Hospital [facility]  [phone]   Preferred Pharmacy [id number] [place] [id number] [facility]     Behavioral Health Crisis Line The National Suicide Prevention Lifeline at 1-227.346.7882 or 861                     My Care Team Members  Patient Care Team       Relationship Specialty Notifications Start End    Jermain Jaramillo MD PCP - General Internal Medicine  7/18/17     Phone: [phone] Fax: [phone]         [address] [place] [id number]    Jermain Jaramillo MD Assigned PCP   7/2/17     Phone: [phone] Fax: [phone]         [address] [place] [id number]    Lisa Conrad NP Nurse Practitioner Nurse Practitioner Psych/Mental Health  8/7/17     Phone: [phone] Fax: [phone]          CLINICS [address]  [facility] [address]    Pb Martínez [facility] Care Coordinator Primary Care - CC  4/3/19     Phone: [phone]                My Medical and Care Information  Problem List   Patient Active Problem List   Diagnosis     STEPHENIE (generalized anxiety disorder)     Morbid obesity due to excess calories (H)     CARDIOVASCULAR SCREENING; LDL GOAL LESS THAN 130     Herpes     Depression, major, recurrent, moderate (H)     Intermittent asthma without complication, unspecified asthma severity     Bipolar affective disorder, remission status unspecified (H)     Cannabis use disorder, mild, abuse     Alcohol use disorder     Lateral epicondylitis of left elbow     Elbow pain, left      Current Medications:   Current Outpatient Medications   Medication     albuterol (PROAIR HFA/PROVENTIL HFA/VENTOLIN HFA) 108 (90 BASE) MCG/ACT Inhaler     ARIPiprazole (ABILIFY) 10 MG tablet     atorvastatin (LIPITOR) 10 MG tablet     buPROPion (WELLBUTRIN XL) 300 MG 24 hr tablet     gabapentin (NEURONTIN) 800 MG tablet     propranolol (INDERAL) 20 MG tablet     No current facility-administered medications for this visit.

## 2019-04-04 ASSESSMENT — ANXIETY QUESTIONNAIRES: GAD7 TOTAL SCORE: 16

## 2019-04-04 ASSESSMENT — PATIENT HEALTH QUESTIONNAIRE - PHQ9: SUM OF ALL RESPONSES TO PHQ QUESTIONS 1-9: 21

## 2019-04-16 NOTE — PROGRESS NOTES
Clinic Care Coordination Contact  [facility]/Voicemail    Referral Source: Behavioral Health Clinician  Clinical Data: Care Coordinator Outreach  Outreach attempted x 1.  Spoke with pt very briefly- she was at work and couldn't talk.  Pt requested a call back at a later date.   Plan:. Care Coordinator will try to reach patient again in 1-2 business days.    Pb Martínez [facility]  Clinic Care Coordinator  [facility]  [phone]  [email or web address]

## 2019-04-19 NOTE — PROGRESS NOTES
Clinic Care Coordination Contact  [facility]/Voicemail    Referral Source: Behavioral Health Clinician  Clinical Data: Care Coordinator Outreach  Outreach attempted x 3.  Left message on voicemail with call back information and requested return call.  Plan: Care Coordinator will mail out care coordination introduction letter with care coordinator contact information and explanation of care coordination services. Care Coordinator will do no further outreaches at this time.    Pb Martínez [facility]  Clinic Care Coordinator  [facility]  [phone]  [email or web address]

## 2019-05-09 ENCOUNTER — OFFICE VISIT (OUTPATIENT)
Dept: INTERNAL MEDICINE | Facility: CLINIC | Age: 39
End: 2019-05-09
Payer: COMMERCIAL

## 2019-05-09 VITALS
OXYGEN SATURATION: 95 % | BODY MASS INDEX: 64.64 KG/M2 | RESPIRATION RATE: 17 BRPM | TEMPERATURE: 97.5 F | WEIGHT: 293 LBS | SYSTOLIC BLOOD PRESSURE: 132 MMHG | HEART RATE: 102 BPM | DIASTOLIC BLOOD PRESSURE: 78 MMHG

## 2019-05-09 DIAGNOSIS — Z13.6 CARDIOVASCULAR SCREENING; LDL GOAL LESS THAN 130: ICD-10-CM

## 2019-05-09 DIAGNOSIS — L60.0 INGROWN NAIL: Primary | ICD-10-CM

## 2019-05-09 DIAGNOSIS — F31.9 BIPOLAR AFFECTIVE DISORDER, REMISSION STATUS UNSPECIFIED (H): ICD-10-CM

## 2019-05-09 DIAGNOSIS — F12.10 CANNABIS USE DISORDER, MILD, ABUSE: ICD-10-CM

## 2019-05-09 PROCEDURE — 99214 OFFICE O/P EST MOD 30 MIN: CPT | Performed by: INTERNAL MEDICINE

## 2019-05-09 RX ORDER — ATORVASTATIN CALCIUM 10 MG/1
10 TABLET, FILM COATED ORAL DAILY
Qty: 90 TABLET | Refills: 1 | Status: SHIPPED | OUTPATIENT
Start: 2019-05-09 | End: 2020-01-08

## 2019-05-09 NOTE — PROGRESS NOTES
SUBJECTIVE:   Cristina Dean is a 39 year old female who presents to clinic today for the following   health issues:        Ingrown toenail       Duration: 1 month     Description (location/character/radiation): Right foot great toe/ redness, pain     Intensity:  moderate    Accompanying signs and symptoms: discoloration of toenail     History (similar episodes/previous evaluation): None    Precipitating or alleviating factors: None    Therapies tried and outcome: Soaking foot, helps        Additional history: as documented    Reviewed  and updated as needed this visit by clinical staff         Reviewed and updated as needed this visit by Provider         Patient Active Problem List   Diagnosis     STEPHENIE (generalized anxiety disorder)     Morbid obesity due to excess calories (H)     CARDIOVASCULAR SCREENING; LDL GOAL LESS THAN 130     Herpes     Depression, major, recurrent, moderate (H)     Intermittent asthma without complication, unspecified asthma severity     Bipolar affective disorder, remission status unspecified (H)     Cannabis use disorder, mild, abuse     Alcohol use disorder     Lateral epicondylitis of left elbow     Elbow pain, left     Past Surgical History:   Procedure Laterality Date     EXCISE MASS BACK Right 12/28/2017    Procedure: EXCISE MASS BACK;  Excision Right Back Mass;  Surgeon: Vicente Pérez MD;  Location: [facility] OR     TONSILLECTOMY  1984       Social History     Tobacco Use     Smoking status: Current Every Day Smoker     Packs/day: 0.50     Years: 15.00     Pack years: 7.50     Types: Cigarettes     Start date: 6/1/2018     Smokeless tobacco: Never Used     Tobacco comment: 1/4 ppd    Substance Use Topics     Alcohol use: Yes     Comment: 1 drink every 2 months      Family History   Problem Relation Age of Onset     Cancer Father         acute leukemia (in remission)     Neurologic Disorder Father         neuropathy from chemotherapy     Diabetes Father         type II DM      Other Cancer Father      Depression Father      Anxiety Disorder Father      Family History Negative Sister      Ovarian Cancer Paternal Aunt      Ovarian Cancer Paternal Aunt      Ovarian Cancer Paternal Aunt          Current Outpatient Medications   Medication Sig Dispense Refill     albuterol (PROAIR HFA/PROVENTIL HFA/VENTOLIN HFA) 108 (90 BASE) MCG/ACT Inhaler Inhale 2 puffs into the lungs 4 times daily as needed for shortness of breath / dyspnea 1 Inhaler prn     ARIPiprazole (ABILIFY) 10 MG tablet Take 1 tablet (10 mg) by mouth daily 30 tablet 1     atorvastatin (LIPITOR) 10 MG tablet Take 1 tablet (10 mg) by mouth daily 90 tablet 1     buPROPion (WELLBUTRIN XL) 300 MG 24 hr tablet TAKE 1 TABLET (300 MG) BY MOUTH EVERY MORNING 90 tablet 1     gabapentin (NEURONTIN) 800 MG tablet TAKE 1 TABLET (800 MG) BY MOUTH 3 TIMES DAILY INCREASED DOSE. 90 tablet 3     propranolol (INDERAL) 20 MG tablet Take 1 tablet (20 mg) by mouth 4 times daily as needed (anxiety) 90 tablet 1     Allergies   Allergen Reactions     No Known Drug Allergies      BP Readings from Last 3 Encounters:   05/09/19 132/78   04/03/19 142/70   03/20/19 132/78    Wt Readings from Last 3 Encounters:   05/09/19 (!) 165.5 kg (364 lb 14.4 oz)   04/03/19 (!) 165.1 kg (364 lb)   03/20/19 (!) 163.3 kg (360 lb)           ROS:  CONSTITUTIONAL: NEGATIVE for fever, chills, change in weight  ENT/MOUTH: NEGATIVE for ear, mouth and throat problems  CV: NEGATIVE for chest pain, palpitations or peripheral edema  GI: NEGATIVE for nausea, abdominal pain, heartburn, or change in bowel habits  : NEGATIVE for frequency, dysuria, or hematuria  MUSCULOSKELETAL: NEGATIVE for significant arthralgias or myalgia  NEURO: NEGATIVE for weakness, dizziness or paresthesias  HEME: NEGATIVE for bleeding problems  PSYCHIATRIC: + for changes in mood or affect    OBJECTIVE:                                                    /78   Pulse 102   Temp 97.5  F (36.4  C) (Oral)    Resp 17   Wt (!) 165.5 kg (364 lb 14.4 oz)   LMP 05/02/2019 (Approximate)   SpO2 95%   BMI 64.64 kg/m    Body mass index is 64.64 kg/m .  GENERAL:  alert and no distress  EYES: Eyes grossly normal to inspection, extraocular movements - intact, and PERRL  HENT: ear canals- normal; TMs- normal; Nose- normal; Mouth- no ulcers, no lesions  NECK: no tenderness, no adenopathy, no asymmetry, no masses, no stiffness; thyroid- normal to palpation  RESP: lungs clear to auscultation - no rales, no rhonchi, no wheezes  CV: regular rates and rhythm, normal S1 S2, no S3 or S4 and no click or rub -  ABDOMEN: obese  MS: The great toe demonstrates a downward curvature on the nailbed with mild erythema and superficial tenderness noted to the nailbed interface.  There is no obvious abscess formation.  PSYCH: Alert and oriented times 3; speech- coherent , normal rate and volume; able to articulate logical thoughts, able to abstract reason, no tangential thoughts, no hallucinations or delusions, affect- normal       ASSESSMENT/PLAN:                                                      (L60.0) Ingrown nail  (primary encounter diagnosis)  Comment: The patient that she really needs to be seen in podiatry to have that nail cut back and that she should continue with warm water soaks accordingly as a seem to be helping her to some degree.  Plan: There is no indication for antibiotic therapy at present time    (Z13.6) CARDIOVASCULAR SCREENING; LDL GOAL LESS THAN 130  Comment:   LDL Cholesterol Calculated   Date Value Ref Range Status   01/15/2019 75 <100 mg/dL Final     Comment:     Desirable:       <100 mg/dl     Plan: atorvastatin (LIPITOR) 10 MG tablet        Stable at goal on therapy and medication refilled.    (F31.9) Bipolar affective disorder, remission status unspecified (H)  Comment: Stable with patient being medically managed per her mental health provider, continuing with current meds as ordered  Plan:     (F12.10) Cannabis use  disorder, mild, abuse  Comment: I discussed with the patient issues of ongoing use of marijuana and its potential abuse and problematic concerns  Plan:     See Patient Instructions    Jermain Jaramillo MD  [facility]

## 2019-05-20 ENCOUNTER — OFFICE VISIT (OUTPATIENT)
Dept: PODIATRY | Facility: CLINIC | Age: 39
End: 2019-05-20
Payer: COMMERCIAL

## 2019-05-20 VITALS
DIASTOLIC BLOOD PRESSURE: 98 MMHG | HEIGHT: 63 IN | SYSTOLIC BLOOD PRESSURE: 138 MMHG | BODY MASS INDEX: 51.91 KG/M2 | WEIGHT: 293 LBS

## 2019-05-20 DIAGNOSIS — L60.0 INGROWING NAIL WITH INFECTION: Primary | ICD-10-CM

## 2019-05-20 PROCEDURE — 99203 OFFICE O/P NEW LOW 30 MIN: CPT | Mod: 25 | Performed by: PODIATRIST

## 2019-05-20 PROCEDURE — 11730 AVULSION NAIL PLATE SIMPLE 1: CPT | Mod: T5 | Performed by: PODIATRIST

## 2019-05-20 RX ORDER — CEPHALEXIN 500 MG/1
500 CAPSULE ORAL 3 TIMES DAILY
Qty: 21 CAPSULE | Refills: 0 | Status: SHIPPED | OUTPATIENT
Start: 2019-05-20 | End: 2019-05-29

## 2019-05-20 ASSESSMENT — MIFFLIN-ST. JEOR: SCORE: 2290.22

## 2019-05-20 NOTE — PATIENT INSTRUCTIONS
Thank you for choosing [facility]!    Follow up     DR. ROWLEY'S CLINIC LOCATIONS     MONDAY  [place] TUESDAY & FRIDAY AM  [place]   [address]   [address]   [place] [address] RAHEEM Phelan [address]   [phone]  [phone]       WEDNESDAY  [place] SCHEDULE SURGERY: [phone]   [address] [place] [address] APPOINTMENTS: [phone]   [place] [address] BILLING QUESTIONS: [phone]   [phone]         INGROWN TOENAIL  When a toenail is ingrown, it is curved and grows into the skin, usually at the nail borders (the sides of the nail). This  digging in  of the nail irritates the skin, often creating pain, redness, swelling, and warmth in the toe.  If an ingrown nail causes a break in the skin, bacteria may enter and cause an infection in the area, which is often marked by drainage and a foul odor. However, even if the toe isn t painful, red, swollen, or warm, a nail that curves downward into the skin can progress to an infection.  CAUSES  Causes of ingrown toenails include:  Heredity. In many people, the tendency for ingrown toenails is inherited.   Trauma. Sometimes an ingrown toenail is the result of trauma, such as stubbing your toe, having an object fall on your toe, or engaging in activities that involve repeated pressure on the toes, such as kicking or running.   Improper trimming. The most common cause of ingrown toenails is cutting your nails too short. This encourages the skin next to the nail to fold over the nail.   Improperly sized footwear. Ingrown toenails can result from wearing socks and shoes that are tight or short.   Nail Conditions. Ingrown toenails can be caused by nail problems, such as fungal infections or losing a nail due to trauma.   TREATMENT  Sometimes initial treatment for ingrown toenails can be safely performed at home. However, home treatment is strongly  discouraged if an infection is suspected, or for those who have medical conditions that put feet at high risk, such as diabetes, nerve damage in the foot, or poor circulation.  Home care:  If you don t have an infection or any of the above medical conditions, you can soak your foot in room-temperature water (adding Epsom s salt may be recommended by your doctor), and gently massage the side of the nail fold to help reduce the inflammation.  Avoid attempting  bathroom surgery.  Repeated cutting of the nail can cause the condition to worsen over time. If your symptoms fail to improve, it s time to see a foot and ankle surgeon.  Physician care:  After examining the toe, the foot and ankle surgeon will select the treatment best suited for you. If an infection is present, an oral antibiotic may be prescribed.  Sometimes a minor surgical procedure, often performed in the office, will ease the pain and remove the offending nail. After applying a local anesthetic, the doctor removes part of the nail s side border. Some nails may become ingrown again, requiring removal of the nail root.  Following the nail procedure, a light bandage will be applied. Most people experience very little pain after surgery and may resume normal activity the next day. If your surgeon has prescribed an oral antibiotic, be sure to take all the medication, even if your symptoms have improved.    PREVENTION  Many cases of ingrown toenails may be prevented by:  Proper trimming. Cut toenails in a fairly straight line, and don t cut them too short. You should be able to get your fingernail under the sides and end of the nail.   Well-fitted shoes and socks. Don t wear shoes that are short or tight in the toe area. Avoid shoes that are loose, because they too cause pressure on the toes, especially when running or walking briskly.     INGROWN TOENAIL POSTOPERATIVE INSTRUCTIONS   (Nail avulsion or chemical matrixectomy)   1 Go directly home and elevate the  affected foot on one or two pillows for the remainder of the day/evening. Your toe may stay numb for the next 2-8 hours.   2 Take Tylenol, ibuprofen or another anti-inflammatory as needed for pain.   3 Take antibiotic if that has been prescribed. Take the entire prescribed antibiotic even if your symptoms have improved.   4 Keep dressing dry and intact the day of the procedure. The morning after the procedure, remove entire dressing and soak/wash the affected area in warm water (you may add Epsom salt) for 5 to 10 minutes. Do this twice a day for 2-4 weeks (6-8 weeks if you had phenol) (you may count showering/bathing as one soak).  After soaks, pat the area dry and then allow to airdry for a few minutes. Apply antibiotic ointment to the area and cover with 2 X 2 gauze and paper tape or a band-aid.  5 May walk and pursue everyday activities as tolerated with either an open toe shoe or cut-out shoe as needed. May wear regular shoes if no pain is noted.  6 Watch for any signs and symptoms of infection such as: redness, red streaks going up the foot/leg, swelling, pus or foul odor. Those that have had the phenol procedure, the toe will drain longer and will look like it is infected because it is a chemical burn.  Please call with questions.  SMOKING CESSATION  What's in cigarette smoke? - Cigarette smoke contains over 4,000 chemicals. Nicotine is one of the main ingredients which is an insecticide/herbicide. It is poisonous to our nervous system, increases blood clotting risk, and decreases the body's defenses to fight off infection. Another chemical is Carbon Monoxide is an asphyxiating gas that permanently binds to blood cells and blocks the transport of oxygen. This leads to tissue death and decreases your metabolism. Tar is a chemical that coats your lungs and trachea which impairs new oxygen coming in and carbon dioxide getting out of your body.   How does smoking impact surgery? - Smoking is particularly  hazardous with regards to surgery. Surgery puts stress on the body and a smoker's body is already under strain from these chemicals. Putting the two together, especially for an elective surgery, could be a recipe for disaster. Smoking before and after surgery increases your risk of heart problems, slow wound healing, delayed bone healing, blood clots, wound infection and anesthesia complications.   What are the benefits of quitting? - In 20 minutes your blood pressure will drop back down to normal. In 8 hours the carbon monoxide (a toxic gas) levels in your blood stream will drop by half, and oxygen levels will return to normal. In 48 hours your chance of having a heart attack will have decreased. All nicotine will have left your body. Your sense of taste and smell will return to a normal level. In 72 hours your bronchial tubes will relax, and your energy levels will increase. In 2 weeks your circulation will increase, and it will continue to improve for the next 10 weeks.    Recommendations for elective surgery - Ideally, patients should quit smoking 8 weeks before and at least 2 weeks after elective surgery in order to avoid complications. Simply cutting back on the amount of cigarettes smoked per day does not offer any benefit or decrease the risk of poor wound healing, heart problems, and infection. Smokers should also start taking Vitamin C and B for two weeks before surgery and two weeks after surgery.    Ways to Stop Smokin. Nicotine patches, lozenges, or gum  2. Support Groups  3. Medications (see below)    List of Medications:  1. Varenicline Tartrate (CHANTIX)   2. Bupropion HCL (WELLBUTRIN, ZYBAN) - note: make sure Wellbutrin is for smoking cessation and not other issues   3. Nicotine Patch (NICODERM)   4. Nicotine Inhaler (NICOTROL)   5. Nicotine Gum Nicotine Polacrilex   6. Nicotine Lozenge: Nicotine Polacrilex (COMMIT)   * [place] offers a smoking support group as well!  Please visit:  [email or web address]  If you are interested in these, ask about getting a prescription or talk to your primary care doctor about what may be the best way for you to quit.       Cristina to follow up with Primary Care provider regarding elevated blood pressure.      BODY WEIGHT AND YOUR FEET  The following information is included in the after visit summary for all patients. Body weight can be a sensitive issue to discuss in clinic, but we think the following information is very important. Although we focus on the feet and ankles, we do support the overall health of our patients.     Many things can cause foot and ankle problems. Foot structure, activity level, foot mechanics and injuries are common causes of pain. One very important issue that often goes unmentioned, is body weight. Extra weight can cause increased stress on muscles, ligaments, bones and tendons. Sometimes just a few extra pounds is all it takes to put one over her/his threshold. Without reducing that stress, it can be difficult to alleviate pain. As Foot & Ankle specialists, our job is addressing the lower extremity problem and possible causes. Regarding extra body weight, we encourage patients to discuss diet and weight management plans with their primary care doctors. It is this team approach that gives you the best opportunity for pain relief and getting you back on your feet.      [facility] has a Comprehensive Weight Management Program. This program includes counseling, education, non-surgical and surgical approaches to weight loss. If you are interested in learning more either talk to you primary care provider or call [phone].

## 2019-05-20 NOTE — PROGRESS NOTES
PATIENT HISTORY:  Cristina Dean is a 39 year old female who presents to clinic for R hallux ingrowing nail.  1-2 month duration.  No injury.  No prior procedure.  Worse with pressure, better with rest.  4-8/10 pain.      I was requested to see this patient for this issue by Jermain Jaramillo MD.    Review of Systems:  Patient denies fever, chills, rash, wound, stiffness, numbness, weakness, heart burn, blood in stool, chest pain with activity, calf pain when walking, shortness of breath with activity, chronic cough, easy bleeding/bruising, swelling of ankles, excessive thirst, fatigue, depression.  Patient admits to limping, anxiety.     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, unspecified      Depression      Herpes      Intermittent asthma     triggers include fall and spring allergy seasons     Obesity 8/1/12    BMI 53     Urinary incontinence 2/1/2017        PAST SURGICAL HISTORY:   Past Surgical History:   Procedure Laterality Date     EXCISE MASS BACK Right 12/28/2017    Procedure: EXCISE MASS BACK;  Excision Right Back Mass;  Surgeon: Vicente Pérez MD;  Location: [facility] OR     TONSILLECTOMY  1984        MEDICATIONS:   Current Outpatient Medications:      albuterol (PROAIR HFA/PROVENTIL HFA/VENTOLIN HFA) 108 (90 BASE) MCG/ACT Inhaler, Inhale 2 puffs into the lungs 4 times daily as needed for shortness of breath / dyspnea, Disp: 1 Inhaler, Rfl: prn     ARIPiprazole (ABILIFY) 10 MG tablet, Take 1 tablet (10 mg) by mouth daily, Disp: 30 tablet, Rfl: 1     atorvastatin (LIPITOR) 10 MG tablet, Take 1 tablet (10 mg) by mouth daily, Disp: 90 tablet, Rfl: 1     buPROPion (WELLBUTRIN XL) 300 MG 24 hr tablet, TAKE 1 TABLET (300 MG) BY MOUTH EVERY MORNING, Disp: 90 tablet, Rfl: 1     cephALEXin (KEFLEX) 500 MG capsule, Take 1 capsule (500 mg) by mouth 3 times daily for 7 days, Disp: 21 capsule, Rfl: 0      gabapentin (NEURONTIN) 800 MG tablet, TAKE 1 TABLET (800 MG) BY MOUTH 3 TIMES DAILY INCREASED DOSE., Disp: 90 tablet, Rfl: 3     propranolol (INDERAL) 20 MG tablet, Take 1 tablet (20 mg) by mouth 4 times daily as needed (anxiety), Disp: 90 tablet, Rfl: 1     ALLERGIES:    Allergies   Allergen Reactions     No Known Drug Allergies         SOCIAL HISTORY:   Social History     Socioeconomic History     Marital status:      Spouse name: Not on file     Number of children: Not on file     Years of education: Not on file     Highest education level: Not on file   Occupational History     Not on file   Social Needs     Financial resource strain: Not on file     Food insecurity:     Worry: Not on file     Inability: Not on file     Transportation needs:     Medical: Not on file     Non-medical: Not on file   Tobacco Use     Smoking status: Current Every Day Smoker     Packs/day: 0.50     Years: 15.00     Pack years: 7.50     Types: Cigarettes     Start date: 6/1/2018     Smokeless tobacco: Never Used     Tobacco comment: 1/4 ppd    Substance and Sexual Activity     Alcohol use: Yes     Comment: 1 drink every 2 months      Drug use: Yes     Types: Marijuana     Comment: daily     Sexual activity: Yes     Partners: Male     Birth control/protection: Condom   Lifestyle     Physical activity:     Days per week: Not on file     Minutes per session: Not on file     Stress: Not on file   Relationships     Social connections:     Talks on phone: Not on file     Gets together: Not on file     Attends Church service: Not on file     Active member of club or organization: Not on file     Attends meetings of clubs or organizations: Not on file     Relationship status: Not on file     Intimate partner violence:     Fear of current or ex partner: Not on file     Emotionally abused: Not on file     Physically abused: Not on file     Forced sexual activity: Not on file   Other Topics Concern     Parent/sibling w/ CABG, MI or  "angioplasty before 65F 55M? Not Asked   Social History Narrative     Not on file        FAMILY HISTORY:   Family History   Problem Relation Age of Onset     Cancer Father         acute leukemia (in remission)     Neurologic Disorder Father         neuropathy from chemotherapy     Diabetes Father         type II DM     Other Cancer Father      Depression Father      Anxiety Disorder Father      Family History Negative Sister      Ovarian Cancer Paternal Aunt      Ovarian Cancer Paternal Aunt      Ovarian Cancer Paternal Aunt         EXAM:Vitals: BP (!) 138/98   Ht 1.6 m (5' 3\")   Wt (!) 165.1 kg (364 lb)   LMP 05/02/2019 (Approximate)   BMI 64.48 kg/m    BMI= Body mass index is 64.48 kg/m .    General appearance: Patient is alert and fully cooperative with history & exam.  No sign of distress is noted during the visit.     Psychiatric: Affect is pleasant & appropriate.  Patient appears motivated to improve health.     Respiratory: Breathing is regular & unlabored while sitting.     HEENT: Hearing is intact to spoken word.  Speech is clear.  No gross evidence of visual impairment that would impact ambulation.     Dermatologic: R hallux nail incurvated, painful along both borders, with erythema noted, moreso along lateral border.     Vascular: DP & PT pulses are intact & regular on the R.  No significant edema or varicosities noted.  CFT and skin temperature are normal.     Neurologic: Lower extremity sensation is intact to light touch.  No evidence of weakness or contracture in the lower extremities.  No evidence of neuropathy.     Musculoskeletal: Patient is ambulatory without assistive device or brace.  No gross ankle deformity noted.  No foot or ankle joint effusion is noted.     ASSESSMENT: R hallux ingrown nail, cellulitis     PLAN:  Reviewed patient's chart in epic.  Discussed condition and treatment options including pros and cons.    The potential causes and nature of an ingrown toenail were discussed with " the patient.  We reviewed the natural history/prognosis of the condition and potential risks if no treatment is provided.      Treatment options discussed included conservative management (oral antibiotics, soaking of foot, adequate width shoes)  as well as surgical management (partial or total nail removal).  The pros and cons of both forms of treatment were reviewed.      After thorough discussion and answering all questions, the patient elected to proceed with partial nail avulsion, nonpermanent.  Pt declined permanent.  Discussed risk of recurrence.    Procedure:  In addition to office visit.  After consent, the R 1st toe was anesthetized with 5cc's of 1% lidocaine plain after alcohol prep. Betadine prep performed.  A tourniquet was applied to the toe. Using sterile instrumentation, the medial and then latera border was then raised from the nail bed and then cut the length of the nail.  The offending nail borders were then removed.  Bacitracin was applied to the nail bed.  The tourniquet was removed and a hyperemic response was noted.  Bandage was applied to the toe.  The patient tolerated the procedure and anesthesia well.    Post op instructions provided and discussed with pt.  F/u prn.  Keflex prescribed for 7 days.    Mitch Garrido DPM, FACFAS    Weight management plan: Patient was referred to their PCP to discuss a diet and exercise plan.  Cristina to follow up with Primary Care provider regarding elevated blood pressure.

## 2019-05-20 NOTE — LETTER
5/20/2019         RE: Cristina Dean  [address] [facility] [address]        Dear Colleague,    Thank you for referring your patient, Cristina Dean, to the [facility]. Please see a copy of my visit note below.    PATIENT HISTORY:  Cristina Dean is a 39 year old female who presents to clinic for R hallux ingrowing nail.  1-2 month duration.  No injury.  No prior procedure.  Worse with pressure, better with rest.  4-8/10 pain.      I was requested to see this patient for this issue by Jermain Jaramillo MD.    Review of Systems:  Patient denies fever, chills, rash, wound, stiffness, numbness, weakness, heart burn, blood in stool, chest pain with activity, calf pain when walking, shortness of breath with activity, chronic cough, easy bleeding/bruising, swelling of ankles, excessive thirst, fatigue, depression.  Patient admits to limping, anxiety.     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, unspecified      Depression      Herpes      Intermittent asthma     triggers include fall and spring allergy seasons     Obesity 8/1/12    BMI 53     Urinary incontinence 2/1/2017        PAST SURGICAL HISTORY:   Past Surgical History:   Procedure Laterality Date     EXCISE MASS BACK Right 12/28/2017    Procedure: EXCISE MASS BACK;  Excision Right Back Mass;  Surgeon: Vicente Pérez MD;  Location: [facility] OR     TONSILLECTOMY  1984        MEDICATIONS:   Current Outpatient Medications:      albuterol (PROAIR HFA/PROVENTIL HFA/VENTOLIN HFA) 108 (90 BASE) MCG/ACT Inhaler, Inhale 2 puffs into the lungs 4 times daily as needed for shortness of breath / dyspnea, Disp: 1 Inhaler, Rfl: prn     ARIPiprazole (ABILIFY) 10 MG tablet, Take 1 tablet (10 mg) by mouth daily, Disp: 30 tablet, Rfl: 1     atorvastatin (LIPITOR) 10 MG tablet, Take 1 tablet (10 mg) by mouth daily, Disp: 90 tablet,  Rfl: 1     buPROPion (WELLBUTRIN XL) 300 MG 24 hr tablet, TAKE 1 TABLET (300 MG) BY MOUTH EVERY MORNING, Disp: 90 tablet, Rfl: 1     cephALEXin (KEFLEX) 500 MG capsule, Take 1 capsule (500 mg) by mouth 3 times daily for 7 days, Disp: 21 capsule, Rfl: 0     gabapentin (NEURONTIN) 800 MG tablet, TAKE 1 TABLET (800 MG) BY MOUTH 3 TIMES DAILY INCREASED DOSE., Disp: 90 tablet, Rfl: 3     propranolol (INDERAL) 20 MG tablet, Take 1 tablet (20 mg) by mouth 4 times daily as needed (anxiety), Disp: 90 tablet, Rfl: 1     ALLERGIES:    Allergies   Allergen Reactions     No Known Drug Allergies         SOCIAL HISTORY:   Social History     Socioeconomic History     Marital status:      Spouse name: Not on file     Number of children: Not on file     Years of education: Not on file     Highest education level: Not on file   Occupational History     Not on file   Social Needs     Financial resource strain: Not on file     Food insecurity:     Worry: Not on file     Inability: Not on file     Transportation needs:     Medical: Not on file     Non-medical: Not on file   Tobacco Use     Smoking status: Current Every Day Smoker     Packs/day: 0.50     Years: 15.00     Pack years: 7.50     Types: Cigarettes     Start date: 6/1/2018     Smokeless tobacco: Never Used     Tobacco comment: 1/4 ppd    Substance and Sexual Activity     Alcohol use: Yes     Comment: 1 drink every 2 months      Drug use: Yes     Types: Marijuana     Comment: daily     Sexual activity: Yes     Partners: Male     Birth control/protection: Condom   Lifestyle     Physical activity:     Days per week: Not on file     Minutes per session: Not on file     Stress: Not on file   Relationships     Social connections:     Talks on phone: Not on file     Gets together: Not on file     Attends Religion service: Not on file     Active member of club or organization: Not on file     Attends meetings of clubs or organizations: Not on file     Relationship status: Not  "on file     Intimate partner violence:     Fear of current or ex partner: Not on file     Emotionally abused: Not on file     Physically abused: Not on file     Forced sexual activity: Not on file   Other Topics Concern     Parent/sibling w/ CABG, MI or angioplasty before 65F 55M? Not Asked   Social History Narrative     Not on file        FAMILY HISTORY:   Family History   Problem Relation Age of Onset     Cancer Father         acute leukemia (in remission)     Neurologic Disorder Father         neuropathy from chemotherapy     Diabetes Father         type II DM     Other Cancer Father      Depression Father      Anxiety Disorder Father      Family History Negative Sister      Ovarian Cancer Paternal Aunt      Ovarian Cancer Paternal Aunt      Ovarian Cancer Paternal Aunt         EXAM:Vitals: BP (!) 138/98   Ht 1.6 m (5' 3\")   Wt (!) 165.1 kg (364 lb)   LMP 05/02/2019 (Approximate)   BMI 64.48 kg/m     BMI= Body mass index is 64.48 kg/m .    General appearance: Patient is alert and fully cooperative with history & exam.  No sign of distress is noted during the visit.     Psychiatric: Affect is pleasant & appropriate.  Patient appears motivated to improve health.     Respiratory: Breathing is regular & unlabored while sitting.     HEENT: Hearing is intact to spoken word.  Speech is clear.  No gross evidence of visual impairment that would impact ambulation.     Dermatologic: R hallux nail incurvated, painful along both borders, with erythema noted, moreso along lateral border.     Vascular: DP & PT pulses are intact & regular on the R.  No significant edema or varicosities noted.  CFT and skin temperature are normal.     Neurologic: Lower extremity sensation is intact to light touch.  No evidence of weakness or contracture in the lower extremities.  No evidence of neuropathy.     Musculoskeletal: Patient is ambulatory without assistive device or brace.  No gross ankle deformity noted.  No foot or ankle joint " effusion is noted.     ASSESSMENT: R hallux ingrown nail, cellulitis     PLAN:  Reviewed patient's chart in epic.  Discussed condition and treatment options including pros and cons.    The potential causes and nature of an ingrown toenail were discussed with the patient.  We reviewed the natural history/prognosis of the condition and potential risks if no treatment is provided.      Treatment options discussed included conservative management (oral antibiotics, soaking of foot, adequate width shoes)  as well as surgical management (partial or total nail removal).  The pros and cons of both forms of treatment were reviewed.      After thorough discussion and answering all questions, the patient elected to proceed with partial nail avulsion, nonpermanent.  Pt declined permanent.  Discussed risk of recurrence.    Procedure:  In addition to office visit.  After consent, the R 1st toe was anesthetized with 5cc's of 1% lidocaine plain after alcohol prep. Betadine prep performed.  A tourniquet was applied to the toe. Using sterile instrumentation, the medial and then latera border was then raised from the nail bed and then cut the length of the nail.  The offending nail borders were then removed.  Bacitracin was applied to the nail bed.  The tourniquet was removed and a hyperemic response was noted.  Bandage was applied to the toe.  The patient tolerated the procedure and anesthesia well.    Post op instructions provided and discussed with pt.  F/u prn.  Keflex prescribed for 7 days.    Mitch Garrido DPM, FACFAS    Weight management plan: Patient was referred to their PCP to discuss a diet and exercise plan.  Cristina to follow up with Primary Care provider regarding elevated blood pressure.      Again, thank you for allowing me to participate in the care of your patient.        Sincerely,        Mitch Garrido DPM

## 2019-05-21 DIAGNOSIS — F41.1 GAD (GENERALIZED ANXIETY DISORDER): ICD-10-CM

## 2019-05-21 DIAGNOSIS — F31.9 BIPOLAR AFFECTIVE DISORDER, REMISSION STATUS UNSPECIFIED (H): ICD-10-CM

## 2019-05-21 RX ORDER — ARIPIPRAZOLE 10 MG/1
TABLET ORAL
Qty: 30 TABLET | Refills: 1 | Status: SHIPPED | OUTPATIENT
Start: 2019-05-21 | End: 2019-05-29

## 2019-05-24 NOTE — PROGRESS NOTES
Pt has not returned for therapy since 2/4/19.  Assume all goals are met to pt satisfaction.  D/C [facility].

## 2019-05-28 NOTE — PROGRESS NOTES
"    Outpatient Psychiatric Progress Note    Name: Cristina Dean   : 1980                    Primary Care Provider: Jermain Jaramillo MD - last visit 2019  Therapist: None   [place] [facility] last outreach 2019 after multiple unsuccessful attempts  SAME DAY LATE CANCEL WITH ME 2019  [facility]    PHQ-9 scores:  PHQ-9 SCORE 3/20/2019 4/3/2019 2019   PHQ-9 Total Score MyChart 20 (Severe depression) 21 (Severe depression) 16 (Moderately severe depression)       STEPHENIE-7 scores:  STEPHENIE-7 SCORE 3/20/2019 4/3/2019 2019   Total Score 12 (moderate anxiety) 16 (severe anxiety) 17 (severe anxiety)     Answers for HPI/ROS submitted by the patient on 2019   If you checked off any problems, how difficult have these problems made it for you to do your work, take care of things at home, or get along with other people?: Very difficult    Patient Identification:  Patient is a 39 year old,   White American female  who presents for return visit with me.  Patient is currently employed part time. Patient attended the session alone. Patient prefers to be called: \"Cristina\".    Interim History:    I last saw Cristina Dean for outpatient psychiatry Return Visit on 4/3/2019.     During that appointment, we Start Inderal (propranolol) 20 mg by mouth up to 4 times per day as needed for anxiety. May consider dose increase if needed.     Continue Abilify (aripriprazole) 10 mg by mouth daily for mood. May consider dose increase if needed.     Continue Wellbutrin (buproprion)  mg by mouth daily in AM.    Continue Neurontin (gabapentin) 800 mg by mouth 3 times per day for anxiety.     Current medications include:   Current Outpatient Medications   Medication Sig     albuterol (PROAIR HFA/PROVENTIL HFA/VENTOLIN HFA) 108 (90 BASE) MCG/ACT Inhaler Inhale 2 puffs into the lungs 4 times daily as needed for shortness of breath / dyspnea     ARIPiprazole (ABILIFY) 10 MG tablet " TAKE 1 TABLET BY MOUTH EVERY DAY     atorvastatin (LIPITOR) 10 MG tablet Take 1 tablet (10 mg) by mouth daily     buPROPion (WELLBUTRIN XL) 300 MG 24 hr tablet TAKE 1 TABLET (300 MG) BY MOUTH EVERY MORNING     gabapentin (NEURONTIN) 800 MG tablet TAKE 1 TABLET (800 MG) BY MOUTH 3 TIMES DAILY INCREASED DOSE.     propranolol (INDERAL) 20 MG tablet Take 1 tablet (20 mg) by mouth 4 times daily as needed (anxiety)     No current facility-administered medications for this visit.        The [place] Prescription Monitoring Program has been reviewed and there are no concerns about diversionary activity for controlled substances at this time.      PRESCRIPTIONS  Total Prescriptions: 10  Total Private Pay: 0  Fill Date ID Written Drug Qty Days Prescriber Rx # Pharmacy Refill Daily Dose * Pymt Type   04/24/2019 2 02/13/2019 Gabapentin 800 Mg Tablet   90 30 Ma Valentina [id number] Gra (7408) 2  [facility] [place]  03/19/2019 2 02/13/2019 Gabapentin 800 Mg Tablet   90 30 Ma Valentina [id number] Gra (7408) 1  [facility] [place]  02/13/2019 2 02/13/2019 Gabapentin 800 Mg Tablet   90 30 Ma Valentina [id number] Gra (7408) 0  [facility] [place]  01/14/2019 2 10/05/2018 Gabapentin 800 Mg Tablet   90 30 Ma Valentina [id number] Gra (7408) 3  [facility] [place]  12/08/2018 2 10/05/2018 Gabapentin 800 Mg Tablet   90 30 Ma Valentina [id number] Gra (7408) 2  [facility] [place]  10/31/2018 1 10/05/2018 Gabapentin 800 Mg Tablet   90 30 Ma Valentina [id number] Gra (7408) 1  [facility] [place]  10/05/2018 1 10/05/2018 Gabapentin 800 Mg Tablet   90 30 Ma Valentina [id number] Gra (7408) 0  [facility] [place]  09/02/2018 1 05/10/2018 Gabapentin 800 Mg Tablet   90 30 Ma Valentina [id number] Gra (7408) 3  [facility] [place]  07/30/2018 1 05/10/2018 Gabapentin 800 Mg Tablet   90 30 Ma Valentina [id number] [facility] (7408) 2  [facility] [place]  07/03/2018 1 05/10/2018 Gabapentin 800 Mg Tablet   90 30 Ma Valentina [id number] Gra (7408) 1  [facility] [place]    I was able to review most recent Primary Care Provider, specialty provider, and therapy visit notes that I have access to.     Cristina MURILLO  "Jermaine reports mood has been: \"more depressed\" no hypomania or yadi. No anger or aggression.   Anxiety has been: \"worse\" some panic. More irritable.   Neurontin (gabapentin) takes at least twice and will take two at night if misses the day dose.   Sleep has been: \"I talk in my sleep\" happens almost every night.  also notes that she kicks around at night and will laugh and talk and feels this has happened since she started the Abilify (aripriprazole).  This does not wake her up. Rare naps during the day. No sleep walking. No nightmares.    Patient has taken Inderal (propranolol) up to 40 mg 3 times per day. Her blood pressure and heart rate is much improved today. She does not feel it helps her anxiety. Did not notice any improvement from this.   PHQ9 and GAD7 scores were reviewed today.   Medication side effects: Denies- taking Abilify (aripriprazole) in the morning  Current stressors include: Relationship Difficulties-arguing with , Symptoms, Financial Difficulties, Occupational Difficulties- started a new job- doing cleaning, doing dry cleaning delivery, still working one day per week as a   Coping mechanisms and supports include: Family, Friends, Faith history, Workplace, Overeating continues, Pet Cat, Sleeping, Isolation, Cigarettes     Previous medication trials include but not limited to:  Paxil (paroxetine) \"made me suicidal\"  Seroquel (quetiapine) \"was a zombie\"  Latuda (lurasidone) 80 mg  Minipress (prazosin) for nightmares  Desyrel/Olepto (trazodone) was good for sleeping issues  Benadryl (diphenhydramine) was good for sleeping issues  Neurontin (gabapentin) 800 mg 3 times per day  Cannot remember other medications she has been on in the past  Abilify (aripriprazole) 10 mg   Inderal (propranolol) 40 mg TID     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 She also has no past medical history of Chronic infection, Complication of anesthesia, Diabetes (H), History of blood transfusion, Malignant hyperthermia, Other chronic pain, PONV (postoperative nausea and vomiting), Sleep apnea, or Thyroid disease.    Social History:  Current Living situation: [place], [place] with Spouse/Partner and pet cat. Feels safe at home.  Current use of drugs or alcohol: Alcohol and Cannabis. Drinking 1 product per month. Reports uses Marijuana to cope with anxiety- sometimes works and sometimes does not.  Smoking at least once per day.   Tobacco use: Yes Cigarettes - about 1 pack per week- not every day- no interest in quitting at this time.   Caffeine:  Yes  2-3 cups/day of coffee    Vital Signs:   /80 (BP Location: Right arm, Patient Position: Chair, Cuff Size: Adult Large)   Pulse 86   Temp 98.3  F (36.8  C) (Oral)   Resp 16   Ht 1.6 m (5' 3\")   Wt (!) 165.6 kg (365 lb)   LMP 05/02/2019 (Approximate)   SpO2 93%   BMI 64.66 kg/m      Labs:  Most recent laboratory results reviewed and no new labs.     Review of Systems:  10 systems (general, cardiovascular, respiratory, eyes, ENT, endocrine, GI, , M/S, neurological) were reviewed. Most pertinent finding(s) is/are: weight gain, seasonal allergies are bad and albuterol inhaler works, no headaches, asthma stable, diarrhea, no rashes, no falls, no abnormal or involuntary movements, dry mouth, diarrhea the last week after antibiotic from ingrown toenail removal. The remaining systems are all unremarkable.     Mental Status Examination:  Appearance:  awake, " "alert, adequately groomed, appeared stated age, mild distress, morbidly obese, alone  Attitude:  cooperative   Eye Contact:  fair and wears glasses  Gait and Station: Normal, No assistive Devices used and No dizziness or falls  Psychomotor Behavior:  no evidence of tardive dyskinesia, dystonia, or tics  Oriented to:  time, person, and place  Attention Span and Concentration:  Easily distracted  Speech:  clear, coherent, regular rate, regular rhythm and fluent  Mood:  \"anxious and more depressed\"  Affect:  mood congruent and tearful at times  Associations:  no loose associations  Thought Process:  logical, linear and goal oriented  Thought Content:  no evidence of psychotic thought, passive suicidal ideation present, no homicidal ideation, Appropriate to Interview  Recent and Remote Memory:  intact to interview. Uses memory reminders. Not formally assessed. No amnesia.  Fund of Knowledge: appropriate  Insight:  adequate  Judgment:  fair but adequate for safety  Impulse Control:  fair at times  Language: intact     Suicide Risk Assessment:  Today Cristina Dean reports chronic thoughts of \"I don't want to be here anymore, but I do not want to kill myself\". Depression has been worse. No suicidal or homicidal ideation. In addition, there are notable risk factors for self-harm, including age, anxiety, substance abuse, suicidal ideation, anger/rage, hopelessness and mood change. However, risk is mitigated by commitment to family, spiritual/Taoist beliefs, absence of past attempts, ability to volunteer a safety plan, history of seeking help when needed, future oriented, no access to firearms or weapons, denies suicidal intent or plan, no family history of suicide and denies homicidal ideation, intent, or plan. Therefore, based on all available evidence including the factors cited above, Cristina Dean does not appear to be at imminent risk for self-harm, does not meet criteria for a 72-hr hold, and therefore " remains appropriate for ongoing outpatient level of care.  A thorough assessment of risk factors related to suicide and self-harm have been reviewed and are noted above. The patient convincingly denies suicidality on several occasions. Local community resources reviewed and printed for patient to use if needed. There was no deceit detected, and the patient presented in a manner that was believable.      DSM5  Diagnosis:   296.33 (F33.2) Major Depressive Disorder, Recurrent Episode, Severe With anxious distress                          Rule out Bipolar Disorder  300.02 (F41.1) Generalized Anxiety Disorder              Rule out 307.51 (F50.8) Binge-Eating Disorder               Rule out 301.83 (F60.3) Borderline Personality Disorder   Alcohol Use Disorder, partial remission  Cannabis Abuse   Obesity per BMI of 64.66    Medical comorbidities include:   Patient Active Problem List    Diagnosis Date Noted     Lateral epicondylitis of left elbow 02/04/2019     Priority: Medium     Cannabis use disorder, mild, abuse 12/04/2017     Priority: Medium     Alcohol use disorder 12/04/2017     Priority: Medium     Bipolar affective disorder, remission status unspecified (H) 11/13/2017     Priority: Medium     Depression, major, recurrent, moderate (H) 10/23/2017     Priority: Medium     Intermittent asthma without complication, unspecified asthma severity 10/23/2017     Priority: Medium     CARDIOVASCULAR SCREENING; LDL GOAL LESS THAN 130 08/07/2012     Priority: Medium     Morbid obesity due to excess calories (H) 08/01/2012     Priority: Medium     BMI 53       STEPHENIE (generalized anxiety disorder)      Priority: Medium     Herpes 07/09/2012     Priority: Medium       Psychosocial & Contextual Factors:  Occupational Difficulties, Financial Difficulties and Relationship Difficulties    Assessment:  Cristina Dean reports ongoing depression and anxiety and irritabilty.    With history of alcohol dependence and frequent Marijuana  use, I will not provide a benzodiazepine medication. Vistaril/Atarax (hydroxyzine) is her next option. She did not tolerate Seroquel (quetiapine) in the past. We have room to increase her Abilify (aripriprazole) medication, but might be having side effects. Will taper off this.   We will add Prozac (fluoxetine) to her Wellbutrin (buproprion). We will change Inderal (propranolol) to LA once per day formulation.     Medication side effects and alternatives were reviewed. Health promotion activities recommended and reviewed today. All questions addressed. Education and counseling completed regarding risks and benefits of medications and psychotherapy options. Recommend therapy for additional support.    Minipress (prazosin) has helped with nightmares in the past. Could trial Buspar (buspirone) for anxiety or Topamax (topiramate) for mood.     Continue to monitor fatigue. May benefit from sleep consult to rule out Obstructive Sleep Apnea. Referral placed today.     This is out 7th visit as part of the Collaborative Care Psychiatry Service. Patient is making limited improvements. She has not followed with recommendations for therapy and she has not answered her phone with our care coordination team to try to connect her to resources. We will try to do another visit and determine to return to Primary Care Provider or refer to community psychiatry care.       Treatment Plan:    [place] Inderal (propranolol)  mg by mouth daily for anxiety and blood pressure.     Reduce Abilify (aripriprazole) to 5 mg daily at bedtime for 3 days, then stop.     Start Prozac (fluoxetine) 20 mg daily for one week, then increase to 40 mg daily for mood and anxiety.     Start Vistaril/Atarax (hydroxyzine) 50 mg by mouth up to 4 times per day as needed for anxiety.     Continue Wellbutrin (buproprion)  mg by mouth daily in AM.    Continue Neurontin (gabapentin) 800 mg by mouth 3 times per day for anxiety.     Continue all other  medications as reviewed per electronic medical record today.     Safety plan reviewed. To the Emergency Department as needed or call after hours crisis line at [phone] or [phone]. [place] Crisis Text Line. Text [place] to 252400 or Suicide LifeLine Chat: suicidepreventionlifeline.org/chat    To schedule individual or family therapy, call [facility] at [phone].     Schedule an appointment with me in 4-6 weeks or sooner as needed.     Follow up with primary care provider as planned or for acute medical concerns.    Call the psychiatric nurse line with medication questions or concerns at [phone].    2smshart may be used to communicate with your provider, but this is not intended to be used for emergencies.    Administrative Billing:   Time spent with patient was 30 minutes and greater than 50% of time or 20 minutes was spent in counseling and coordination of care regarding above diagnoses and treatment plan.    Patient Status:  Patient will continue to be seen for ongoing consultation and stabilization.    Signed:   Lisa Conrad, PhD, APRN, CNP   Psychiatry

## 2019-05-29 ENCOUNTER — OFFICE VISIT (OUTPATIENT)
Dept: PSYCHIATRY | Facility: CLINIC | Age: 39
End: 2019-05-29
Payer: COMMERCIAL

## 2019-05-29 VITALS
BODY MASS INDEX: 51.91 KG/M2 | WEIGHT: 293 LBS | SYSTOLIC BLOOD PRESSURE: 128 MMHG | DIASTOLIC BLOOD PRESSURE: 80 MMHG | HEIGHT: 63 IN | RESPIRATION RATE: 16 BRPM | HEART RATE: 86 BPM | OXYGEN SATURATION: 93 % | TEMPERATURE: 98.3 F

## 2019-05-29 DIAGNOSIS — F41.1 GAD (GENERALIZED ANXIETY DISORDER): Primary | ICD-10-CM

## 2019-05-29 DIAGNOSIS — F33.1 DEPRESSION, MAJOR, RECURRENT, MODERATE (H): ICD-10-CM

## 2019-05-29 DIAGNOSIS — F12.10 CANNABIS USE DISORDER, MILD, ABUSE: ICD-10-CM

## 2019-05-29 PROCEDURE — 99214 OFFICE O/P EST MOD 30 MIN: CPT | Performed by: NURSE PRACTITIONER

## 2019-05-29 RX ORDER — PROPRANOLOL HYDROCHLORIDE 120 MG/1
120 CAPSULE, EXTENDED RELEASE ORAL DAILY
Qty: 30 CAPSULE | Refills: 1 | Status: ON HOLD | OUTPATIENT
Start: 2019-05-29 | End: 2020-02-01

## 2019-05-29 RX ORDER — FLUOXETINE 40 MG/1
40 CAPSULE ORAL DAILY
Qty: 30 CAPSULE | Refills: 1 | Status: ON HOLD | OUTPATIENT
Start: 2019-06-05 | End: 2020-02-01

## 2019-05-29 RX ORDER — CETIRIZINE HYDROCHLORIDE 10 MG/1
10 TABLET ORAL DAILY
Status: ON HOLD | COMMUNITY
End: 2020-02-01

## 2019-05-29 RX ORDER — HYDROXYZINE PAMOATE 50 MG/1
50 CAPSULE ORAL 4 TIMES DAILY PRN
Qty: 120 CAPSULE | Refills: 1 | Status: ON HOLD | OUTPATIENT
Start: 2019-05-29 | End: 2020-02-01

## 2019-05-29 ASSESSMENT — ANXIETY QUESTIONNAIRES
5. BEING SO RESTLESS THAT IT IS HARD TO SIT STILL: MORE THAN HALF THE DAYS
GAD7 TOTAL SCORE: 17
3. WORRYING TOO MUCH ABOUT DIFFERENT THINGS: NEARLY EVERY DAY
6. BECOMING EASILY ANNOYED OR IRRITABLE: NEARLY EVERY DAY
2. NOT BEING ABLE TO STOP OR CONTROL WORRYING: NEARLY EVERY DAY
1. FEELING NERVOUS, ANXIOUS, OR ON EDGE: NEARLY EVERY DAY
4. TROUBLE RELAXING: MORE THAN HALF THE DAYS
7. FEELING AFRAID AS IF SOMETHING AWFUL MIGHT HAPPEN: SEVERAL DAYS
GAD7 TOTAL SCORE: 17
7. FEELING AFRAID AS IF SOMETHING AWFUL MIGHT HAPPEN: SEVERAL DAYS
GAD7 TOTAL SCORE: 17

## 2019-05-29 ASSESSMENT — PAIN SCALES - GENERAL: PAINLEVEL: MILD PAIN (2)

## 2019-05-29 ASSESSMENT — MIFFLIN-ST. JEOR: SCORE: 2294.76

## 2019-05-29 NOTE — PATIENT INSTRUCTIONS
Treatment Plan:    [place] Inderal (propranolol)  mg by mouth daily for anxiety and blood pressure.     Reduce Abilify (aripriprazole) to 5 mg daily at bedtime for 3 days, then stop.     Start Prozac (fluoxetine) 20 mg daily for one week, then increase to 40 mg daily for mood and anxiety.     Start Vistaril/Atarax (hydroxyzine) 50 mg by mouth up to 4 times per day as needed for anxiety.     Continue Wellbutrin (buproprion)  mg by mouth daily in AM.    Continue Neurontin (gabapentin) 800 mg by mouth 3 times per day for anxiety.     Continue all other medications as reviewed per electronic medical record today.     Safety plan reviewed. To the Emergency Department as needed or call after hours crisis line at [phone] or [phone]. [place] Crisis Text Line. Text [place] to 399726 or Suicide LifeLine Chat: suicidepreventionlifeline.org/chat    To schedule individual or family therapy, call [facility] at [phone].     Schedule an appointment with me in 4-6 weeks or sooner as needed.     Follow up with primary care provider as planned or for acute medical concerns.    Call the psychiatric nurse line with medication questions or concerns at [phone].    MyChart may be used to communicate with your provider, but this is not intended to be used for emergencies.

## 2019-05-29 NOTE — NURSING NOTE
"Chief Complaint   Patient presents with     Recheck Medication     more anxious and depressed, no help wih propranolol for anxiety.      initial /80 (BP Location: Right arm, Patient Position: Chair, Cuff Size: Adult Large)   Pulse 86   Temp 98.3  F (36.8  C) (Oral)   Resp 16   Ht 1.6 m (5' 3\")   Wt (!) 165.6 kg (365 lb)   LMP 05/02/2019 (Approximate)   SpO2 93%   BMI 64.66 kg/m   Estimated body mass index is 64.66 kg/m  as calculated from the following:    Height as of this encounter: 1.6 m (5' 3\").    Weight as of this encounter: 165.6 kg (365 lb)..  bp completed using cuff size large    "

## 2019-05-30 ASSESSMENT — ANXIETY QUESTIONNAIRES: GAD7 TOTAL SCORE: 17

## 2019-05-30 ASSESSMENT — PATIENT HEALTH QUESTIONNAIRE - PHQ9: SUM OF ALL RESPONSES TO PHQ QUESTIONS 1-9: 16

## 2019-06-24 DIAGNOSIS — F41.1 GAD (GENERALIZED ANXIETY DISORDER): ICD-10-CM

## 2019-06-24 NOTE — TELEPHONE ENCOUNTER
Requested Prescriptions   Pending Prescriptions Disp Refills     gabapentin (NEURONTIN) 800 MG tablet [Pharmacy Med Name: GABAPENTIN 800 MG TABLET] 90 tablet 3     Sig: TAKE 1 TABLET (800 MG) BY MOUTH 3 TIMES DAILY INCREASED DOSE.       There is no refill protocol information for this order        Last Written Prescription Date:  2/13/19  Last Fill Quantity: 90,  # refills: 3   Last office visit: 5/29/2019 with prescribing provider:  5/29/19   Future Office Visit:   Next 5 appointments (look out 90 days)    Jul 03, 2019 10:45 AM CDT  Return Visit with Lisa Conrad NP  [facility] [address]  [address]  [facility] [address] [phone]

## 2019-06-25 RX ORDER — GABAPENTIN 800 MG/1
TABLET ORAL
Qty: 90 TABLET | Refills: 3 | Status: ON HOLD | OUTPATIENT
Start: 2019-06-25 | End: 2020-02-01

## 2019-06-29 DIAGNOSIS — F33.1 DEPRESSION, MAJOR, RECURRENT, MODERATE (H): ICD-10-CM

## 2019-06-29 DIAGNOSIS — F41.1 GAD (GENERALIZED ANXIETY DISORDER): ICD-10-CM

## 2019-06-29 NOTE — TELEPHONE ENCOUNTER
"Requested Prescriptions   Pending Prescriptions Disp Refills     buPROPion (WELLBUTRIN XL) 300 MG 24 hr tablet [Pharmacy Med Name: BUPROPION HCL  MG TABLET]  Last Written Prescription Date:  12/11/2018  Last Fill Quantity: 90 tablet,  # refills: 1   Last Office Visit: 5/29/2019   Future Office Visit:    Next 5 appointments (look out 90 days)    Jul 03, 2019 10:45 AM CDT  Return Visit with Lisa Conrad NP  [facility] [address]  [address]  [facility] [id number] [phone]        30 tablet 5     Sig: TAKE 1 TABLET (300 MG) BY MOUTH EVERY MORNING       SSRIs Protocol Failed - 6/29/2019  8:51 AM        Failed - PHQ-9 score less than 5 in past 6 months     Please review last PHQ-9 score.  PHQ-9 SCORE 3/20/2019 4/3/2019 5/29/2019   PHQ-9 Total Score - - -   PHQ-9 Total Score MyChart 20 (Severe depression) 21 (Severe depression) 16 (Moderately severe depression)   PHQ-9 Total Score 20 21 16     STEPHENIE-7 SCORE 3/20/2019 4/3/2019 5/29/2019   Total Score - - -   Total Score 12 (moderate anxiety) 16 (severe anxiety) 17 (severe anxiety)   Total Score 12 16 17           Passed - Medication is Bupropion     If the medication is Bupropion (Wellbutrin), and the patient is taking for smoking cessation; OK to refill.          Passed - Medication is active on med list        Passed - Patient is age 18 or older        Passed - No active pregnancy on record        Passed - No positive pregnancy test in last 12 months        Passed - Recent (6 mo) or future (30 days) visit within the authorizing provider's specialty     Patient had office visit in the last 6 months or has a visit in the next 30 days with authorizing provider or within the authorizing provider's specialty.  See \"Patient Info\" tab in inbasket, or \"Choose Columns\" in Meds & Orders section of the refill encounter.              "

## 2019-07-03 NOTE — TELEPHONE ENCOUNTER
Patient no showed today with me.   Please reach out to see if she will follow with me  Looks like Primary Care Provider filled this med on 6/1/2019

## 2019-07-03 NOTE — TELEPHONE ENCOUNTER
Routing refill request to provider for review/approval because:  PHQ-9>5.     Please review psychiatry note on 5/29/2019 as well    Zaria KATE, RN, BSN, PHN

## 2019-07-08 RX ORDER — BUPROPION HYDROCHLORIDE 300 MG/1
TABLET ORAL
Qty: 7 TABLET | Refills: 0 | Status: ON HOLD | OUTPATIENT
Start: 2019-07-08 | End: 2020-02-01

## 2019-07-08 NOTE — TELEPHONE ENCOUNTER
PCP declined to refill this med.7 day bridge provided with message to pharmacy asking them to urge patient to call us. She has not returned our attempts at communication about her follow-up plans.

## 2019-08-06 ENCOUNTER — TELEPHONE (OUTPATIENT)
Dept: INTERNAL MEDICINE | Facility: CLINIC | Age: 39
End: 2019-08-06

## 2019-08-06 ENCOUNTER — ANCILLARY PROCEDURE (OUTPATIENT)
Dept: GENERAL RADIOLOGY | Facility: CLINIC | Age: 39
End: 2019-08-06
Attending: PHYSICIAN ASSISTANT
Payer: COMMERCIAL

## 2019-08-06 ENCOUNTER — OFFICE VISIT (OUTPATIENT)
Dept: URGENT CARE | Facility: URGENT CARE | Age: 39
End: 2019-08-06
Payer: COMMERCIAL

## 2019-08-06 VITALS
BODY MASS INDEX: 64.18 KG/M2 | RESPIRATION RATE: 16 BRPM | TEMPERATURE: 97 F | DIASTOLIC BLOOD PRESSURE: 88 MMHG | WEIGHT: 293 LBS | HEART RATE: 77 BPM | OXYGEN SATURATION: 94 % | SYSTOLIC BLOOD PRESSURE: 146 MMHG

## 2019-08-06 DIAGNOSIS — R05.9 COUGH: ICD-10-CM

## 2019-08-06 DIAGNOSIS — R06.2 WHEEZING: ICD-10-CM

## 2019-08-06 DIAGNOSIS — J45.901 EXACERBATION OF ASTHMA, UNSPECIFIED ASTHMA SEVERITY, UNSPECIFIED WHETHER PERSISTENT: ICD-10-CM

## 2019-08-06 DIAGNOSIS — R05.9 COUGH: Primary | ICD-10-CM

## 2019-08-06 PROCEDURE — 71046 X-RAY EXAM CHEST 2 VIEWS: CPT

## 2019-08-06 PROCEDURE — 99214 OFFICE O/P EST MOD 30 MIN: CPT | Mod: 25 | Performed by: PHYSICIAN ASSISTANT

## 2019-08-06 PROCEDURE — 94640 AIRWAY INHALATION TREATMENT: CPT | Performed by: PHYSICIAN ASSISTANT

## 2019-08-06 RX ORDER — ALBUTEROL SULFATE 90 UG/1
2 AEROSOL, METERED RESPIRATORY (INHALATION) EVERY 6 HOURS
Qty: 1 INHALER | Refills: 0 | Status: SHIPPED | OUTPATIENT
Start: 2019-08-06 | End: 2022-09-28

## 2019-08-06 RX ORDER — LEVALBUTEROL INHALATION SOLUTION 1.25 MG/3ML
SOLUTION RESPIRATORY (INHALATION)
Qty: 1 ML | Refills: 0
Start: 2019-08-06 | End: 2019-08-06

## 2019-08-06 RX ORDER — CEFUROXIME AXETIL 500 MG/1
500 TABLET ORAL 2 TIMES DAILY
Qty: 20 TABLET | Refills: 0 | Status: SHIPPED | OUTPATIENT
Start: 2019-08-06 | End: 2020-01-08

## 2019-08-06 RX ORDER — DOXYCYCLINE 100 MG/1
100 CAPSULE ORAL 2 TIMES DAILY
Qty: 20 CAPSULE | Refills: 0 | Status: SHIPPED | OUTPATIENT
Start: 2019-08-06 | End: 2019-08-06

## 2019-08-06 RX ORDER — PREDNISONE 20 MG/1
TABLET ORAL
Qty: 18 TABLET | Refills: 0 | Status: SHIPPED | OUTPATIENT
Start: 2019-08-06 | End: 2020-01-08

## 2019-08-06 RX ORDER — LEVALBUTEROL INHALATION SOLUTION 1.25 MG/3ML
1.25 SOLUTION RESPIRATORY (INHALATION) ONCE
Status: COMPLETED | OUTPATIENT
Start: 2019-08-06 | End: 2019-08-06

## 2019-08-06 RX ADMIN — LEVALBUTEROL INHALATION SOLUTION 1.25 MG: 1.25 SOLUTION RESPIRATORY (INHALATION) at 09:53

## 2019-08-06 NOTE — PATIENT INSTRUCTIONS
(R06.2) Wheezing  (primary encounter diagnosis)  Comment:   Plan: INHALATION/NEBULIZER TREATMENT, INITIAL,         Nursing Communication 1, levalbuterol (XOPENEX)        neb solution 1.25 mg, albuterol (PROAIR         HFA/PROVENTIL HFA/VENTOLIN HFA) 108 (90 Base)         MCG/ACT inhaler, DISCONTINUED: levalbuterol         (XOPENEX) 1.25 MG/3ML neb solution            (R05) Cough  Comment:   Plan: XR Chest 2 Views, doxycycline hyclate         (VIBRAMYCIN) 100 MG capsule            (J45.901) Exacerbation of asthma, unspecified asthma severity, unspecified whether persistent  Comment:   Plan: predniSONE (DELTASONE) 20 MG tablet          Follow up with primary clinic for re-check in 1 week, sooner should symptoms persist or worsen.

## 2019-08-06 NOTE — TELEPHONE ENCOUNTER
Panel Management Review    Patient Active Problem List   Diagnosis     STEPHENIE (generalized anxiety disorder)     Morbid obesity due to excess calories (H)     CARDIOVASCULAR SCREENING; LDL GOAL LESS THAN 130     Herpes     Depression, major, recurrent, moderate (H)     Intermittent asthma without complication, unspecified asthma severity     Bipolar affective disorder, remission status unspecified (H)     Cannabis use disorder, mild, abuse     Alcohol use disorder     Lateral epicondylitis of left elbow       Patient has the following on her problem list:     Depression / Dysthymia review    Measure:  Needs PHQ-9 score of 4 or less during index window.  Administer PHQ-9 and if score is 5 or more, send encounter to provider for next steps.    5 - 7 month window range: 7/22-11/19    PHQ-9 SCORE 3/20/2019 4/3/2019 5/29/2019   PHQ-9 Total Score - - -   PHQ-9 Total Score MyChart 20 (Severe depression) 21 (Severe depression) 16 (Moderately severe depression)   PHQ-9 Total Score 20 21 16       If PHQ-9 recheck is 5 or more, route to provider for next steps.    Patient is due for:  PHQ9    Asthma review     ACT Total Scores 1/14/2019   ACT TOTAL SCORE -   ASTHMA ER VISITS -   ASTHMA HOSPITALIZATIONS -   ACT TOTAL SCORE (Goal Greater than or Equal to 20) 20   In the past 12 months, how many times did you visit the emergency room for your asthma without being admitted to the hospital? 0   In the past 12 months, how many times were you hospitalized overnight because of your asthma? 0      1. Is Asthma diagnosis on the Problem List? Yes    2. Is Asthma listed on Health Maintenance? Yes    3. Patient is due for:  ACT      Composite cancer screening  Chart review shows that this patient is due/due soon for the following None  Summary:    Patient is due/failing the following:   ACT and PHQ9    Action needed:   Patient needs to do ACT. and Patient needs to do PHQ9.    Type of outreach:    Sent [employer] message. and Copy of ACT mailed  to patient, will reach out in 5 days.    Questions for provider review:    None                                                                                                                                    Lisa Hooks CMA       Chart routed to Care Team .

## 2019-08-06 NOTE — PROGRESS NOTES
Patient presents with:  Urgent Care: sore throat, tongue soreness, slight cough and stuffy nose since saturday.     SUBJECTIVE:   Cristina Dean is a 39 year old female presenting with a chief complaint o:  1) sore throat, severe today  2) runny nose and congestion  3) cough  4) awoke last night and felt short of breath with cough.    Feeling better this morning.  Denies any chest pain.      Onset of symptoms was 3 day(s) ago.  Course of illness is worsening.    Severity moderate  Current and Associated symptoms: as above  Treatment measures tried include sugar selzer,albuterol   Predisposing factors include HX of asthma.    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, unspecified      Depression      Herpes      Intermittent asthma     triggers include fall and spring allergy seasons     Obesity 8/1/12    BMI 53     Urinary incontinence 2/1/2017     Patient Active Problem List   Diagnosis     STEPHENIE (generalized anxiety disorder)     Morbid obesity due to excess calories (H)     CARDIOVASCULAR SCREENING; LDL GOAL LESS THAN 130     Herpes     Depression, major, recurrent, moderate (H)     Intermittent asthma without complication, unspecified asthma severity     Bipolar affective disorder, remission status unspecified (H)     Cannabis use disorder, mild, abuse     Alcohol use disorder     Lateral epicondylitis of left elbow     Social History     Tobacco Use     Smoking status: Current Every Day Smoker     Packs/day: 0.50     Years: 15.00     Pack years: 7.50     Types: Cigarettes     Start date: 6/1/2018     Smokeless tobacco: Never Used     Tobacco comment: 1/4 ppd    Substance Use Topics     Alcohol use: Yes     Comment: 1 drink every 2 months        ROS:  CONSTITUTIONAL:as per HPI  INTEGUMENTARY/SKIN: NEGATIVE for worrisome rashes, moles or lesions  EYES: NEGATIVE for vision changes or irritation  ENT/MOUTH: as per  HPI  RESP:as per HPI  CV: NEGATIVE for chest pain, palpitations or peripheral edema  GI: NEGATIVE for nausea, abdominal pain, heartburn, or change in bowel habits  MUSCULOSKELETAL: NEGATIVE for significant arthralgias or myalgia  ENDOCRINE: NEGATIVE for temperature intolerance, skin/hair changes  Review of systems negative except as stated above.    OBJECTIVE  :BP (!) 146/88   Pulse 77   Temp 97  F (36.1  C) (Oral)   Resp 16   Wt (!) 164.3 kg (362 lb 5 oz)   SpO2 96%   BMI 64.18 kg/m    GENERAL APPEARANCE: healthy, alert and no distress  EYES: EOMI,  PERRL, conjunctiva clear  HENT: ear canals and TM's normal.  Nose and mouth without ulcers, erythema or lesions  NECK: supple, nontender, no lymphadenopathy  RESP: wheezing throughout which improves with neb in clinic but does not clear completely.    CV: regular rates and rhythm, normal S1 S2, no murmur noted  ABDOMEN:  soft, nontender, no HSM or masses and bowel sounds normal  NEURO: Normal strength and tone, sensory exam grossly normal,  normal speech and mentation  SKIN: no suspicious lesions or rashes    CXR: no infiltrates, masses or cardiomegaly as per my interpretation during clinic visit today.      (R06.2) Wheezing  (primary encounter diagnosis)  Comment:   Plan: INHALATION/NEBULIZER TREATMENT, INITIAL,         Nursing Communication 1, levalbuterol (XOPENEX)        neb solution 1.25 mg, albuterol (PROAIR         HFA/PROVENTIL HFA/VENTOLIN HFA) 108 (90 Base)         MCG/ACT inhaler, DISCONTINUED: levalbuterol         (XOPENEX) 1.25 MG/3ML neb solution            (R05) Cough  Comment:   Plan: XR Chest 2 Views, doxycycline hyclate         (VIBRAMYCIN) 100 MG capsule            (J45.901) Exacerbation of asthma, unspecified asthma severity, unspecified whether persistent  Comment:   Plan: predniSONE (DELTASONE) 20 MG tablet          Follow up with primary clinic for re-check in 1 week, sooner should symptoms persist or worsen.    Patient expresses  understanding and agreement with the assessment and plan as above.

## 2019-12-31 ENCOUNTER — APPOINTMENT (OUTPATIENT)
Dept: GENERAL RADIOLOGY | Facility: CLINIC | Age: 39
End: 2019-12-31
Attending: EMERGENCY MEDICINE
Payer: COMMERCIAL

## 2019-12-31 ENCOUNTER — HOSPITAL ENCOUNTER (EMERGENCY)
Facility: CLINIC | Age: 39
Discharge: HOME | End: 2019-12-31
Attending: EMERGENCY MEDICINE | Admitting: EMERGENCY MEDICINE
Payer: COMMERCIAL

## 2019-12-31 VITALS
RESPIRATION RATE: 22 BRPM | OXYGEN SATURATION: 97 % | WEIGHT: 293 LBS | BODY MASS INDEX: 51.91 KG/M2 | TEMPERATURE: 98.5 F | SYSTOLIC BLOOD PRESSURE: 150 MMHG | HEIGHT: 63 IN | DIASTOLIC BLOOD PRESSURE: 100 MMHG

## 2019-12-31 DIAGNOSIS — M25.511 ACUTE PAIN OF RIGHT SHOULDER: ICD-10-CM

## 2019-12-31 PROCEDURE — 99283 EMERGENCY DEPT VISIT LOW MDM: CPT

## 2019-12-31 PROCEDURE — 73030 X-RAY EXAM OF SHOULDER: CPT | Mod: RT

## 2019-12-31 RX ORDER — LIDOCAINE 50 MG/G
1 PATCH TOPICAL EVERY 12 HOURS PRN
Qty: 12 PATCH | Refills: 0 | Status: SHIPPED | OUTPATIENT
Start: 2019-12-31 | End: 2020-02-21

## 2019-12-31 RX ORDER — CYCLOBENZAPRINE HCL 10 MG
10 TABLET ORAL 3 TIMES DAILY PRN
Qty: 15 TABLET | Refills: 0 | Status: ON HOLD | OUTPATIENT
Start: 2019-12-31 | End: 2020-02-07

## 2019-12-31 ASSESSMENT — ENCOUNTER SYMPTOMS
BACK PAIN: 1
ARTHRALGIAS: 1
NECK PAIN: 0

## 2019-12-31 ASSESSMENT — MIFFLIN-ST. JEOR: SCORE: 2309.13

## 2019-12-31 NOTE — ED TRIAGE NOTES
Pt has right shoulder pain for past 2 months which has been worsening.  She does 3 physical demanding jobs.  No known injury.

## 2019-12-31 NOTE — DISCHARGE INSTRUCTIONS
Ice shoulder  Continue to keep shoulder loose  Sling for comfort as needed  Ibuprofen and tylenol for pain as needed  Lidocaine patches as needed  Flexeril as needed  Follow up with orthopedics

## 2019-12-31 NOTE — LETTER
December 31, 2019      To Whom It May Concern:      Cristina Dean was seen in our Emergency Department today, 12/31/19.  I expect her condition to improve over the next 2-3 days.  She may return to work when improved.    Sincerely,        Demetra Cevallos RN

## 2019-12-31 NOTE — ED NOTES
Patient discharged to home. Patient received follow-up as needed with [facility] and medication instructions for Flexeril and Lidocaine patches. Patient received discharge instructions and has no other questions at this time.

## 2019-12-31 NOTE — ED AVS SNAPSHOT
[facility] Emergency Department  [address]  [facility] [address]  Phone:  [phone]  Fax:  [phone]                                    Cristina Dean   MRN: [id number]    Department:  [facility] Emergency Department   Date of Visit:  12/31/2019           After Visit Summary Signature Page    I have received my discharge instructions, and my questions have been answered. I have discussed any challenges I see with this plan with the nurse or doctor.    ..........................................................................................................................................  Patient/Patient Representative Signature      ..........................................................................................................................................  Patient Representative Print Name and Relationship to Patient    ..................................................               ................................................  Date                                   Time    ..........................................................................................................................................  Reviewed by Signature/Title    ...................................................              ..............................................  Date                                               Time          22EPIC Rev 08/18

## 2019-12-31 NOTE — ED PROVIDER NOTES
History     Chief Complaint:    Shoulder Pain    The history is provided by the patient.      Cristina Dean is a 39 year old female who presents with right shoulder pain. The patient has been experiencing this pain for the last 2 months, but it has worsened in the last week. The pain radiates down her right arm and it is worse with movement.  Pain primarily there only with movement of right shoulder. She also reports some upper back/neck pain. The patient denies any chest pain, shortness of breath, spasms, numbness/tingling, fever, or neck pain. She states that she has 3 demanding jobs requiring her to use her right arm and lift things above shoulder level. The patient has been applying ice, heat, and taking Aleve for pain alleviation. She denies any falls or known injuries. The patient tried to go to urgent care before coming to the ED, but there was a 4 hour wait. The patient tried scheduling a primary care appointment with Dr. Jaramillo at the [facility], but was not able to get one scheduled until January 8th.    Allergies:  No Known Drug Allergies     Medications:    Albuterol inhaler  Lipitor  Wellbutrin XL  Zyrtec  Prozac  Neurontin  Vistaril  Prednisone  Inderal LA    Past Medical History:    Anxiety  Chlamydia  Depression  Herpes  Intermittent asthma  Panic disorder with agoraphobia  Obesity  Gonorrhea  Trichomonas  Urinary incontinence  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, unspecified  Lateral epicondylitis of left elbow  Cannabis use disorder, mild, abuse  Bipolar affective disorder, remission status unspecified  Alcohol use disorder    Past Surgical History:    Excise back mass  Tonsillectomy    Family History:    Acute leukemia in remission - father  Neuropathy from chemo - father  Type 2 diabetes - father  Depression - father  Anxiety - father    Social History:  Positive for tobacco use - 0.25 packs/day.  Positive for alcohol use - 1 drink every 2  "months.  Positive for alcohol use - daily marijuana.  Patient accompanied to the ED by her .   Marital Status:   [2]     Review of Systems   Cardiovascular: Negative for chest pain.   Musculoskeletal: Positive for arthralgias (right shoulder) and back pain. Negative for neck pain.        Reports right arm pain. Denies muscle spasms.   All other systems reviewed and are negative.    Physical Exam     Patient Vitals for the past 24 hrs:   BP Temp Temp src Heart Rate Resp SpO2 Height Weight   12/31/19 1232 (!) 150/100 -- -- -- -- -- -- --   12/31/19 1231 -- 98.5  F (36.9  C) Oral 84 22 97 % 1.6 m (5' 3\") (!) 167 kg (368 lb 2.7 oz)     Physical Exam    General: Resting comfortably on the gurney  Eyes:  The pupils are equal and round    Conjunctivae and sclerae are normal  ENT:    Moist mucous membranes  Neck:  Normal range of motion. No midline neck tenderness  CV:  Regular rate and rhythm    Skin warm and well perfused     Radial pulses 2+ on right  Resp:  Lungs are clear    Non-labored    No rales    No wheezing   MS:  Mild tenderness on right shoulder, right upper back musculature. No swelling or erythema on right shoulder, right arm. Pain with movement of right shoulder. Able to move right shoulder up to about shoulder height but more pain when moving it above shoulder height. Painful when trying to reach hand to low back and with external rotation of right shoulder  Skin:  No rash or acute skin lesions noted  Neuro:   Awake, alert.      Speech is normal and fluent.    Face is symmetric.     Strength intact on right arm including able to move right hand fingers, wrist flexion/extension, elbow flexion/extension, shoulder flexion/extension, abduction/adduction.    SILT on right arm  Psych:  Normal affect.  Appropriate interactions.    Emergency Department Course     Imaging:  Radiology findings were communicated with the patient and family who voiced understanding of the findings.    XR  Shoulder Right " G/E 3 Views:   No evidence of acute fracture. Acromioclavicular joint and glenohumeral joint have an unremarkable appearance. There is a small calcification adjacent to the proximal humeral metaphysis which may be a soft tissue calcification. Otherwise unremarkable, as per radiology.     Emergency Department Course:  Past medical records, nursing notes, and vitals reviewed.    1240: I performed an exam of the patient as documented above.     The patient was sent for a right shoulder x-ray while in the emergency department, results above.     1403: I rechecked the patient and discussed the results of her workup thus far.     I personally reviewed the imaging results with the Patient and spouse and answered all related questions prior to discharge.     Findings and plan explained to the Patient and spouse. Patient discharged home with instructions regarding supportive care, medications, and reasons to return. The importance of close follow-up was reviewed.     Impression & Plan     Medical Decision Making:  Cristina Dean is a 39 year old female who presents with atraumatic right shoulder pain. Broad differential was considered.  Patient is well appearing with stable vitals. X-rays were obtained which demonstrates no evidence of fracture or dislocation. Neurovascular status is intact distally and no signs of compartment syndrome. I suspect rotator cuff injury. There is no evidence to suggest cervical radiculopathy or thoracic outlet syndrome as an etiology for symptoms. The patient's pain is clearly mechanical in nature, reproducible with movement, and there is no associated chest pain or shortness of breath.  Suspicion for referred pain from cardiac or pulmonary cause is extremely low.  I doubt septic arthritis or inflammatory effusion such as gout or pseudogout given absence of effusion, joint redness, and afebrile.  No clinical evidence of overlying skin or soft tissue infection such as cellulitis or necrotizing  soft tissue infection.    Given significant pain with movement, the patient was given a sling and instructed to follow-up with ortho for further evaluation.  Recommended conservative treatment with NSAIDS, tylenol, ice, rest, keeping shoulder mobile. Discussed indications to return to ED if any new or worsening symptoms develop    Diagnosis:    ICD-10-CM   1. Acute pain of right shoulder M25.511     Disposition:  Discharged to home.    Discharge Medications:  New Prescriptions    LIDOCAINE (LIDODERM) 5 % PATCH    Place 1 patch onto the skin every 12 hours as needed for moderate pain To prevent lidocaine toxicity, patient should be patch free for 12 hrs daily.     Scribe Disclosure:  Adri RUGGIERO, am serving as a scribe at 12:35 PM on 12/31/2019 to document services personally performed by Maggie Dorantes MD based on my observations and the provider's statements to me.     Adri Kumar  12/31/2019   [facility] EMERGENCY DEPARTMENT       Maggie Dorantes MD  12/31/19 1522

## 2020-01-08 ENCOUNTER — ANCILLARY PROCEDURE (OUTPATIENT)
Dept: MAMMOGRAPHY | Facility: CLINIC | Age: 40
End: 2020-01-08
Attending: INTERNAL MEDICINE
Payer: COMMERCIAL

## 2020-01-08 ENCOUNTER — OFFICE VISIT (OUTPATIENT)
Dept: INTERNAL MEDICINE | Facility: CLINIC | Age: 40
End: 2020-01-08
Payer: COMMERCIAL

## 2020-01-08 VITALS
WEIGHT: 293 LBS | OXYGEN SATURATION: 94 % | BODY MASS INDEX: 51.91 KG/M2 | TEMPERATURE: 98.4 F | SYSTOLIC BLOOD PRESSURE: 136 MMHG | HEART RATE: 76 BPM | DIASTOLIC BLOOD PRESSURE: 82 MMHG | RESPIRATION RATE: 17 BRPM | HEIGHT: 63 IN

## 2020-01-08 DIAGNOSIS — Z23 NEED FOR PROPHYLACTIC VACCINATION AND INOCULATION AGAINST INFLUENZA: ICD-10-CM

## 2020-01-08 DIAGNOSIS — Z23 NEED FOR PNEUMOCOCCAL VACCINATION: ICD-10-CM

## 2020-01-08 DIAGNOSIS — J45.20 INTERMITTENT ASTHMA WITHOUT COMPLICATION, UNSPECIFIED ASTHMA SEVERITY: ICD-10-CM

## 2020-01-08 DIAGNOSIS — R43.9 SMELL DISORDER: ICD-10-CM

## 2020-01-08 DIAGNOSIS — F33.1 DEPRESSION, MAJOR, RECURRENT, MODERATE (H): ICD-10-CM

## 2020-01-08 DIAGNOSIS — R06.83 SNORING: ICD-10-CM

## 2020-01-08 DIAGNOSIS — Z12.31 VISIT FOR SCREENING MAMMOGRAM: ICD-10-CM

## 2020-01-08 DIAGNOSIS — Z13.6 CARDIOVASCULAR SCREENING; LDL GOAL LESS THAN 130: ICD-10-CM

## 2020-01-08 DIAGNOSIS — E66.01 MORBID OBESITY DUE TO EXCESS CALORIES (H): ICD-10-CM

## 2020-01-08 DIAGNOSIS — Z00.00 ROUTINE GENERAL MEDICAL EXAMINATION AT A HEALTH CARE FACILITY: Primary | ICD-10-CM

## 2020-01-08 DIAGNOSIS — F31.9 BIPOLAR AFFECTIVE DISORDER, REMISSION STATUS UNSPECIFIED (H): ICD-10-CM

## 2020-01-08 DIAGNOSIS — F41.1 GAD (GENERALIZED ANXIETY DISORDER): ICD-10-CM

## 2020-01-08 LAB
ALBUMIN SERPL-MCNC: 3.4 G/DL (ref 3.4–5)
ALP SERPL-CCNC: 66 U/L (ref 40–150)
ALT SERPL W P-5'-P-CCNC: 25 U/L (ref 0–50)
ANION GAP SERPL CALCULATED.3IONS-SCNC: 4 MMOL/L (ref 3–14)
AST SERPL W P-5'-P-CCNC: 10 U/L (ref 0–45)
BILIRUB SERPL-MCNC: 0.4 MG/DL (ref 0.2–1.3)
BUN SERPL-MCNC: 13 MG/DL (ref 7–30)
CALCIUM SERPL-MCNC: 9.2 MG/DL (ref 8.5–10.1)
CHLORIDE SERPL-SCNC: 105 MMOL/L (ref 94–109)
CHOLEST SERPL-MCNC: 213 MG/DL
CO2 SERPL-SCNC: 28 MMOL/L (ref 20–32)
CREAT SERPL-MCNC: 0.78 MG/DL (ref 0.52–1.04)
GFR SERPL CREATININE-BSD FRML MDRD: >90 ML/MIN/{1.73_M2}
GLUCOSE SERPL-MCNC: 98 MG/DL (ref 70–99)
HDLC SERPL-MCNC: 48 MG/DL
HGB BLD-MCNC: 14.7 G/DL (ref 11.7–15.7)
LDLC SERPL CALC-MCNC: 131 MG/DL
NONHDLC SERPL-MCNC: 165 MG/DL
POTASSIUM SERPL-SCNC: 4.6 MMOL/L (ref 3.4–5.3)
PROT SERPL-MCNC: 7.1 G/DL (ref 6.8–8.8)
SODIUM SERPL-SCNC: 137 MMOL/L (ref 133–144)
TRIGL SERPL-MCNC: 168 MG/DL

## 2020-01-08 PROCEDURE — 36415 COLL VENOUS BLD VENIPUNCTURE: CPT | Performed by: INTERNAL MEDICINE

## 2020-01-08 PROCEDURE — 90686 IIV4 VACC NO PRSV 0.5 ML IM: CPT | Performed by: INTERNAL MEDICINE

## 2020-01-08 PROCEDURE — 80061 LIPID PANEL: CPT | Performed by: INTERNAL MEDICINE

## 2020-01-08 PROCEDURE — 99214 OFFICE O/P EST MOD 30 MIN: CPT | Mod: 25 | Performed by: INTERNAL MEDICINE

## 2020-01-08 PROCEDURE — 90472 IMMUNIZATION ADMIN EACH ADD: CPT | Performed by: INTERNAL MEDICINE

## 2020-01-08 PROCEDURE — 77067 SCR MAMMO BI INCL CAD: CPT | Mod: TC

## 2020-01-08 PROCEDURE — 85018 HEMOGLOBIN: CPT | Performed by: INTERNAL MEDICINE

## 2020-01-08 PROCEDURE — 80053 COMPREHEN METABOLIC PANEL: CPT | Performed by: INTERNAL MEDICINE

## 2020-01-08 PROCEDURE — 99396 PREV VISIT EST AGE 40-64: CPT | Mod: 25 | Performed by: INTERNAL MEDICINE

## 2020-01-08 PROCEDURE — 90471 IMMUNIZATION ADMIN: CPT | Performed by: INTERNAL MEDICINE

## 2020-01-08 PROCEDURE — 90732 PPSV23 VACC 2 YRS+ SUBQ/IM: CPT | Performed by: INTERNAL MEDICINE

## 2020-01-08 RX ORDER — GABAPENTIN 800 MG/1
800 TABLET ORAL 3 TIMES DAILY
Qty: 270 TABLET | Refills: 0 | Status: CANCELLED | OUTPATIENT
Start: 2020-01-08

## 2020-01-08 RX ORDER — PROPRANOLOL HYDROCHLORIDE 120 MG/1
120 CAPSULE, EXTENDED RELEASE ORAL DAILY
Qty: 30 CAPSULE | Refills: 1 | Status: CANCELLED | OUTPATIENT
Start: 2020-01-08

## 2020-01-08 RX ORDER — ATORVASTATIN CALCIUM 10 MG/1
10 TABLET, FILM COATED ORAL DAILY
Qty: 90 TABLET | Refills: 1 | Status: ON HOLD | OUTPATIENT
Start: 2020-01-08 | End: 2020-02-07

## 2020-01-08 ASSESSMENT — MIFFLIN-ST. JEOR: SCORE: 2287.5
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
## 2020-01-08 NOTE — NURSING NOTE
Prior to immunization administration, verified patients identity using patient s name and date of birth. Please see Immunization Activity for additional information.     Screening Questionnaire for Adult Immunization    Are you sick today?   No   Do you have allergies to medications, food, a vaccine component or latex?   No   Have you ever had a serious reaction after receiving a vaccination?   No   Do you have a long-term health problem with heart, lung, kidney, or metabolic disease (e.g., diabetes), asthma, a blood disorder, no spleen, complement component deficiency, a cochlear implant, or a spinal fluid leak?  Are you on long-term aspirin therapy?   Yes   Do you have cancer, leukemia, HIV/AIDS, or any other immune system problem?   No   Do you have a parent, brother, or sister with an immune system problem?   Yes   In the past 3 months, have you taken medications that affect  your immune system, such as prednisone, other steroids, or anticancer drugs; drugs for the treatment of rheumatoid arthritis, Crohn s disease, or psoriasis; or have you had radiation treatments?   No   Have you had a seizure, or a brain or other nervous system problem?   No   During the past year, have you received a transfusion of blood or blood    products, or been given immune (gamma) globulin or antiviral drug?   No   For women: Are you pregnant or is there a chance you could become       pregnant during the next month?   No   Have you received any vaccinations in the past 4 weeks?   No     Immunization questionnaire answers were all negative.        Per orders of Dr. Jaramillo, injection of Pneumococcal 23 given by Lisa Hooks. Patient instructed to remain in clinic for 15 minutes afterwards, and to report any adverse reaction to me immediately.       Screening performed by Lisa Hooks on 1/8/2020 at 2:34 PM.

## 2020-01-08 NOTE — LETTER
[facility]  [address] [address]  [phone]      1/9/2020       Cristina Dean  [address] [place] [id number]        Dear Cristina,    I am pleased to inform you that your routine blood work including your hemoglobin, sodium, potassium, calcium, kidney and liver function tests are all normal.    Your cholesterol is slightly high and could be treated more aggressively with better diet, exercise and weight loss.  Please follow-up with me to discuss your further medication options/changes if you are interested.  We may want to consider increasing your cholesterol medication dosing.    Sincerely,      Jermain Jaramillo MD  Internal Medicine

## 2020-01-09 ASSESSMENT — ASTHMA QUESTIONNAIRES: ACT_TOTALSCORE: 25

## 2020-01-30 ENCOUNTER — TELEPHONE (OUTPATIENT)
Dept: BEHAVIORAL HEALTH | Facility: CLINIC | Age: 40
End: 2020-01-30

## 2020-01-30 ENCOUNTER — HOSPITAL ENCOUNTER (EMERGENCY)
Facility: CLINIC | Age: 40
Discharge: PSYCHIATRIC OR SUBSTANCE USE FACILITY | End: 2020-01-31
Attending: EMERGENCY MEDICINE | Admitting: EMERGENCY MEDICINE
Payer: COMMERCIAL

## 2020-01-30 DIAGNOSIS — R45.851 SUICIDAL IDEATION: ICD-10-CM

## 2020-01-30 LAB
ALCOHOL BREATH TEST: 0 (ref 0–0.01)
AMPHETAMINES UR QL SCN: NEGATIVE
BARBITURATES UR QL: NEGATIVE
BENZODIAZ UR QL: NEGATIVE
CANNABINOIDS UR QL SCN: POSITIVE
COCAINE UR QL: NEGATIVE
HCG UR QL: NEGATIVE
OPIATES UR QL SCN: NEGATIVE
PCP UR QL SCN: NEGATIVE

## 2020-01-30 PROCEDURE — 99285 EMERGENCY DEPT VISIT HI MDM: CPT | Mod: 25

## 2020-01-30 PROCEDURE — 81025 URINE PREGNANCY TEST: CPT | Performed by: EMERGENCY MEDICINE

## 2020-01-30 PROCEDURE — 80307 DRUG TEST PRSMV CHEM ANLYZR: CPT | Performed by: EMERGENCY MEDICINE

## 2020-01-30 PROCEDURE — 25000132 ZZH RX MED GY IP 250 OP 250 PS 637: Performed by: EMERGENCY MEDICINE

## 2020-01-30 PROCEDURE — 90791 PSYCH DIAGNOSTIC EVALUATION: CPT

## 2020-01-30 RX ORDER — ACETAMINOPHEN 500 MG
1000 TABLET ORAL ONCE
Status: COMPLETED | OUTPATIENT
Start: 2020-01-30 | End: 2020-01-30

## 2020-01-30 RX ADMIN — ACETAMINOPHEN 1000 MG: 500 TABLET, FILM COATED ORAL at 19:16

## 2020-01-30 ASSESSMENT — ENCOUNTER SYMPTOMS: SLEEP DISTURBANCE: 1

## 2020-01-30 ASSESSMENT — MIFFLIN-ST. JEOR: SCORE: 2301.56

## 2020-01-30 NOTE — ED PROVIDER NOTES
"  History     Chief Complaint:  Mental Health Problem    The history is provided by the patient and a friend.      Cristina Dean is a 39 year old female with a history of anxiety, bipolar disorder, and depression, who presents to the emergency department with her best friend for evaluation of a mental health problem. To note, the patient reports being admitted to the hospital for mental health reasons a few years back but has not needed it since then. She states she has been off medications secondary to financial reasons.    Recently, the patient has had an increase in stressors, such as starting a new job and having both her father and  get sick. She is noted to live with her  but states her relationship with him is not going very well at the moment. Patient also reports suicidal ideations but denies having a plan. She states some sleep disturbance as well, as she usually wakes up with nightmares, feeling as though \"I am being strangled,\" which prompted her to take a dose of her father's Trazodone last night.     To note, the patient does not see a counselor or take any mental health drugs as she was unable to afford it. She tried to make a therapy appointment but was not able to get in until April. 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  Smoking Status: Current every day smoker 0.5 PPD for 15 years  Smokeless Tobacco: Never  Alcohol Use: yes   Drug use: marijuana   Marital Status:        Review of Systems   Psychiatric/Behavioral: Positive for sleep disturbance and suicidal ideas.   All other systems reviewed and are negative.      Physical Exam     Patient Vitals for the past 24 hrs:   BP Temp Temp src Pulse Resp SpO2 Height Weight   01/30/20 1310 (!) 184/110 98.6  F (37  C) Temporal 88 20 97 % 1.626 m (5' 4\") (!) 164.7 kg (363 lb)       Physical Exam  Nursing note and vitals reviewed.  Constitutional: Well nourished.   Eyes: Conjunctiva normal.  Pupils are equal, round, and reactive to light.   ENT: Nose normal.   Neck: Normal range of motion.  CVS: Normal rate, regular rhythm.  Normal heart sounds.    Pulmonary: Lungs clear to auscultation bilaterally.   GI: Obese. Abdomen soft. Nontender, nondistended.   MSK: No calf tenderness or swelling.  Neuro: Alert. Follows simple commands.  Skin: Skin is warm and dry. No rash noted.   Psychiatric: Quite tearful, admits to suicidal ideations though no definitive plan.  Reports \"seeing things at night and feeling as though I am being strangled\". Denies homicidal ideations    Emergency Department Course   Laboratory:  Laboratory findings were communicated with the patient and friend who voiced understanding of the findings.    HCG Qualitative Pregnancy: Negative     Drug Abuse Screen: Cannabinoids: Positive, o/w Negative    Alcohol Breath Test: 0.00    Interventions:  1916 Tylenol 1,000 mg PO    Emergency Department Course:  Past medical records, nursing notes, and vitals reviewed.    1400 I performed an exam of the patient as documented above.     The patient provided a urine sample here in the emergency department. This was sent for laboratory testing, findings above.     1658 I consulted with DEC, regarding the patient's history and presentation here in " the emergency department.    1715 I consulted with DEC, regarding the patient's history and presentation here in the emergency department.    2200  The patient was signed out to Dr. Mauro, oncoming ED physician, pending mental health bed.    I personally reviewed the laboratory results with the Patient and friend and answered all related questions prior to sign out.     Impression & Plan     Medical Decision Making:  Patient is a 39-year-old female presenting for mental health evaluation.  She was medically cleared and evaluated by DEC.  Patient has a history of bipolar and depression and has been off of medications.  She has a history of suicidality.  I do have concerns that patient is decompensated at this point in time and would benefit from inpatient placement.  DEC team is in agreement and is currently searching for a bed for patient.  She is currently on ANGELO though has been cooperative and not needed any medications.  She was signed out to my partner Dr. Mauro been pending formal bedplacement.    Diagnosis:    ICD-10-CM    1. Suicidal ideation R45.851 Drug abuse screen 77 urine (FL, RH, SH)       Disposition:  Pending mental health bed placement    Scribe Disclosure:  I, Jojo Kennedy, am serving as a scribe at 1:59 PM on 1/30/2020 to document services personally performed by Eugenia Casper DO, based on my observations and the provider's statements to me.      Eugenia Casper DO  01/30/20 [id number]

## 2020-01-30 NOTE — TELEPHONE ENCOUNTER
S: 5:23PM-Pt is a 39 years old female in the [facility] ED for increased symptoms of mental health, report by Madiha.      B:  Pt presented to the ED with symptoms that increased in the last 2 weeks.  Pt reports that she does not trust anyone and that people are out to get her.  Pt has a dx of Bi-polar d/o, Panic d/o, Generalized Anxiety, GERD.  Pt had 3 major losses in December who were her father and 2 godparents. Pt also provided hospice for her dad, which she has not had therapy for.  Pt reports she has not been taking her mental health medications for multiple months.  Pt endorses SI, no plan or intent at this time.  She reports waking up at night with terrors and has a choking sensation.  Pt denies A/V hallucinations.  Pt reports multiple hospitalization at [facility].  She smokes marijuana on a daily basis.  Utox was positive for Cannibis.   Pt lives with  and a 15 yrs old god daughter.     A: Vol.  Pt is medically cleared and ambulates independently.  Pt is open to go anywhere but [facility].  Pt sobbed during the entire assessment. Pt is calm and cooperative.      R:  Awaiting placement.  830PM- Erika consulted: Pt needs ITC due to paranoia.  Pt will remain on waitlist until there is an available bed.       Patient cleared and ready for behavioral bed placement: Yes

## 2020-01-30 NOTE — ED NOTES
Assisted pt with changing into gown. Breathalyzer test done: (0.000) Repeated exam to be sure and obtained same result.

## 2020-01-30 NOTE — ED NOTES
Assisted security to do a pat down and belongings placed in a plastic bag and taped with two signatures. Placed in locked room by security.

## 2020-01-30 NOTE — ED TRIAGE NOTES
"Pt was on Latuda in 2017, felt that she was \"doing well on that\" insurance changed and she had to stop due to very large cash price. Since then she has been on a variety of medications which she felt did not help. Then in September pts father went into hospice and pt was no longer able to afford meds. Pt is tearful in triage. States she is not sleeping well, wakes up from nightmares, has a choking sensation. 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 Pt states \"I dont know who I can trust anymore\". Feels like  \"just doesn't get it\". Denies suicidal thoughts, states \"I want to be dead but I dont want to kill myself.\"  "

## 2020-01-31 ENCOUNTER — HOSPITAL ENCOUNTER (INPATIENT)
Facility: CLINIC | Age: 40
LOS: 7 days | Discharge: HOME | DRG: 885 | End: 2020-02-07
Attending: PSYCHIATRY & NEUROLOGY | Admitting: PSYCHIATRY & NEUROLOGY
Payer: COMMERCIAL

## 2020-01-31 ENCOUNTER — TELEPHONE (OUTPATIENT)
Dept: BEHAVIORAL HEALTH | Facility: CLINIC | Age: 40
End: 2020-01-31

## 2020-01-31 VITALS
DIASTOLIC BLOOD PRESSURE: 99 MMHG | HEIGHT: 64 IN | HEART RATE: 75 BPM | TEMPERATURE: 97.8 F | OXYGEN SATURATION: 94 % | WEIGHT: 293 LBS | SYSTOLIC BLOOD PRESSURE: 150 MMHG | RESPIRATION RATE: 16 BRPM | BODY MASS INDEX: 50.02 KG/M2

## 2020-01-31 DIAGNOSIS — F31.9 BIPOLAR AFFECTIVE DISORDER, REMISSION STATUS UNSPECIFIED (H): ICD-10-CM

## 2020-01-31 DIAGNOSIS — I10 BENIGN ESSENTIAL HYPERTENSION: ICD-10-CM

## 2020-01-31 DIAGNOSIS — K21.9 GASTROESOPHAGEAL REFLUX DISEASE, ESOPHAGITIS PRESENCE NOT SPECIFIED: Primary | ICD-10-CM

## 2020-01-31 DIAGNOSIS — Z13.6 CARDIOVASCULAR SCREENING; LDL GOAL LESS THAN 130: ICD-10-CM

## 2020-01-31 PROBLEM — R45.851 SUICIDAL IDEATION: Status: ACTIVE | Noted: 2020-01-31

## 2020-01-31 PROCEDURE — 25000132 ZZH RX MED GY IP 250 OP 250 PS 637: Performed by: PSYCHIATRY & NEUROLOGY

## 2020-01-31 PROCEDURE — 25000132 ZZH RX MED GY IP 250 OP 250 PS 637: Performed by: EMERGENCY MEDICINE

## 2020-01-31 PROCEDURE — 12400001 ZZH R&B MH UMMC

## 2020-01-31 RX ORDER — ACETAMINOPHEN 325 MG/1
650 TABLET ORAL EVERY 4 HOURS PRN
Status: DISCONTINUED | OUTPATIENT
Start: 2020-01-31 | End: 2020-02-07 | Stop reason: HOSPADM

## 2020-01-31 RX ORDER — OLANZAPINE 10 MG/1
10 TABLET ORAL
Status: DISCONTINUED | OUTPATIENT
Start: 2020-01-31 | End: 2020-02-07 | Stop reason: HOSPADM

## 2020-01-31 RX ORDER — ATORVASTATIN CALCIUM 10 MG/1
10 TABLET, FILM COATED ORAL DAILY
Status: DISCONTINUED | OUTPATIENT
Start: 2020-01-31 | End: 2020-02-07 | Stop reason: HOSPADM

## 2020-01-31 RX ORDER — BISACODYL 10 MG
10 SUPPOSITORY, RECTAL RECTAL DAILY PRN
Status: DISCONTINUED | OUTPATIENT
Start: 2020-01-31 | End: 2020-02-07 | Stop reason: HOSPADM

## 2020-01-31 RX ORDER — HYDROXYZINE HYDROCHLORIDE 25 MG/1
25 TABLET, FILM COATED ORAL EVERY 4 HOURS PRN
Status: DISCONTINUED | OUTPATIENT
Start: 2020-01-31 | End: 2020-01-31

## 2020-01-31 RX ORDER — ALBUTEROL SULFATE 90 UG/1
2 AEROSOL, METERED RESPIRATORY (INHALATION) EVERY 6 HOURS PRN
Status: DISCONTINUED | OUTPATIENT
Start: 2020-01-31 | End: 2020-02-07 | Stop reason: HOSPADM

## 2020-01-31 RX ORDER — ALUMINA, MAGNESIA, AND SIMETHICONE 2400; 2400; 240 MG/30ML; MG/30ML; MG/30ML
30 SUSPENSION ORAL EVERY 4 HOURS PRN
Status: DISCONTINUED | OUTPATIENT
Start: 2020-01-31 | End: 2020-02-07 | Stop reason: HOSPADM

## 2020-01-31 RX ORDER — LORAZEPAM 1 MG/1
1 TABLET ORAL ONCE
Status: COMPLETED | OUTPATIENT
Start: 2020-01-31 | End: 2020-01-31

## 2020-01-31 RX ORDER — HYDROXYZINE HYDROCHLORIDE 25 MG/1
25-50 TABLET, FILM COATED ORAL EVERY 4 HOURS PRN
Status: DISCONTINUED | OUTPATIENT
Start: 2020-01-31 | End: 2020-02-07 | Stop reason: HOSPADM

## 2020-01-31 RX ORDER — TRAZODONE HYDROCHLORIDE 50 MG/1
50 TABLET, FILM COATED ORAL
Status: DISCONTINUED | OUTPATIENT
Start: 2020-01-31 | End: 2020-02-07 | Stop reason: HOSPADM

## 2020-01-31 RX ORDER — OLANZAPINE 10 MG/2ML
10 INJECTION, POWDER, FOR SOLUTION INTRAMUSCULAR
Status: DISCONTINUED | OUTPATIENT
Start: 2020-01-31 | End: 2020-02-07 | Stop reason: HOSPADM

## 2020-01-31 RX ORDER — ALBUTEROL SULFATE 90 UG/1
2 AEROSOL, METERED RESPIRATORY (INHALATION) EVERY 6 HOURS
Status: DISCONTINUED | OUTPATIENT
Start: 2020-01-31 | End: 2020-01-31

## 2020-01-31 RX ORDER — ACETAMINOPHEN 325 MG/1
975 TABLET ORAL EVERY 6 HOURS PRN
Status: DISCONTINUED | OUTPATIENT
Start: 2020-01-31 | End: 2020-01-31 | Stop reason: HOSPADM

## 2020-01-31 RX ADMIN — HYDROXYZINE HYDROCHLORIDE 25 MG: 25 TABLET, FILM COATED ORAL at 17:38

## 2020-01-31 RX ADMIN — ACETAMINOPHEN 650 MG: 325 TABLET, FILM COATED ORAL at 22:09

## 2020-01-31 RX ADMIN — ALUMINUM HYDROXIDE, MAGNESIUM HYDROXIDE, AND DIMETHICONE 30 ML: 400; 400; 40 SUSPENSION ORAL at 22:08

## 2020-01-31 RX ADMIN — LORAZEPAM 1 MG: 1 TABLET ORAL at 14:07

## 2020-01-31 RX ADMIN — ACETAMINOPHEN 975 MG: 325 TABLET, FILM COATED ORAL at 07:16

## 2020-01-31 RX ADMIN — HYDROXYZINE HYDROCHLORIDE 50 MG: 25 TABLET, FILM COATED ORAL at 22:09

## 2020-01-31 RX ADMIN — TRAZODONE HYDROCHLORIDE 50 MG: 50 TABLET ORAL at 22:09

## 2020-01-31 RX ADMIN — ATORVASTATIN CALCIUM 10 MG: 10 TABLET, FILM COATED ORAL at 17:38

## 2020-01-31 ASSESSMENT — ACTIVITIES OF DAILY LIVING (ADL)
HYGIENE/GROOMING: INDEPENDENT
BATHING: 0-->INDEPENDENT
SWALLOWING: 0-->SWALLOWS FOODS/LIQUIDS WITHOUT DIFFICULTY
LAUNDRY: WITH SUPERVISION
RETIRED_COMMUNICATION: 0-->UNDERSTANDS/COMMUNICATES WITHOUT DIFFICULTY
AMBULATION: 0-->INDEPENDENT
DRESS: 0-->INDEPENDENT
FALL_HISTORY_WITHIN_LAST_SIX_MONTHS: NO
COGNITION: 0 - NO COGNITION ISSUES REPORTED
ORAL_HYGIENE: INDEPENDENT
TOILETING: 0-->INDEPENDENT
TRANSFERRING: 0-->INDEPENDENT
DRESS: INDEPENDENT
RETIRED_EATING: 0-->INDEPENDENT

## 2020-01-31 ASSESSMENT — MIFFLIN-ST. JEOR: SCORE: 2288.17

## 2020-01-31 NOTE — TELEPHONE ENCOUNTER
R: [facility] looking for update. Pt is still on wait list until an appropriate bed comes available. @ 4:18 am.

## 2020-01-31 NOTE — ED NOTES
"'' Feel like Im being punished for asking for help when can I go to a room.\" explained to pt we had dec  and they would be finding a different hospital.  Pt still crying  "

## 2020-01-31 NOTE — PROGRESS NOTES
01/31/20 1622   Patient Belongings   Did you bring any home meds/supplements to the hospital?  No   Patient Belongings remains with patient;locker;sent to security per site process   Patient Belongings Remaining with Patient glasses;clothing   Patient Belongings Put in Hospital Secure Location (Security or Locker, etc.) cell phone/electronics;clothing;money (see comment);shoes;other (see comments)   Belongings Search Yes   Clothing Search Yes   Second Staff Mandy FRIEDMAN     Remains with Patient:  - Blue long sleeve t-shirt, black sweat pants, pair undies, eyeglasses.    In Locker:  - Black/gray sneakers, pair black leather gloves, chapstick, pair black socks, black jacket, gray sports bra, cleaning cloth, black smartphone w/ multiple scratches on both sides of phone.    Sent to Security:  - $20 CASH    A               Admission:  I am responsible for any personal items that are not sent to the safe or pharmacy.  [facility] is not responsible for loss, theft or damage of any property in my possession.    Signature:  _________________________________ Date: _______  Time: _____                                              Staff Signature:  ____________________________ Date: ________  Time: _____      2nd Staff person, if patient is unable/unwilling to sign:    Signature: ________________________________ Date: ________  Time: _____     Discharge:  [facility] has returned all of my personal belongings:    Signature: _________________________________ Date: ________  Time: _____                                          Staff Signature:  ____________________________ Date: ________  Time: _____

## 2020-01-31 NOTE — TELEPHONE ENCOUNTER
1/31/2020  R: 7:53am - ED updated. TC area only, no [facility].  11:13am - paged provider  11:21am - Accepted 30/[facility]   11:30am  - unit notified. RN to RN at anytime  11:32am - [facility] ED notified of disposition.

## 2020-01-31 NOTE — ED PROVIDER NOTES
formerly [facility] ED Behavioral Health Handoff Note:       Brief HPI:  This is a 39 year old female signed out to me by Dr. Mauro (Dr. Casper is the initial evaluating ED Physician) . The patient presents with increased stressors, relationship issues, and suicidal ideations without a plan. See initial ED Provider note for details of the presentation.     Patient is medically cleared for admission to a Behavioral Health unit.      Pending studies: None.      Hold Status:  Active Orders   Legal    Legal Status: ANGELO - Health Officer Authority to Detain     Frequency: Effective Now     Start Date/Time: 01/30/20 1418      Number of Occurrences: Until Specified     The patient has not required medication for agitation.      Exam:   Temp:  [97.8  F (36.6  C)] 97.8  F (36.6  C)  Pulse:  [75-87] 75  Resp:  [16-20] 16  BP: (149-150)/(96-99) 150/99  SpO2:  [94 %-97 %] 94 %     Patient Vitals for the past 24 hrs:   BP Temp Temp src Pulse Resp SpO2   01/31/20 1152 -- -- -- 75 16 94 %   01/31/20 0332 (!) 150/99 97.8  F (36.6  C) Oral 84 20 95 %   01/30/20 2005 -- -- -- 87 -- --   01/30/20 2000 (!) 149/96 -- -- -- -- --   01/30/20 1945 -- -- -- -- -- 97 %         Constitutional: Vital signs reviewed as above.   HEENT:    Head: No external signs of trauma noted.    Eyes: Conjunctivae are normal. Pupils are equal, round, and reactive to light.    Cardiovascular: Normal rate, regular rhythm and normal heart sounds.    Pulmonary/Chest: Effort normal and breath sounds normal. No respiratory distress. Patient has no wheezes.   Gastrointestinal: Soft, non-tender, non-distended.  Musculoskeletal: No gross deformities noted. ROM appears normal.  Skin: Skin is warm and dry. No diaphoresis noted.   Neurological: Patient was sleeping, but awakes to voice. GCS 15.   Psychiatric: Depressed mood, somewhat flat affect. No hallucinations appreciated    ED Course:    Medications   acetaminophen (TYLENOL) tablet 975 mg (975 mg Oral Given 1/31/20 0716)  "  LORazepam (ATIVAN) tablet 1 mg (has no administration in time range)   acetaminophen (TYLENOL) tablet 1,000 mg (1,000 mg Oral Given 1/30/20 1916)     ED Course as of Jan 31 1404   Fri Jan 31, 2020   0600 Dr. Alvarado received s/o from Dr. Mauro. Awaiting bed for inpatient psychiatric treatment.      0608 Dr. Alvarado' evaluation. Patient notes chronic right shoulder pain (had previous \"cortisone\" injection). She would like some Tylenol when her breakfast comes. PRN Tylenol ordered.            There were no significant events while under my care.        Impression:    ICD-10-CM    1. Suicidal ideation R45.851 Drug abuse screen 77 urine (FL, RH, SH)       Plan:    1. Transferred to [place], Unit 30      RESULTS:   Results for orders placed or performed during the hospital encounter of 01/30/20 (from the past 24 hour(s))   Drug abuse screen 77 urine (FL, RH, SH)     Status: Abnormal    Collection Time: 01/30/20  1:44 PM   Result Value Ref Range    Amphetamine Qual Urine Negative NEG^Negative    Barbiturates Qual Urine Negative NEG^Negative    Benzodiazepine Qual Urine Negative NEG^Negative    Cannabinoids Qual Urine Positive (A) NEG^Negative    Cocaine Qual Urine Negative NEG^Negative    Opiates Qualitative Urine Negative NEG^Negative    PCP Qual Urine Negative NEG^Negative   HCG qualitative urine (UPT)     Status: None    Collection Time: 01/30/20  1:44 PM   Result Value Ref Range    HCG Qual Urine Negative NEG^Negative   Alcohol breath test POCT     Status: Normal    Collection Time: 01/30/20  2:17 PM   Result Value Ref Range    Alcohol Breath Test 0.00 0.00 - 0.01             DO Christiano Horner Bradley Joseph, DO  01/31/20 1405    "

## 2020-02-01 PROCEDURE — 25000132 ZZH RX MED GY IP 250 OP 250 PS 637: Performed by: PSYCHIATRY & NEUROLOGY

## 2020-02-01 PROCEDURE — 99232 SBSQ HOSP IP/OBS MODERATE 35: CPT | Performed by: PHYSICIAN ASSISTANT

## 2020-02-01 PROCEDURE — 99207 ZZC CONSULT E&M CHANGED TO SUBSEQUENT LEVEL: CPT | Performed by: PHYSICIAN ASSISTANT

## 2020-02-01 PROCEDURE — 99222 1ST HOSP IP/OBS MODERATE 55: CPT | Mod: AI | Performed by: PSYCHIATRY & NEUROLOGY

## 2020-02-01 PROCEDURE — 93005 ELECTROCARDIOGRAM TRACING: CPT

## 2020-02-01 PROCEDURE — 25000132 ZZH RX MED GY IP 250 OP 250 PS 637: Performed by: PHYSICIAN ASSISTANT

## 2020-02-01 PROCEDURE — 12400001 ZZH R&B MH UMMC

## 2020-02-01 PROCEDURE — H2032 ACTIVITY THERAPY, PER 15 MIN: HCPCS

## 2020-02-01 PROCEDURE — 93010 ELECTROCARDIOGRAM REPORT: CPT | Performed by: INTERNAL MEDICINE

## 2020-02-01 RX ORDER — HYDRALAZINE HYDROCHLORIDE 25 MG/1
25 TABLET, FILM COATED ORAL 4 TIMES DAILY PRN
Status: DISCONTINUED | OUTPATIENT
Start: 2020-02-01 | End: 2020-02-07 | Stop reason: HOSPADM

## 2020-02-01 RX ORDER — NAPROXEN SODIUM 220 MG
440 TABLET ORAL 2 TIMES DAILY WITH MEALS
COMMUNITY
End: 2020-02-21

## 2020-02-01 RX ORDER — ACETAMINOPHEN 500 MG
1000 TABLET ORAL EVERY 8 HOURS PRN
COMMUNITY
End: 2023-12-05

## 2020-02-01 RX ORDER — PRAZOSIN HYDROCHLORIDE 1 MG/1
1 CAPSULE ORAL AT BEDTIME
Status: DISCONTINUED | OUTPATIENT
Start: 2020-02-01 | End: 2020-02-07 | Stop reason: HOSPADM

## 2020-02-01 RX ORDER — BUPROPION HYDROCHLORIDE 150 MG/1
150 TABLET ORAL DAILY
Status: DISCONTINUED | OUTPATIENT
Start: 2020-02-01 | End: 2020-02-05

## 2020-02-01 RX ORDER — ARIPIPRAZOLE 5 MG/1
5 TABLET ORAL DAILY
Status: DISCONTINUED | OUTPATIENT
Start: 2020-02-01 | End: 2020-02-05

## 2020-02-01 RX ADMIN — BUPROPION 150 MG: 150 TABLET, EXTENDED RELEASE ORAL at 18:04

## 2020-02-01 RX ADMIN — OMEPRAZOLE 20 MG: 20 CAPSULE, DELAYED RELEASE ORAL at 11:23

## 2020-02-01 RX ADMIN — OLANZAPINE 10 MG: 10 TABLET, FILM COATED ORAL at 20:19

## 2020-02-01 RX ADMIN — HYDROXYZINE HYDROCHLORIDE 50 MG: 25 TABLET, FILM COATED ORAL at 07:59

## 2020-02-01 RX ADMIN — TRAZODONE HYDROCHLORIDE 50 MG: 50 TABLET ORAL at 22:07

## 2020-02-01 RX ADMIN — PRAZOSIN HYDROCHLORIDE 1 MG: 1 CAPSULE ORAL at 22:06

## 2020-02-01 RX ADMIN — ACETAMINOPHEN 650 MG: 325 TABLET, FILM COATED ORAL at 15:51

## 2020-02-01 RX ADMIN — ARIPIPRAZOLE 5 MG: 5 TABLET ORAL at 18:04

## 2020-02-01 RX ADMIN — OLANZAPINE 10 MG: 10 TABLET, FILM COATED ORAL at 08:12

## 2020-02-01 RX ADMIN — ATORVASTATIN CALCIUM 10 MG: 10 TABLET, FILM COATED ORAL at 08:03

## 2020-02-01 ASSESSMENT — ACTIVITIES OF DAILY LIVING (ADL)
DRESS: INDEPENDENT
ORAL_HYGIENE: INDEPENDENT
HYGIENE/GROOMING: INDEPENDENT

## 2020-02-01 NOTE — PHARMACY-ADMISSION MEDICATION HISTORY
Admission medication history for the February 1, 2020 admission is complete.     Interview Sources:  - Patient  - Dispense Report    Reliability of Source:  - Good; patient was able to verify medication names, strengths, and last doses.    Medication Adherence:  - Moderate; patient reports missing doses ~ 3 x per month.    Current Outpatient Pharmacy:   - [id number] in [place], [place]    Changes made to PTA medication list (reason)  Added:   - Acetaminophen 500 mg tabs (per patient)  - Naproxen 220 mg tabs (per patient)  Deleted:   - Bupropion 300 mg 24 hr tabs; 1T PO QAM (stopped per patient and fill history)  - Cetirizine 10 mg tabs; 1T PO every day (stopped per patient)  - Fluoxetine 40 mg caps; 1C PO every day (stopped per patient and fill history)  - Gabapentin 800 mg tabs; 1T PO TID (stopped per patient and fill history)  - Hydroxyzine 50 mg caps; 1C PO QID PRN (stopped per patient and fill history)  - Propranolol  mg caps; 1C PO every day (stopped per patient and fill history)  Changed: None    Additional medication history information:   - Patient reports using a steroid cream for eczema patches on her right hand and right buttocks, but does not recall the name. She has not refilled this medication recently and reports using rarely, with her last dose being within the past month.    Prior to Admission Medication List:  Prior to Admission medications    Medication Sig Last Dose Taking? Auth Provider   acetaminophen (TYLENOL) 500 MG tablet Take 1,000 mg by mouth every 8 hours as needed for pain (right shoulder) Past Week Yes Unknown, Entered By History   albuterol (PROAIR HFA/PROVENTIL HFA/VENTOLIN HFA) 108 (90 Base) MCG/ACT inhaler Inhale 2 puffs into the lungs every 6 hours Past Week Yes Mikki Figueroa, [facility]   cyclobenzaprine (FLEXERIL) 10 MG tablet Take 1 tablet (10 mg) by mouth 3 times daily as needed for muscle spasms Past Month Yes Maggie Dorantes MD   lidocaine (LIDODERM) 5 %  patch Place 1 patch onto the skin every 12 hours as needed for moderate pain To prevent lidocaine toxicity, patient should be patch free for 12 hrs daily. Past Week Yes Maggie Dorantes MD   naproxen sodium (ANAPROX) 220 MG tablet Take 440 mg by mouth 2 times daily (with meals) Past Week Yes Unknown, Entered By History   atorvastatin (LIPITOR) 10 MG tablet Take 1 tablet (10 mg) by mouth daily 1/29/2020 - AM  Jermain Jaramillo MD     Time spent: 20 minutes    Medication history completed by:   Zofia Toure, Pharmacy Intern
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
## 2020-02-01 NOTE — CONSULTS
Consult Date:  02/01/2020      HISTORY OF PRESENT ILLNESS:  The patient is a pleasant 39-year-old female with reported history of bipolar disorder admitted to station 30 for suicidal ideation.  An Internal Medicine consultation was ordered by Dr. Foley to assess medical problems including elevated blood pressure since admission.  At this time, Cristina denies acute physical concerns including fever, chills, chest pain, shortness of breath, abdominal pain, nausea, bowel or bladder concerns.  Denies having a history of primary hypertension and per review of Care Everywhere on 01/08/2019, she went to her family physician and clinic blood pressure at that time was 136/82.  Most recently, her blood pressure this morning was 190/104; however, patient is morbidly obese and this could be falsely elevated due to using the wrong size blood pressure cuff.      PAST MEDICAL HISTORY:   1.  Depression and other psychiatric history per Dr. Foley.   2.  Asthma.   3.  GERD.   4.  Hyperlipidemia.   5.  Morbid obesity.   6.  Allergic rhinitis.   7.  Denies history of other chronic medical problems including coronary artery disease, hypertension and diabetes.  The patient did have a negative cardiac stress test in 2018.      PAST SURGICAL HISTORY:  Lipoma excision from back, 12/2017.      ADMISSION MEDICATIONS:  Reviewed and listed in the medication reconciliation list.      ALLERGIES:  NO KNOWN DRUG ALLERGIES.      SOCIAL HISTORY:  .  States her 15-year-old goddaughter is currently living with her and states that this is helpful.  Lives in [place].  Frequently smokes marijuana for antianxiety effect.      FAMILY HISTORY:  Reviewed and noncontributory.      REVIEW OF SYSTEMS:  Ten-point review of systems negative except as stated above in history of present illness.      PHYSICAL EXAMINATION:   GENERAL:  Pleasant lady in no acute distress.   VITAL SIGNS:  Stable, blood pressure this morning was 190/104.   HEENT:  Negative.    NECK:  Supple.  No cervical lymphadenopathy or thyromegaly.   LUNGS:  Clear.   CARDIOVASCULAR:  Regular rate and rhythm.   ABDOMEN:  Soft, nontender.   EXTREMITIES:  No edema.   SKIN:  No rash on exposed areas.   NEUROLOGIC:  She is awake, alert and oriented x 3.  Motor strength is symmetric.  She is not tremulous.      LABORATORY DATA:  Urine pregnancy test negative.  Urine drug screen positive for cannabinoids.  Breathalyzer negative.      ASSESSMENT:   1.  Depression and behavioral concerns per Dr. Foley.   2.  Elevated blood pressure since admission, question falsely elevated due to using a too small blood pressure cuff.  The patient nonetheless is asymptomatic.   3.  Gastroesophageal reflux disease, normally controlled on daily proton pump inhibitor therapy, with which patient states prior to admission she has been noncompliant.   4.  Morbid obesity.   5.  Asthma without evidence of bronchospasm on exam.   6.  No known coronary artery disease with history of negative cardiac stress test in 2018.      PLAN:  We will obtain a routine baseline EKG.  Blood pressures will be monitored closely and discussed with staff that patient needs to continue with blood pressure checks with an extra-large blood pressure cuff.  If pressures remain high, we will start patient on amlodipine and titrate dose up accordingly.  In the interim, we will start oral p.r.n. hydralazine to give systolic blood pressure greater than 190 or diastolic blood pressure greater than 110.  We will start omeprazole 20 mg daily for acid reflux.        No further medical intervention indicated at this time.  Medicine will continue to follow peripherally with daily chart check to continue monitoring her blood pressures.  Please feel free to call with questions.      Thank you for this consultation.         TIFFANY GÓMEZ PA-C             D: 02/01/2020   T: 02/01/2020   MT: CHUCHO      Name:     LONNIE WEEMS   MRN:      [id number]         Account:       [id number]   :      1980           Consult Date:  2020      Document: [id number]       cc: NEFTALY Martinez MD

## 2020-02-01 NOTE — PROGRESS NOTES
Pt woke up early and began to watch [facility] in the [facility] area, when she was asked to come get her vitals taken she began crying. She stated she was anxious therefore she went to the RN for a PRN. She was visited by her sister and the visit appeared positive. Pt met with the  as well.      02/01/20 1400   Behavioral Health   Hallucinations denies / not responding to hallucinations   Insight admits / accepts   Affect sad;blunted, flat   Mood anxious;depressed   Physical Appearance/Attire attire appropriate to age and situation   1. Wish to be Dead (Recent) No   2. Non-Specific Active Suicidal Thoughts (Recent) No
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
## 2020-02-02 PROCEDURE — 25000132 ZZH RX MED GY IP 250 OP 250 PS 637: Performed by: PSYCHIATRY & NEUROLOGY

## 2020-02-02 PROCEDURE — 25000132 ZZH RX MED GY IP 250 OP 250 PS 637: Performed by: PHYSICIAN ASSISTANT

## 2020-02-02 PROCEDURE — 12400001 ZZH R&B MH UMMC

## 2020-02-02 RX ORDER — AMLODIPINE BESYLATE 2.5 MG/1
5 TABLET ORAL DAILY
Status: DISCONTINUED | OUTPATIENT
Start: 2020-02-02 | End: 2020-02-05

## 2020-02-02 RX ADMIN — AMLODIPINE BESYLATE 5 MG: 2.5 TABLET ORAL at 10:35

## 2020-02-02 RX ADMIN — ATORVASTATIN CALCIUM 10 MG: 10 TABLET, FILM COATED ORAL at 07:55

## 2020-02-02 RX ADMIN — OMEPRAZOLE 20 MG: 20 CAPSULE, DELAYED RELEASE ORAL at 07:55

## 2020-02-02 RX ADMIN — ACETAMINOPHEN 650 MG: 325 TABLET, FILM COATED ORAL at 21:58

## 2020-02-02 RX ADMIN — BUPROPION 150 MG: 150 TABLET, EXTENDED RELEASE ORAL at 07:55

## 2020-02-02 RX ADMIN — PRAZOSIN HYDROCHLORIDE 1 MG: 1 CAPSULE ORAL at 21:58

## 2020-02-02 RX ADMIN — ARIPIPRAZOLE 5 MG: 5 TABLET ORAL at 07:55

## 2020-02-02 RX ADMIN — ACETAMINOPHEN 650 MG: 325 TABLET, FILM COATED ORAL at 07:55

## 2020-02-02 RX ADMIN — TRAZODONE HYDROCHLORIDE 50 MG: 50 TABLET ORAL at 21:58

## 2020-02-02 ASSESSMENT — ACTIVITIES OF DAILY LIVING (ADL)
ORAL_HYGIENE: INDEPENDENT
DRESS: INDEPENDENT;STREET CLOTHES
ORAL_HYGIENE: INDEPENDENT
HYGIENE/GROOMING: INDEPENDENT;HANDWASHING
HYGIENE/GROOMING: INDEPENDENT
LAUNDRY: WITH SUPERVISION
LAUNDRY: WITH SUPERVISION
DRESS: INDEPENDENT

## 2020-02-02 ASSESSMENT — MIFFLIN-ST. JEOR: SCORE: 2295.89

## 2020-02-02 NOTE — PROGRESS NOTES
Pt denies SI/SIB. Pt reports depression at 10 and anxiety at about a 9. Pt sad today was her aunts . Pt stated she is feeling better than she did the last few days. Pt significant  other visited - she cried when he left. During check in she cried as well.      204   Behavioral Health   Hallucinations denies / not responding to hallucinations   Thinking intact   Orientation person: oriented;place: oriented   Memory baseline memory   Insight admits / accepts   Judgement intact   Eye Contact at examiner   Affect blunted, flat;sad   Mood depressed   Suicidality other (see comments)  (pt denies )   Self Injury other (see comment)  (pt denies)   Activity other (see comment)  (present in [place])   Activities of Daily Living   Hygiene/Grooming independent   Oral Hygiene independent   Dress independent   Room Organization independent

## 2020-02-02 NOTE — PROGRESS NOTES
Brief medicine note:  - Pt's BPs still elevated despite using extra large cuff. Pt remains asymptomatic. Will start amlodipine 5 mg daily with parameters to hold and continue to follow pt daily peripherally with daily chart check of her BPs.       Cristobal Gage PA-C  Internal Medicine Hospitalist   [facility]ist group  [phone]

## 2020-02-02 NOTE — PLAN OF CARE
RN Assessment: Met with the patient early in the shift and she reported depression and anxiety of 7 and 6 out of 10 respectively. Stated that medications are helpful at times. Patient was able to identify coping skills for anxiety and depression such as deep breathing, distracting herself and talking to family and friends. She denied having SI/SIB/HI/Hallucinations. Patient reported good sleep and appetite, she had shower early in the shift. Patient's BP was elevated early in the shift but remained asymptomatic. Internal medicine was notified about elevated BP and prescribed amlodipine 5mg daily. Nursing will continue to monitor patient's BP.

## 2020-02-02 NOTE — PROGRESS NOTES
02/01/20 [facility]   Therapeutic Recreation   Type of Intervention structured groups   Activity game   Response Participates, initiates socially appropriate   Hours 1     Pt participated in Therapeutic Recreation group with focus on leisure participation, social engagement, and critical thinking. Engaged and focused in the group recreational intervention via a group game.  Pt was a full participant throughout the entire duration of group. Showed progress in session goals. Pt mood was calm. Appropriately shared sense of humor with peers during groups, and appeared to brighten with social interaction. Pt stated she enjoyed the group and it was very helpful. Pt stated she was able to laugh and smile during the activity, which is something she said she hasn't done in awhile.

## 2020-02-03 ENCOUNTER — TELEPHONE (OUTPATIENT)
Dept: BEHAVIORAL HEALTH | Facility: CLINIC | Age: 40
End: 2020-02-03

## 2020-02-03 LAB — INTERPRETATION ECG - MUSE: NORMAL

## 2020-02-03 PROCEDURE — 99232 SBSQ HOSP IP/OBS MODERATE 35: CPT | Performed by: PSYCHIATRY & NEUROLOGY

## 2020-02-03 PROCEDURE — G0177 OPPS/PHP; TRAIN & EDUC SERV: HCPCS

## 2020-02-03 PROCEDURE — 12400001 ZZH R&B MH UMMC

## 2020-02-03 PROCEDURE — 25000132 ZZH RX MED GY IP 250 OP 250 PS 637: Performed by: PSYCHIATRY & NEUROLOGY

## 2020-02-03 PROCEDURE — 25000132 ZZH RX MED GY IP 250 OP 250 PS 637: Performed by: PHYSICIAN ASSISTANT

## 2020-02-03 RX ADMIN — AMLODIPINE BESYLATE 5 MG: 2.5 TABLET ORAL at 08:08

## 2020-02-03 RX ADMIN — BUPROPION 150 MG: 150 TABLET, EXTENDED RELEASE ORAL at 08:08

## 2020-02-03 RX ADMIN — TRAZODONE HYDROCHLORIDE 50 MG: 50 TABLET ORAL at 22:11

## 2020-02-03 RX ADMIN — PRAZOSIN HYDROCHLORIDE 1 MG: 1 CAPSULE ORAL at 22:11

## 2020-02-03 RX ADMIN — OMEPRAZOLE 20 MG: 20 CAPSULE, DELAYED RELEASE ORAL at 08:08

## 2020-02-03 RX ADMIN — OLANZAPINE 10 MG: 10 TABLET, FILM COATED ORAL at 05:27

## 2020-02-03 RX ADMIN — HYDROXYZINE HYDROCHLORIDE 50 MG: 25 TABLET, FILM COATED ORAL at 22:11

## 2020-02-03 RX ADMIN — ATORVASTATIN CALCIUM 10 MG: 10 TABLET, FILM COATED ORAL at 08:08

## 2020-02-03 RX ADMIN — ACETAMINOPHEN 650 MG: 325 TABLET, FILM COATED ORAL at 08:08

## 2020-02-03 RX ADMIN — ARIPIPRAZOLE 5 MG: 5 TABLET ORAL at 08:08

## 2020-02-03 NOTE — PROGRESS NOTES
"[facility], [place]   Psychiatric Progress Note  Hospital Day: 3        Interim History:   The patient's care was discussed with the treatment team during the daily team meeting and/or staff's chart notes were reviewed.  Staff report patient was visible in milieu, tearful at times, had visitors, denies SI, taking medications as prescribed, continues to have some elevated BPs and medicine is following, no acute events over weekend.     Upon interview, the patient reports that she is \"definitely feeling an improvement\" from admission.  She reports her anxiety has improved.  She denies having any suicidal ideation.  She states she has been having some poor sleep but does not feel like she is remembering having any nightmares and believes the prazosin has been working.  She is tolerating the medications without any side effects.  Writer informed her that they will be in contact with pharmacy liaison regarding Latuda and she expressed appreciation.  She continues to have some elevated blood pressures and is tolerating the medication started for this.  No additional concerns at this time.         Medications:       amLODIPine  5 mg Oral Daily     ARIPiprazole  5 mg Oral Daily     atorvastatin  10 mg Oral Daily     buPROPion  150 mg Oral Daily     omeprazole  20 mg Oral QAM AC     prazosin  1 mg Oral At Bedtime          Allergies:     Allergies   Allergen Reactions     No Known Drug Allergies           Labs:     Recent Results (from the past 48 hour(s))   EKG 12-lead, complete    Collection Time: 02/01/20  3:38 PM   Result Value Ref Range    Interpretation ECG Click View Image link to view waveform and result           Psychiatric Examination:     BP (!) 158/88   Pulse 94   Temp 97.9  F (36.6  C) (Oral)   Resp 20   Ht 1.613 m (5' 3.5\")   Wt (!) 164.9 kg (363 lb 8 oz)   SpO2 94%   BMI 63.38 kg/m    Weight is 363 lbs 8 oz  Body mass index is 63.38 kg/m .    Orthostatic Vitals       Most " Recent      Sitting Orthostatic /88 02/03 0500    Sitting Orthostatic Pulse (bpm) 107 02/03 0500    Standing Orthostatic /92 02/03 0735    Standing Orthostatic Pulse (bpm) 89 02/03 0735        Appearance: awake, alert, adequately groomed and appeared as age stated  Attitude:  cooperative  Eye Contact:  good  Mood:  better  Affect:  appropriate and in normal range and mood congruent  Speech:  clear, coherent and normal prosody  Language: fluent and intact in English  Psychomotor, Gait, Musculoskeletal:  no evidence of tardive dyskinesia, dystonia, or tics  Throught Process:  logical, linear and goal oriented  Associations:  no loose associations  Thought Content:  no evidence of suicidal ideation or homicidal ideation and no evidence of psychotic thought  Insight:  fair  Judgement:  fair  Oriented to:  time, person, and place  Attention Span and Concentration:  intact  Recent and Remote Memory:  intact  Fund of Knowledge:  appropriate    Clinical Global Impressions  First:  Considering your total clinical experience with this particular patient population, how severe are the patient's symptoms at this time?: 7 (02/01/20 1356)  Compared to the patient's condition at the START of treatment, this patient's condition is:: 4 (02/01/20 1356)  Most recent:  Considering your total clinical experience with this particular patient population, how severe are the patient's symptoms at this time?: 7 (02/01/20 1356)  Compared to the patient's condition at the START of treatment, this patient's condition is:: 4 (02/01/20 1356)           Precautions:     Behavioral Orders   Procedures     Code 1 - Restrict to Unit     Routine Programming     As clinically indicated     Status 15     Every 15 minutes.     Suicide precautions     Patients on Suicide Precautions should have a Combination Diet ordered that includes a Diet selection(s) AND a Behavioral Tray selection for Safe Tray - with utensils, or Safe Tray - NO utensils             Diagnoses:   Major Depressive Disorder, Recurrent Episode, Severe With anxious distress  Rule out Bipolar Disorder  Generalized Anxiety Disorder  Rule Out Binge-Eating Disorder   Rule out Borderline Personality Disorder   Alcohol Use Disorder, partial remission  Cannabis Abuse          Assessment & Plan:   Assessment and hospital summary:  This patient is a 39 year old female with history of the below diagnosis who presented to ED with passive thoughts about death and increased depression/anxiety in context of increased stressors, limited support, relationship stressors and inability to be adherent to medications due to financial strain/insurance. Reviewed previous medication trials, she is open to retrial of Abilify for mood stabilization, Buproprion for depression and prazosin for PTSD.  She denied that bupropion made her anxiety worse in the past, but will monitor for this.  Of note, Latuda was the medication that worked best for her and will work with pharmacy liason to see about PA and getting this medication covered on Monday. Patient would also benefit from getting re-estabalished with outpatient services upon discharge.     Inpatient psychiatric hospitalization is warranted at this time for safety, stabilization, and possible adjustment in medications.    Psychiatric treatment/inteventions:  Medications:   -Continue Abilify 5 mg daily  -Continue bupropion  mg daily  -Continue prazosin 1 mg at bedtime     Laboratory/Imaging: No new labs ordered, admission labs reviewed     Patient will be treated in therapeutic milieu with appropriate individual and group therapies as described.     Medical treatment/interventions:  Medical concerns: IM consult placed on admission for comanagement of comorbidities including poorly controlled blood pressure, appreciate assistance see their note for complete details.     Legal Status: Voluntary     Safety Assessment:   Checks: Status 15  Pt has not required  locked seclusion or restraints in the past 24 hours to maintain safety, please refer to RN documentation for further details.    The risks, benefits, alternatives and side effects have been discussed and are understood by the patient.     Disposition: Pending clinical stabilization. Will likely discharge to home when stable.     This note was created by eliseo using a Dragon dictation system. All typing errors or contextual distortion are unintentional and software inherent.      Chanelle Foley,   [facility]

## 2020-02-03 NOTE — PLAN OF CARE
BEHAVIORAL TEAM DISCUSSION    Participants: Dr. Chanelle Foley DO, Cristiano Lopez RN, Demetra MIRANDA, Linda José Nurse Manager, Janice Jiang OT    Progress: Day 3, voluntary. Pt was admitted to [facility] from ED. She presented with anxiety, panic attacks and SI. Reports several life stressors that contribute.     Anticipated length of stay: 1 week     Continued Stay Criteria/Rationale: Pt needs to further be monitored for SI. She needs outpatient providers in order to maintain stabalization. Also needs further medication management to assess for MH stability.     Medical/Physical: Needs to monitor BP; Pt is preoccupied with it and is often registering in a higher range     Precautions:   Behavioral Orders   Procedures     Code 1 - Restrict to Unit     Routine Programming     As clinically indicated     Status 15     Every 15 minutes.     Suicide precautions     Patients on Suicide Precautions should have a Combination Diet ordered that includes a Diet selection(s) AND a Behavioral Tray selection for Safe Tray - with utensils, or Safe Tray - NO utensils       Plan: Patient has been admitted to the psychiatric unit for stabilization.  Patient will be seen and assessed by psychiatric doctor.  Medication changes will be reviewed and monitored.  Groups will be offered to assist patient with increasing knowledge, strategies, and copping techniques to help manage mental health.  CTC will meet with patient to provide individualized mental health resources and support throughout patient s hospitalization.  CTC will assist with developing a Care Plan and after care appointments.    Rationale for change in precautions or plan: No changes

## 2020-02-03 NOTE — PLAN OF CARE
Illness Management Recovery model: Personal Plan of Care    Patient completed Personal Plan of Care, identifying reasons for hospitalization and goals for discharge. Form reviewed in team meeting and signed by patient, physician, writer.   Form given to [facility]UC to be scanned into [employer].

## 2020-02-03 NOTE — PROGRESS NOTES
"Pt woke after 0500 with complaints of heart palpitations and crying. Pt responded \"yes\" to if she was feeling anxious. Vital Signs taken; /88 and . Pt given prn for high anxiety and suggested positive coping methods. Pt observed to be using deep breathing exercises.  "

## 2020-02-03 NOTE — PLAN OF CARE
"The patient and/or their representative will achieve their patient-specific goals related to the plan of care.    The patient-specific goals include:       \"Reasons you are in the hospital;\" The patient identifies the following reasons for current hospitalization:     1. Depression, Anxiety, Nightmares, a lot of grief  2. Marital problems  3. Financial problems  4. Caring for my 15 year old god daughter      \"Goals for Discharge\" The patient identifies the following goals for discharge:    1. Learn coping with my anxiety so it doesn't get out of control  2.   Start therapy  Problem: Adult Behavioral Health Plan of Care  Goal: Patient-Specific Goal (Individualization)  Flowsheets (Taken 2/3/2020 1217)  Patient Personal Strengths: stable living environment; motivated for recovery; motivated for treatment; no history of violence; parenting skills; positive vocational history; appropriate judgment/decision making; expressive of needs; family/social support; independent living skills; insight into illness/situation; medication/treatment adherence  Patient Vulnerabilities: Pt identifies some marital problems, Pt identifies socioeconomic strain with caring for a 15 year old and having to maintain bills, Pt has not engaged in  Tx yet, Pt has lost her father while he was in Hospice care            "

## 2020-02-03 NOTE — PROGRESS NOTES
"Cristina had a low-marquez, unremarkable shift. She was calm, visible in the milieu watching the Super Bowl game and movies and social with peers. Cristina also made phone calls throughout the shift and visited with her  this evening which she reported went well. She currently denies SI/SIB/HI/VH/AH. She reports both her depression (8/10) and anxiety (6-7/10) have improved and continue to improve \"day by day\". Cristina reports feeling more irritable today than previously, although she attributes this to missing home and being in the hospital. She was able to distract herself and use positive coping skills. ADL's are independent, no nutrition concerns. Cristina reports adequate sleep and appetite, other than waking up periodically from feeling sore due to her bed.        02/02/20 2152   Behavioral Health   Hallucinations denies / not responding to hallucinations   Thinking poor concentration;distractable   Orientation person: oriented;place: oriented;date: oriented;time: oriented   Memory baseline memory   Insight admits / accepts   Judgement impaired   Eye Contact at examiner   Affect tense;irritable   Mood anxious;depressed   Physical Appearance/Attire attire appropriate to age and situation;appears stated age;neat   Hygiene well groomed   Suicidality other (see comments)  (Patient denies)   1. Wish to be Dead (Recent) No   2. Non-Specific Active Suicidal Thoughts (Recent) No   Self Injury other (see comment)  (Patient denies)   Elopement   (No indicators this shift / Pt does not appear to be a risk)   Activity other (see comment)  (Visible in milieu, social)   Speech clear;coherent   Medication Sensitivity no stated side effects;no observed side effects   Psychomotor / Gait balanced;steady   Psycho Education   Type of Intervention 1:1 intervention   Response participates, initiates socially appropriate   Hours 0.5   Treatment Detail Check-In   Activities of Daily Living   Hygiene/Grooming independent;handwashing   Oral Hygiene " independent   Dress independent;street clothes   Laundry with supervision   Room Organization independent

## 2020-02-03 NOTE — TELEPHONE ENCOUNTER
Prior Authorization **INITIATED**    Medication: Latuda tablets  Insurance Company: [facility] - Phone [phone] Fax [phone]  Pharmacy Filling the Rx: n/a - Patient has not yet been discharged, and there are no outpatient orders for this medication yet  Start Date: 2/3/2020  Reference #: n/a  Comments:  Plan requires step therapy prior authorization: patient must try other atypical antipsychotic medications before they will cover Latuda.  Fax Sent to Plan (along with H&P from current hospital admission):        Angelique Nettles CPhT  [facility] [facility] Pharm[facility] Liaison  Pronouns: She/Her/Hers    [facility]  [address] [place] [address]  [address] [place] [address] [address], [place] [address]   [email or web address]  [email or web address]   Phone: [phone]  Pager: [phone]  Fax: [phone]

## 2020-02-03 NOTE — PROGRESS NOTES
02/03/20 1400   Behavioral Health   Hallucinations denies / not responding to hallucinations   Thinking poor concentration   Orientation person: oriented;place: oriented;date: oriented;time: oriented   Memory baseline memory   Insight admits / accepts   Eye Contact at examiner   Affect full range affect   Mood anxious;depressed   Physical Appearance/Attire attire appropriate to age and situation   Hygiene well groomed   Suicidality other (see comments)  (Denies.)   1. Wish to be Dead (Recent) No   2. Non-Specific Active Suicidal Thoughts (Recent) No   Activity other (see comment)  (Out in milieu, participating in groups.)   Speech clear;coherent   Medication Sensitivity no stated side effects;no observed side effects   Psychomotor / Gait balanced;steady

## 2020-02-03 NOTE — PLAN OF CARE
Patient participated in 3 of 3 OT groups.   OT clinic she declined to begin a project. She sat and looked out the window and made a song request, enjoyed the view a reported multiple times how relaxing it was, socialized with table mate.   Extended mental health management and topic blended a focus on gratitude with education/discussion and a structured task to promote independent gratitude practice. Samples were made available to increase concrete structure. She was focused during project and worked while casually talking with her. She looked up at the end of group and commented how fast time went. She cleaned up and was appropriate in her interactions and conversation.

## 2020-02-03 NOTE — PHARMACY
Consulted by Dr Chanelle Foley to run a test claim for Latuda tablets. Medication requires a step therapy prior authorization through outpatient pharmacy insurance--patient must try an fail other atypical antipsychotics before this medication will be approved.    Prior authorization has been initiated for this medication. Please see separate telephone encounter for updates.    Please feel free to contact me with any other test claims, prior authorizations, or insurance questions regarding outpatient medications.     Thanks!        Angelique Nettles CPhT  [place] Discharge Pharmacy Liaison  Pronouns: She/Her/Hers    [facility]  [address] [place] [address]  [address] [place] [address] [address] [address]   [email or web address]  [email or web address]   Phone: [phone]  Pager: [phone]  Fax: [phone]

## 2020-02-04 PROCEDURE — G0177 OPPS/PHP; TRAIN & EDUC SERV: HCPCS

## 2020-02-04 PROCEDURE — 12400001 ZZH R&B MH UMMC

## 2020-02-04 PROCEDURE — 25000132 ZZH RX MED GY IP 250 OP 250 PS 637: Performed by: PSYCHIATRY & NEUROLOGY

## 2020-02-04 PROCEDURE — 25000132 ZZH RX MED GY IP 250 OP 250 PS 637: Performed by: PHYSICIAN ASSISTANT

## 2020-02-04 PROCEDURE — H2032 ACTIVITY THERAPY, PER 15 MIN: HCPCS

## 2020-02-04 RX ADMIN — TRAZODONE HYDROCHLORIDE 50 MG: 50 TABLET ORAL at 22:00

## 2020-02-04 RX ADMIN — ATORVASTATIN CALCIUM 10 MG: 10 TABLET, FILM COATED ORAL at 08:28

## 2020-02-04 RX ADMIN — ARIPIPRAZOLE 5 MG: 5 TABLET ORAL at 08:28

## 2020-02-04 RX ADMIN — OMEPRAZOLE 20 MG: 20 CAPSULE, DELAYED RELEASE ORAL at 08:27

## 2020-02-04 RX ADMIN — BUPROPION 150 MG: 150 TABLET, EXTENDED RELEASE ORAL at 08:28

## 2020-02-04 RX ADMIN — PRAZOSIN HYDROCHLORIDE 1 MG: 1 CAPSULE ORAL at 21:59

## 2020-02-04 RX ADMIN — ACETAMINOPHEN 650 MG: 325 TABLET, FILM COATED ORAL at 22:00

## 2020-02-04 RX ADMIN — AMLODIPINE BESYLATE 5 MG: 2.5 TABLET ORAL at 08:28

## 2020-02-04 RX ADMIN — ACETAMINOPHEN 650 MG: 325 TABLET, FILM COATED ORAL at 08:27

## 2020-02-04 ASSESSMENT — ACTIVITIES OF DAILY LIVING (ADL)
HYGIENE/GROOMING: INDEPENDENT
ORAL_HYGIENE: INDEPENDENT
ORAL_HYGIENE: INDEPENDENT
LAUNDRY: WITH SUPERVISION
LAUNDRY: WITH SUPERVISION
DRESS: INDEPENDENT
HYGIENE/GROOMING: INDEPENDENT
DRESS: STREET CLOTHES;INDEPENDENT

## 2020-02-04 ASSESSMENT — MIFFLIN-ST. JEOR: SCORE: 2311.31

## 2020-02-04 NOTE — PLAN OF CARE
Problem: OT General Care Plan  Goal: OT Goal 1    Pt actively participated in occupational therapy clinic. Pt was able to ask for assistance as needed, and independently initiated a structured creative expression task. Pt demonstrated good focus, planning, and attention to detail. Social with peers throughout. Calm, pleasant, cooperative, and engaged. Affect appeared to brighten in interactions.

## 2020-02-04 NOTE — PROGRESS NOTES
"Brief Medicine Note    Medicine following regarding chart check of bp.     Today's vital signs, medications, and nursing notes were reviewed. Labs reviewed and note dyslipidemia and mild hypertriglyceridemia.     BP (!) 137/90   Pulse 101   Temp 98.4  F (36.9  C) (Oral)   Resp 18   Ht 1.613 m (5' 3.5\")   Wt (!) 166.4 kg (366 lb 14.4 oz)   SpO2 95%   BMI 63.97 kg/m        A/P:  Hypertension  bp continues to have been intermittently elevated with the most recent results as below on the right.  Patient is more recently normotensive.  She has not triggered parameters to receive hydralazine since admission.  Vitals:    02/03/20 0735 02/03/20 1642 02/03/20 2032 02/04/20 0804   BP: (!) 158/88 127/84 132/83 (!) 137/90   BP Location:  Left arm Left arm    Pulse: 94 103  101   Resp:    18   Temp: 97.9  F (36.6  C) 98.3  F (36.8  C)  98.4  F (36.9  C)   TempSrc: Oral Oral  Oral   SpO2: 94%   95%   Weight:    (!) 166.4 kg (366 lb 14.4 oz)   Height:       -May continue with as needed hydralazine which patient has so far not required    Hypertriglyceridemia  Dyslipidemia  Recent Labs   Lab Test 01/08/20  1435 01/15/19  0902   CHOL 213* 178   HDL 48* 42*   * 75   TRIG 168* 303*   Results per above show persistent dyslipidemia x1 year.  Unclear if laboratories were performed while fasting.  Does not warrant further inpatient management.  -Follow-up nonurgently with primary care provider regarding hypercholesterolemia, hypertriglyceridemia    At this point medicine will sign off, please call with any new questions or concerns.      Annabel Byrd PA-C  Hospitalist Service  Pager: [phone]    "

## 2020-02-04 NOTE — PROGRESS NOTES
Pt was visible in the milieu throughout the day, social with peers. Pt attended groups and participated. Pt became anxious and upset when she was told her shoes were unable to be worn on the unit, due to their laces. Pt stated they went to their room to meditate to help cope with the situation. Pt was able to call their  who is sending someone with memory foam shoes for her. Pt reports dry mouth and is curious whether they are experiencing hiccups as a side effect. Pt was calm and cooperative, denies SI/SIB.      02/04/20 1223   Behavioral Health   Hallucinations denies / not responding to hallucinations   Thinking distractable   Orientation person: oriented;place: oriented;time: oriented;date: oriented   Memory baseline memory   Insight admits / accepts   Judgement intact   Eye Contact at examiner   Affect full range affect   Mood depressed;anxious   Physical Appearance/Attire attire appropriate to age and situation   Hygiene well groomed   Suicidality other (see comments)  (denies)   1. Wish to be Dead (Recent) No   2. Non-Specific Active Suicidal Thoughts (Recent) No   Activity other (see comment)  (denies)   Speech clear;coherent   Medication Sensitivity no observed side effects;no stated side effects   Psychomotor / Gait balanced;steady   Psycho Education   Type of Intervention 1:1 intervention   Response participates, initiates socially appropriate   Hours 0.5   Treatment Detail check in    Activities of Daily Living   Hygiene/Grooming independent   Oral Hygiene independent   Dress street clothes;independent   Laundry with supervision   Room Organization independent   Activity   Activity Assistance Provided independent

## 2020-02-04 NOTE — PROGRESS NOTES
Pt was visible and appropriate within the milieu, she was visited by her  and god daughter whom which she is currently raising and states the visit was positive. Pt attended all groups, would randomly get tearful and appears to be very hard on herself in terms of self expectations. She rates her depression as a 5/10 and anxiety as a 6/10. She denies SI SIB.      02/03/20 2200   Behavioral Health   Hallucinations denies / not responding to hallucinations   Thinking poor concentration   Orientation person: oriented;time: oriented;date: oriented;place: oriented   Memory baseline memory   Insight admits / accepts   Eye Contact at examiner   Affect full range affect   Mood depressed;anxious   Physical Appearance/Attire attire appropriate to age and situation   Hygiene well groomed   Suicidality other (see comments)  (denies)   1. Wish to be Dead (Recent) No   2. Non-Specific Active Suicidal Thoughts (Recent) No

## 2020-02-05 PROCEDURE — 99207 ZZC CDG-CHARGE REQUIRED MANUAL ENTRY: CPT | Performed by: PHYSICIAN ASSISTANT

## 2020-02-05 PROCEDURE — 25000132 ZZH RX MED GY IP 250 OP 250 PS 637: Performed by: PSYCHIATRY & NEUROLOGY

## 2020-02-05 PROCEDURE — 99232 SBSQ HOSP IP/OBS MODERATE 35: CPT | Performed by: PSYCHIATRY & NEUROLOGY

## 2020-02-05 PROCEDURE — G0177 OPPS/PHP; TRAIN & EDUC SERV: HCPCS

## 2020-02-05 PROCEDURE — 99231 SBSQ HOSP IP/OBS SF/LOW 25: CPT | Performed by: PHYSICIAN ASSISTANT

## 2020-02-05 PROCEDURE — 12400001 ZZH R&B MH UMMC

## 2020-02-05 PROCEDURE — 25000132 ZZH RX MED GY IP 250 OP 250 PS 637: Performed by: PHYSICIAN ASSISTANT

## 2020-02-05 RX ORDER — LISINOPRIL 2.5 MG/1
5 TABLET ORAL DAILY
Status: DISCONTINUED | OUTPATIENT
Start: 2020-02-06 | End: 2020-02-07 | Stop reason: HOSPADM

## 2020-02-05 RX ORDER — LURASIDONE HYDROCHLORIDE 20 MG/1
20 TABLET, FILM COATED ORAL DAILY
Status: DISCONTINUED | OUTPATIENT
Start: 2020-02-05 | End: 2020-02-07 | Stop reason: HOSPADM

## 2020-02-05 RX ORDER — IBUPROFEN 600 MG/1
600 TABLET, FILM COATED ORAL EVERY 6 HOURS PRN
Status: DISCONTINUED | OUTPATIENT
Start: 2020-02-05 | End: 2020-02-07 | Stop reason: HOSPADM

## 2020-02-05 RX ADMIN — ACETAMINOPHEN 650 MG: 325 TABLET, FILM COATED ORAL at 16:32

## 2020-02-05 RX ADMIN — TRAZODONE HYDROCHLORIDE 50 MG: 50 TABLET ORAL at 21:53

## 2020-02-05 RX ADMIN — BUPROPION 150 MG: 150 TABLET, EXTENDED RELEASE ORAL at 07:58

## 2020-02-05 RX ADMIN — LURASIDONE HYDROCHLORIDE 20 MG: 20 TABLET, FILM COATED ORAL at 18:05

## 2020-02-05 RX ADMIN — OMEPRAZOLE 20 MG: 20 CAPSULE, DELAYED RELEASE ORAL at 07:58

## 2020-02-05 RX ADMIN — PRAZOSIN HYDROCHLORIDE 1 MG: 1 CAPSULE ORAL at 21:53

## 2020-02-05 RX ADMIN — ACETAMINOPHEN 650 MG: 325 TABLET, FILM COATED ORAL at 05:56

## 2020-02-05 RX ADMIN — ACETAMINOPHEN 650 MG: 325 TABLET, FILM COATED ORAL at 11:08

## 2020-02-05 RX ADMIN — IBUPROFEN 600 MG: 600 TABLET ORAL at 16:32

## 2020-02-05 RX ADMIN — ATORVASTATIN CALCIUM 10 MG: 10 TABLET, FILM COATED ORAL at 07:59

## 2020-02-05 RX ADMIN — ARIPIPRAZOLE 5 MG: 5 TABLET ORAL at 07:59

## 2020-02-05 RX ADMIN — AMLODIPINE BESYLATE 5 MG: 2.5 TABLET ORAL at 07:59

## 2020-02-05 RX ADMIN — IBUPROFEN 600 MG: 600 TABLET ORAL at 21:53

## 2020-02-05 ASSESSMENT — ACTIVITIES OF DAILY LIVING (ADL)
LAUNDRY: WITH SUPERVISION
LAUNDRY: WITH SUPERVISION
ORAL_HYGIENE: INDEPENDENT
ORAL_HYGIENE: INDEPENDENT
HYGIENE/GROOMING: INDEPENDENT;HANDWASHING
DRESS: STREET CLOTHES
DRESS: INDEPENDENT;STREET CLOTHES
HYGIENE/GROOMING: INDEPENDENT

## 2020-02-05 NOTE — PROGRESS NOTES
"Patient has been visible in the milieu for majority of the shift. Patient denies suicidal ideation and self injurious thoughts. Reported having anxiety earlier in the day but says that she was able to calm herself down by going to her room and decompressing, anxiety is much better this evening. Continues to have depression. Denies auditory and visual hallucinations. Patient was concerned about some reddened areas on her skin that she thinks may be psoriasis and some swelling in her hands.     Patient evaluation continues. Assessed mood,anxiety,thoughts and behavior.     Patient gradually progressing towards goals.    Patient is encouraged to participate in groups and assisted to develop healthy coping skills.     VS reviewed: /85 (BP Location: Left arm)   Pulse 88   Temp 98.9  F (37.2  C) (Oral)   Resp 18   Ht 1.613 m (5' 3.5\")   Wt (!) 166.4 kg (366 lb 14.4 oz)   SpO2 95%   BMI 63.97 kg/m      Length of stay: 4    Refer to daily team meeting notes for individualized plan of care. Nursing will continue to assess.      "

## 2020-02-05 NOTE — PROGRESS NOTES
Attended leisure skill/social skill /cognitive task. Bright, pleasant and social. Readily engaged in new task that required attention to details. Noted enjoyment and benefits of activity (ie socialization, focus, use of executive skills and healthy distraction/coping). Verbalized desire to get activity when she returns home. Focused x 40 minutes. Offered peers positive feedback.

## 2020-02-05 NOTE — TELEPHONE ENCOUNTER
Prior Authorization **APPROVED**    Authorization Effective Date: 2/3/2020  Authorization Expiration Date: 5/3/2020  Medication: Latuda tablets **APPROVED**  Approved Dose/Quantity: 1 tablet daily  Reference #: [id number]  Insurance Company: [employer] - Phone [phone] Fax [phone]  Expected CoPay: $35.00     CoPay Card Available: No    Foundation Assistance Needed: n/a  Which Pharmacy is filling the prescription (Not needed for infusion/clinic administered):    Pharmacy Notified:    Patient Notified:    Comments:  Plan requires step therapy prior authorization: patient must try other atypical antipsychotic medications before they will cover Latuda.        Angelique Nettles CPhT  [place] Discharge Pharmacy Liaison  Pronouns: She/Her/Hers    [facility]  [address] [place] [address]  [address] [place] [address] [address], [place] [address]   [email or web address]  [email or web address]   Phone: [phone]  Pager: [phone]  Fax: [phone]

## 2020-02-05 NOTE — PLAN OF CARE
Patient participated in OT clinic. She continued on coloring project from day before. She was adding lots of detail and intentional in color choice. She was appropriately social with peers and requested music to listen to. She was upbeat about the songs that played and about seeing a bald eagle outside.

## 2020-02-05 NOTE — PROGRESS NOTES
"Brief Medicine Note    Contacted by psychiatry MD regarding pt having hand and foot swelling attributable to amlodipine and bp are under improved control per review of vitals.     Today's vital signs, medications, and nursing notes were reviewed. Labs reviewed.     /89   Pulse 88   Temp 98  F (36.7  C) (Oral)   Resp 18   Ht 1.613 m (5' 3.5\")   Wt (!) 166.4 kg (366 lb 14.4 oz)   SpO2 96%   BMI 63.97 kg/m        A/P:  Hypertension  Hand and leg edema  -stop amlodipine  -trial lisinopril 5mg daily  -follow bp    Annabel Byrd PA-C  Hospitalist Service  Pager: [phone]    "

## 2020-02-05 NOTE — PROGRESS NOTES
"Pt was active in the milieu and social with her peers.  She reported that she earlier in the shift, she felt very upset because she stated she was talking with a staff, attempting to make conversation with them, and they completely ignored her and she felt disrespected.  Pt stated \"they shouldn't be a staff if they can't be compassionate, let alone, have a small conversation with a patient.  It really hurt my feelings that they ignored me while I was saying hi and asking how there day was.\"  Pt went to groups today and is independent with ADL's.  She reported having minimal depression (with having 4 deaths in her family in 2 months), irritable- \"I was earlier this morning because of the staff and everything just bothered me.  Then I came in my room and I took some deep breaths.  It's easier to remember to use my coping skills versus before I came here, they weren't even a thought in my mind when I was going through things,\" sadness, anxious- \"I'm feeling, I guess homesick.  I miss working, my home life, my , my goddaughter.  I guess I'm feeling homesick,\" concentration- \"not good.  It's like a lot of racing thoughts and spacing out.  My  calls it \"chasing squirrels.\"  Pt reported having normal appetite, sleep is getting better, pain is some shoulder pain in her \"routater cuff,\" and having swelling all over her body as a SE's.      02/05/20 1154   Behavioral Health   Hallucinations denies / not responding to hallucinations   Thinking poor concentration   Orientation time: oriented;date: oriented;place: oriented;person: oriented   Memory baseline memory   Insight other (see comment)  (reports improving)   Judgement   (improving)   Eye Contact at examiner   Affect sad;full range affect;irritable   Mood depressed;anxious;mood is calm   Physical Appearance/Attire attire appropriate to age and situation;appears stated age   Hygiene well groomed   Suicidality other (see comments)  (pt denies)   1. Wish to be " Dead (Recent) No   2. Non-Specific Active Suicidal Thoughts (Recent) No   3. Active Sucidal Ideation with any Methods (Not Plan) Without Intent to Act (Recent) No   4. Active Suicidal Ideation with Some Intent to Act, Without Specific Plan (Recent) No   5. Active Suicidal Ideation with Specific Plan and Intent (Recent) No   Self Injury other (see comment)  (pt denies)   Elopement   (shows no signs)   Activity other (see comment)  (active in milieu and social with peers)   Speech coherent;clear   Medication Sensitivity other (see comment)  (swelling on her body)   Psychomotor / Gait steady;balanced   Activities of Daily Living   Hygiene/Grooming independent   Oral Hygiene independent   Dress street clothes   Laundry with supervision   Room Organization independent

## 2020-02-05 NOTE — PROGRESS NOTES
02/04/20 [facility]   Therapeutic Recreation   Type of Intervention structured groups   Activity game   Response Participates, initiates socially appropriate   Hours 1     Pt participated in Therapeutic Recreation group with focus on stress reduction, creative expression, and leisure participation. Engaged and cooperative in a recreational activity via a group drawing game. Pt participated throughout entire duration of the group. Pt added to group discussion, sociable, and was appropriate with interactions. Showed progress in session goals. Pt mood was calm. Appropriately shared sense of humor with peers during group and appeared to brighten with social interaction.

## 2020-02-05 NOTE — DISCHARGE INSTRUCTIONS
Behavioral Discharge Planning and Instructions      Summary:  You were admitted on 1/31/2020  due to Depression and Anxiety. You were treated by Dr. Chanelle Foley DO and discharged on 2/7/20 from [facility] to Home.     You arrived on [facility] from the Emergency Department. You signed in voluntarily and agreed to treatment. While you were here you were prescribed new medications by the doctor. You should continue to take these as prescribed and work with outpatient providers if any necessary changes are needed.       Principal Diagnosis:   Major Depressive Disorder, Recurrent Episode, Severe With anxious distress  Generalized Anxiety Disorder  Alcohol Use Disorder, partial remission  Cannabis Abuse       Health Care Follow-up Appointments:   Primary Care - this provider will need to prescribe your current medications until you are able to start seeing your psychiatrist again  Date/Time: 2/12/20 10:00AM   Provider: Dr. Jermain Jaramillo MD  Address: [address] [place] [address]  Phone: [phone]      Therapy    Date/Time: 3/31/20 1:30Pm    Provider: Jerri Vieyra   Address: [address] [place] [address]  Phone: [phone]  Fax: [phone]    Psychiatric   Date/Time: 5/7/20 10:15AM    Provider: Lisa Conrad NP  Address: [address] [place] [address]  Phone: [phone]  Fax: [phone]    *The first available appointments were scheduled for you at your preferred clinics, if you want to try to get in sooner with your providers call the clinics and ask to be placed on the cancellation list       Grief Support Group ([facility])  This group is open to all grieving a loss. Come for a time of sharing, support, and education about grief and loss.  First Monday of the month, January 6th, February 3rd, March 2nd; 10:00 a.m. - 12:00 p.m.  [facility], [facility], [address] [place] [address]  For more information, please contact Diane at  [phone].    For information on grief and loss help, please contact us:  [facility]: ([place]) [phone].  Or e-mail us at [email or web address]    Attend all scheduled appointments with your outpatient providers. Call at least 24 hours in advance if you need to reschedule an appointment to ensure continued access to your outpatient providers.   Major Treatments, Procedures and Findings:  You were provided with: a psychiatric assessment, medication evaluation and/or management, group therapy and milieu management    Symptoms to Report: feeling more aggressive, increased confusion, losing more sleep, mood getting worse or thoughts of suicide    Early warning signs can include: increased depression or anxiety sleep disturbances increased thoughts or behaviors of suicide or self-harm  increased unusual thinking, such as paranoia or hearing voices    Safety and Wellness:  Take all medicines as directed.  Make no changes unless your doctor suggests them. Follow treatment recommendations. Refrain from alcohol and non-prescribed drugs.  If there is a concern for safety, call 911.    Resources:   [facility]   Crisis services are available 24/7 if you are having a mental health crisis.    COPE (Community Outreach for Psychiatric Emergencies): [phone]    In the [facility], call **CRISIS (730861) from a cell phone to talk to a team of professionals who can help you.    Crisis Text Line: Text [place] to 899921  These are crisis residences where you can stay for a short time if you feel like you need to stabilize but do not need to go to the hospital.  [facility]  - Crisis Beds  1.[facility] Crisis Stabilization Program  [address] [place] [address]  Phone:[phone]    2.[facility]  [address] [place] [address]  Phone: [phone]    3.[facility]  [address] [place] [address]  Phone:  [phone]    If you ever need immediate access to services, [facility] has a walk in triage services to help you with what you need. This Triage Center is located in Room N141 at [address]. Go through the front door of [address], and follow signs to [address].   Triage hours:10:00 am - 5:00 pm  Triage Phone Number: [phone]      The treatment team has appreciated the opportunity to work with you.     If you have any questions or concerns our unit number is [phone].  If you have the need for records regarding this hospitalization please contact the medical records department at [phone].

## 2020-02-05 NOTE — PROGRESS NOTES
"[facility], [place]   Psychiatric Progress Note  Hospital Day: 5        Interim History:   The patient's care was discussed with the treatment team during the daily team meeting and/or staff's chart notes were reviewed.  Staff report patient has been visible in the milieu, had visitors which went well, taking medications as prescribed, no acute events overnight.     Upon interview, the patient reports that she is continuing to feel better each day and is starting to feel little homesick.  She is having some swelling in her feet and hands and discussed how this may be due to her blood pressure medication and internal medicine was made aware of this.  Discussed how a prior authorization has been completed for her Latuda and it would be covered at $35 a month.  She contemplated switching from the Abilify and Wellbutrin to the Latuda and figures out it will be about the same cost wise for her each month and she would prefer to restart the Latuda as this has been the most helpful medication for her in the past.  We will plan to discontinue Abilify and Wellbutrin today and start Latuda 20 mg daily.  She denies having any thoughts of hurting self or others.  She does feel like she is progressing towards discharge and if she tolerates the medication changes will plan to discharge home on Friday.  No additional concerns at this time.         Medications:       amLODIPine  5 mg Oral Daily     ARIPiprazole  5 mg Oral Daily     atorvastatin  10 mg Oral Daily     buPROPion  150 mg Oral Daily     omeprazole  20 mg Oral QAM AC     prazosin  1 mg Oral At Bedtime          Allergies:     Allergies   Allergen Reactions     No Known Drug Allergies           Labs:     No results found for this or any previous visit (from the past 48 hour(s)).       Psychiatric Examination:     /85 (BP Location: Left arm)   Pulse 88   Temp 98.9  F (37.2  C) (Oral)   Resp 18   Ht 1.613 m (5' 3.5\")   Wt (!) 166.4 kg " (366 lb 14.4 oz)   SpO2 95%   BMI 63.97 kg/m    Weight is 366 lbs 14.4 oz  Body mass index is 63.97 kg/m .    Orthostatic Vitals       Most Recent      Sitting Orthostatic /90 02/04 0804    Sitting Orthostatic Pulse (bpm) 101 02/04 0804    Standing Orthostatic /91 02/05 0759    Standing Orthostatic Pulse (bpm) 103 02/05 0759        Appearance: awake, alert, adequately groomed and appeared as age stated  Attitude:  cooperative  Eye Contact:  good  Mood:  better  Affect:  appropriate and in normal range and mood congruent  Speech:  clear, coherent and normal prosody  Language: fluent and intact in English  Psychomotor, Gait, Musculoskeletal:  no evidence of tardive dyskinesia, dystonia, or tics  Throught Process:  logical, linear and goal oriented  Associations:  no loose associations  Thought Content:  no evidence of suicidal ideation or homicidal ideation and no evidence of psychotic thought  Insight:  fair  Judgement:  fair  Oriented to:  time, person, and place  Attention Span and Concentration:  intact  Recent and Remote Memory:  intact  Fund of Knowledge:  appropriate    Clinical Global Impressions  First:  Considering your total clinical experience with this particular patient population, how severe are the patient's symptoms at this time?: 7 (02/01/20 1356)  Compared to the patient's condition at the START of treatment, this patient's condition is:: 4 (02/01/20 1356)  Most recent:  Considering your total clinical experience with this particular patient population, how severe are the patient's symptoms at this time?: 7 (02/01/20 1356)  Compared to the patient's condition at the START of treatment, this patient's condition is:: 4 (02/01/20 1356)           Precautions:     Behavioral Orders   Procedures     Code 1 - Restrict to Unit     Routine Programming     As clinically indicated     Status 15     Every 15 minutes.     Suicide precautions     Patients on Suicide Precautions should have a  Combination Diet ordered that includes a Diet selection(s) AND a Behavioral Tray selection for Safe Tray - with utensils, or Safe Tray - NO utensils            Diagnoses:   Major Depressive Disorder, Recurrent Episode, Severe With anxious distress  Rule out Bipolar Disorder  Generalized Anxiety Disorder  Rule Out Binge-Eating Disorder   Rule out Borderline Personality Disorder   Alcohol Use Disorder, partial remission  Cannabis Abuse          Assessment & Plan:   Assessment and hospital summary:  This patient is a 39 year old female with history of the below diagnosis who presented to ED with passive thoughts about death and increased depression/anxiety in context of increased stressors, limited support, relationship stressors and inability to be adherent to medications due to financial strain/insurance. Reviewed previous medication trials, she is open to retrial of Abilify for mood stabilization, Buproprion for depression and prazosin for PTSD.  She denied that bupropion made her anxiety worse in the past, but will monitor for this.  Of note, Latuda was the medication that worked best for her and will work with pharmacy liason to see about PA and getting this medication covered on Monday. Patient would also benefit from getting re-estabalished with outpatient services upon discharge.     Inpatient psychiatric hospitalization is warranted at this time for safety, stabilization, and possible adjustment in medications.    Psychiatric treatment/inteventions:  Medications:   -start latuda 20mg daily with evening meal   -DIsconitnue Abilify 5 mg daily  -Discontinuebupropion  mg daily  -Continue prazosin 1 mg at bedtime     Laboratory/Imaging: No new labs ordered, admission labs reviewed     Patient will be treated in therapeutic milieu with appropriate individual and group therapies as described.     Medical treatment/interventions:  Medical concerns: IM consult placed on admission for comanagement of comorbidities  including poorly controlled blood pressure, appreciate assistance see their notes for complete details.     Legal Status: Voluntary     Safety Assessment:   Checks: Status 15  Pt has not required locked seclusion or restraints in the past 24 hours to maintain safety, please refer to RN documentation for further details.    The risks, benefits, alternatives and side effects have been discussed and are understood by the patient.     Disposition: Pending clinical stabilization. Will likely discharge to home Friday if continuing to stabilize and tolerate medication changes.      This note was created by undersigned using a Dragon dictation system. All typing errors or contextual distortion are unintentional and software inherent.      Chanelle Foley,   [facility]

## 2020-02-06 PROCEDURE — 25000132 ZZH RX MED GY IP 250 OP 250 PS 637: Performed by: PHYSICIAN ASSISTANT

## 2020-02-06 PROCEDURE — 99232 SBSQ HOSP IP/OBS MODERATE 35: CPT | Performed by: PSYCHIATRY & NEUROLOGY

## 2020-02-06 PROCEDURE — 25000132 ZZH RX MED GY IP 250 OP 250 PS 637: Performed by: PSYCHIATRY & NEUROLOGY

## 2020-02-06 PROCEDURE — 12400001 ZZH R&B MH UMMC

## 2020-02-06 PROCEDURE — G0177 OPPS/PHP; TRAIN & EDUC SERV: HCPCS

## 2020-02-06 RX ADMIN — LURASIDONE HYDROCHLORIDE 20 MG: 20 TABLET, FILM COATED ORAL at 18:16

## 2020-02-06 RX ADMIN — PRAZOSIN HYDROCHLORIDE 1 MG: 1 CAPSULE ORAL at 21:20

## 2020-02-06 RX ADMIN — IBUPROFEN 600 MG: 600 TABLET ORAL at 08:04

## 2020-02-06 RX ADMIN — IBUPROFEN 600 MG: 600 TABLET ORAL at 21:20

## 2020-02-06 RX ADMIN — ATORVASTATIN CALCIUM 10 MG: 10 TABLET, FILM COATED ORAL at 08:02

## 2020-02-06 RX ADMIN — LISINOPRIL 5 MG: 2.5 TABLET ORAL at 08:02

## 2020-02-06 RX ADMIN — OMEPRAZOLE 20 MG: 20 CAPSULE, DELAYED RELEASE ORAL at 08:02

## 2020-02-06 ASSESSMENT — ACTIVITIES OF DAILY LIVING (ADL)
DRESS: INDEPENDENT
ORAL_HYGIENE: INDEPENDENT
HYGIENE/GROOMING: INDEPENDENT

## 2020-02-06 ASSESSMENT — MIFFLIN-ST. JEOR: SCORE: 2314.94

## 2020-02-06 NOTE — PROGRESS NOTES
02/06/20 1352   Behavioral Health   Hallucinations denies / not responding to hallucinations   Thinking distractable   Orientation place: oriented;person: oriented;date: oriented;time: oriented   Memory baseline memory   Insight insight appropriate to situation   Judgement impaired   Eye Contact at examiner   Affect full range affect   Mood mood is calm;anxious   Physical Appearance/Attire appears stated age;attire appropriate to age and situation   Hygiene well groomed   Suicidality other (see comments)  (denies currently)   1. Wish to be Dead (Recent) No   2. Non-Specific Active Suicidal Thoughts (Recent) No   Self Injury other (see comment)  (denies currently)   Elopement   (no concerns)   Activity other (see comment)  (visible in milieu, social with others, attended groups)   Speech coherent;clear     Pt was visible in milieu, social with peers, and attended all the groups. Pt reported moderate anxiety and depressive symptoms. Denies SI/SIB. Denies psychotic symptoms. Pt reported feeling sad because she misses her regular life (, work, etc.).  No behavioral issues. Appetite is intact.

## 2020-02-06 NOTE — PLAN OF CARE
Patient participated in 3 of 3 OT groups. Extended clinic group she was focused a chose a new project. She was social with table mates. She requested songs. She remained in group for the duration of time. She was appreciative of her project.   Participated in mental health management group focused on the mindful benefits of having fun and being silly. A structured group activity was used to promote silly fun. Discussion/education of mindfulness and ways to practice provided. She was an active participant through the group and attended discussion despite being quite sleepy.

## 2020-02-06 NOTE — PROGRESS NOTES
Cristina had a pleasant, unremarkable shift. She was calm, social with others, visible in the milieu watching TV, and chatting on the phone with friends and family. She attended and participated in groups. She reports that she has been feeling tired today and homesick, that her anxiety and depression have decreased, and that overall her day has gotten better since the day shift. She denies SI/SIB/HI and hallucinations. Her  visited this evening which she reported went well. She reports adequate appetite but poor sleep due to the hospital bed. ADL's are independent, no nutrition concerns. No further concerns at this time.        02/05/20 2203   Behavioral Health   Hallucinations denies / not responding to hallucinations   Thinking poor concentration   Orientation time: oriented;date: oriented;place: oriented;person: oriented   Memory baseline memory   Insight admits / accepts;other (see comment)  (Continues to improve)   Judgement intact   Eye Contact at examiner   Affect full range affect   Mood depressed;anxious;mood is calm   Physical Appearance/Attire other (see comment);appears stated age;multiple layers  (Adequate)   Hygiene other (see comment)  (Adequate)   Suicidality other (see comments)  (Patient denies)   1. Wish to be Dead (Recent) No   2. Non-Specific Active Suicidal Thoughts (Recent) No   Self Injury other (see comment)  (Patient denies / Nothing stated or observed)   Elopement   (No indicators this shift )   Activity other (see comment)  (Visible in milieu, groups, social)   Speech clear;coherent   Medication Sensitivity other (see comment)  (Continues to have some swelling)   Psychomotor / Gait balanced;steady   Psycho Education   Type of Intervention 1:1 intervention   Response participates, initiates socially appropriate   Hours 0.5   Treatment Detail Check-In   Group Therapy Session   Group Attendance attended group session   Activities of Daily Living   Hygiene/Grooming independent;handwashing    Oral Hygiene independent   Dress independent;street clothes   Laundry with supervision   Room Organization independent   Groups   Details Community Meeting

## 2020-02-06 NOTE — PROGRESS NOTES
"[facility], [place]   Psychiatric Progress Note  Hospital Day: 6        Interim History:   The patient's care was discussed with the treatment team during the daily team meeting and/or staff's chart notes were reviewed.  Staff report patient has been visible in the milieu, taking medications as prescribed, no acute events overnight, continue to progress towards discharge.      Upon interview, the patient reports that her mood is \"okay\".  She continues to feel homesick.  She denies having any thoughts of hurting self or others.  She is tolerating the medication changes and has not noticed anything different other than swelling improving in her hands and ankles after discontinuing the amlodipine.  Discussed how team plans to monitor her overnight and ensure that blood pressures are within range with starting lisinopril and she does not have a reaction to this medication.  Also will assess if she is tolerating the Latuda with plan for her to increase this dose further as outpatient.  She expressed understanding and feels like she would be ready to discharge home tomorrow.  No additional concerns at this time.         Medications:       atorvastatin  10 mg Oral Daily     lisinopril  5 mg Oral Daily     lurasidone  20 mg Oral Daily     omeprazole  20 mg Oral QAM AC     prazosin  1 mg Oral At Bedtime          Allergies:     Allergies   Allergen Reactions     No Known Drug Allergies           Labs:     No results found for this or any previous visit (from the past 48 hour(s)).       Psychiatric Examination:     /84   Pulse 78   Temp 98.9  F (37.2  C) (Oral)   Resp 18   Ht 1.613 m (5' 3.5\")   Wt (!) 166.4 kg (366 lb 14.4 oz)   SpO2 96%   BMI 63.97 kg/m    Weight is 366 lbs 14.4 oz  Body mass index is 63.97 kg/m .    Orthostatic Vitals       Most Recent      Sitting Orthostatic /90 02/04 0804    Sitting Orthostatic Pulse (bpm) 101 02/04 0804    Standing Orthostatic /91 " "02/05 0759    Standing Orthostatic Pulse (bpm) 103 02/05 0759        Appearance: awake, alert, adequately groomed and appeared as age stated  Attitude:  cooperative  Eye Contact:  good  Mood:  \"okay\"  Affect:  appropriate and in normal range and mood congruent  Speech:  clear, coherent and normal prosody  Language: fluent and intact in English  Psychomotor, Gait, Musculoskeletal:  no evidence of tardive dyskinesia, dystonia, or tics  Throught Process:  logical, linear and goal oriented  Associations:  no loose associations  Thought Content:  no evidence of suicidal ideation or homicidal ideation and no evidence of psychotic thought  Insight:  fair  Judgement:  fair  Oriented to:  time, person, and place  Attention Span and Concentration:  intact  Recent and Remote Memory:  intact  Fund of Knowledge:  appropriate    Clinical Global Impressions  First:  Considering your total clinical experience with this particular patient population, how severe are the patient's symptoms at this time?: 7 (02/01/20 1356)  Compared to the patient's condition at the START of treatment, this patient's condition is:: 4 (02/01/20 1356)  Most recent:  Considering your total clinical experience with this particular patient population, how severe are the patient's symptoms at this time?: 7 (02/01/20 1356)  Compared to the patient's condition at the START of treatment, this patient's condition is:: 4 (02/01/20 1356)           Precautions:     Behavioral Orders   Procedures     Code 1 - Restrict to Unit     Routine Programming     As clinically indicated     Status 15     Every 15 minutes.     Suicide precautions     Patients on Suicide Precautions should have a Combination Diet ordered that includes a Diet selection(s) AND a Behavioral Tray selection for Safe Tray - with utensils, or Safe Tray - NO utensils            Diagnoses:   Major Depressive Disorder, Recurrent Episode, Severe With anxious distress  Rule out Bipolar " Disorder  Generalized Anxiety Disorder  Rule Out Binge-Eating Disorder   Rule out Borderline Personality Disorder   Alcohol Use Disorder, partial remission  Cannabis Abuse          Assessment & Plan:   Assessment and hospital summary:  This patient is a 39 year old female with history of the below diagnosis who presented to ED with passive thoughts about death and increased depression/anxiety in context of increased stressors, limited support, relationship stressors and inability to be adherent to medications due to financial strain/insurance. Reviewed previous medication trials, she is open to retrial of Abilify for mood stabilization, Buproprion for depression and prazosin for PTSD.  She denied that bupropion made her anxiety worse in the past, but will monitor for this.  Of note, Latuda was the medication that worked best for her and will work with pharmacy liason to see about PA and getting this medication covered on Monday. Patient would also benefit from getting re-estabalished with outpatient services upon discharge. Latuda covered by PA at $35/month, pt elected to start this medication and discontinue bupropion and aripiprazole 2/5/20.      Inpatient psychiatric hospitalization is warranted at this time for safety, stabilization, and possible adjustment in medications.    Psychiatric treatment/inteventions:  Medications:   -Continue latuda 20mg daily with evening meal   -Continue prazosin 1 mg at bedtime     Laboratory/Imaging: No new labs ordered, admission labs reviewed     Patient will be treated in therapeutic milieu with appropriate individual and group therapies as described.     Medical treatment/interventions:  Medical concerns: IM consult placed on admission for comanagement of comorbidities including poorly controlled blood pressure, appreciate assistance see their notes for complete details.     Legal Status: Voluntary     Safety Assessment:   Checks: Status 15  Pt has not required locked  seclusion or restraints in the past 24 hours to maintain safety, please refer to RN documentation for further details.    The risks, benefits, alternatives and side effects have been discussed and are understood by the patient.     Disposition: Pending clinical stabilization. Will likely discharge to home Friday if continuing to stabilize and tolerate medication changes.      This note was created by undersigned using a Dragon dictation system. All typing errors or contextual distortion are unintentional and software inherent.      Chanelle Foley,   [facility]

## 2020-02-07 VITALS
HEIGHT: 64 IN | OXYGEN SATURATION: 93 % | SYSTOLIC BLOOD PRESSURE: 120 MMHG | HEART RATE: 85 BPM | TEMPERATURE: 98.3 F | BODY MASS INDEX: 50.02 KG/M2 | WEIGHT: 293 LBS | DIASTOLIC BLOOD PRESSURE: 79 MMHG | RESPIRATION RATE: 18 BRPM

## 2020-02-07 PROCEDURE — 25000132 ZZH RX MED GY IP 250 OP 250 PS 637: Performed by: PHYSICIAN ASSISTANT

## 2020-02-07 PROCEDURE — 99239 HOSP IP/OBS DSCHRG MGMT >30: CPT | Performed by: PSYCHIATRY & NEUROLOGY

## 2020-02-07 PROCEDURE — G0177 OPPS/PHP; TRAIN & EDUC SERV: HCPCS

## 2020-02-07 PROCEDURE — 25000132 ZZH RX MED GY IP 250 OP 250 PS 637: Performed by: PSYCHIATRY & NEUROLOGY

## 2020-02-07 RX ORDER — PRAZOSIN HYDROCHLORIDE 1 MG/1
1 CAPSULE ORAL AT BEDTIME
Qty: 30 CAPSULE | Refills: 0 | Status: SHIPPED | OUTPATIENT
Start: 2020-02-07 | End: 2020-05-22

## 2020-02-07 RX ORDER — LURASIDONE HYDROCHLORIDE 20 MG/1
TABLET, FILM COATED ORAL
Qty: 60 TABLET | Refills: 0 | Status: SHIPPED | OUTPATIENT
Start: 2020-02-07 | End: 2020-02-21

## 2020-02-07 RX ORDER — LISINOPRIL 5 MG/1
5 TABLET ORAL DAILY
Qty: 30 TABLET | Refills: 0 | Status: SHIPPED | OUTPATIENT
Start: 2020-02-08 | End: 2020-02-12

## 2020-02-07 RX ORDER — ATORVASTATIN CALCIUM 10 MG/1
10 TABLET, FILM COATED ORAL DAILY
Qty: 30 TABLET | Refills: 0 | Status: SHIPPED | OUTPATIENT
Start: 2020-02-07 | End: 2020-02-12

## 2020-02-07 RX ADMIN — LISINOPRIL 5 MG: 2.5 TABLET ORAL at 08:25

## 2020-02-07 RX ADMIN — IBUPROFEN 600 MG: 600 TABLET ORAL at 04:16

## 2020-02-07 RX ADMIN — OMEPRAZOLE 20 MG: 20 CAPSULE, DELAYED RELEASE ORAL at 08:25

## 2020-02-07 RX ADMIN — HYDROXYZINE HYDROCHLORIDE 50 MG: 25 TABLET, FILM COATED ORAL at 11:45

## 2020-02-07 RX ADMIN — ATORVASTATIN CALCIUM 10 MG: 10 TABLET, FILM COATED ORAL at 08:25

## 2020-02-07 NOTE — PROGRESS NOTES
Pt denies SI/.SIB. Pt social with peers. Pt hopeful for discharge tomorrow but pt is aware if blood pressure is not ok she will have to stay.  Pt attended community meeting an visited with .     02/06/20 2562   Behavioral Health   Hallucinations denies / not responding to hallucinations   Thinking intact   Orientation person: oriented;place: oriented   Memory baseline memory   Insight admits / accepts   Judgement intact   Eye Contact at examiner   Affect blunted, flat   Mood mood is calm   Suicidality other (see comments)  (pt denies)   Self Injury other (see comment)  (pt denies)   Activity other (see comment)  (attended groups, social with peers, visited with )   Activities of Daily Living   Hygiene/Grooming independent   Oral Hygiene independent   Dress independent   Room Organization independent

## 2020-02-07 NOTE — DISCHARGE SUMMARY
"Psychiatric Discharge Summary    Cristina Dean MRN# [id number]   Age: 39 year old YOB: 1980     Date of Admission:  1/31/2020  Date of Discharge:  2/7/2020 12:53 PM  Admitting Physician:  Chanelle Foley MD  Discharge Physician:  Chanelle Foley MD          Event Leading to Hospitalization:   Cristina is a 39 year old female with history of the below diagnosis who presented to ED with passive thoughts about death and increased depression/anxiety in context of increased stressors, limited support, relationship stressors and inability to be adherent to medications due to financial strain/insurance.     Per ED: The history is provided by the patient and a friend.      Cristina Dean is a 39 year old female with a history of anxiety, bipolar disorder, and depression, who presents to the emergency department with her best friend for evaluation of a mental health problem. To note, the patient reports being admitted to the hospital for mental health reasons a few years back but has not needed it since then. She states she has been off medications secondary to financial reasons.     Recently, the patient has had an increase in stressors, such as starting a new job and having both her father and  get sick. She is noted to live with her  but states her relationship with him is not going very well at the moment. Patient also reports suicidal ideations but denies having a plan. She states some sleep disturbance as well, as she usually wakes up with nightmares, feeling as though \"I am being strangled,\" which prompted her to take a dose of her father's Trazodone last night.      To note, the patient does not see a counselor or take any mental health drugs as she was unable to afford it. She tried to make a therapy appointment but was not able to get in until April. She presents to the ED today wanting help as she \"does not want to do this anymore\" or \"feeling safe anywhere.\"      Upon " "interview patient reports that she has been feeling extremely anxious and having increasing panic attacks along with some paranoia where she feels like she may be misinterpreting things are making things up for the past few months.  She has been having worsening in her mood along with anhedonia, sleeping too much, decreased appetite and impaired concentration.  She denies having any suicidal ideation stating that she sometimes will think that it be easier if she were not alive but denies having any actual thoughts of harming self.  She reports that this all started with an increase in stressors including her dad's health started to decline, her  then went through cardiac arrest and was resuscitated and she had to witness this, then she needed to arrange to get her dad transitioned into hospice and \"watched him die\".  She then reports that she experienced the death of a godparent and as a result has been taking care of her 15-year-old daughter for the past 3 weeks since her dad's death.  She reports she is having relationship strains with her  as he uses alcohol and this is something that she has worked on to decrease as she finds that it will make her angry and she is susceptible to abusing it.  She reports she had a period of history where she abused alcohol when she was younger and now she very occasionally will socially drink but not to excess.  She also uses marijuana occasionally.  She reports she has had some issues with joint pain and knee pain and this is caused her to need to change job duties at work and cut back on hours.  She is concerned about being able to contact work so that they will not expect her for shift tonight.  She denies having any other acute medical concerns.  She is open to restarting some medications and getting reestablished with outpatient supports prior to discharge.       See Admission note by Chanelle Foley DO found on 2/1/2020 for additional details.          " Diagnoses:     Major Depressive Disorder, Recurrent Episode, Severe With anxious distress  Rule out Bipolar Disorder  Generalized Anxiety Disorder  Rule Out Binge-Eating Disorder   Rule out Borderline Personality Disorder   Alcohol Use Disorder, partial remission  Cannabis Abuse          Labs:   1/8/2020: CMP WNL; lipid panel with Cholesterol 213 (H), HDL 48 (L),  (H), Non  (H),  (H); Urine HCG negative; Utox positive for cannabinoids; EtOH breath test 0.00; Hgb 14.7         Consults:   IM consult placed on admission and follow up requested as below for management of medical co-morbidities:     ASSESSMENT:   1.  Depression and behavioral concerns per Dr. Foley.   2.  Elevated blood pressure since admission, question falsely elevated due to using a too small blood pressure cuff.  The patient nonetheless is asymptomatic.   3.  Gastroesophageal reflux disease, normally controlled on daily proton pump inhibitor therapy, with which patient states prior to admission she has been noncompliant.   4.  Morbid obesity.   5.  Asthma without evidence of bronchospasm on exam.   6.  No known coronary artery disease with history of negative cardiac stress test in 2018.      PLAN:  We will obtain a routine baseline EKG.  Blood pressures will be monitored closely and discussed with staff that patient needs to continue with blood pressure checks with an extra-large blood pressure cuff.  If pressures remain high, we will start patient on amlodipine and titrate dose up accordingly.  In the interim, we will start oral p.r.n. hydralazine to give systolic blood pressure greater than 190 or diastolic blood pressure greater than 110.  We will start omeprazole 20 mg daily for acid reflux.        No further medical intervention indicated at this time.  Medicine will continue to follow peripherally with daily chart check to continue monitoring her blood pressures.  Please feel free to call with questions.      Thank you  "for this consultation.         TIFFANY GÓMEZ PA-C        Brief Medicine Note     Contacted by psychiatry MD regarding pt having hand and foot swelling attributable to amlodipine and bp are under improved control per review of vitals.      Today's vital signs, medications, and nursing notes were reviewed. Labs reviewed.      /89   Pulse 88   Temp 98  F (36.7  C) (Oral)   Resp 18   Ht 1.613 m (5' 3.5\")   Wt (!) 166.4 kg (366 lb 14.4 oz)   SpO2 96%   BMI 63.97 kg/m          A/P:  Hypertension  Hand and leg edema  -stop amlodipine  -trial lisinopril 5mg daily  -follow bp     Annabel Byrd PA-C  Hospitalist Service         Hospital Course:   Cristina Dean was admitted to [facility] with Chanelle Foley DO  as a voluntary patient. The patient was placed under status 15 (15 minute checks) to ensure patient safety. Labs obtained as above. IM consult also placed.  She was not on medications PTA. Reviewed previous medication trials, she was open to retrial of Abilify for mood stabilization, Buproprion for depression and prazosin for PTSD.  She denied that bupropion made her anxiety worse in the past, but planned to monitor for this.  Of note,per patients report Latuda was the medication that worked best for her and writer worked with work with pharmacy liason to see about PA and getting this medication covered. Patient would also benefit from getting re-estabalished with outpatient services upon discharge and team CTC worked to arrange this. Latuda covered by PA at $35/month, pt elected to start this medication and discontinue bupropion and aripiprazole 2/5/20. Cristina Dean did participate in groups and was visible in the milieu. The patient's symptoms of depression, anxiety and trauma nightmares improved.     Today Cristina Dean reports she does not have any thoughts of hurting self or others. In addition, she has notable risk factors for self-harm, including substance abuse. However, risk " is mitigated by commitment to family, absence of past attempts, ability to volunteer a safety plan and history of seeking help when needed. Therefore, based on all available evidence including the factors cited above, she does not appear to be at imminent risk for self-harm, does not meet criteria for a 72-hr hold, and therefore remains appropriate for ongoing outpatient level of care.     Cristina Dean was discharged to home. At the time of discharge Cristina Dean was determined to not be a danger to herself or others.          Discharge Medications:     Discharge Medication List as of 2/7/2020 11:58 AM      START taking these medications    Details   lisinopril (PRINIVIL/ZESTRIL) 5 MG tablet Take 1 tablet (5 mg) by mouth daily, Disp-30 tablet, R-0, E-Prescribe      lurasidone (LATUDA) 20 MG TABS tablet Take one tablet (20mg) daily x5 days then if tolerating increase to two tablets (40mg) daily, Disp-60 tablet, R-0, E-Prescribe      omeprazole (PRILOSEC) 20 MG DR capsule Take 1 capsule (20 mg) by mouth every morning (before breakfast), Disp-30 capsule, R-0, E-Prescribe      prazosin (MINIPRESS) 1 MG capsule Take 1 capsule (1 mg) by mouth At Bedtime, Disp-30 capsule, R-0, E-Prescribe         CONTINUE these medications which have CHANGED    Details   atorvastatin (LIPITOR) 10 MG tablet Take 1 tablet (10 mg) by mouth daily, Disp-30 tablet, R-0, E-Prescribe         CONTINUE these medications which have NOT CHANGED    Details   acetaminophen (TYLENOL) 500 MG tablet Take 1,000 mg by mouth every 8 hours as needed for pain (right shoulder), Historical      albuterol (PROAIR HFA/PROVENTIL HFA/VENTOLIN HFA) 108 (90 Base) MCG/ACT inhaler Inhale 2 puffs into the lungs every 6 hours, Disp-1 Inhaler, R-0, E-PrescribePharmacy may dispense brand covered by insurance (Proair, or proventil or ventolin or generic albuterol inhaler)      lidocaine (LIDODERM) 5 % patch Place 1 patch onto the skin every 12 hours as needed for  moderate pain To prevent lidocaine toxicity, patient should be patch free for 12 hrs daily.Disp-12 patch, R-0Local Print      naproxen sodium (ANAPROX) 220 MG tablet Take 440 mg by mouth 2 times daily (with meals), Historical         STOP taking these medications       cyclobenzaprine (FLEXERIL) 10 MG tablet Comments:   Reason for Stopping:                    Psychiatric Examination:   Appearance:  awake, alert, adequately groomed and appeared as age stated  Attitude:  cooperative  Eye Contact:  good  Mood:  better  Affect:  appropriate and in normal range and mood congruent  Speech:  clear, coherent and normal prosody  Psychomotor Behavior:  no evidence of tardive dyskinesia, dystonia, or tics  Thought Process:  logical, linear and goal oriented  Associations:  no loose associations  Thought Content:  no evidence of suicidal ideation or homicidal ideation and no evidence of psychotic thought  Insight:  good  Judgment:  intact  Oriented to:  time, person, and place  Attention Span and Concentration:  intact  Recent and Remote Memory:  intact  Language: English, fluent  Fund of Knowledge: appropriate  Muscle Strength and Tone: normal  Gait and Station: Normal         Discharge Plan:   Health Care Follow-up Appointments:   Primary Care - this provider will need to prescribe your current medications until you are able to start seeing your psychiatrist again  Date/Time: 2/12/20 10:00AM   Provider: Dr. Jermain Jaramillo MD  Address: [address] [place] [id number]  Phone: [phone]       Therapy    Date/Time: 3/31/20 1:30Pm    Provider: Jerri Vieyra   Address: [address] [place] [address]  Phone: [phone]  Fax: [phone]     Psychiatric   Date/Time: 5/7/20 10:15AM    Provider: Lisa Conrad NP  Address: [address] [place] [address]  Phone: [phone]  Fax: [phone]     *The first available appointments were scheduled for you at your preferred clinics, if you want to try to  get in sooner with your providers call the clinics and ask to be placed on the cancellation list     Attestation:  Patient has been seen and evaluated by me, Chanelle Foley DO on day of discharge. 40 minutes spent in coordination of discharge planning.

## 2020-02-07 NOTE — PROGRESS NOTES
Pt given copy of their discharge instructions and medication administration instructions. All discharge plans were discussed with patient. Pt reports no questions at this time regarding discharge plans, or medications. Pt denies any suicidal ideation, plans or intent at this time. Patient has received all her belongings and was picked up by her friend.

## 2020-02-10 ENCOUNTER — TELEPHONE (OUTPATIENT)
Dept: INTERNAL MEDICINE | Facility: CLINIC | Age: 40
End: 2020-02-10

## 2020-02-10 ENCOUNTER — PATIENT OUTREACH (OUTPATIENT)
Dept: CARE COORDINATION | Facility: CLINIC | Age: 40
End: 2020-02-10

## 2020-02-10 DIAGNOSIS — F33.1 DEPRESSION, MAJOR, RECURRENT, MODERATE (H): Primary | ICD-10-CM

## 2020-02-10 DIAGNOSIS — R45.851 SUICIDAL IDEATION: ICD-10-CM

## 2020-02-10 DIAGNOSIS — F31.9 BIPOLAR AFFECTIVE DISORDER, REMISSION STATUS UNSPECIFIED (H): ICD-10-CM

## 2020-02-10 NOTE — LETTER
[place] CARE COORDINATION  [facility]  [address] [address]  Tel. [phone]  Fax [phone]    February 11, 2020    Cristina Dean  [address] [place] [id number]      Dear Cristina,    I am a clinic care coordinator who works with Jermain Jaramillo MD at [facility]. I have been trying to reach you recently to introduce Clinic Care Coordination and to see if there was anything I could assist you with.  Below is a description of clinic care coordination and how I can further assist you.      The clinic care coordinator team is made up of a registered nurse,  and community health worker who understand the health care system. The goal of clinic care coordination is to help you manage your health and improve access to the health care system in the most efficient manner. The team can assist you in meeting your health care goals by providing education, coordinating services, strengthening the communication among your providers  and supporting you with any resource needs.    Please feel free to contact me at [phone] with any questions or concerns. We are focused on providing you with the highest-quality healthcare experience possible and that all starts with you.     Sincerely,     Rosetta Whitehead, [facility]  Clinic Care Coordinator   [facility] and [facility]  [phone]  [email or web address]  Enclosed: I have enclosed a copy of a 24 Hour Access Plan. This has helpful phone numbers for you to call when needed. Please keep this in an easy to access place to use as needed.

## 2020-02-10 NOTE — PROGRESS NOTES
Clinic Care Coordination Contact  [facility]/Voicemail    Referral Source: IP Handoff    Clinical Data: Care Coordinator Outreach    Outreach attempted x 1.  Left message on patient's voicemail with call back information and requested return call.    Plan: Care Coordinator will try to reach patient again in 1-2 business days.    Rosetta Whitehead, [facility]  Clinic Care Coordinator   Krysten Bills, and [facility]  [phone]  [email or web address]

## 2020-02-10 NOTE — LETTER
Health Care Home - Access Care Plan    About Me:    Patient Name:  Cristina Dean    YOB: 1980  Age:                      39 year old   Shivani MRN:     [id number] Telephone Information:   Home Phone [phone]   Mobile [phone]       Address:  [address] [place] [address] Email address:  [email or web address]      Emergency Contact(s)   Name Relationship Lgl Grd Work Phone Home Phone Mobile Phone   1. DANK, RE* Spouse   [phone]   2. PANTERA HUERTA Mother   [phone]              Health Maintenance: Routine Health maintenance Reviewed: Due/Overdue There are no preventive care reminders to display for this patient.    My Access Plan  Medical Emergency 914   Questions or concerns during clinic hours Primary Clinic Line, I will call the clinic directly: [facility] - [phone]   24 Hour Appointment Line [phone] or  [phone]) (toll free)   24 Hour Nurse Line [phone] (toll free)   Questions or concerns outside clinic hours 24 Hour Appointment Line, I will call the after-hours on-call line:   [facility] [phone] or [phone]) (toll-free)   Preferred Urgent Care [facility], [phone]   Preferred [facility]  [phone]   Preferred Pharmacy [facility]/pharmacy #[id number] [place], [place] - [address] [facility]     Behavioral Health Crisis Line The National Suicide Prevention Lifeline at 1-778.279.2338 or 232     My Care Team Members  Patient Care Team       Relationship Specialty Notifications Start End    Jermain Jaramillo MD PCP - General Internal Medicine  7/18/17     Phone: [phone] Fax: [phone]         [address] [place] [id number]    Jermain Jaramillo MD Assigned PCP   7/2/17     Phone: [phone] Fax: [phone]         [address] [place] [id number]    Lisa Conrad  TYLER, NP Nurse Practitioner Nurse Practitioner Psych/Mental Health  8/7/17     Phone: [phone] Fax: [phone]         [place] [address] [place] [address]           My Medical and Care Information  Problem List   Patient Active Problem List   Diagnosis     STEPHENIE (generalized anxiety disorder)     Morbid obesity due to excess calories (H)     CARDIOVASCULAR SCREENING; LDL GOAL LESS THAN 130     Herpes     Depression, major, recurrent, moderate (H)     Intermittent asthma without complication, unspecified asthma severity     Bipolar affective disorder, remission status unspecified (H)     Cannabis use disorder, mild, abuse     Alcohol use disorder     Lateral epicondylitis of left elbow     Suicidal ideation      Current Medications and Allergies:  See printed Medication Report   Current Outpatient Medications   Medication     acetaminophen (TYLENOL) 500 MG tablet     albuterol (PROAIR HFA/PROVENTIL HFA/VENTOLIN HFA) 108 (90 Base) MCG/ACT inhaler     atorvastatin (LIPITOR) 10 MG tablet     lidocaine (LIDODERM) 5 % patch     lisinopril (PRINIVIL/ZESTRIL) 5 MG tablet     lurasidone (LATUDA) 20 MG TABS tablet     naproxen sodium (ANAPROX) 220 MG tablet     omeprazole (PRILOSEC) 20 MG DR capsule     prazosin (MINIPRESS) 1 MG capsule     No current facility-administered medications for this visit.

## 2020-02-11 NOTE — PROGRESS NOTES
Clinic Care Coordination Contact  [facility]/Voicemail    Referral Source: IP Handoff    Clinical Data: Care Coordinator Outreach    Outreach attempted x 2.  Left message on patient's voicemail with call back information and requested return call.    Plan: Care Coordinator will send care coordination introduction letter with care coordinator contact information and explanation of care coordination services via mail. Care Coordinator will do no further outreaches at this time.    Rosetta Whitehead, [facility]  Clinic Care Coordinator   [facility], and [facility]  [phone]  [email or web address]

## 2020-02-12 ENCOUNTER — OFFICE VISIT (OUTPATIENT)
Dept: INTERNAL MEDICINE | Facility: CLINIC | Age: 40
End: 2020-02-12
Payer: COMMERCIAL

## 2020-02-12 VITALS
OXYGEN SATURATION: 93 % | TEMPERATURE: 98.7 F | HEART RATE: 104 BPM | WEIGHT: 293 LBS | DIASTOLIC BLOOD PRESSURE: 82 MMHG | SYSTOLIC BLOOD PRESSURE: 128 MMHG | HEIGHT: 64 IN | RESPIRATION RATE: 17 BRPM | BODY MASS INDEX: 50.02 KG/M2

## 2020-02-12 DIAGNOSIS — Z13.6 CARDIOVASCULAR SCREENING; LDL GOAL LESS THAN 130: ICD-10-CM

## 2020-02-12 DIAGNOSIS — Z09 HOSPITAL DISCHARGE FOLLOW-UP: Primary | ICD-10-CM

## 2020-02-12 DIAGNOSIS — F41.1 GAD (GENERALIZED ANXIETY DISORDER): ICD-10-CM

## 2020-02-12 DIAGNOSIS — F31.9 BIPOLAR AFFECTIVE DISORDER, REMISSION STATUS UNSPECIFIED (H): ICD-10-CM

## 2020-02-12 DIAGNOSIS — I10 BENIGN ESSENTIAL HYPERTENSION: ICD-10-CM

## 2020-02-12 DIAGNOSIS — R45.851 SUICIDAL IDEATION: ICD-10-CM

## 2020-02-12 DIAGNOSIS — K21.9 GASTROESOPHAGEAL REFLUX DISEASE, ESOPHAGITIS PRESENCE NOT SPECIFIED: ICD-10-CM

## 2020-02-12 PROCEDURE — 99495 TRANSJ CARE MGMT MOD F2F 14D: CPT | Performed by: INTERNAL MEDICINE

## 2020-02-12 RX ORDER — PRAZOSIN HYDROCHLORIDE 1 MG/1
1 CAPSULE ORAL AT BEDTIME
Qty: 30 CAPSULE | Refills: 0 | Status: CANCELLED | OUTPATIENT
Start: 2020-02-12

## 2020-02-12 RX ORDER — LISINOPRIL 5 MG/1
5 TABLET ORAL DAILY
Qty: 90 TABLET | Refills: 1 | Status: SHIPPED | OUTPATIENT
Start: 2020-02-12 | End: 2020-03-13 | Stop reason: DRUGHIGH

## 2020-02-12 RX ORDER — ATORVASTATIN CALCIUM 10 MG/1
10 TABLET, FILM COATED ORAL DAILY
Qty: 90 TABLET | Refills: 3 | Status: SHIPPED | OUTPATIENT
Start: 2020-02-12 | End: 2021-04-12

## 2020-02-12 RX ORDER — LURASIDONE HYDROCHLORIDE 40 MG/1
TABLET, FILM COATED ORAL
Qty: 90 TABLET | Refills: 0 | Status: CANCELLED | OUTPATIENT
Start: 2020-02-12

## 2020-02-12 ASSESSMENT — MIFFLIN-ST. JEOR: SCORE: 2335.8

## 2020-02-12 NOTE — PROGRESS NOTES
Subjective     Cristina Dean is a 39 year old female who presents to clinic today for the following health issues:    [facility] [place]   Hospital Follow-up Visit:    Hospital/Nursing Home/IP Rehab Facility: [facility]  Date of Admission: 01/31/20  Date of Discharge: 02/07/20  Reason(s) for Admission: Suicidal ideation            Problems taking medications regularly:  None       Medication changes since discharge: None       Problems adhering to non-medication therapy:  None    Summary of hospitalization:  [facility] discharge summary reviewed  Diagnostic Tests/Treatments reviewed.  Follow up needed: mental health  Other Healthcare Providers Involved in Patient s Care:         None  Update since discharge: stable.     Post Discharge Medication Reconciliation: discharge medications reconciled, continue medications without change.  Plan of care communicated with patient     Coding guidelines for this visit:  Type of Medical   Decision Making Face-to-Face Visit       within 7 Days of discharge Face-to-Face Visit        within 14 days of discharge   Moderate Complexity [id number]   High Complexity [id number]                 Hospital Course:   Cristina Dean was admitted to [facility] with Chanelle Foley DO  as a voluntary patient. The patient was placed under status 15 (15 minute checks) to ensure patient safety. Labs obtained as above. IM consult also placed.  She was not on medications PTA. Reviewed previous medication trials, she was open to retrial of Abilify for mood stabilization, Buproprion for depression and prazosin for PTSD.  She denied that bupropion made her anxiety worse in the past, but planned to monitor for this.  Of note,per patients report Latuda was the medication that worked best for her and writer worked with work with pharmacy liason to see about PA and getting this medication covered. Patient would also benefit from getting re-estabalished with outpatient services upon  discharge and team CTC worked to arrange this. Latuda covered by PA at $35/month, pt elected to start this medication and discontinue bupropion and aripiprazole 2/5/20. Cristina Dean did participate in groups and was visible in the milieu. The patient's symptoms of depression, anxiety and trauma nightmares improved.      Today Cristina Dean reports she does not have any thoughts of hurting self or others. In addition, she has notable risk factors for self-harm, including substance abuse. However, risk is mitigated by commitment to family, absence of past attempts, ability to volunteer a safety plan and history of seeking help when needed. Therefore, based on all available evidence including the factors cited above, she does not appear to be at imminent risk for self-harm, does not meet criteria for a 72-hr hold, and therefore remains appropriate for ongoing outpatient level of care.       Patient Active Problem List   Diagnosis     STEPHENIE (generalized anxiety disorder)     Morbid obesity due to excess calories (H)     CARDIOVASCULAR SCREENING; LDL GOAL LESS THAN 130     Herpes     Depression, major, recurrent, moderate (H)     Intermittent asthma without complication, unspecified asthma severity     Bipolar affective disorder, remission status unspecified (H)     Cannabis use disorder, mild, abuse     Alcohol use disorder     Lateral epicondylitis of left elbow     Suicidal ideation     Past Surgical History:   Procedure Laterality Date     EXCISE MASS BACK Right 12/28/2017    Procedure: EXCISE MASS BACK;  Excision Right Back Mass;  Surgeon: Vicente Pérez MD;  Location: [facility] OR     TONSILLECTOMY  1984       Social History     Tobacco Use     Smoking status: Current Every Day Smoker     Packs/day: 0.50     Years: 15.00     Pack years: 7.50     Types: Cigarettes     Start date: 6/1/2018     Smokeless tobacco: Never Used     Tobacco comment: 1/4 ppd    Substance Use Topics     Alcohol use: Yes     Comment: 1  drink every 2 months      Family History   Problem Relation Age of Onset     Cancer Father         acute leukemia (in remission)     Neurologic Disorder Father         neuropathy from chemotherapy     Diabetes Father         type II DM     Other Cancer Father      Depression Father      Anxiety Disorder Father      Family History Negative Sister      Ovarian Cancer Paternal Aunt      Ovarian Cancer Paternal Aunt      Ovarian Cancer Paternal Aunt          Current Outpatient Medications   Medication Sig Dispense Refill     acetaminophen (TYLENOL) 500 MG tablet Take 1,000 mg by mouth every 8 hours as needed for pain (right shoulder)       albuterol (PROAIR HFA/PROVENTIL HFA/VENTOLIN HFA) 108 (90 Base) MCG/ACT inhaler Inhale 2 puffs into the lungs every 6 hours 1 Inhaler 0     atorvastatin (LIPITOR) 10 MG tablet Take 1 tablet (10 mg) by mouth daily 30 tablet 0     lidocaine (LIDODERM) 5 % patch Place 1 patch onto the skin every 12 hours as needed for moderate pain To prevent lidocaine toxicity, patient should be patch free for 12 hrs daily. 12 patch 0     lisinopril (PRINIVIL/ZESTRIL) 5 MG tablet Take 1 tablet (5 mg) by mouth daily 30 tablet 0     lurasidone (LATUDA) 20 MG TABS tablet Take one tablet (20mg) daily x5 days then if tolerating increase to two tablets (40mg) daily 60 tablet 0     naproxen sodium (ANAPROX) 220 MG tablet Take 440 mg by mouth 2 times daily (with meals)       omeprazole (PRILOSEC) 20 MG DR capsule Take 1 capsule (20 mg) by mouth every morning (before breakfast) 30 capsule 0     prazosin (MINIPRESS) 1 MG capsule Take 1 capsule (1 mg) by mouth At Bedtime 30 capsule 0     Allergies   Allergen Reactions     No Known Drug Allergies      BP Readings from Last 3 Encounters:   02/07/20 120/79   01/31/20 (!) 150/99   01/08/20 136/82    Wt Readings from Last 3 Encounters:   02/06/20 (!) 166.8 kg (367 lb 11.2 oz)   01/30/20 (!) 164.7 kg (363 lb)   01/08/20 (!) 164.8 kg (363 lb 6.4 oz)           Reviewed  "and updated as needed this visit by Provider         Review of Systems   ROS COMP: CONSTITUTIONAL: NEGATIVE for fever, chills, change in weight  ENT/MOUTH: NEGATIVE for ear, mouth and throat problems  RESP: NEGATIVE for significant cough or SOB  CV: NEGATIVE for chest pain, palpitations or peripheral edema  : NEGATIVE for frequency, dysuria, or hematuria  MUSCULOSKELETAL: NEGATIVE for significant arthralgias or myalgia  NEURO: NEGATIVE for weakness, dizziness or paresthesias  ENDOCRINE: NEGATIVE for temperature intolerance, skin/hair changes  HEME: NEGATIVE for bleeding problems  She states her mental health issues are stable.  She reports taking her medication although at times she has had issues with insurance coverage for her Latuda.  She is hopeful that this issue has resolved.  Currently has no active suicidal ideation and at present is back home dealing with her daily home issues which center a lot around her 15-year-old child.      Objective    /82   Pulse 104   Temp 98.7  F (37.1  C) (Oral)   Resp 17   Ht 1.613 m (5' 3.5\")   Wt (!) 168.9 kg (372 lb 4.8 oz)   LMP 01/17/2020   SpO2 93%   BMI 64.92 kg/m    Body mass index is 64.92 kg/m .  Physical Exam   GENERAL: alert and no distress  EYES: Eyes grossly normal to inspection, PERRL and conjunctivae and sclerae normal  HENT: ear canals and TM's normal, nose and mouth without ulcers or lesions  NECK: no adenopathy, no asymmetry, masses, or scars and thyroid normal to palpation  RESP: lungs clear to auscultation - no rales, rhonchi or wheezes  CV: regular rate and rhythm, normal S1 S2, no S3 or S4, no murmur, click or rub, no peripheral edema and peripheral pulses strong  MS: no gross musculoskeletal defects noted, no edema  NEURO: No focal changes  PSYCH: mentation appears normal, affect  Baseline although slightly nervous        Assessment & Plan     1. Hospital discharge follow-up  Appears stable and patient really needs aggressive follow-up " with her mental health provider.    2. Bipolar affective disorder, remission status unspecified (H)  Continuing with medication and medication refills per her mental health provider.    3. STEPHENIE (generalized anxiety disorder)  Stable currently on oral therapy and recommend follow-up with her mental health provider for ongoing medication adjustment, refills and further treatment    4. Suicidal ideation  Stable at present time she denies any obvious risks for suicidal ideation at present    5. CARDIOVASCULAR SCREENING; LDL GOAL LESS THAN 130  Labs ordered as fasting.  This patient has not been on her Lipitor on a regular basis and I have recommended that we go ahead and restart her medication and then recheck her cholesterol and liver function panel in 3 months for comparison for which orders have been placed  - atorvastatin (LIPITOR) 10 MG tablet; Take 1 tablet (10 mg) by mouth daily  Dispense: 90 tablet; Refill: 3  - Hepatic panel; Future  - Lipid Profile; Future    6. Gastroesophageal reflux disease, esophagitis presence not specified  Stable continuing with dietary treatment as well as oral therapy  - omeprazole (PRILOSEC) 20 MG DR capsule; Take 1 capsule (20 mg) by mouth every morning (before breakfast)  Dispense: 90 capsule; Refill: 1    7. Benign essential hypertension  Stable at goal suggest recheck blood pressure 6 months  - lisinopril (PRINIVIL/ZESTRIL) 5 MG tablet; Take 1 tablet (5 mg) by mouth daily  Dispense: 90 tablet; Refill: 1     See Patient Instructions    Return in about 3 months (around 5/12/2020) for Lab Work appointment, mental health provider.    Jermain Jaramillo MD  [facility]

## 2020-02-21 ENCOUNTER — OFFICE VISIT (OUTPATIENT)
Dept: PSYCHIATRY | Facility: CLINIC | Age: 40
End: 2020-02-21
Attending: INTERNAL MEDICINE
Payer: COMMERCIAL

## 2020-02-21 VITALS
RESPIRATION RATE: 20 BRPM | DIASTOLIC BLOOD PRESSURE: 100 MMHG | SYSTOLIC BLOOD PRESSURE: 130 MMHG | TEMPERATURE: 97.6 F | HEART RATE: 93 BPM | BODY MASS INDEX: 65.91 KG/M2 | WEIGHT: 293 LBS | OXYGEN SATURATION: 99 %

## 2020-02-21 DIAGNOSIS — F33.2 SEVERE EPISODE OF RECURRENT MAJOR DEPRESSIVE DISORDER, WITHOUT PSYCHOTIC FEATURES (H): ICD-10-CM

## 2020-02-21 DIAGNOSIS — F41.1 GAD (GENERALIZED ANXIETY DISORDER): Primary | ICD-10-CM

## 2020-02-21 DIAGNOSIS — F31.9 BIPOLAR AFFECTIVE DISORDER, REMISSION STATUS UNSPECIFIED (H): ICD-10-CM

## 2020-02-21 DIAGNOSIS — F43.0 ACUTE STRESS DISORDER: ICD-10-CM

## 2020-02-21 DIAGNOSIS — G47.9 SLEEP DISORDER: ICD-10-CM

## 2020-02-21 PROCEDURE — 90792 PSYCH DIAG EVAL W/MED SRVCS: CPT | Performed by: NURSE PRACTITIONER

## 2020-02-21 RX ORDER — TRAZODONE HYDROCHLORIDE 50 MG/1
50-100 TABLET, FILM COATED ORAL AT BEDTIME
Qty: 45 TABLET | Refills: 0 | Status: SHIPPED | OUTPATIENT
Start: 2020-02-21 | End: 2020-03-13 | Stop reason: ALTCHOICE

## 2020-02-21 RX ORDER — LURASIDONE HYDROCHLORIDE 60 MG/1
60 TABLET, FILM COATED ORAL DAILY
Qty: 30 TABLET | Refills: 0 | Status: SHIPPED | OUTPATIENT
Start: 2020-02-28 | End: 2020-03-13

## 2020-02-21 ASSESSMENT — ANXIETY QUESTIONNAIRES
3. WORRYING TOO MUCH ABOUT DIFFERENT THINGS: NEARLY EVERY DAY
7. FEELING AFRAID AS IF SOMETHING AWFUL MIGHT HAPPEN: MORE THAN HALF THE DAYS
4. TROUBLE RELAXING: MORE THAN HALF THE DAYS
GAD7 TOTAL SCORE: 16
GAD7 TOTAL SCORE: 16
2. NOT BEING ABLE TO STOP OR CONTROL WORRYING: NEARLY EVERY DAY
GAD7 TOTAL SCORE: 16
7. FEELING AFRAID AS IF SOMETHING AWFUL MIGHT HAPPEN: MORE THAN HALF THE DAYS
6. BECOMING EASILY ANNOYED OR IRRITABLE: MORE THAN HALF THE DAYS
5. BEING SO RESTLESS THAT IT IS HARD TO SIT STILL: SEVERAL DAYS
1. FEELING NERVOUS, ANXIOUS, OR ON EDGE: NEARLY EVERY DAY

## 2020-02-21 ASSESSMENT — PATIENT HEALTH QUESTIONNAIRE - PHQ9
10. IF YOU CHECKED OFF ANY PROBLEMS, HOW DIFFICULT HAVE THESE PROBLEMS MADE IT FOR YOU TO DO YOUR WORK, TAKE CARE OF THINGS AT HOME, OR GET ALONG WITH OTHER PEOPLE: EXTREMELY DIFFICULT
SUM OF ALL RESPONSES TO PHQ QUESTIONS 1-9: 21
SUM OF ALL RESPONSES TO PHQ QUESTIONS 1-9: 21

## 2020-02-21 NOTE — LETTER
[facility] [address] [place]  [address]  Phone  [phone]        February 21, 2020    To whom it may concern:      Please excuse Cristina Dean from work today due to a medical appointment and medical reasons  .  If any questions or concerns, please let me know.        Sincerely,        Lisa Conrad, PhD, APRN, CNP     Psychiatry Nurse Practitioner   [facility] [address]   [address]   [place] [address]
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
## 2020-02-21 NOTE — PATIENT INSTRUCTIONS
Treatment Plan:    In one week, increase the Latuda (lurasidone) to 60 mg by mouth daily for mood.     Start Desyrel/Olepto (trazodone) 50 - 100 mg by mouth daily at bedtime as needed for sleep.     Continue Minipress (prazosin) 1 mg by mouth daily at bedtime for anxiety and nightmares. May consider dose increase if needed.     Continue all other medications as reviewed per electronic medical record today.     Safety plan reviewed. To the Emergency Department as needed or call after hours crisis line at [phone] or [phone]. [place] Crisis Text Line: Text [place] to 492510  or  Suicide LifeLine Chat: suicidepreventionlifeline.org/chat    Continue therapy as planned.    Schedule an appointment with me in 2-4 weeks or sooner as needed.     Follow up with primary care provider as planned or sooner if needed for acute medical concerns.    Call the psychiatric nurse line with medication questions or concerns at [phone].    [employer] may be used to communicate with your provider, but this is not intended to be used for emergencies.

## 2020-02-22 ASSESSMENT — PATIENT HEALTH QUESTIONNAIRE - PHQ9: SUM OF ALL RESPONSES TO PHQ QUESTIONS 1-9: 21

## 2020-02-22 ASSESSMENT — ANXIETY QUESTIONNAIRES: GAD7 TOTAL SCORE: 16

## 2020-02-24 PROBLEM — F43.0 ACUTE STRESS DISORDER: Status: ACTIVE | Noted: 2020-02-24

## 2020-03-09 DIAGNOSIS — G47.9 SLEEP DISORDER: ICD-10-CM

## 2020-03-09 RX ORDER — TRAZODONE HYDROCHLORIDE 50 MG/1
50-100 TABLET, FILM COATED ORAL AT BEDTIME
Qty: 45 TABLET | Refills: 0 | OUTPATIENT
Start: 2020-03-09

## 2020-03-09 NOTE — TELEPHONE ENCOUNTER
Patient:   CORTEZ HERRERA            MRN: [id number]            FIN: [id number]               Age:   16 hours     Sex:  MALE     :  [birth year]   Associated Diagnoses:   None   Author:   KAYCEE GRADY      Procedure   Procedure   Confirmed: patient, procedure, site, safety procedures followed.     Performed by: KAYCEE GRADY.     Type of procedure: Circumcision.     Physical Exam: no relative contraindications found.     Location: Penis.     Informed consent: signed by legal guardian.     Indication: Elective.     Preparation and technique: sterile preparation of site (with 10 % povidone iodine, draped to expose affected area), position Supine on circ board, local anesthesia (1% xylocaine without epinephrine, Dorsal penile block), Vaseline gauzedressing applied.     Procedure tolerated: well.     Specimen: disposed of.     Complications: None.     1.3 Gomco.              Electronically Signed On 2017 06:33  __________________________________________________   KAYCEE GRADY     Patient sees Lisa 3/13 and can discuss further refills at that time.    Monika Tomas, RN    Nurse Liaison  [facility]

## 2020-03-13 ENCOUNTER — OFFICE VISIT (OUTPATIENT)
Dept: PSYCHIATRY | Facility: CLINIC | Age: 40
End: 2020-03-13
Payer: COMMERCIAL

## 2020-03-13 VITALS
WEIGHT: 293 LBS | HEIGHT: 64 IN | OXYGEN SATURATION: 95 % | RESPIRATION RATE: 19 BRPM | TEMPERATURE: 97.9 F | HEART RATE: 85 BPM | DIASTOLIC BLOOD PRESSURE: 80 MMHG | BODY MASS INDEX: 50.02 KG/M2 | SYSTOLIC BLOOD PRESSURE: 122 MMHG

## 2020-03-13 DIAGNOSIS — F31.9 BIPOLAR AFFECTIVE DISORDER, REMISSION STATUS UNSPECIFIED (H): ICD-10-CM

## 2020-03-13 DIAGNOSIS — F41.1 GAD (GENERALIZED ANXIETY DISORDER): Primary | ICD-10-CM

## 2020-03-13 PROCEDURE — 99214 OFFICE O/P EST MOD 30 MIN: CPT | Performed by: NURSE PRACTITIONER

## 2020-03-13 RX ORDER — HYDROXYZINE PAMOATE 50 MG/1
50 CAPSULE ORAL 3 TIMES DAILY PRN
Qty: 60 CAPSULE | Refills: 1 | Status: SHIPPED | OUTPATIENT
Start: 2020-03-13 | End: 2020-05-22

## 2020-03-13 RX ORDER — LURASIDONE HYDROCHLORIDE 80 MG/1
80 TABLET, FILM COATED ORAL
Qty: 30 TABLET | Refills: 1 | Status: SHIPPED | OUTPATIENT
Start: 2020-03-13 | End: 2020-05-22

## 2020-03-13 RX ORDER — TOPIRAMATE 25 MG/1
25 TABLET, FILM COATED ORAL 2 TIMES DAILY
Qty: 60 TABLET | Refills: 0 | Status: SHIPPED | OUTPATIENT
Start: 2020-03-13 | End: 2020-04-10

## 2020-03-13 ASSESSMENT — ANXIETY QUESTIONNAIRES
2. NOT BEING ABLE TO STOP OR CONTROL WORRYING: NEARLY EVERY DAY
GAD7 TOTAL SCORE: 19
GAD7 TOTAL SCORE: 19
4. TROUBLE RELAXING: NEARLY EVERY DAY
6. BECOMING EASILY ANNOYED OR IRRITABLE: NEARLY EVERY DAY
GAD7 TOTAL SCORE: 19
7. FEELING AFRAID AS IF SOMETHING AWFUL MIGHT HAPPEN: NEARLY EVERY DAY
7. FEELING AFRAID AS IF SOMETHING AWFUL MIGHT HAPPEN: NEARLY EVERY DAY
5. BEING SO RESTLESS THAT IT IS HARD TO SIT STILL: SEVERAL DAYS
1. FEELING NERVOUS, ANXIOUS, OR ON EDGE: NEARLY EVERY DAY
3. WORRYING TOO MUCH ABOUT DIFFERENT THINGS: NEARLY EVERY DAY

## 2020-03-13 ASSESSMENT — PATIENT HEALTH QUESTIONNAIRE - PHQ9
SUM OF ALL RESPONSES TO PHQ QUESTIONS 1-9: 17
10. IF YOU CHECKED OFF ANY PROBLEMS, HOW DIFFICULT HAVE THESE PROBLEMS MADE IT FOR YOU TO DO YOUR WORK, TAKE CARE OF THINGS AT HOME, OR GET ALONG WITH OTHER PEOPLE: EXTREMELY DIFFICULT
SUM OF ALL RESPONSES TO PHQ QUESTIONS 1-9: 17

## 2020-03-13 ASSESSMENT — PAIN SCALES - GENERAL: PAINLEVEL: NO PAIN (0)

## 2020-03-13 ASSESSMENT — MIFFLIN-ST. JEOR: SCORE: 2357.12

## 2020-03-13 NOTE — NURSING NOTE
"Chief Complaint   Patient presents with     Recheck Medication     pt c/o feeling agitated the past 2 weeks, ran out of minipres 1 week ago.     initial /80 (BP Location: Right arm, Patient Position: Sitting, Cuff Size: Adult Large)   Pulse 85   Temp 97.9  F (36.6  C) (Oral)   Resp 19   Ht 1.613 m (5' 3.5\")   Wt (!) 171 kg (377 lb)   LMP 02/17/2020 (Approximate)   SpO2 95%   BMI 65.73 kg/m   Estimated body mass index is 65.73 kg/m  as calculated from the following:    Height as of this encounter: 1.613 m (5' 3.5\").    Weight as of this encounter: 171 kg (377 lb)..  bp completed using cuff size large    "

## 2020-03-13 NOTE — PATIENT INSTRUCTIONS
Treatment Plan:    Increase Latuda (lurasidone) to 80 mg by mouth daily. Take with food.     Start Topamax (topiramate) 25 mg by mouth daily in AM and PM for mood. May consider dose increase if needed.     Continue Vistaril/Atarax (hydroxyzine) 50 mg by mouth up to 3 times per day as needed for anxiety and sleep.     Stop Desyrel/Olepto (trazodone).     Stop Minipress (prazosin). May need to restart for nightmares.     Continue all other medications as reviewed per electronic medical record today.     Safety plan reviewed. To the Emergency Department as needed or call after hours crisis line at [phone] or [phone]. [place] Crisis Text Line. Text [place] to 149465 or Suicide LifeLine Chat: suicidepreventionlifeline.org/chat    Attend therapy as planned.    Schedule an appointment with me in 6-8 weeks or sooner as needed. Call [facility] at [phone] to schedule.    Follow up with primary care provider as planned or sooner for acute medical concerns.    Call the psychiatric nurse line with medication questions or concerns at [phone].    [employer] may be used to communicate with your provider, but this is not intended to be used for emergencies.

## 2020-03-13 NOTE — PROGRESS NOTES
"    Outpatient Psychiatric Progress Note    Name: Cristina Dean   : 1980                    Primary Care Provider: Jermain Jaramillo MD - last visit 2020  Current Psychotherapist: Intake upcoming with Jamel 3/31/2020.   Will attend grief counseling through [facility].   Sleep Eval upcoming 3/20/2020    PHQ-9 scores:  PHQ-9 SCORE 2019 2020 3/13/2020   PHQ-9 Total Score MyChart 16 (Moderately severe depression) 21 (Severe depression) 17 (Moderately severe depression)       STEPHENIE-7 scores:  STEPHENIE-7 SCORE 2019 2020 3/13/2020   Total Score 17 (severe anxiety) 16 (severe anxiety) 19 (severe anxiety)     Answers for HPI/ROS submitted by the patient on 3/13/2020   If you checked off any problems, how difficult have these problems made it for you to do your work, take care of things at home, or get along with other people?: Extremely difficult  PHQ9 TOTAL SCORE: 17  STEPHENIE 7 TOTAL SCORE: 19    Patient Identification:  Patient is a 39 year old,   White American female  who presents for return visit with me.  Patient is currently employed part time. Patient attended the session alone. Patient prefers to be called: \"Cristina\".    Chief Complaint:  Patient presents with:  Recheck Medication: pt c/o feeling agitated the past 2 weeks, ran out of minipres 1 week ago.    Interim History:  I last saw Crisitna Dean for outpatient psychiatry Consultation on 2020.     During that appointment, we In one week, increase the Latuda (lurasidone) to 60 mg by mouth daily for mood.     Start Desyrel/Olepto (trazodone) 50 - 100 mg by mouth daily at bedtime as needed for sleep.     Continue Minipress (prazosin) 1 mg by mouth daily at bedtime for anxiety and nightmares. May consider dose increase if needed.   Current medications include:   Current Outpatient Medications   Medication Sig     acetaminophen (TYLENOL) 500 MG tablet Take 1,000 mg by mouth every 8 hours as needed for pain (right shoulder) " "    albuterol (PROAIR HFA/PROVENTIL HFA/VENTOLIN HFA) 108 (90 Base) MCG/ACT inhaler Inhale 2 puffs into the lungs every 6 hours     atorvastatin (LIPITOR) 10 MG tablet Take 1 tablet (10 mg) by mouth daily     lurasidone 60 MG PO TABS tablet Take 1 tablet (60 mg) by mouth daily     omeprazole (PRILOSEC) 20 MG DR capsule Take 1 capsule (20 mg) by mouth every morning (before breakfast)     traZODone 50 MG PO tablet Take 1-2 tablets ( mg) by mouth At Bedtime     prazosin (MINIPRESS) 1 MG capsule Take 1 capsule (1 mg) by mouth At Bedtime (Patient not taking: Reported on 3/13/2020)     No current facility-administered medications for this visit.        The [place] Prescription Monitoring Program has been reviewed and there are no concerns about diversionary activity for controlled substances at this time.  No new medications since last visit.     I was able to review most recent Primary Care Provider, specialty provider, and therapy visit notes that I have access to.     Cristina Dean reports mood has been: \"agitated\" feels more negative and irritable. Has a lot of stress related to her symptoms. Has not been able to work due to symptoms. Has more guilt related to not being able to work.   Continues to gain weight and physically difficult to move around. Has ongoing feelings of embarrassment.   Anxiety has been: \"less\" has been taking some Vistaril/Atarax (hydroxyzine) more during the day and this has been taking the edge off. Has used the albuterol inhaler more the last one week due to allergies.   Sleep has been: \"okay\" ran out of minipress from the hospital a few days ago. Did not request refill from this provider. Vivid dreams and nightmares most nights. Not waking as much and gasping for air.   PHQ9 and GAD7 scores were reviewed today.   Medication side effects: Denies  Current stressors include: Financial Difficulties, Caregiving Stress, Grief/Loss, Relationship Difficulties and Occupational Difficulties " "Stopped meds in Oct due to financial probs.  Coping mechanisms and supports include: Trying to work, forcing myself to do things I need to.  Took in god-daughter into home since September    Previous medication trials include but not limited to:  Paxil (paroxetine) \"made me suicidal\"  Seroquel (quetiapine) \"was a zombie\"  Latuda (lurasidone) 80 mg  Minipress (prazosin) for nightmares  Desyrel/Olepto (trazodone) was good for sleeping issues  Benadryl (diphenhydramine) was good for sleeping issues  Neurontin (gabapentin) 800 mg 3 times per day  Abilify (aripriprazole) 10 mg   Inderal (propranolol) 40 mg TID   Benadryl (diphenhydramine)  was good for sleeping issues  Prozac (fluoxetine)   Zoloft (sertraline)     Past Medical History:   Diagnosis Date     Anxiety      Bipolar affective disorder, remission status unspecified (H) 11/13/2017     Chlamydia      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 She also has no past medical history of Chronic infection, Complication of anesthesia, Diabetes (H), History of blood transfusion, Malignant hyperthermia, Other chronic pain, PONV (postoperative nausea and vomiting), Sleep apnea, or Thyroid disease.    Social History:  Current Living situation: [place], [place] with Spouse/Partner and God-daughter 15 Year old Breann. Feels safe at home.  Current use of drugs or alcohol: No alcohol. Cannabis using daily more at night  Tobacco use: Yes Cigarettes rarely    Caffeine: Yes  4-5 " "cups/day of coffee- drinking a lot of water and much less coffee lately because feeling more agitated recently and not going to work much    Vital Signs:   /80 (BP Location: Right arm, Patient Position: Sitting, Cuff Size: Adult Large)   Pulse 85   Temp 97.9  F (36.6  C) (Oral)   Resp 19   Ht 1.613 m (5' 3.5\")   Wt (!) 171 kg (377 lb)   LMP 02/17/2020 (Approximate)   SpO2 95%   BMI 65.73 kg/m      Labs:  Most recent laboratory results reviewed and no new labs.     Review of Systems:  10 systems (general, cardiovascular, respiratory, eyes, ENT, endocrine, GI, , M/S, neurological) were reviewed. Most pertinent finding(s) is/are:  Right shoulder pain (rotator cuff), weight gain, fatigue, diarrhea and urinary urgency started about one month ago, dry mouth, no rash, dizzy often and lightheaded related to orthostasis. The remaining systems are all unremarkable.    Mental Status Examination:  Appearance:  awake, alert, adequately groomed, appeared stated age, moderately severe distress, morbidly obese, early, alone  Attitude:  cooperative   Eye Contact:  good and wears glasses  Gait and Station: Normal  Psychomotor Behavior:  no evidence of tardive dyskinesia, dystonia, or tics  Oriented to:  time, person, and place  Attention Span and Concentration:  Fair  Speech:  clear, coherent  Language: intact  Mood:  \"agitated\"  Affect:  mood congruent, intensity is heightened and tearful, depressed.  Associations:  no loose associations  Thought Process:  logical  Thought Content:  no evidence of suicidal ideation or homicidal ideation  Recent and Remote Memory:  Intact to interview. Not formally assessed. No amnesia.  Fund of Knowledge: appropriate  Insight:  adequate  Judgment:  fair, adequate for safety  Impulse Control:  Reports a couple shopping sprees recently that she could not afford, but feels she can control this.     Suicide Risk Assessment:  Today Cristina Dean reports no suicidal ideation or " thoughts to harm self or others.  However, does reports thoughts that she would be better off dead.. In addition, there are notable risk factors for self-harm, including anxiety, substance abuse and recent loss of father.. However, risk is mitigated by absence of past attempts, denies suicidal intent or plan and no family history of suicide. Therefore, based on all available evidence including the factors cited above, Cristina Dean does not appear to be at imminent risk for self-harm, does not meet criteria for a 72-hr hold, and therefore remains appropriate for ongoing outpatient level of care.  A thorough assessment of risk factors related to suicide and self-harm have been reviewed and are noted above. The patient convincingly denies acute suicidality on several occasions. Local community safety resources reviewed and printed for patient to use if needed. There was no deceit detected, and the patient presented in a manner that was believable.      DSM5  Diagnosis:  (F33.2) Major Depressive Disorder, Recurrent Episode, Severe With anxious distress                   Rule out Bipolar Disorder  (F41.1) Generalized Anxiety Disorder              Rule out 307.51 (F50.8) Binge-Eating Disorder               Rule out 301.83 (F60.3) Borderline Personality Disorder   Grief  Acute Stress Disorder               Rule out Post-traumatic stress disorder (PTSD)   Alcohol Use Disorder, full remission  Cannabis Abuse, continued use  Obesity per BMI of 65    Medical comorbidities include:   Patient Active Problem List    Diagnosis Date Noted     Acute stress disorder 02/24/2020     Priority: Medium     Suicidal ideation 01/31/2020     Priority: Medium     Lateral epicondylitis of left elbow 02/04/2019     Priority: Medium     Cannabis use disorder, mild, abuse 12/04/2017     Priority: Medium     Alcohol use disorder 12/04/2017     Priority: Medium     Severe episode of recurrent major depressive disorder, without psychotic  features (H) 10/23/2017     Priority: Medium     Intermittent asthma without complication, unspecified asthma severity 10/23/2017     Priority: Medium     CARDIOVASCULAR SCREENING; LDL GOAL LESS THAN 130 08/07/2012     Priority: Medium     Morbid obesity due to excess calories (H) 08/01/2012     Priority: Medium     BMI 53       STEPHENIE (generalized anxiety disorder)      Priority: Medium     Herpes 07/09/2012     Priority: Medium       Psychosocial & Contextual Factors:  Occupational Difficulties, Financial Difficulties and Relationship Difficulties (working two part-time jobs,  drinking heavily.)    Assessment:  Cristina Dean reports ongoing poor functioning due to uncontrolled symptoms. Medication side effects and alternatives were reviewed. Health promotion activities recommended and reviewed today. All questions addressed. Education and counseling completed regarding risks and benefits of medications and psychotherapy options. Recommend therapy for additional support. 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 Patient is aware of this and we discussed other options for care if needed. We also reviewed the [facility].  Could trial Buspar (buspirone) for anxiety or Topamax (topiramate) for mood. Will start with Topamax (topiramate). With history of alcohol dependence and frequent Marijuana use, I will not provide a benzodiazepine medication at this time.  May need to re check labs such as vitamin D level due to ongoing fatigue.     Treatment Plan:    Increase Latuda (lurasidone) to 80 mg by mouth daily. Take with food.     Start Topamax (topiramate) 25 mg by mouth daily in AM and PM for mood. May consider dose increase if needed.     Continue Vistaril/Atarax (hydroxyzine) 50 mg by mouth up to 3 times per day as needed for anxiety and sleep.     Stop Desyrel/Olepto (trazodone).     Stop Minipress (prazosin). May need  to restart for nightmares.     Continue all other medications as reviewed per electronic medical record today.     Safety plan reviewed. To the Emergency Department as needed or call after hours crisis line at [phone] or [phone]. [place] Crisis Text Line. Text [place] to 369073 or Suicide LifeLine Chat: suicidepreventionInvicta Networksline.org/chat    Attend therapy as planned.    Schedule an appointment with me in 6-8 weeks or sooner as needed. Call [facility] at [phone] to schedule.    Follow up with primary care provider as planned or sooner for acute medical concerns.    Call the psychiatric nurse line with medication questions or concerns at [phone].    [employer] may be used to communicate with your provider, but this is not intended to be used for emergencies.    Administrative Billing:   Time spent with patient was 30 minutes and greater than 50% of time or 30 minutes was spent in counseling and coordination of care regarding above diagnoses and treatment plan.    Patient Status:  Patient will continue to be seen for ongoing consultation and stabilization.    Signed:   Lisa Conrad, PhD, APRN, CNP   Psychiatry

## 2020-03-14 ASSESSMENT — PATIENT HEALTH QUESTIONNAIRE - PHQ9: SUM OF ALL RESPONSES TO PHQ QUESTIONS 1-9: 17

## 2020-03-14 ASSESSMENT — ANXIETY QUESTIONNAIRES: GAD7 TOTAL SCORE: 19

## 2020-03-17 DIAGNOSIS — F31.9 BIPOLAR AFFECTIVE DISORDER, REMISSION STATUS UNSPECIFIED (H): ICD-10-CM

## 2020-03-17 RX ORDER — LURASIDONE HYDROCHLORIDE 60 MG/1
TABLET, FILM COATED ORAL
Qty: 30 TABLET | Refills: 0 | OUTPATIENT
Start: 2020-03-17

## 2020-03-17 NOTE — TELEPHONE ENCOUNTER
Last Rx written on 3/13/20 for #30 with 1 refill.  Patient has follow up on 5/22.   Patient will be a few days short of medication-- can request refill when closer to being out.    Monika Tomas, RN    Nurse Liaison  [facility]

## 2020-03-29 ENCOUNTER — TELEPHONE (OUTPATIENT)
Dept: PSYCHOLOGY | Facility: CLINIC | Age: 40
End: 2020-03-29

## 2020-03-31 ENCOUNTER — TELEPHONE (OUTPATIENT)
Dept: PSYCHOLOGY | Facility: CLINIC | Age: 40
End: 2020-03-31

## 2020-04-10 DIAGNOSIS — F31.9 BIPOLAR AFFECTIVE DISORDER, REMISSION STATUS UNSPECIFIED (H): ICD-10-CM

## 2020-04-10 RX ORDER — TOPIRAMATE 25 MG/1
TABLET, FILM COATED ORAL
Qty: 60 TABLET | Refills: 0 | Status: SHIPPED | OUTPATIENT
Start: 2020-04-10 | End: 2020-04-29

## 2020-04-10 NOTE — TELEPHONE ENCOUNTER
Refill for: topiramate (TOPAMAX) 25 MG tablet     Last Appointment: 3/13/20    Next Appointment: 5/22/20    No Shows/Cancellations since last appointment: none    Last Refill in Epic (date and amount/how many days):    Disp  Refills  Start  End  EDUARDO    topiramate (TOPAMAX) 25 MG tablet  60 tablet  0  3/13/2020   --    Sig - Route: Take 1 tablet (25 mg) by mouth 2 times daily - Oral      Last office visit note reviewed and summarized below:      Refill x1 sent. Patient will need follow up for further refills.      Monika Tomas, RN    Nurse Liaison  [facility]

## 2020-04-12 DIAGNOSIS — G47.9 SLEEP DISORDER: ICD-10-CM

## 2020-04-13 RX ORDER — TRAZODONE HYDROCHLORIDE 50 MG/1
50-100 TABLET, FILM COATED ORAL AT BEDTIME
Qty: 45 TABLET | Refills: 0 | OUTPATIENT
Start: 2020-04-13

## 2020-04-13 NOTE — TELEPHONE ENCOUNTER
Faxed trazodone refill request from pharmacy. Trazodone was discontinued at last office visit per Lisa Conrad, 3/13/20:      Increase Latuda (lurasidone) to 80 mg by mouth daily. Take with food.     Start Topamax (topiramate) 25 mg by mouth daily in AM and PM for mood. May consider dose increase if needed.     Continue Vistaril/Atarax (hydroxyzine) 50 mg by mouth up to 3 times per day as needed for anxiety and sleep.     Stop Desyrel/Olepto (trazodone).     Stop Minipress (prazosin). May need to restart for nightmares.     Continue all other medications as reviewed per electronic medical record today.     Safety plan reviewed. To the Emergency Department as needed or call after hours crisis line at [phone] or [phone]. [place] Crisis Text Line. Text [place] to 435766 or Suicide LifeLine Chat: suicidepreventionlifeline.org/chat    Attend therapy as planned.    Schedule an appointment with me in 6-8 weeks or sooner as needed. Call [facility] at [phone] to schedule.    No refill at this time.    Next lorit pablo/Lisa 5/22/20    Mihaela Potter RN on 4/13/2020 at 11:43 AM

## 2020-04-27 ENCOUNTER — HOSPITAL ENCOUNTER (EMERGENCY)
Facility: CLINIC | Age: 40
Discharge: HOME | End: 2020-04-27
Attending: PHYSICIAN ASSISTANT | Admitting: PHYSICIAN ASSISTANT
Payer: COMMERCIAL

## 2020-04-27 ENCOUNTER — APPOINTMENT (OUTPATIENT)
Dept: GENERAL RADIOLOGY | Facility: CLINIC | Age: 40
End: 2020-04-27
Attending: PHYSICIAN ASSISTANT
Payer: COMMERCIAL

## 2020-04-27 VITALS
OXYGEN SATURATION: 96 % | DIASTOLIC BLOOD PRESSURE: 99 MMHG | TEMPERATURE: 99 F | RESPIRATION RATE: 20 BRPM | HEART RATE: 85 BPM | SYSTOLIC BLOOD PRESSURE: 164 MMHG

## 2020-04-27 DIAGNOSIS — M25.561 ACUTE PAIN OF RIGHT KNEE: ICD-10-CM

## 2020-04-27 PROCEDURE — 73562 X-RAY EXAM OF KNEE 3: CPT | Mod: RT

## 2020-04-27 PROCEDURE — 99285 EMERGENCY DEPT VISIT HI MDM: CPT

## 2020-04-27 PROCEDURE — 25000132 ZZH RX MED GY IP 250 OP 250 PS 637: Performed by: PHYSICIAN ASSISTANT

## 2020-04-27 RX ORDER — OXYCODONE HYDROCHLORIDE 5 MG/1
5 TABLET ORAL ONCE
Status: COMPLETED | OUTPATIENT
Start: 2020-04-27 | End: 2020-04-27

## 2020-04-27 RX ORDER — OXYCODONE HYDROCHLORIDE 5 MG/1
5 TABLET ORAL ONCE
Status: DISCONTINUED | OUTPATIENT
Start: 2020-04-27 | End: 2020-04-27 | Stop reason: HOSPADM

## 2020-04-27 RX ADMIN — OXYCODONE HYDROCHLORIDE 5 MG: 5 TABLET ORAL at 15:33

## 2020-04-27 ASSESSMENT — ENCOUNTER SYMPTOMS
MYALGIAS: 1
ARTHRALGIAS: 1

## 2020-04-27 NOTE — ED AVS SNAPSHOT
[facility] Emergency Department  [address]  [facility] [address]  Phone:  [phone]  Fax:  [phone]                                    Cristina Dean   MRN: [id number]    Department:  [facility] Emergency Department   Date of Visit:  4/27/2020           After Visit Summary Signature Page    I have received my discharge instructions, and my questions have been answered. I have discussed any challenges I see with this plan with the nurse or doctor.    ..........................................................................................................................................  Patient/Patient Representative Signature      ..........................................................................................................................................  Patient Representative Print Name and Relationship to Patient    ..................................................               ................................................  Date                                   Time    ..........................................................................................................................................  Reviewed by Signature/Title    ...................................................              ..............................................  Date                                               Time          22EPIC Rev 08/18

## 2020-04-27 NOTE — DISCHARGE INSTRUCTIONS
For pain, you can take up to 1000 mg or 1 g of Tylenol.  You can take 600 mg of ibuprofen at one time.  You can take these together every 6 hours if needed or alternate every 3 hours.  Always take ibuprofen with food.  Never take more than 4 g (4000 mg) of Tylenol or 3200mg of ibuprofen in one day.  Do not take this amount for more than 1 week at a time.

## 2020-04-27 NOTE — ED TRIAGE NOTES
pt comes in after twistring to take shoe off, hearing a pop and having severe pain to right knee. pt got a PIV to left hand 20 50 fent given for pain. for 10/10 pain vss

## 2020-04-27 NOTE — ED PROVIDER NOTES
"  History     Chief Complaint:  Knee Injury      HPI   Cristina Dean is a 39 year old female with a history of obesity, anxiety, and opioid drug dependence who presents for evaluation of a knee injury. Patient comes in via EMS after twisting to take her shoe off and hearing a \"pop.\" She then immediately experienced severe pain to her right knee. EMS provided patient with 50mcg Fentanyl for her discomfort, which she rated 10/10. On evaluation here, she actually reports several months of a \"dull ache\" in her right knee which has worsened this month. She has been taking Aleve and Tylenol for her pain alongside elevation and ice packs. Then, the patient severely worsened again today following the injury described above. Her pain now is behind the kneecap on the lateral aspect of the knee. She rates it 5-6/10 following her dose of Fentanyl. There is no radiation of her pain either up or down her leg. She has no hip pain. She states that she cannot bear weight whatsoever.     Cristina notes a distant history of a right knee injury about 15 years ago after falling in a driveway requiring her to wear a knee immobilizer. However, she never followed up with orthopedics like she was supposed to. She denies prolonged immobilization or any history of DVT.     PE/DVT Risk Factors:   Hx of PE/DVT:                        negative   Hx of clotting disorder:            negative   Hx of cancer:                          negative   Tobacco use:                          positive   Hormone therapy:                   negative   Pregnancy:                              negative   Prolonged immobilization:       negative   Recent surgery:                       negative   Recent travel:                          negative   Familial Hx of PE/DVT:           negative      Allergies:  Amlodipine      Medications:    Albuterol inhaler   Atorvastatin   Vistaril   Lisinopril  Latuda   Omeprazole  Topiramate   Prazosin      Past Medical History:  "   Anxiety  Bipolar disorder   Chlamydia   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   Depression  Herpes   Asthma  Obesity  Urinary incontinence   Alcohol use disorder   Suicidal ideation     Past Surgical History:    Excise mass back   Tonsillectomy     Family History:    Father - acute leukemia, neuropathy from chemotherapy, type II DM, depression, anxiety    Social History:  The patient was accompanied to the ED by EMS.  Smoking Status: Current every day smoker 0.5 PPD for 15 years  Smokeless Tobacco: Never  Alcohol Use: Yes   Drug use: Marijuana   Marital Status:        Review of Systems   Musculoskeletal: Positive for arthralgias, gait problem and myalgias.   All other systems reviewed and are negative.      Physical Exam     Patient Vitals for the past 24 hrs:   BP Temp Temp src Pulse Heart Rate Resp SpO2   04/27/20 1432 (!) 164/99 99  F (37.2  C) Oral 85 85 20 96 %       Physical Exam  Constitutional: Pleasant. Cooperative.  Eyes: Pupils equally round  HENT: Head is normal in appearance. Oropharynx is normal with moist mucus membranes.  Cardiovascular: Regular rate and rhythm without murmurs.  Respiratory: Normal respiratory effort, lungs clear to auscultation  Musculoskeletal: TTP of R lateral knee joint. Decreased ROM secondary to pain. Normal strength distally to RLE. 2+ DP pulses.  Skin: Normal, without rash. No overlying erythema to R knee. No warmth.  Lymphatic: No edema  Neurologic: Cranial nerves grossly intact, normal cognition, no apparent deficits. Sensation intact distally.  Psychiatric: Normal affect.  Nursing notes and vital signs reviewed.      Emergency Department Course     Imaging:  Radiology findings were communicated with the patient who voiced understanding of the findings.    XR Knee Right 3 Views  Final Result  IMPRESSION: Normal joint spacing and alignment. No fracture. Moderate  sized joint effusion.  KASIE SYLVESTER MD    Interventions:  Medications    oxyCODONE (ROXICODONE) tablet 5 mg (5 mg Oral Given 4/27/20 1533)       Emergency Department Course:  Past medical records, nursing notes, and vitals reviewed.    1500 I performed an exam of the patient as documented above.     The patient was sent for a XR Knee Rt while in the emergency department, results above.     1535 I rechecked the patient and discussed the results of her workup thus far.     Findings and plan explained to the Patient. Patient discharged home with instructions regarding supportive care, medications, and reasons to return. The importance of close follow-up was reviewed.     I personally reviewed the imaging results with the Patient and answered all related questions prior to discharge.     Impression & Plan     Medical Decision Making:  Cristina Dean is a 39 year old female who presents for evaluation of pain to the R knee after performing a kicking motion with her right lower extremity and feeling a pop.  Her exam findings are noted above, most pertinent is no redness, warmth to knee making septic arthritis and/or crystal disease of joint very unlikely.  Signs and symptoms are consistent with a likely knee sprain.  A broad differential was also considered including sprain, strain, fracture, tendon rupture, nerve impingement/compromise, referred pain. Supportive outpatient management is indicated.  Rest, ice, and elevation treatment was discussed with the patient. Knee immobilizer and crutches for home. The patients head to toe trauma exam is otherwise negative for serious underlying disease of the head, neck, chest, abdomen, extremities, pelvis.  Close follow-up with ortho for further evaluation. Discussed reasons to return. All questions answered. Patient discharged to home in stable condition.    Diagnosis:    ICD-10-CM    1. Acute pain of right knee  M25.561        Disposition:  Discharged to home.    Scribe Disclosure:  Brooke RUGGIERO, am serving as a scribe at 2:52 PM on  4/27/2020 to document services personally performed by Neo Sorensen PA-C based on my observations and the provider's statements to me.   4/27/2020   [facility] EMERGENCY DEPARTMENT       Neo Sorensen PA-C  04/27/20 2112

## 2020-04-29 DIAGNOSIS — F31.9 BIPOLAR AFFECTIVE DISORDER, REMISSION STATUS UNSPECIFIED (H): ICD-10-CM

## 2020-04-29 RX ORDER — TOPIRAMATE 25 MG/1
TABLET, FILM COATED ORAL
Qty: 60 TABLET | Refills: 0 | Status: SHIPPED | OUTPATIENT
Start: 2020-04-29 | End: 2020-05-22

## 2020-04-29 NOTE — TELEPHONE ENCOUNTER
Topiramate refill request.    Last appt w/Lisa [facility]: 3/13/20  Next appt: 5/22/20    Per tx plan 3/13:    Increase Latuda (lurasidone) to 80 mg by mouth daily. Take with food.     Start Topamax (topiramate) 25 mg by mouth daily in AM and PM for mood. May consider dose increase if needed.     Continue Vistaril/Atarax (hydroxyzine) 50 mg by mouth up to 3 times per day as needed for anxiety and sleep.     Stop Desyrel/Olepto (trazodone).     Stop Minipress (prazosin). May need to restart for nightmares.     Continue all other medications as reviewed per electronic medical record today.     Safety plan reviewed. To the Emergency Department as needed or call after hours crisis line at [phone] or [phone]. [place] Crisis Text Line. Text [place] to 322994 or Suicide LifeLine Chat: suicidepreventionlifeline.org/chat    Attend therapy as planned.    Schedule an appointment with me in 6-8 weeks or sooner as needed. Call [facility] at [phone] to schedule.    Follow up with primary care provider as planned or sooner for acute medical concerns.    Call the psychiatric nurse line with medication questions or concerns at [phone].    ZEFRhart may be used to communicate with your provider, but this is not intended to be used for emergencies.    Phone call to Cristina to assess effectiveness of Topamax. No answer; voicemail full. Topamax refill sent to pharmacy per tx plan.    Mihaela Potter RN on 4/29/2020 at 3:31 PM

## 2020-05-20 ENCOUNTER — TELEPHONE (OUTPATIENT)
Dept: PSYCHOLOGY | Facility: CLINIC | Age: 40
End: 2020-05-20

## 2020-05-21 DIAGNOSIS — F31.9 BIPOLAR AFFECTIVE DISORDER, REMISSION STATUS UNSPECIFIED (H): ICD-10-CM

## 2020-05-22 ENCOUNTER — VIRTUAL VISIT (OUTPATIENT)
Dept: PSYCHIATRY | Facility: CLINIC | Age: 40
End: 2020-05-22
Payer: COMMERCIAL

## 2020-05-22 DIAGNOSIS — F31.9 BIPOLAR AFFECTIVE DISORDER, REMISSION STATUS UNSPECIFIED (H): ICD-10-CM

## 2020-05-22 DIAGNOSIS — F41.1 GAD (GENERALIZED ANXIETY DISORDER): ICD-10-CM

## 2020-05-22 PROCEDURE — 99214 OFFICE O/P EST MOD 30 MIN: CPT | Mod: TEL | Performed by: NURSE PRACTITIONER

## 2020-05-22 RX ORDER — LISINOPRIL 5 MG/1
TABLET ORAL
COMMUNITY
Start: 2020-03-05 | End: 2020-08-26

## 2020-05-22 RX ORDER — TRAZODONE HYDROCHLORIDE 50 MG/1
50 TABLET, FILM COATED ORAL PRN
COMMUNITY
Start: 2020-02-21 | End: 2023-12-05

## 2020-05-22 RX ORDER — LURASIDONE HYDROCHLORIDE 80 MG/1
80 TABLET, FILM COATED ORAL
Qty: 30 TABLET | Refills: 1 | Status: ON HOLD | OUTPATIENT
Start: 2020-05-22 | End: 2021-08-12

## 2020-05-22 RX ORDER — LURASIDONE HYDROCHLORIDE 80 MG/1
TABLET, FILM COATED ORAL
COMMUNITY
Start: 2020-03-13 | End: 2020-05-22

## 2020-05-22 RX ORDER — TOPIRAMATE 50 MG/1
50 TABLET, FILM COATED ORAL 2 TIMES DAILY
Qty: 60 TABLET | Refills: 1 | Status: SHIPPED | OUTPATIENT
Start: 2020-05-22 | End: 2020-07-27

## 2020-05-22 RX ORDER — HYDROXYZINE PAMOATE 50 MG/1
50 CAPSULE ORAL 3 TIMES DAILY PRN
Qty: 60 CAPSULE | Refills: 1 | Status: ON HOLD | OUTPATIENT
Start: 2020-05-22 | End: 2021-08-12

## 2020-05-22 RX ORDER — HYDROXYZINE PAMOATE 50 MG/1
CAPSULE ORAL
COMMUNITY
Start: 2020-03-13 | End: 2020-05-22

## 2020-05-22 RX ORDER — TOPIRAMATE 25 MG/1
TABLET, FILM COATED ORAL
COMMUNITY
Start: 2020-03-13 | End: 2020-05-22

## 2020-05-22 RX ORDER — LURASIDONE HYDROCHLORIDE 80 MG/1
TABLET, FILM COATED ORAL
Status: CANCELLED | OUTPATIENT
Start: 2020-05-22

## 2020-05-22 RX ORDER — TOPIRAMATE 25 MG/1
TABLET, FILM COATED ORAL
Qty: 60 TABLET | Refills: 0 | OUTPATIENT
Start: 2020-05-22

## 2020-05-22 ASSESSMENT — PATIENT HEALTH QUESTIONNAIRE - PHQ9
SUM OF ALL RESPONSES TO PHQ QUESTIONS 1-9: 22
5. POOR APPETITE OR OVEREATING: MORE THAN HALF THE DAYS

## 2020-05-22 ASSESSMENT — ANXIETY QUESTIONNAIRES
6. BECOMING EASILY ANNOYED OR IRRITABLE: SEVERAL DAYS
3. WORRYING TOO MUCH ABOUT DIFFERENT THINGS: NEARLY EVERY DAY
5. BEING SO RESTLESS THAT IT IS HARD TO SIT STILL: SEVERAL DAYS
2. NOT BEING ABLE TO STOP OR CONTROL WORRYING: NEARLY EVERY DAY
IF YOU CHECKED OFF ANY PROBLEMS ON THIS QUESTIONNAIRE, HOW DIFFICULT HAVE THESE PROBLEMS MADE IT FOR YOU TO DO YOUR WORK, TAKE CARE OF THINGS AT HOME, OR GET ALONG WITH OTHER PEOPLE: SOMEWHAT DIFFICULT
1. FEELING NERVOUS, ANXIOUS, OR ON EDGE: NEARLY EVERY DAY

## 2020-05-22 NOTE — PROGRESS NOTES
"Cristina Dean is a 40 year old female who is being evaluated via a billable telephone visit.      The patient has been notified of following:     \"This telephone visit will be conducted via a call between you and your physician/provider. We have found that certain health care needs can be provided without the need for a physical exam.  This service lets us provide the care you need with a short phone conversation.  If a prescription is necessary we can send it directly to your pharmacy.  If lab work is needed we can place an order for that and you can then stop by our lab to have the test done at a later time.    If during the course of the call the physician/provider feels a telephone visit is not appropriate, you will not be charged for this service.\"     Patient has given verbal consent for Telephone visit?  Yes    Cristina Dean complains of  Patient presents with:  TELEPHONE VISIT: RETURN VISIT..............MEDICATION CHECK    How would you like to obtain your AVS? MyChart     Will anyone else be joining your visit? No    If patient encounters technical issues they should call [phone]    I have reviewed and updated the patient's Past Medical History, Social History, Family History, Allergies, and Medication List.    Primary Care Provider: Jermain Jaramillo MD  Therapist: None from [facility] - last visit 3/31/2020 - Provider attempted two times to call ct to initiate Intake appnt by tele therapy/phone.  Left client two messages to contact intake and be transferred to providers phone.  Will route note to ct's psychiatric provider.    Sleep Evaluation was cancelled due to COVID19    PHQ 2/21/2020 3/13/2020 5/22/2020   PHQ-9 Total Score 21 17 22   Q9: Thoughts of better off dead/self-harm past 2 weeks Several days Several days Not at all   F/U: Thoughts of suicide or self-harm No No -   F/U: Safety concerns No No -       STEPHENIE-7 SCORE 5/29/2019 2/21/2020 3/13/2020   Total Score - - -   Total Score 17 " (severe anxiety) 16 (severe anxiety) 19 (severe anxiety)   Total Score 17 16 19         I last saw Cristina Dean for outpatient psychiatry Return Visit on 3/13/2020.     During that appointment, we Increase Latuda (lurasidone) to 80 mg by mouth daily. Take with food.     Start Topamax (topiramate) 25 mg by mouth daily in AM and PM for mood. May consider dose increase if needed.     Continue Vistaril/Atarax (hydroxyzine) 50 mg by mouth up to 3 times per day as needed for anxiety and sleep.     Stop Desyrel/Olepto (trazodone).     Stop Minipress (prazosin). May need to restart for nightmares.   Current medications include:   Current Outpatient Medications   Medication Sig     acetaminophen (TYLENOL) 500 MG tablet Take 1,000 mg by mouth every 8 hours as needed for pain (right shoulder)     albuterol (PROAIR HFA/PROVENTIL HFA/VENTOLIN HFA) 108 (90 Base) MCG/ACT inhaler Inhale 2 puffs into the lungs every 6 hours     atorvastatin (LIPITOR) 10 MG tablet Take 1 tablet (10 mg) by mouth daily     hydrOXYzine (VISTARIL) 50 MG capsule Take 1 capsule (50 mg) by mouth 3 times daily as needed for anxiety     LATUDA 80 MG TABS tablet      lisinopril (ZESTRIL) 5 MG tablet      LISINOPRIL PO Take 5 mg by mouth daily     lurasidone (LATUDA) 80 MG TABS tablet Take 1 tablet (80 mg) by mouth daily with food     omeprazole (PRILOSEC) 20 MG DR capsule Take 1 capsule (20 mg) by mouth every morning (before breakfast)     prazosin (MINIPRESS) 1 MG capsule Take 1 capsule (1 mg) by mouth At Bedtime     topiramate (TOPAMAX) 25 MG tablet TAKE 1 TABLET BY MOUTH TWICE A DAY     traZODone (DESYREL) 50 MG tablet      No current facility-administered medications for this visit.        The [place] Prescription Monitoring Program has been reviewed and there are no concerns about diversionary activity for controlled substances at this time.      PRESCRIPTIONS  Total Prescriptions: 1  Total Private Pay: 0  Fill  "Date ID Written Drug Qty Days Prescriber Rx # Pharmacy Refill Daily Dose * Pymt Type   05/29/2019 1 02/13/2019 Gabapentin 800 Mg Tablet  90.00 30 Ma Valentina [id number] Gra (3982) 3/3  Comm Ins [place]    I was able to review most recent Primary Care Provider, specialty provider, and therapy visit notes that I have access to.   Patient has been unable to work during COVID19 and this has been a challenge.    Cristina Dean reports mood has been: \"doing better, not as snappy, leveled out a little\" no hypomania or yadi.  Anxiety has been: \"higher\" less panic. Feeling on edge.   Sleep has been: \"sleeping a lot\" during the day and night as something to do. No energy or motivation.   Focus and concentration has been: \"not good\"  PHQ9 and GAD7 scores were reviewed today.   Medication side effects: Denies    Previous medication trials include but not limited to:  Paxil (paroxetine) \"made me suicidal\"  Seroquel (quetiapine) \"was a zombie\"  Latuda (lurasidone) 80 mg  Minipress (prazosin) for nightmares  Desyrel/Olepto (trazodone) was good for sleeping issues  Benadryl (diphenhydramine) was good for sleeping issues  Neurontin (gabapentin) 800 mg 3 times per day  Abilify (aripriprazole) 10 mg   Inderal (propranolol) 40 mg TID   Benadryl (diphenhydramine)  was good for sleeping issues  Prozac (fluoxetine)   Zoloft (sertraline)   Topamax (topiramate)    Past Medical History:   Diagnosis Date     Anxiety      Bipolar affective disorder, remission status unspecified (H) 11/13/2017     Chlamydia      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, unspecified      Depression      Herpes      Intermittent asthma     triggers include fall and spring allergy seasons     Obesity 8/1/12    BMI 53     Urinary incontinence 2/1/2017      has a past medical history of Anxiety, Bipolar affective disorder, remission status unspecified (H) (11/13/2017), Chlamydia, 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, " "unspecified, Depression, Herpes, Intermittent asthma, Obesity (8/1/12), and Urinary incontinence (2/1/2017). She also has no past medical history of Chronic infection, Complication of anesthesia, Diabetes (H), History of blood transfusion, Malignant hyperthermia, Other chronic pain, PONV (postoperative nausea and vomiting), Sleep apnea, or Thyroid disease.    Social History:  Current Living situation: [place], [place] with Spouse/Partner and God-daughter 15 Year old Breann. Feels safe at home.  Current use of drugs or alcohol: No alcohol. Cannabis using much less because has felt better. Positive feedback provided today.  Tobacco use: Yes Cigarettes rarely    Caffeine: much less lately    Vital Signs:   There were no vitals taken for this visit.    Labs:  Admission on 01/31/2020, Discharged on 02/07/2020   Component Date Value Ref Range Status     Interpretation ECG 02/01/2020 Click View Image link to view waveform and result   Final       Review of Systems:  10 systems (general, cardiovascular, respiratory, eyes, ENT, endocrine, GI, , M/S, neurological) were reviewed. Most pertinent finding(s) is/are: right knee pain and swelling, fatigue, no rash. The remaining systems are all unremarkable.    Mental Status Examination:  Attitude:  cooperative   Oriented to:  time, person, and place  Attention Span and Concentration:  Normal but notes difficulty at times  Speech:   clear, coherent, regular rate, regular rhythm and fluent  Language: intact  Mood:  \"doing better\"  Affect: tearful at times  Associations:  no loose associations  Thought Process:  logical, linear and goal oriented  Thought Content:  no evidence of suicidal ideation or homicidal ideation, no evidence of psychotic thought and Appropriate to Interview  Recent and Remote Memory: Not formally assessed. No amnesia. No memory concerns.   Fund of Knowledge: appropriate  Insight:  good  Judgment:  intact  Impulse Control:  intact    Suicide Risk " Assessment:  Today Cristina Dean reports history of mood lability no suicidal ideation or thoughts to harm self or others. However, there are notable risk factors for self-harm, including anxiety, substance abuse and recent loss of father.. However, risk is mitigated by absence of past attempts, denies suicidal intent or plan and no family history of suicide. Therefore, based on all available evidence including the factors cited above, Cristina Dean does not appear to be at imminent risk for self-harm, does not meet criteria for a 72-hr hold, and therefore remains appropriate for ongoing outpatient level of care.  A thorough assessment of risk factors related to suicide and self-harm have been reviewed and are noted above.  Local community safety resources reviewed for patient to use if needed.     DSM5 Diagnosis:  (F33.2) Major Depressive Disorder, Recurrent Episode, Severe With anxious distress                   Rule out Bipolar Disorder  (F41.1) Generalized Anxiety Disorder              Rule out 307.51 (F50.8) Binge-Eating Disorder               Rule out 301.83 (F60.3) Borderline Personality Disorder   Post-traumatic stress disorder (PTSD)   Grief  Alcohol Use Disorder, full remission  Cannabis Abuse, continued use    Medical comorbidities include:   Patient Active Problem List    Diagnosis Date Noted     Acute stress disorder 02/24/2020     Priority: Medium     Suicidal ideation 01/31/2020     Priority: Medium     Lateral epicondylitis of left elbow 02/04/2019     Priority: Medium     Cannabis use disorder, mild, abuse 12/04/2017     Priority: Medium     Alcohol use disorder 12/04/2017     Priority: Medium     Severe episode of recurrent major depressive disorder, without psychotic features (H) 10/23/2017     Priority: Medium     Intermittent asthma without complication, unspecified asthma severity 10/23/2017     Priority: Medium     CARDIOVASCULAR SCREENING; LDL GOAL LESS THAN 130 08/07/2012      Priority: Medium     Morbid obesity due to excess calories (H) 08/01/2012     Priority: Medium     BMI 53       STEPHENIE (generalized anxiety disorder)      Priority: Medium     Herpes 07/09/2012     Priority: Medium       Assessment:  Cristina Dean reports some initial improvement in symptoms but due to COVID19 and acute medical issues, mood and anxiety could still be better. Medication side effects and alternatives were reviewed. Health promotion activities recommended and reviewed today. All questions addressed. Education and counseling completed regarding risks and benefits of medications and psychotherapy options.     With history of alcohol dependence and frequent Marijuana use, I will not provide a benzodiazepine medication at this time. She has been using less Marijuana lately and positive feedback provided.     May need to re check labs such as vitamin D level due to ongoing fatigue. With ongoing use of antipsychotic medication of Latuda (lurasidone), need to monitor lipids and glucose and abnormal and involuntary movements. Due to COVID19, this is not a possibility for lab draws, but will get drawn as soon as possible.    Reviewed today that I will be leaving [facility] as of June 30, 2020. We reviewed the Collaborative Care Psychiatry Service. We discussed other options for care. Patient will need long term psychiatry care. Referred placed today. Patient was thanked for our work together.       Treatment Plan:    Continue Latuda (lurasidone) 80 mg by mouth daily. Take with food. May consider dose increase if needed.    Increase Topamax (topiramate) to 50 mg by mouth daily in AM and PM for mood. May consider dose increase if needed.     Continue Vistaril/Atarax (hydroxyzine) 50 mg by mouth up to 3 times per day as needed for anxiety and sleep.     Continue all other medications as reviewed per electronic medical record today.     Safety plan reviewed. To the Emergency Department as needed or call  after hours crisis line at [phone] or [phone].     To schedule individual or family therapy, call [facility] at [phone].   Thank you for our work together in the Psychiatry Collaborative Care Model at [facility]. This is our last visit and I am referring to another psychiatry provider.     Follow up with primary care provider as planned or for acute medical concerns.    Call the psychiatric nurse line with medication questions or concerns at [phone] before June 30, 2020.    EZBOB may be used to communicate with your provider, but this is not intended to be used for emergencies.    Administrative Billing:   Phone call duration: 22 minutes  Start 2:55 PM   End 3:17 PM     Patient Status:  The patient is being referred to long term community psychiatry care and provider will provide bridging until patient is established with new community provider.     Signed:  Lisa Conrad, PhD, APRN, CNP  Psychiatric Mental Health Nurse Practitioner  [facility]

## 2020-05-22 NOTE — Clinical Note
Referring to community psychiatry.I can help bridge medications and care for patient and Primary Care Provider if needed before my departure from [facility] on June 30, 2020.

## 2020-05-22 NOTE — PATIENT INSTRUCTIONS
Treatment Plan:    Continue Latuda (lurasidone) 80 mg by mouth daily. Take with food. May consider dose increase if needed.    Increase Topamax (topiramate) to 50 mg by mouth daily in AM and PM for mood. May consider dose increase if needed.     Continue Vistaril/Atarax (hydroxyzine) 50 mg by mouth up to 3 times per day as needed for anxiety and sleep.     Continue all other medications as reviewed per electronic medical record today.     Safety plan reviewed. To the Emergency Department as needed or call after hours crisis line at [phone] or [phone].     To schedule individual or family therapy, call [facility] at [phone].   Thank you for our work together in the Psychiatry Collaborative Care Model at [facility]. This is our last visit and I am referring to another psychiatry     Follow up with primary care provider as planned or for acute medical concerns.    Call the psychiatric nurse line with medication questions or concerns at [phone] before June 30, 2020.    Operating Analyticst may be used to communicate with your provider, but this is not intended to be used for emergencies.

## 2020-05-22 NOTE — TELEPHONE ENCOUNTER
Patient has appointment with provider today.   Can discuss refills at that time.     Monika Tomas RN    Nurse Liaison  [facility]

## 2020-06-05 ENCOUNTER — TELEPHONE (OUTPATIENT)
Dept: INTERNAL MEDICINE | Facility: CLINIC | Age: 40
End: 2020-06-05

## 2020-06-05 ENCOUNTER — VIRTUAL VISIT (OUTPATIENT)
Dept: INTERNAL MEDICINE | Facility: CLINIC | Age: 40
End: 2020-06-05
Payer: COMMERCIAL

## 2020-06-05 DIAGNOSIS — L60.0 INGROWING TOENAIL WITH INFECTION: Primary | ICD-10-CM

## 2020-06-05 PROCEDURE — 99214 OFFICE O/P EST MOD 30 MIN: CPT | Mod: TEL | Performed by: PHYSICIAN ASSISTANT

## 2020-06-05 RX ORDER — CEPHALEXIN 500 MG/1
500 CAPSULE ORAL 4 TIMES DAILY
Qty: 28 CAPSULE | Refills: 0 | Status: SHIPPED | OUTPATIENT
Start: 2020-06-05 | End: 2020-06-12

## 2020-06-05 NOTE — PROGRESS NOTES
"Cristina Dean is a 40 year old female who is being evaluated via a billable video visit.      The patient has been notified of following:     \"This video visit will be conducted via a call between you and your physician/provider. We have found that certain health care needs can be provided without the need for an in-person physical exam.  This service lets us provide the care you need with a video conversation.  If a prescription is necessary we can send it directly to your pharmacy.  If lab work is needed we can place an order for that and you can then stop by our lab to have the test done at a later time.    Video visits are billed at different rates depending on your insurance coverage.  Please reach out to your insurance provider with any questions.    If during the course of the call the physician/provider feels a video visit is not appropriate, you will not be charged for this service.\"    Patient has given verbal consent for Video visit? Yes    How would you like to obtain your AVS? Laury    Patient would like the video invitation sent by: Text to cell phone: [phone]    Will anyone else be joining your video visit? No      Subjective     Cristina Dean is a 40 year old female who presents today via video visit for the following health issues:    HPI  Patient has a ingrown toenail since 05/29/2020 patient states she developed a blood blster. She notes she had drainage. Her toe is now red and painful. She is trying to get an appointment with Podiatry but unable to get in until July. Her foot has no redness or swelling. She denies fever.        Reviewed and updated as needed this visit by Provider  Meds  Problems               Objective    There were no vitals taken for this visit.  Estimated body mass index is 65.73 kg/m  as calculated from the following:    Height as of 3/13/20: 1.613 m (5' 3.5\").    Weight as of 3/13/20: 171 kg (377 lb).  Physical Exam     GENERAL: Healthy, alert and no " distress  EYES: Eyes grossly normal to inspection.  No discharge or erythema, or obvious scleral/conjunctival abnormalities.  RESP: No audible wheeze, cough, or visible cyanosis.  No visible retractions or increased work of breathing.    SKIN: Visible skin clear. No significant rash, abnormal pigmentation or lesions.  NEURO: Cranial nerves grossly intact.  Mentation and speech appropriate for age.  PSYCH: Mentation appears normal, affect normal/bright, judgement and insight intact, normal speech and appearance well-groomed.      Diagnostic Test Results:  Labs reviewed in Epic        Assessment & Plan     1. Ingrowing toenail with infection  - treatment with antibiotics with referral to podiatry  - reviewed when to follow up, needs to follow up urgently if worsening symptoms   - cephALEXin (KEFLEX) 500 MG capsule; Take 1 capsule (500 mg) by mouth 4 times daily for 7 days  Dispense: 28 capsule; Refill: 0       No follow-ups on file.    Thi Roberts PA-C  [facility]      Video-Visit Details  - patient unable to load video, switched to telephone visit     No follow-ups on file.       Thi Roberts PA-C      Duration: 5 minutes

## 2020-06-05 NOTE — TELEPHONE ENCOUNTER
Patient calling. Wants to make appointment with a podiatrist. Saw Thi PENG but office visit notes not complete. Dr. Molina currently seeing patients in [place], as are other podiatrists at that location. Gave her number to schedule and asked her to call us back if any issues or if needs referral. Advise if differently.

## 2020-06-25 DIAGNOSIS — F31.9 BIPOLAR AFFECTIVE DISORDER, REMISSION STATUS UNSPECIFIED (H): ICD-10-CM

## 2020-06-25 RX ORDER — TOPIRAMATE 50 MG/1
TABLET, FILM COATED ORAL
Qty: 60 TABLET | Refills: 1 | OUTPATIENT
Start: 2020-06-25

## 2020-06-25 NOTE — TELEPHONE ENCOUNTER
Call placed to pharmacy, verified that patient still has one refill. Declined refill request, too soon.

## 2020-08-25 DIAGNOSIS — K21.9 GASTROESOPHAGEAL REFLUX DISEASE, ESOPHAGITIS PRESENCE NOT SPECIFIED: ICD-10-CM

## 2020-08-26 DIAGNOSIS — I10 ESSENTIAL (PRIMARY) HYPERTENSION: ICD-10-CM

## 2020-08-26 RX ORDER — LISINOPRIL 5 MG/1
TABLET ORAL
Qty: 90 TABLET | Refills: 1 | Status: SHIPPED | OUTPATIENT
Start: 2020-08-26 | End: 2021-03-01

## 2020-09-28 NOTE — ADDENDUM NOTE
Addended by: BEVERLY LUBIN on: 10/23/2017 08:42 AM     Modules accepted: Kandy Saravia     Rosette Butler Staff             Hello,   Auth for MRI Brain has been denied with patient's insurance Dr. PATRICIO can do a peer to peer to try for overturn by calling [phone], Option #3 with tracking number [id number].   Denial Reason:  Your doctor said you had headaches.  To review for approval, C[facility] Clinical Guideline 001 for Brain (head) MRI, page 2, line 64-78 requires the following information: doctor's notes that say your headaches are worse (increased frequency or intensity).  The records we got did not show this.     Thanks,   Rosette

## 2020-10-22 NOTE — OP NOTE
General Surgery Operative Note    Pre-operative diagnosis:  Right back mass   Post-operative diagnosis:  Right back mass (15 x 21 cm)   Procedure:  Excision of large right back mass   Surgeon: Vicente Pérez MD   Assistant(s): Antonio De Souza PA-C  - the PA's assistance was medically necessary in providing adequate exposure in the operating field, maintaining hemostasis, cuttting suture, clamping and ligating blood vessels, and visualization of the anatomic structures throughout the surgical procedure.   Anesthesia: General    Estimated blood loss: 10 cc's   Drains placed: Khurram   Complications:  None   Findings:   Large subcutaneous mass with multilobulated character posteriorly.  There was a firmer area more anteriorly.  This was removed without obvious residual.     Indications for operation: This is a 37-year-old woman with a large right back mass which has been becoming increasingly symptomatic.  Excision was recommended, and the procedure, along with its risks and complications, was discussed with patient.  She agreed to proceed.    Details of the operation: After informed consent, the patient was taken to the operating room where she underwent satisfactory induction of general anesthesia.  The patient was sterilely prepped and draped in the right side up lateral position.  Skin incision was made and dissection was carried down towards the mass.  Posteriorly, there were lobules of the mass coming up very close to the skin.  As we dissected anteriorly, the mass was more well defined.  I was able to get into a plane and dissected out the anterior portion bluntly.  There was a firm 3 cm area anteriorly.  This did not come out to the edge of the lesion.  Dissection was carried around the entire circumference of the mass.  It was quite densely adherent to the chest wall musculature.  Posteriorly, there were multiple lobules going out into the surrounding adipose tissue.  All identifiable extensions were removed.  Occupational Therapy  Visit Type: initial evaluation  Precautions:  Medical precautions:  fall risk;. Therapist wearing gloves, eye protection and surgical mask during session.    Patient not masked during session 2^ agitation/confusion    Lines:       Lines in chart and on patient reviewed, cautions maintained throughout session.  Hearing: no hearing deficits  Vision:     Current vision: no visual deficits  Safety Measures: bed alarm and bed rails    SUBJECTIVE                                                                                                            Patient agreed to participate in therapy this date.  Patient verbally agrees to allow the following to be present during session: spouse  I'm dizzy   Patient / Family Goal: return to previous functional status, return home and maximize function    Pain     At onset of session (out of 10): 0  RN informed on pain level      OBJECTIVE                                                                                                              Level of consciousness: lethargic    Oriented to person     Disoriented to place and time    Affect/Behavior: calm  Functional Communication/Cognition    Overall status:  Impaired  Range of Motion (measured in degrees unless otherwise indicated)  ROM Details: Unable to follow commands adequate for formal assessment   Strength (out of 5 unless otherwise indicated)  Comments / Details:  Unable to follow commands adequate for formal assessment, R UE appears slightly weaker than L?   Balance  Sitting:    Static:  contact guard/touching/steadying assist and minimal assist     Sensation - Upper Extremity  C4 (shoulder, clavicular and upper scapular areas):     Light Touch: Left: intact Right: intact  C5 (deltoid area, anterior aspect of entire arm to base of thumb):     Light Touch: Left: intact Right: intact  C6 (anterior upper extremity, radial side of hand to 1st and 2nd digit):     Light Touch: Left: intact Right:   The mass was now removed and measured at 15 x 21 cm.  Hemostasis was assured using electrocautery and the incision was irrigated out.  The area was infiltrated with 0.5% Marcaine.  A 15 Yi round Khurram type drain was placed through an anterior stab incision.  The drain was sutured in place of the skin.  The deep subdermal tissues were now approximated using interrupted 2-0 Vicryl sutures and the superficial subdermal tissues approximated with 3-0 Vicryl suture.  Skin was closed using Dermabond skin adhesive.    The patient tolerated the procedure well and was transferred to the recovery room in satisfactory condition.  Sponge and needle counts were correct at the close of the case.    Specimens:   ID Type Source Tests Collected by Time Destination   A : RIGHT BACK MASS Tissue Back SURGICAL PATHOLOGY EXAM Vicente Pérez MD 12/28/2017  8:35 AM            Vicente Pérez MD       intact  C7 (lateral upper extremity and forearm to 2nd through 4th digit):     Light Touch: Left: intact Right: intact  C8 (medial upper extremity and forearm to 3rd through 5th):     Light Touch: Left: intact Right: intact  T1 (medial side of forearm to base of 5th digit):    Light Touch: Left: intact Right: intact  Bed mobility:      Supine to sit: maximal assist    Sit to supine: maximal assist  Transfers:      Sit to stand: not attempted due to safety concerns  Activities of Daily Living (ADLs):  Grooming/Oral Hygiene:     Grooming assist: maximal assist  Lower Body Dressing:     Footwear assistance: total assist - dependent  Toilet transfer:     Assist: not attempted due to safety concerns  Toileting:     Assist: total assist - dependent    Vision:     Visual perceptual: right inattention and/or neglect - present (further assessment indicated to discern neglect vs lethargy)            ASSESSMENT                                                                                                                Impairments: activity tolerance, balance, cognitive, coordination/proprioception, decreased insight into deficits, strength, safety awareness and visual perceptual  Functional Limitations: functional mobility, grooming, bathing, toileting, showering, dressing and functional transfers  Personal Occupations Profile Affected: bathing/showering, personal hygiene/grooming, upper body dressing, functional mobility/transfers, toileting/toilet hygiene, lower body dressing  Therapy Diagnosis:   Decreased ability to perform self care tasks and functional transfers.     Discharge Recommendations:   Recommendations for Discharge: OT IL: Patient requires 24 hour nonskilled assistance to perform mobility and/or ADLs safely, Patient needs intensive skilled therapy for a minimum of 3 hours daily by at least two disciplines with the oversight of a physical medicine and rehabilitation physician        PT/OT ADL Equipment for  Discharge: tbd        Skilled therapy is required to address these limitations in attempt to maximize the patient's independence.  Clinical decision making: Moderate - Patient has several limitations (3-5), comorbidities and/or complexities, as noted in detailed assessment above, that impact their occupational profile.  Resulting in several treatment options and minimal to moderate task modification consistent with moderate clinical decision making complexity.    End of Session:   Location: in bed  Safety measures: alarm system in place/re-engaged, call light within reach, equipment intact and lines intact  Handoff to: nurse    PLAN                                                                                                                          Suggestions for next session as indicated: OT Frequency: 5 days/week  Frequency Comments: neuro 1/5 IRP? 10.22    A minimum of 8 minutes per session x 1 week.    Interventions: ADL retraining, balance, safety training, therapeutic activity, therapeutic exercise and transfer training  Agreement to plan and goals: patient agrees with goals and treatment plan      GOALS:  Review Date: 10/22/2020  Long Term Goals: (to be met by time of discharge from hospital)  Grooming: Patient will complete grooming tasks in standing supervision.  Toileting: Patient will complete toileting supervision.  Toilet transfer: Patient will complete toilet transfer with supervision.         Documented in the chart in the following areas:  Services. Prior Level of Function. Assessment. Plan.       Epidermal Closure Graft Donor Site (Optional): simple interrupted

## 2021-02-21 DIAGNOSIS — I10 ESSENTIAL (PRIMARY) HYPERTENSION: ICD-10-CM

## 2021-02-23 RX ORDER — LISINOPRIL 5 MG/1
TABLET ORAL
Qty: 90 TABLET | Refills: 1 | OUTPATIENT
Start: 2021-02-23

## 2021-02-23 NOTE — TELEPHONE ENCOUNTER
Routing refill request to provider for review/approval because:  Labs not current:  Creatinine, Potassium  BP not at goal    Creatinine   Date Value Ref Range Status   01/08/2020 0.78 0.52 - 1.04 mg/dL Final     Potassium   Date Value Ref Range Status   01/08/2020 4.6 3.4 - 5.3 mmol/L Final     BP Readings from Last 3 Encounters:   04/27/20 (!) 164/99   03/13/20 122/80   02/21/20 (!) 130/100

## 2021-02-25 NOTE — PROGRESS NOTES
"    Assessment & Plan     Benign essential hypertension  Advise patient blood pressure poorly controlled.  Suggest increasing lisinopril to 10 mg daily with a recheck of blood pressure in 3 months  - Comprehensive metabolic panel  - lisinopril (ZESTRIL) 10 MG tablet; Take 1 tablet (10 mg) by mouth daily    Morbid obesity due to excess calories (H)  Reviewed patient's weight chart and encouraged ongoing weight loss and dietary change    CARDIOVASCULAR SCREENING; LDL GOAL LESS THAN 130  Labs ordered as fasting per routine today.  - Lipid Profile    Intermittent asthma without complication, unspecified asthma severity  Advised patient to continue with albuterol inhaler.  Consider nasal inhalers as needed.  - Asthma Action Plan (AAP)    Visit for screening mammogram  Ordered as routine screening.  - *MA Screening Digital Bilateral; Future    Smoking cessation was advised and the risks of continued smoking in regards to this patients health history was reiterated. Options of smoking cessation were also discussed. This time extended beyond the routine exam.       Tobacco Cessation:   reports that she has been smoking cigarettes. She started smoking about 2 years ago. She has a 7.50 pack-year smoking history. She has never used smokeless tobacco.  Tobacco Cessation Action Plan: Information offered: Patient not interested at this time    BMI:   Estimated body mass index is 65.73 kg/m  as calculated from the following:    Height as of 3/13/20: 1.613 m (5' 3.5\").    Weight as of 3/13/20: 171 kg (377 lb).   Weight management plan: Discussed healthy diet and exercise guidelines    Work on weight loss  Regular exercise  Advised of need for f/u papa smear.    Return in about 3 months (around 6/1/2021) for BP Recheck, f/u OB/GYN for pap smear, routine mammogram, screening.    Jermain Jaramillo MD  [facility]    Davide Evans is a 40 year old who presents for the following health issues "     HPI       Hyperlipidemia Follow-Up      Are you regularly taking any medication or supplement to lower your cholesterol?   Yes- atorvastatin    Are you having muscle aches or other side effects that you think could be caused by your cholesterol lowering medication?  No    Hypertension Follow-up      Do you check your blood pressure regularly outside of the clinic? No     Are you following a low salt diet? No    Are your blood pressures ever more than 140 on the top number (systolic) OR more   than 90 on the bottom number (diastolic), for example 140/90? N/A     .Asthma Follow-Up    Was ACT completed today?    Yes    ACT Total Scores 3/1/2021   ACT TOTAL SCORE -   ASTHMA ER VISITS -   ASTHMA HOSPITALIZATIONS -   ACT TOTAL SCORE (Goal Greater than or Equal to 20) 15   In the past 12 months, how many times did you visit the emergency room for your asthma without being admitted to the hospital? 0   In the past 12 months, how many times were you hospitalized overnight because of your asthma? 0          How many days per week do you miss taking your asthma controller medication?  I do not have an asthma controller medication    Please describe any recent triggers for your asthma: None    Have you had any Emergency Room Visits, Urgent Care Visits, or Hospital Admissions since your last office visit?  No      How many servings of fruits and vegetables do you eat daily?  0-1    On average, how many sweetened beverages do you drink each day (Examples: soda, juice, sweet tea, etc.  Do NOT count diet or artificially sweetened beverages)?   0    How many days per week do you exercise enough to make your heart beat faster? NONE     How many minutes a day do you exercise enough to make your heart beat faster? N/A   How many days per week do you miss taking your medication? 1    What makes it hard for you to take your medications?  remembering to take    Review of Systems   CONSTITUTIONAL: NEGATIVE for fever, chills, change in  "weight  EYES: NEGATIVE for vision changes or irritation  ENT/MOUTH: NEGATIVE for ear, mouth and throat problems  RESP: NEGATIVE for significant cough   CV: NEGATIVE for chest pain, palpitations or peripheral edema  GI: NEGATIVE for nausea, abdominal pain, heartburn, or change in bowel habits  : NEGATIVE for frequency, dysuria, or hematuria  MUSCULOSKELETAL: NEGATIVE for significant arthralgias or myalgia  NEURO: NEGATIVE for weakness, dizziness or paresthesias  HEME: NEGATIVE for bleeding problems      Objective    BP (!) 152/94   Pulse 93   Temp 96.1  F (35.6  C) (Temporal)   Resp 16   Ht 1.613 m (5' 3.5\")   Wt (!) 172.9 kg (381 lb 1.6 oz)   LMP 03/01/2021   SpO2 95%   BMI 66.45 kg/m    Body mass index is 66.45 kg/m .     Physical Exam   GENERAL: alert and no distress  EYES: Eyes grossly normal to inspection, PERRL and conjunctivae and sclerae normal  HENT: ear canals and TM's normal, nose and mouth without ulcers or lesions  NECK: no adenopathy, no asymmetry, masses, or scars and thyroid normal to palpation  RESP: lungs clear to auscultation - no rales, rhonchi or wheezes  CV: regular rate and rhythm, normal S1 S2, no S3 or S4, no murmur, click or rub, no peripheral edema and peripheral pulses strong  ABDOMEN: obese  MS: no gross musculoskeletal defects noted  NEURO: No focal changes  PSYCH: mentation appears normal, affect normal/bright            "

## 2021-03-01 ENCOUNTER — OFFICE VISIT (OUTPATIENT)
Dept: INTERNAL MEDICINE | Facility: CLINIC | Age: 41
End: 2021-03-01
Payer: COMMERCIAL

## 2021-03-01 VITALS
DIASTOLIC BLOOD PRESSURE: 94 MMHG | OXYGEN SATURATION: 95 % | RESPIRATION RATE: 16 BRPM | HEART RATE: 93 BPM | HEIGHT: 64 IN | BODY MASS INDEX: 50.02 KG/M2 | TEMPERATURE: 96.1 F | WEIGHT: 293 LBS | SYSTOLIC BLOOD PRESSURE: 152 MMHG

## 2021-03-01 DIAGNOSIS — J45.20 INTERMITTENT ASTHMA WITHOUT COMPLICATION, UNSPECIFIED ASTHMA SEVERITY: ICD-10-CM

## 2021-03-01 DIAGNOSIS — Z13.6 CARDIOVASCULAR SCREENING; LDL GOAL LESS THAN 130: ICD-10-CM

## 2021-03-01 DIAGNOSIS — I10 BENIGN ESSENTIAL HYPERTENSION: Primary | ICD-10-CM

## 2021-03-01 DIAGNOSIS — E66.01 MORBID OBESITY DUE TO EXCESS CALORIES (H): ICD-10-CM

## 2021-03-01 DIAGNOSIS — Z12.31 VISIT FOR SCREENING MAMMOGRAM: ICD-10-CM

## 2021-03-01 LAB
ALBUMIN SERPL-MCNC: 3.2 G/DL (ref 3.4–5)
ALP SERPL-CCNC: 70 U/L (ref 40–150)
ALT SERPL W P-5'-P-CCNC: 44 U/L (ref 0–50)
ANION GAP SERPL CALCULATED.3IONS-SCNC: 6 MMOL/L (ref 3–14)
AST SERPL W P-5'-P-CCNC: 16 U/L (ref 0–45)
BILIRUB SERPL-MCNC: 0.2 MG/DL (ref 0.2–1.3)
BUN SERPL-MCNC: 15 MG/DL (ref 7–30)
CALCIUM SERPL-MCNC: 9.2 MG/DL (ref 8.5–10.1)
CHLORIDE SERPL-SCNC: 106 MMOL/L (ref 94–109)
CHOLEST SERPL-MCNC: 217 MG/DL
CO2 SERPL-SCNC: 27 MMOL/L (ref 20–32)
CREAT SERPL-MCNC: 0.9 MG/DL (ref 0.52–1.04)
GFR SERPL CREATININE-BSD FRML MDRD: 79 ML/MIN/{1.73_M2}
GLUCOSE SERPL-MCNC: 134 MG/DL (ref 70–99)
HDLC SERPL-MCNC: 39 MG/DL
LDLC SERPL CALC-MCNC: 111 MG/DL
NONHDLC SERPL-MCNC: 178 MG/DL
POTASSIUM SERPL-SCNC: 3.9 MMOL/L (ref 3.4–5.3)
PROT SERPL-MCNC: 7.1 G/DL (ref 6.8–8.8)
SODIUM SERPL-SCNC: 139 MMOL/L (ref 133–144)
TRIGL SERPL-MCNC: 336 MG/DL

## 2021-03-01 PROCEDURE — 80061 LIPID PANEL: CPT | Performed by: INTERNAL MEDICINE

## 2021-03-01 PROCEDURE — 36415 COLL VENOUS BLD VENIPUNCTURE: CPT | Performed by: INTERNAL MEDICINE

## 2021-03-01 PROCEDURE — 80053 COMPREHEN METABOLIC PANEL: CPT | Performed by: INTERNAL MEDICINE

## 2021-03-01 PROCEDURE — 99214 OFFICE O/P EST MOD 30 MIN: CPT | Performed by: INTERNAL MEDICINE

## 2021-03-01 RX ORDER — LISINOPRIL 10 MG/1
10 TABLET ORAL DAILY
Qty: 90 TABLET | Refills: 1 | Status: SHIPPED | OUTPATIENT
Start: 2021-03-01 | End: 2021-08-24 | Stop reason: DRUGHIGH

## 2021-03-01 RX ORDER — METHYLPHENIDATE HYDROCHLORIDE 10 MG/1
10 TABLET ORAL DAILY
Status: ON HOLD | COMMUNITY
Start: 2020-10-15 | End: 2021-08-12

## 2021-03-01 ASSESSMENT — MIFFLIN-ST. JEOR: SCORE: 2370.72

## 2021-03-02 ASSESSMENT — ASTHMA QUESTIONNAIRES: ACT_TOTALSCORE: 15

## 2021-03-09 ENCOUNTER — ANCILLARY PROCEDURE (OUTPATIENT)
Dept: MAMMOGRAPHY | Facility: CLINIC | Age: 41
End: 2021-03-09
Attending: INTERNAL MEDICINE
Payer: COMMERCIAL

## 2021-03-09 DIAGNOSIS — Z12.31 VISIT FOR SCREENING MAMMOGRAM: ICD-10-CM

## 2021-03-09 PROCEDURE — 77067 SCR MAMMO BI INCL CAD: CPT | Mod: TC | Performed by: RADIOLOGY

## 2021-04-10 DIAGNOSIS — Z13.6 CARDIOVASCULAR SCREENING; LDL GOAL LESS THAN 130: ICD-10-CM

## 2021-04-12 RX ORDER — ATORVASTATIN CALCIUM 10 MG/1
TABLET, FILM COATED ORAL
Qty: 90 TABLET | Refills: 3 | Status: SHIPPED | OUTPATIENT
Start: 2021-04-12 | End: 2023-12-05

## 2021-08-10 ENCOUNTER — HOSPITAL ENCOUNTER (INPATIENT)
Facility: CLINIC | Age: 41
LOS: 2 days | Discharge: HOME | DRG: 885 | End: 2021-08-12
Attending: EMERGENCY MEDICINE | Admitting: PSYCHIATRY & NEUROLOGY
Payer: COMMERCIAL

## 2021-08-10 ENCOUNTER — TELEPHONE (OUTPATIENT)
Dept: BEHAVIORAL HEALTH | Facility: CLINIC | Age: 41
End: 2021-08-10

## 2021-08-10 DIAGNOSIS — F33.2 SEVERE EPISODE OF RECURRENT MAJOR DEPRESSIVE DISORDER, WITHOUT PSYCHOTIC FEATURES (H): Primary | ICD-10-CM

## 2021-08-10 DIAGNOSIS — T50.904A DRUG OVERDOSE, UNDETERMINED INTENT, INITIAL ENCOUNTER: ICD-10-CM

## 2021-08-10 DIAGNOSIS — F33.2 SEVERE RECURRENT MAJOR DEPRESSION WITHOUT PSYCHOTIC FEATURES (H): ICD-10-CM

## 2021-08-10 DIAGNOSIS — R45.851 SUICIDAL IDEATION: ICD-10-CM

## 2021-08-10 DIAGNOSIS — F41.1 GAD (GENERALIZED ANXIETY DISORDER): ICD-10-CM

## 2021-08-10 DIAGNOSIS — F31.9 BIPOLAR AFFECTIVE DISORDER, REMISSION STATUS UNSPECIFIED (H): ICD-10-CM

## 2021-08-10 DIAGNOSIS — F43.0 ACUTE STRESS DISORDER: ICD-10-CM

## 2021-08-10 DIAGNOSIS — T45.0X2A: ICD-10-CM

## 2021-08-10 DIAGNOSIS — Z20.822 COVID-19 RULED OUT BY LABORATORY TESTING: ICD-10-CM

## 2021-08-10 LAB
ALBUMIN SERPL-MCNC: 3.3 G/DL (ref 3.4–5)
ALP SERPL-CCNC: 67 U/L (ref 40–150)
ALT SERPL W P-5'-P-CCNC: 54 U/L (ref 0–50)
ANION GAP SERPL CALCULATED.3IONS-SCNC: 6 MMOL/L (ref 3–14)
APAP SERPL-MCNC: <2 MG/L (ref 10–30)
AST SERPL W P-5'-P-CCNC: 29 U/L (ref 0–45)
ATRIAL RATE - MUSE: 91 BPM
BASOPHILS # BLD AUTO: 0.1 10E3/UL (ref 0–0.2)
BASOPHILS NFR BLD AUTO: 1 %
BILIRUB SERPL-MCNC: 0.2 MG/DL (ref 0.2–1.3)
BUN SERPL-MCNC: 13 MG/DL (ref 7–30)
CALCIUM SERPL-MCNC: 8.3 MG/DL (ref 8.5–10.1)
CHLORIDE BLD-SCNC: 108 MMOL/L (ref 94–109)
CO2 SERPL-SCNC: 27 MMOL/L (ref 20–32)
CREAT SERPL-MCNC: 0.82 MG/DL (ref 0.52–1.04)
DIASTOLIC BLOOD PRESSURE - MUSE: NORMAL MMHG
EOSINOPHIL # BLD AUTO: 0.3 10E3/UL (ref 0–0.7)
EOSINOPHIL NFR BLD AUTO: 3 %
ERYTHROCYTE [DISTWIDTH] IN BLOOD BY AUTOMATED COUNT: 13.5 % (ref 10–15)
ETHANOL SERPL-MCNC: 0.08 G/DL
GFR SERPL CREATININE-BSD FRML MDRD: 89 ML/MIN/1.73M2
GLUCOSE BLD-MCNC: 164 MG/DL (ref 70–99)
HCG SERPL QL: NEGATIVE
HCT VFR BLD AUTO: 44.7 % (ref 35–47)
HGB BLD-MCNC: 14.3 G/DL (ref 11.7–15.7)
IMM GRANULOCYTES # BLD: 0 10E3/UL
IMM GRANULOCYTES NFR BLD: 0 %
INTERPRETATION ECG - MUSE: NORMAL
LYMPHOCYTES # BLD AUTO: 2.2 10E3/UL (ref 0.8–5.3)
LYMPHOCYTES NFR BLD AUTO: 25 %
MCH RBC QN AUTO: 30.5 PG (ref 26.5–33)
MCHC RBC AUTO-ENTMCNC: 32 G/DL (ref 31.5–36.5)
MCV RBC AUTO: 95 FL (ref 78–100)
MONOCYTES # BLD AUTO: 0.6 10E3/UL (ref 0–1.3)
MONOCYTES NFR BLD AUTO: 6 %
NEUTROPHILS # BLD AUTO: 5.8 10E3/UL (ref 1.6–8.3)
NEUTROPHILS NFR BLD AUTO: 65 %
NRBC # BLD AUTO: 0 10E3/UL
NRBC BLD AUTO-RTO: 0 /100
P AXIS - MUSE: 45 DEGREES
PLATELET # BLD AUTO: 333 10E3/UL (ref 150–450)
POTASSIUM BLD-SCNC: 3.8 MMOL/L (ref 3.4–5.3)
PR INTERVAL - MUSE: 152 MS
PROT SERPL-MCNC: 7.1 G/DL (ref 6.8–8.8)
QRS DURATION - MUSE: 86 MS
QT - MUSE: 370 MS
QTC - MUSE: 455 MS
R AXIS - MUSE: 19 DEGREES
RBC # BLD AUTO: 4.69 10E6/UL (ref 3.8–5.2)
SALICYLATES SERPL-MCNC: <2 MG/DL
SARS-COV-2 RNA RESP QL NAA+PROBE: NEGATIVE
SODIUM SERPL-SCNC: 141 MMOL/L (ref 133–144)
SYSTOLIC BLOOD PRESSURE - MUSE: NORMAL MMHG
T AXIS - MUSE: 48 DEGREES
VENTRICULAR RATE- MUSE: 91 BPM
WBC # BLD AUTO: 9.1 10E3/UL (ref 4–11)

## 2021-08-10 PROCEDURE — 80053 COMPREHEN METABOLIC PANEL: CPT | Performed by: EMERGENCY MEDICINE

## 2021-08-10 PROCEDURE — 99285 EMERGENCY DEPT VISIT HI MDM: CPT | Mod: 25 | Performed by: EMERGENCY MEDICINE

## 2021-08-10 PROCEDURE — U0005 INFEC AGEN DETEC AMPLI PROBE: HCPCS | Performed by: EMERGENCY MEDICINE

## 2021-08-10 PROCEDURE — 82077 ASSAY SPEC XCP UR&BREATH IA: CPT | Performed by: EMERGENCY MEDICINE

## 2021-08-10 PROCEDURE — 250N000013 HC RX MED GY IP 250 OP 250 PS 637: Performed by: EMERGENCY MEDICINE

## 2021-08-10 PROCEDURE — 84703 CHORIONIC GONADOTROPIN ASSAY: CPT | Performed by: EMERGENCY MEDICINE

## 2021-08-10 PROCEDURE — C9803 HOPD COVID-19 SPEC COLLECT: HCPCS | Performed by: EMERGENCY MEDICINE

## 2021-08-10 PROCEDURE — 90791 PSYCH DIAGNOSTIC EVALUATION: CPT

## 2021-08-10 PROCEDURE — 250N000013 HC RX MED GY IP 250 OP 250 PS 637: Performed by: PSYCHIATRY & NEUROLOGY

## 2021-08-10 PROCEDURE — 85025 COMPLETE CBC W/AUTO DIFF WBC: CPT | Performed by: EMERGENCY MEDICINE

## 2021-08-10 PROCEDURE — 36415 COLL VENOUS BLD VENIPUNCTURE: CPT | Performed by: EMERGENCY MEDICINE

## 2021-08-10 PROCEDURE — 80143 DRUG ASSAY ACETAMINOPHEN: CPT | Performed by: EMERGENCY MEDICINE

## 2021-08-10 PROCEDURE — 80179 DRUG ASSAY SALICYLATE: CPT | Performed by: EMERGENCY MEDICINE

## 2021-08-10 PROCEDURE — 93005 ELECTROCARDIOGRAM TRACING: CPT | Performed by: EMERGENCY MEDICINE

## 2021-08-10 PROCEDURE — 99223 1ST HOSP IP/OBS HIGH 75: CPT | Mod: AI | Performed by: PSYCHIATRY & NEUROLOGY

## 2021-08-10 PROCEDURE — 93010 ELECTROCARDIOGRAM REPORT: CPT | Performed by: EMERGENCY MEDICINE

## 2021-08-10 PROCEDURE — 124N000002 HC R&B MH UMMC

## 2021-08-10 RX ORDER — LURASIDONE HYDROCHLORIDE 40 MG/1
80 TABLET, FILM COATED ORAL DAILY
Status: DISCONTINUED | OUTPATIENT
Start: 2021-08-10 | End: 2021-08-10

## 2021-08-10 RX ORDER — ALBUTEROL SULFATE 90 UG/1
2 AEROSOL, METERED RESPIRATORY (INHALATION) EVERY 6 HOURS PRN
Status: DISCONTINUED | OUTPATIENT
Start: 2021-08-10 | End: 2021-08-12 | Stop reason: HOSPADM

## 2021-08-10 RX ORDER — LISINOPRIL 10 MG/1
10 TABLET ORAL DAILY
Status: DISCONTINUED | OUTPATIENT
Start: 2021-08-10 | End: 2021-08-12 | Stop reason: HOSPADM

## 2021-08-10 RX ORDER — TOPIRAMATE 50 MG/1
50 TABLET, FILM COATED ORAL 2 TIMES DAILY
Status: DISCONTINUED | OUTPATIENT
Start: 2021-08-10 | End: 2021-08-12 | Stop reason: HOSPADM

## 2021-08-10 RX ORDER — FLUOXETINE 10 MG/1
10 CAPSULE ORAL AT BEDTIME
Status: DISCONTINUED | OUTPATIENT
Start: 2021-08-10 | End: 2021-08-11

## 2021-08-10 RX ORDER — IBUPROFEN 600 MG/1
600 TABLET, FILM COATED ORAL EVERY 6 HOURS PRN
Status: DISCONTINUED | OUTPATIENT
Start: 2021-08-10 | End: 2021-08-12 | Stop reason: HOSPADM

## 2021-08-10 RX ORDER — LURASIDONE HYDROCHLORIDE 40 MG/1
80 TABLET, FILM COATED ORAL AT BEDTIME
Status: DISCONTINUED | OUTPATIENT
Start: 2021-08-10 | End: 2021-08-12 | Stop reason: HOSPADM

## 2021-08-10 RX ORDER — ALBUTEROL SULFATE 90 UG/1
2 AEROSOL, METERED RESPIRATORY (INHALATION) ONCE
Status: COMPLETED | OUTPATIENT
Start: 2021-08-10 | End: 2021-08-10

## 2021-08-10 RX ORDER — ATORVASTATIN CALCIUM 10 MG/1
10 TABLET, FILM COATED ORAL DAILY
Status: DISCONTINUED | OUTPATIENT
Start: 2021-08-10 | End: 2021-08-12 | Stop reason: HOSPADM

## 2021-08-10 RX ORDER — TRAZODONE HYDROCHLORIDE 50 MG/1
50 TABLET, FILM COATED ORAL
Status: DISCONTINUED | OUTPATIENT
Start: 2021-08-10 | End: 2021-08-12 | Stop reason: HOSPADM

## 2021-08-10 RX ORDER — HYDROXYZINE HYDROCHLORIDE 25 MG/1
50 TABLET, FILM COATED ORAL 3 TIMES DAILY PRN
Status: DISCONTINUED | OUTPATIENT
Start: 2021-08-10 | End: 2021-08-12 | Stop reason: HOSPADM

## 2021-08-10 RX ADMIN — OMEPRAZOLE 20 MG: 20 CAPSULE, DELAYED RELEASE ORAL at 14:34

## 2021-08-10 RX ADMIN — ATORVASTATIN CALCIUM 10 MG: 10 TABLET, FILM COATED ORAL at 13:16

## 2021-08-10 RX ADMIN — ALBUTEROL SULFATE 2 PUFF: 90 AEROSOL, METERED RESPIRATORY (INHALATION) at 04:48

## 2021-08-10 RX ADMIN — TOPIRAMATE 50 MG: 50 TABLET ORAL at 20:56

## 2021-08-10 RX ADMIN — FLUOXETINE 10 MG: 10 CAPSULE ORAL at 20:56

## 2021-08-10 RX ADMIN — LURASIDONE HYDROCHLORIDE 80 MG: 40 TABLET, FILM COATED ORAL at 20:56

## 2021-08-10 RX ADMIN — LISINOPRIL 10 MG: 10 TABLET ORAL at 13:16

## 2021-08-10 ASSESSMENT — ACTIVITIES OF DAILY LIVING (ADL)
CONCENTRATING,_REMEMBERING_OR_MAKING_DECISIONS_DIFFICULTY: NO
LAUNDRY: WITH SUPERVISION
HYGIENE/GROOMING: INDEPENDENT
WEAR_GLASSES_OR_BLIND: NO
HYGIENE/GROOMING: INDEPENDENT
DRESS: INDEPENDENT
DRESS: INDEPENDENT
DIFFICULTY_COMMUNICATING: NO
WALKING_OR_CLIMBING_STAIRS_DIFFICULTY: NO
PATIENT_/_FAMILY_COMMUNICATION_STYLE: SPOKEN LANGUAGE (ENGLISH OR BILINGUAL)
ORAL_HYGIENE: INDEPENDENT
LAUNDRY: WITH SUPERVISION
DRESSING/BATHING_DIFFICULTY: NO
FALL_HISTORY_WITHIN_LAST_SIX_MONTHS: NO
ORAL_HYGIENE: INDEPENDENT
TOILETING_ISSUES: NO
DIFFICULTY_EATING/SWALLOWING: NO
HEARING_DIFFICULTY_OR_DEAF: NO
DOING_ERRANDS_INDEPENDENTLY_DIFFICULTY: NO
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
## 2021-08-10 NOTE — ED NOTES
Dr Rosario states that pt is medically cleared. She states that poison control does not need to be contacted. 1:1 has been discontinued

## 2021-08-10 NOTE — PROGRESS NOTES
08/10/21 1209   Patient Belongings   Did you bring any home meds/supplements to the hospital?  Yes   Disposition of meds  Returned to patient   Patient Belongings remains with patient   Patient Belongings Remaining with Patient glasses;clothing   Belongings Search Yes   Clothing Search Yes   Second Staff Brooklynn LINDSEY         Items in Locker:   Candy bar  's License   Cell Phone    Kept with Patient:    Black flip flop  Daniel short  Shirt  Bra  Glasses    ..A               Admission:  I am responsible for any personal items that are not sent to the safe or pharmacy.  [facility] is not responsible for loss, theft or damage of any property in my possession.    Signature:  _________________________________ Date: _______  Time: _____                                              Staff Signature:  ____________________________ Date: ________  Time: _____      2nd Staff person, if patient is unable/unwilling to sign:    Signature: ________________________________ Date: ________  Time: _____     Discharge:  [facility] has returned all of my personal belongings:    Signature: _________________________________ Date: ________  Time: _____                                          Staff Signature:  ____________________________ Date: ________  Time: _____
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
## 2021-08-10 NOTE — TELEPHONE ENCOUNTER
Pt presents in [facility]  ED SI  B: Pt worsening depression, increase nightmares, dec sleep, feels neds are not working. Pt Si , does not give a specific plan but cannot contract safety. Gives no precipitator but does endorse worsening conflict with hgusband and concerns for finances.  Uses MJ daily and drank etoh last  Night and is 0.08 upon arrival, no concern for witharawals.   A: increased depression, calm, cooperative, vol.  R: otis/20  Patient cleared and ready for behavioral bed placement: Yes

## 2021-08-10 NOTE — ED NOTES
Sign out note: Cristina Dean is a 41 year old female was signed out to me by Dr. Owens at 0700 am.  Please see initial dictation for full details and history.  Patient had an ingestion of 2 hydroxyzine and 2 trazodone last night while drinking alcohol.  She was feeling suicidal.  Plan at time of sign out is have her evaluated by the BEC through this morning..       Course in the ED: The patient was evaluated by  BEC  Jocelyn at approximately 10 AM.  Please see her documentation for details.  Briefly, the patient took  2 hydroxyzine and 2 trazodone last night while drinking.  She states that she is not feeling right and not feeling safe She does not feel that her medications are working and is unable to contract for safety. She is voluntarily seeking inpatient admission for mental health treatment.  She will be admitted here to [facility] for an inpatient mental health bed.    This note was created in part by the use of Dragon voice recognition dictation system. Inadvertent grammatical errors and typographical errors may still exist.  MD Abraham Cevallos Alda L, MD  08/10/21 1537

## 2021-08-10 NOTE — ED NOTES
Bed: ED10  Expected date:   Expected time:   Means of arrival:   Comments:  Marily [address]  41F  Took extra Xanax and Trazodone
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
## 2021-08-10 NOTE — ED PROVIDER NOTES
ED Provider Note  [facility]      History     Chief Complaint   Patient presents with     Suicidal     pt BIBA due to SI. pt endorses increasing depression, having nightmares. pt reported took 2 tabs of Trazodone and 2 pills of Hydorxyzine. per EMS,  saw patient took unknown amount of Trazodone and Hydroxyzione     HPI  Cristina Dean is a 41 year old female who has past medical history of generalized anxiety disorder, alcohol use disorder, bipolar disorder presenting with suicidal ideation.  Patient has been under a lot of stress lately.  Its anniversary with her .  She said that she wanted to kill herself.  She is unsure if she took meds to hurt herself.  She says she took 2 hydroxyzine and 2 trazodone which are her normal doses.  She denies taking more meds than this.  She is also been drinking tonight but denies other drug use.  Denies self-harm such as cutting.  She denies taking any Tylenol tonight.  No chest pain, shortness of breath or other physical complaints at this point.  She is sweating but she said she is always sweating because this runs in her family and she is quite overweight.  No visual changes, hallucinations or voices.  She took medications at approximately 2:30 AM    Patient did come with her pills, there are hydroxyzine and trazodone left.    Past Medical History  Past Medical History:   Diagnosis Date     Anxiety      Bipolar affective disorder, remission status unspecified (H) 11/13/2017     Chlamydia      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, unspecified      Depression      Herpes      Intermittent asthma     triggers include fall and spring allergy seasons     Obesity 8/1/12    BMI 53     Urinary incontinence 2/1/2017     Past Surgical History:   Procedure Laterality Date     EXCISE MASS BACK Right 12/28/2017    Procedure: EXCISE MASS BACK;  Excision Right Back Mass;  Surgeon: Vicente Pérez MD;  Location:  OR      TONSILLECTOMY  1984     acetaminophen (TYLENOL) 500 MG tablet  atorvastatin (LIPITOR) 10 MG tablet  hydrOXYzine (VISTARIL) 50 MG capsule  lisinopril (ZESTRIL) 10 MG tablet  lurasidone (LATUDA) 80 MG TABS tablet  omeprazole (PRILOSEC) 20 MG DR capsule  traZODone (DESYREL) 50 MG tablet  albuterol (PROAIR HFA/PROVENTIL HFA/VENTOLIN HFA) 108 (90 Base) MCG/ACT inhaler  methylphenidate (RITALIN) 10 MG tablet  topiramate (TOPAMAX) 50 MG tablet      Allergies   Allergen Reactions     Amlodipine Swelling     No Known Drug Allergies      Family History  Family History   Problem Relation Age of Onset     Cancer Father         acute leukemia (in remission)     Neurologic Disorder Father         neuropathy from chemotherapy     Diabetes Father         type II DM     Other Cancer Father      Depression Father      Anxiety Disorder Father      Family History Negative Sister      Ovarian Cancer Paternal Aunt      Ovarian Cancer Paternal Aunt      Ovarian Cancer Paternal Aunt      Social History   Social History     Tobacco Use     Smoking status: Current Every Day Smoker     Packs/day: 0.50     Years: 15.00     Pack years: 7.50     Types: Cigarettes     Start date: 6/1/2018     Smokeless tobacco: Never Used     Tobacco comment: 1/4 ppd    Substance Use Topics     Alcohol use: Yes     Comment: 1 drink every 2 months      Drug use: Yes     Types: Marijuana     Comment: daily      Past medical history, past surgical history, medications, allergies, family history, and social history were reviewed with the patient. No additional pertinent items.       Review of Systems  A complete review of systems was performed with pertinent positives and negatives noted in the HPI, and all other systems negative.    Physical Exam      Physical Exam  Physical Exam   Constitutional: oriented to person, place, and time. appears well-developed and well-nourished.   HENT:   Head: Normocephalic and atraumatic.   Neck: Normal range of motion. No nuchal  rigidity.  Pulmonary/Chest: Effort normal. No respiratory distress.   Cardiac: No murmurs, rubs, gallops. RRR.  Abdominal: Abdomen soft, nontender, nondistended. No rebound tenderness.  MSK: Long bones without deformity or evidence of trauma  Neurological: alert and oriented to person, place, and time. Moving all extremities.  No tremor.  No clonus.  No nystagmus.  Pupils 3 mm reactive light.  Skin: Skin is warm and dry. Diaphoresis over the brow.  No erythema of the skin.  Psychiatric: Tearful.  Endorsing suicidal ideation    ED Course      Procedures            EKG Interpretation:      Interpreted by Mitch Owens MD  Time reviewed: 0430  Symptoms at time of EKG: ingestion   Rhythm: normal sinus   Rate: normal  Axis: normal  Ectopy: none  Conduction: normal  ST Segments/ T Waves: No ST-T wave changes  Q Waves: inferior q waves, chronic  Comparison to prior: Unchanged    Clinical Impression: no QRS widening.  Appears largely similar to prior EKG without changes suggestive of ischemia or infarction.  No right axis deviation.             Results for orders placed or performed during the hospital encounter of 08/10/21   EKG 12-lead, tracing only     Status: None (Preliminary result)   Result Value Ref Range    Systolic Blood Pressure  mmHg    Diastolic Blood Pressure  mmHg    Ventricular Rate 91 BPM    Atrial Rate 91 BPM    NV Interval 152 ms    QRS Duration 86 ms     ms    QTc 455 ms    P Axis 45 degrees    R AXIS 19 degrees    T Axis 48 degrees    Interpretation ECG       Sinus rhythm  Cannot rule out Anterior infarct , age undetermined  Abnormal ECG       Medications   albuterol (PROAIR HFA/PROVENTIL HFA/VENTOLIN HFA) 108 (90 Base) MCG/ACT inhaler 2 puff (has no administration in time range)        Assessments & Plan (with Medical Decision Making)   MDM  Patient presenting with suicidal ideation and ingestion of meds.  It does not appear that she took her entire meds.  Regarding the hydroxyzine, she is  not tachycardic, she has mild swelling over the brow, no QRS widening right axis deviation on EKG.  It has been 2-1/2 hours so should likely see symptoms if she were anticholinergic.  She is not altered.  We will continue to observe on the monitor at this point.  She should be at time to peak which is about 2 hours according to up-to-date for the hydroxyzine.    Regarding the trazodone, she does not have somnolence at this point.  She is wide awake, alert and oriented.  Time to peak is up to 100 minutes which she is well past.  We will continue to monitor for somnolence.    Re eval: Patient continues to be alert without somnolence. Vitals stable. Labs reassuring, tylenol/salicylate levels normal.    Patient was placed on a cardiac monitor on arrival.    Re eval: Patient is alert, oriented with letter voice.  Vitals remained stable.  She appears quite well and does not appear to have toxidrome from either trazodone or hydroxyzine.  She is approximately 6 hours after ingestion, she will be monitored while in emergency department for short longer time until officially medically clear, 6 hours.  Patient signed out to oncoming physician pending behavioral assessment.  I would place this patient on hold prior to behavioral assessment.    I have reviewed the nursing notes. I have reviewed the findings, diagnosis, plan and need for follow up with the patient.    New Prescriptions    No medications on file       Final diagnoses:   Suicidal ideation       --  Mitch Owens  [facility] EMERGENCY DEPARTMENT  8/10/2021     Mitch Owens MD  08/10/21 9748

## 2021-08-10 NOTE — ED NOTES
Handoff report to ADRI Cameron.  Informed of course of ED stay and plan of care.  Nisha verbalized understanding.

## 2021-08-11 LAB — HBA1C MFR BLD: 6.3 % (ref 0–5.6)

## 2021-08-11 PROCEDURE — 99207 PR CONSULT E&M CHANGED TO SUBSEQUENT LEVEL: CPT | Performed by: PHYSICIAN ASSISTANT

## 2021-08-11 PROCEDURE — 99232 SBSQ HOSP IP/OBS MODERATE 35: CPT | Performed by: PHYSICIAN ASSISTANT

## 2021-08-11 PROCEDURE — 36415 COLL VENOUS BLD VENIPUNCTURE: CPT | Performed by: PSYCHIATRY & NEUROLOGY

## 2021-08-11 PROCEDURE — 250N000013 HC RX MED GY IP 250 OP 250 PS 637: Performed by: PSYCHIATRY & NEUROLOGY

## 2021-08-11 PROCEDURE — 99232 SBSQ HOSP IP/OBS MODERATE 35: CPT | Mod: GC | Performed by: PSYCHIATRY & NEUROLOGY

## 2021-08-11 PROCEDURE — 83036 HEMOGLOBIN GLYCOSYLATED A1C: CPT | Performed by: PSYCHIATRY & NEUROLOGY

## 2021-08-11 PROCEDURE — 124N000002 HC R&B MH UMMC

## 2021-08-11 PROCEDURE — G0177 OPPS/PHP; TRAIN & EDUC SERV: HCPCS

## 2021-08-11 RX ORDER — PRAZOSIN HYDROCHLORIDE 1 MG/1
1 CAPSULE ORAL AT BEDTIME
Status: DISCONTINUED | OUTPATIENT
Start: 2021-08-11 | End: 2021-08-12 | Stop reason: HOSPADM

## 2021-08-11 RX ADMIN — LURASIDONE HYDROCHLORIDE 80 MG: 40 TABLET, FILM COATED ORAL at 21:09

## 2021-08-11 RX ADMIN — OMEPRAZOLE 20 MG: 20 CAPSULE, DELAYED RELEASE ORAL at 08:29

## 2021-08-11 RX ADMIN — HYDROXYZINE HYDROCHLORIDE 50 MG: 25 TABLET, FILM COATED ORAL at 14:20

## 2021-08-11 RX ADMIN — ATORVASTATIN CALCIUM 10 MG: 10 TABLET, FILM COATED ORAL at 08:29

## 2021-08-11 RX ADMIN — TOPIRAMATE 50 MG: 50 TABLET ORAL at 08:29

## 2021-08-11 RX ADMIN — TOPIRAMATE 50 MG: 50 TABLET ORAL at 21:09

## 2021-08-11 RX ADMIN — PRAZOSIN HYDROCHLORIDE 1 MG: 1 CAPSULE ORAL at 21:09

## 2021-08-11 RX ADMIN — FLUOXETINE 20 MG: 20 CAPSULE ORAL at 21:09

## 2021-08-11 RX ADMIN — LISINOPRIL 10 MG: 10 TABLET ORAL at 08:29

## 2021-08-11 ASSESSMENT — ACTIVITIES OF DAILY LIVING (ADL)
HYGIENE/GROOMING: INDEPENDENT
LAUNDRY: WITH SUPERVISION
DRESS: INDEPENDENT;STREET CLOTHES
DRESS: INDEPENDENT
HYGIENE/GROOMING: INDEPENDENT
LAUNDRY: WITH SUPERVISION
ORAL_HYGIENE: INDEPENDENT
ORAL_HYGIENE: INDEPENDENT
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
## 2021-08-11 NOTE — PLAN OF CARE
"  Problem: Behavioral Health Plan of Care  Goal: Patient-Specific Goal (Individualization)  Flowsheets (Taken 8/11/2021 1138)  Patient Vulnerabilities:   poor physical health   recent loss   family/relationship conflict   traumatic event  Patient Personal Strengths:   self-awareness   socioeconomic stability   stable living environment   resilient   motivated for treatment   insight into illness/situation   family/social support   Personal Plan of Care    Reasons you are in the Hospital  \"My depression\"  \"Failing marriage\"         Goals for Discharge   \"I want to feel peace\"  Medications stable  Feel hopeful more often.      "

## 2021-08-11 NOTE — PLAN OF CARE
"Pt was tearful during 1:1, stating \"I feel like I'm such a disappointment.\" She denied SI and said that she did not really know if her overdose which preceded her admission was a suicide attempt. She admitted to increasing anxiety and depression, and said her goal for the hospitalization is that \"I just want to find peace again.\" She is open to medication changes. She was in a better mood later in the evening after her  visited, on their fifth wedding anniversary. She reported a good visit. She described feeling some trauma surrounding her finding their friend/neighbor dead at home. She reported this exacerbated her depression to the point that she could not go to work or function well. Pt was later visible, calm and cooperative in milieu, eating popcorn and watching a movie.     Problem: Depressive Symptoms  Goal: Depressive Symptoms  Description: Signs and symptoms of listed problems will be absent or manageable.  Outcome: No Change  Flowsheets (Taken 8/10/2021 2103)  Depressive Symptoms Assessed: all  Depressive Symptoms Present:   anxiety   thought process   psychomotor activity   affect     "

## 2021-08-11 NOTE — PLAN OF CARE
BEHAVIORAL TEAM DISCUSSION    Participants: Dr. Peña Attending, Dr. Meier Resident, Brokolynn BLAS RN, Katie MIRANDA, medical students.  Progress: New patient  Anticipated length of stay: Approximately 5 days  Continued Stay Criteria/Rationale: Medication change monitoring  Medical/Physical: Obesity, High blood pressure  Precautions:   Behavioral Orders   Procedures    Code 1 - Restrict to Unit    Routine Programming     As clinically indicated    Status 15     Every 15 minutes.     Plan: Schedule psychiatry, primary care, refer to day treatment or therapy. Medication changes per MD.  Rationale for change in precautions or plan: Ongoing psychosocial stressors and depressive symptoms.

## 2021-08-11 NOTE — PROGRESS NOTES
08/11/21 1434   General Information   Date Initially Attended OT 08/11/21     Date of Service: August 11, 2021    Description: Cristina attended 2 occupational therapy groups today. Overall content / congruent affect and full participation.   11:15 - 12:00. 3 total participants. Occupational Therapy Clinic. Purpose: Coping skill exploration, creative expression within personally meaningful activities, and clinical observation of social, cognitive, and kinesthetic performance skills.  Pt Response: Content and congruent. Chosen activity: simple coloring. IND to initiate, gather materials, sequence and adjust to workspace demands as needed. Demonstrated fair focus, planning, and problem solving for this minimally complex task. Able to ask for assistance and appeared comfortable interacting with peers and staff.  1:15 - 2:00. 2 total participants. Leisure exploration and participation group offered for increased intrinsic motivation to engage in social, non-obligatory occupations via a group game. Structured group was used to promote positive milieu interaction and collaboration. Pt Response: Demonstrated full participation. Engaged in conversation when prompted. Reported previous enjoyment in playing games with family and close friends.     OT staff met with pt to review the role of occupational therapy and explained the value of having them involved in their treatment plan including options to meet current needs/self-identified goals. Pt was given a self-assessment     Assessment: Demonstrates benefit from engagement in OT groups that support healthy recovery, specifically exploration of positive coping skills for symptom management, relapse prevention, and resumption of personal roles, routines and daily occupations.    Plan: OT initial assessment - to be completed with continued clinical observation. Continue graded occupation-based activities for increased success towards goal and ongoing assessment.     Lisa  Yee, OT on 8/11/2021 at 2:34 PM
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
## 2021-08-11 NOTE — PLAN OF CARE
Pt appeared to sleep 7 hours overnight. No prns or snacks given. No concerns reported or noted. Will continue to monitor.

## 2021-08-11 NOTE — PLAN OF CARE
Problem: Behavioral Health Plan of Care  Goal: Adheres to Safety Considerations for Self and Others  Outcome: Improving  Goal: Optimized Coping Skills in Response to Life Stressors  Outcome: Improving     Problem: Depressive Symptoms  Goal: Depressive Symptoms  Description: Signs and symptoms of listed problems will be absent or manageable.  Outcome: Improving     Problem: Suicidal Behavior  Goal: Suicidal Behavior is Absent or Managed  Outcome: Improving   Patient up and visible on the unit most part of the shift, flat affect on approach, keeps to self and does not interact with peers while in the milieu, watched TV, endorsed anxiety and depression, irritable because patient wanted visitor to bring in sandwich, wanted  private visit with visitor, calm down and visit went well, denies SI/hallucinations,tim for safety,  safety checks in place every, medication compliant, patient in room sleeping at this time, offers no other concerns, will continue to offer support as needed per plan of care.

## 2021-08-11 NOTE — PLAN OF CARE
Assessment/Intervention/Current Symtoms and Care Coordination:  Current Symptoms include the following: anxious and tearful  Chart Review  Patient met with team and discussed medication changes and possible recommendations.  RUBEN completed initial assessment, personal plan of care and started AVS.  RUBEN scheduled patient for psychiatry follow up with her psychiatrist, see AVS.  RUBEN scheduled patient for primary care follow up, see AVS.  RUBEN researched IOP programs with [facility], [facility], and [facility] and provided her with print outs to review.    Discharge Plan or Goal:  Pending stabilization & development of a safe discharge plan.  Considerations include: Return home with outpatient providers    Barriers to Discharge:  Patient requires further psychiatric stabilization due to current symptomology and Medication management with possible adjustments    Referral Status:  Psychiatry    Legal Status:  Patient is voluntary

## 2021-08-11 NOTE — CONSULTS
Internal Medicine Initial Visit    Cristina Dean MRN# [id number]   Age: 41 year old YOB: 1980   Date of Admission: 8/10/2021    ADMIT DATE: 8/10/2021  DATE OF CONSULT: 8/11/2021    PCP: Jermain Jaramillo    REQUESTING SERVICE: psychiatry  REASON FOR CONSULT: HTN    CHIEF COMPLAINT: high blood pressure    HPI: Cristina Dean is a 41 year old female with a past medical history of STEPHENIE, depression, alcohol use d/o, BPD, HTN, mild intermittent asthma who is admitted to [facility] for depression w/ SI.     Medicine was asked to consult for BP. Patient notes she doesn't check her BP at home. Anxious about this mornings blood pressure. Notes she is on lisinopril, dose recently increased in 03/2021. Denies chest pain, dyspnea, HA, vision changes, nausea with elevated BP. Was reassured it went down this afternoon. Denies any issues with edema, dysuria, frequency. Denies any liver/kidney/cardiac issues at baseline. No h/o DM2 per patient. Has a h/o asthma that is well controlled. No known h/o sleep apnea.     ROS:   10 point ROS asked and otherwise negative, with exceptions as noted above in HPI.     PMH:  Past Medical History:   Diagnosis Date     Anxiety      Bipolar affective disorder, remission status unspecified (H) 11/13/2017     Chlamydia      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, unspecified      Depression      Herpes      Intermittent asthma     triggers include fall and spring allergy seasons     Obesity 8/1/12    BMI 53     Urinary incontinence 2/1/2017       PSH:  Past Surgical History:   Procedure Laterality Date     EXCISE MASS BACK Right 12/28/2017    Procedure: EXCISE MASS BACK;  Excision Right Back Mass;  Surgeon: Vicente Pérez MD;  Location: [facility] OR     TONSILLECTOMY  1984       MEDICATIONS  No current facility-administered medications on file prior to encounter.  acetaminophen (TYLENOL) 500 MG tablet, Take 1,000 mg by mouth every 8 hours as needed for  pain (right shoulder)  albuterol (PROAIR HFA/PROVENTIL HFA/VENTOLIN HFA) 108 (90 Base) MCG/ACT inhaler, Inhale 2 puffs into the lungs every 6 hours  atorvastatin (LIPITOR) 10 MG tablet, TAKE 1 TABLET BY MOUTH EVERY DAY  FLUoxetine (PROZAC) 20 MG capsule, Take 1 capsule by mouth At Bedtime  hydrOXYzine (VISTARIL) 50 MG capsule, Take 1 capsule (50 mg) by mouth 3 times daily as needed for anxiety  lisinopril (ZESTRIL) 10 MG tablet, Take 1 tablet (10 mg) by mouth daily  lurasidone (LATUDA) 80 MG TABS tablet, Take 1 tablet (80 mg) by mouth daily with food  omeprazole (PRILOSEC) 20 MG DR capsule, TAKE 1 CAPSULE (20 MG) BY MOUTH EVERY MORNING (BEFORE BREAKFAST)  topiramate (TOPAMAX) 50 MG tablet, TAKE 1 TABLET BY MOUTH TWICE A DAY  traZODone (DESYREL) 50 MG tablet, Take 50 mg by mouth as needed   methylphenidate (RITALIN) 10 MG tablet, Take 10 mg by mouth daily (Patient not taking: Reported on 8/10/2021)        ALLERGIES:     Allergies   Allergen Reactions     Amlodipine Swelling     No Known Drug Allergies        FAMILY HISTORY:  Family History   Problem Relation Age of Onset     Cancer Father         acute leukemia (in remission)     Neurologic Disorder Father         neuropathy from chemotherapy     Diabetes Father         type II DM     Other Cancer Father      Depression Father      Anxiety Disorder Father      Family History Negative Sister      Ovarian Cancer Paternal Aunt      Ovarian Cancer Paternal Aunt      Ovarian Cancer Paternal Aunt        SOCIAL HISTORY:  Social History     Socioeconomic History     Marital status:      Spouse name: None     Number of children: None     Years of education: None     Highest education level: None   Occupational History     None   Tobacco Use     Smoking status: Current Every Day Smoker     Packs/day: 0.50     Years: 15.00     Pack years: 7.50     Types: Cigarettes     Start date: 6/1/2018     Smokeless tobacco: Never Used     Tobacco comment: 1/4 ppd    Substance and  Sexual Activity     Alcohol use: Yes     Comment: 1 drink every 2 months      Drug use: Yes     Types: Marijuana     Comment: daily     Sexual activity: Yes     Partners: Male     Birth control/protection: Condom   Other Topics Concern     Parent/sibling w/ CABG, MI or angioplasty before 65F 55M? Not Asked   Social History Narrative     None     Social Determinants of Health     Financial Resource Strain:      Difficulty of Paying Living Expenses:    Food Insecurity:      Worried About Running Out of Food in the Last Year:      Ran Out of Food in the Last Year:    Transportation Needs:      Lack of Transportation (Medical):      Lack of Transportation (Non-Medical):    Physical Activity:      Days of Exercise per Week:      Minutes of Exercise per Session:    Stress:      Feeling of Stress :    Social Connections:      Frequency of Communication with Friends and Family:      Frequency of Social Gatherings with Friends and Family:      Attends Islam Services:      Active Member of Clubs or Organizations:      Attends Club or Organization Meetings:      Marital Status:    Intimate Partner Violence:      Fear of Current or Ex-Partner:      Emotionally Abused:      Physically Abused:      Sexually Abused:        PHYSICAL EXAM:  Blood pressure (!) 143/90, pulse (!) 122, temperature 99  F (37.2  C), temperature source Oral, resp. rate 16, SpO2 97 %, not currently breastfeeding.    GENERAL: Alert and orientated x 3. Appears in no acute distress.   HEENT: Anicteric sclera. Mucous membranes moist and without lesions.   CV: RRR, S1S2, no murmurs appreciated.  RESPIRATORY: Respirations regular, even, and unlabored. Lungs CTAB with no wheezing, rales, rhonchi.   GI: Soft and non distended with normoactive bowel sounds present in all quadrants. No tenderness, rebound, guarding.   EXTREMITIES: No peripheral edema.    NEUROLOGIC: No focal deficits.   MUSCULOSKELETAL: No joint swelling or tenderness.   SKIN: No jaundice. No  rashes or lesions.     LABS:  CMP  Recent Labs   Lab 08/10/21  0445      POTASSIUM 3.8   CHLORIDE 108   CO2 27   ANIONGAP 6   *   BUN 13   CR 0.82   GFRESTIMATED 89   REID 8.3*   PROTTOTAL 7.1   ALBUMIN 3.3*   BILITOTAL 0.2   ALKPHOS 67   AST 29   ALT 54*     CBC  Recent Labs   Lab 08/10/21  0445   WBC 9.1   RBC 4.69   HGB 14.3   HCT 44.7   MCV 95   MCH 30.5   MCHC 32.0   RDW 13.5        INRNo lab results found in last 7 days.    IMAGING: None to review from this admission    ASSESSMENT & RECOMMENDATIONS:  #MDD, anxiety  -Management per psychiatry  #HTN: Follows w/ PCP Dr. Jaramillo, last visit in 03/2021 lisinopril increased to 10 mg due to poorly controlled BP (152/94 at that visit). /111 this AM, improved to 143/90 after medications. Per psych, plan to start prazosin tonight. Normal renal function and lytes.   -Continue home BP meds  -Would not add additional meds on top of Prazosin as this can lower BP and afternoon BP improved w/ PTA meds  -Will monitor BP in next few days, if remains elevated despite prazosin and treating anxiety, will consider increasing lisinopril  -Agree with OP sleep study referral   #Asthma: Sating well on RA and no wheezing on exam. Continue PRN albuterol.   #HLD: Continue statin.   #Pre diabetes: Recommend exercise, low carb diet, continued f/u with PCP.   #Elevated ALT: In isolation, rest of hepatic panel WNL. Has CT of abd from 07/2021 w/ fatty infiltration of liver. Can f/u with PCP as OP for further/continued monitoring.     I appreciate the opportunity to participate in the care of this patient. Medicine will continue to follow BP peripherally. Please notify on call [facility] if any intercurrent medical issues arise.     Cyn Gallegos PA-C

## 2021-08-11 NOTE — PLAN OF CARE
Pt denies SI. Pt has been attending groups, eating meals, sitting in lounge watching TV.No observed pt interacting with others outside of groups.  Pt stated she talked to her boss this am to let him know she won't be to work.  Pt flat, sad.    Problem: Adult Inpatient Plan of Care  Goal: Plan of Care Review  Outcome: No Change  Goal: Patient-Specific Goal (Individualized)  Outcome: No Change  Goal: Absence of Hospital-Acquired Illness or Injury  Outcome: No Change  Goal: Optimal Comfort and Wellbeing  Outcome: No Change  Goal: Readiness for Transition of Care  Outcome: No Change     Problem: Behavioral Health Plan of Care  Goal: Plan of Care Review  Outcome: No Change  Goal: Patient-Specific Goal (Individualization)  Outcome: No Change  Goal: Adheres to Safety Considerations for Self and Others  Outcome: No Change  Goal: Absence of New-Onset Illness or Injury  Outcome: No Change  Goal: Optimized Coping Skills in Response to Life Stressors  Outcome: No Change  Goal: Develops/Participates in Therapeutic [place] to Support Successful Transition  Outcome: No Change     Problem: Depressive Symptoms  Goal: Depressive Symptoms  Description: Signs and symptoms of listed problems will be absent or manageable.  Outcome: No Change  Goal: Social and Therapeutic (Depression)  Description: Signs and symptoms of listed problems will be absent or manageable.  Outcome: No Change     Problem: Suicidal Behavior  Goal: Suicidal Behavior is Absent or Managed  Outcome: No Change

## 2021-08-12 ENCOUNTER — TELEPHONE (OUTPATIENT)
Dept: BEHAVIORAL HEALTH | Facility: CLINIC | Age: 41
End: 2021-08-12

## 2021-08-12 VITALS
RESPIRATION RATE: 20 BRPM | BODY MASS INDEX: 65.44 KG/M2 | TEMPERATURE: 97.3 F | SYSTOLIC BLOOD PRESSURE: 165 MMHG | OXYGEN SATURATION: 96 % | DIASTOLIC BLOOD PRESSURE: 97 MMHG | WEIGHT: 293 LBS | HEART RATE: 68 BPM

## 2021-08-12 PROCEDURE — 99239 HOSP IP/OBS DSCHRG MGMT >30: CPT | Mod: GC | Performed by: PSYCHIATRY & NEUROLOGY

## 2021-08-12 PROCEDURE — G0177 OPPS/PHP; TRAIN & EDUC SERV: HCPCS

## 2021-08-12 PROCEDURE — 250N000013 HC RX MED GY IP 250 OP 250 PS 637: Performed by: PSYCHIATRY & NEUROLOGY

## 2021-08-12 RX ORDER — FLUOXETINE 40 MG/1
40 CAPSULE ORAL DAILY
Qty: 30 CAPSULE | Refills: 0 | OUTPATIENT
Start: 2021-08-17 | End: 2022-06-23

## 2021-08-12 RX ORDER — PRAZOSIN HYDROCHLORIDE 1 MG/1
1 CAPSULE ORAL AT BEDTIME
Qty: 30 CAPSULE | Refills: 0 | OUTPATIENT
Start: 2021-08-12 | End: 2022-06-23

## 2021-08-12 RX ORDER — FLUOXETINE 10 MG/1
30 CAPSULE ORAL AT BEDTIME
Qty: 15 CAPSULE | Refills: 0 | Status: SHIPPED | OUTPATIENT
Start: 2021-08-12 | End: 2022-06-22

## 2021-08-12 RX ORDER — LURASIDONE HYDROCHLORIDE 80 MG/1
80 TABLET, FILM COATED ORAL
Qty: 30 TABLET | Refills: 1 | OUTPATIENT
Start: 2021-08-12 | End: 2022-06-23

## 2021-08-12 RX ORDER — HYDROXYZINE PAMOATE 50 MG/1
50 CAPSULE ORAL 3 TIMES DAILY PRN
Qty: 60 CAPSULE | Refills: 1 | Status: SHIPPED | OUTPATIENT
Start: 2021-08-12 | End: 2023-12-05

## 2021-08-12 RX ADMIN — ATORVASTATIN CALCIUM 10 MG: 10 TABLET, FILM COATED ORAL at 08:03

## 2021-08-12 RX ADMIN — OMEPRAZOLE 20 MG: 20 CAPSULE, DELAYED RELEASE ORAL at 08:03

## 2021-08-12 RX ADMIN — LISINOPRIL 10 MG: 10 TABLET ORAL at 08:03

## 2021-08-12 RX ADMIN — TOPIRAMATE 50 MG: 50 TABLET ORAL at 08:03

## 2021-08-12 RX ADMIN — HYDROXYZINE HYDROCHLORIDE 50 MG: 25 TABLET, FILM COATED ORAL at 14:04

## 2021-08-12 ASSESSMENT — ACTIVITIES OF DAILY LIVING (ADL)
LAUNDRY: WITH SUPERVISION
ORAL_HYGIENE: INDEPENDENT
LAUNDRY: WITH SUPERVISION
HYGIENE/GROOMING: INDEPENDENT
DRESS: STREET CLOTHES;INDEPENDENT
ORAL_HYGIENE: INDEPENDENT
HYGIENE/GROOMING: INDEPENDENT
DRESS: STREET CLOTHES;INDEPENDENT

## 2021-08-12 NOTE — DISCHARGE INSTRUCTIONS
Behavioral Discharge Planning and Instructions    Summary: You were admitted on 8/10/2021 due to Depression and Suicidal Ideations.  You were treated by Dr. Peña and discharged on 8/12/21 from [facility] to Home    Main Diagnosis:   - Depression, recurrent, without psychotic features      Secondary psychiatric diagnoses:   - PTSD  - Generalized Anxiety Disorder     Health Care Follow-up:   Adult Day Treatment Program (IOP)  Appointment date/time:  Patient to follow up  Provider:  [facility]   Phone:  [phone]  Fax: [phone]  You were referred for Adult Day Treatment at the provider listed above at your request.  The referral was sent on the day of your discharge and the program had not responded back to schedule an assessment and intake at the time of your discharge.  They will be following up with you directly.  If you wish, you can also contact the program at the number listed above.      Psychiatrist: Ale Morfin PA-C   Appointment Date/Time: Monday, August 23rd at 11:30 AM     Address: [facility] [address], [address] [place] [address]  Phone Number: [phone]      Primary Care Provider: Jermain Miles MD     Appointment Date/Time: Tuesday, August 24th at 11:40 AM  Address: [address] [address]  Phone Number: [phone]    Attend all scheduled appointments with your outpatient providers. Call at least 24 hours in advance if you need to reschedule an appointment to ensure continued access to your outpatient providers.     Major Treatments, Procedures and Findings:  You were provided with: a psychiatric assessment, assessed for medical stability, medication evaluation and/or management, group therapy and milieu management    Symptoms to Report: losing more sleep, mood getting worse or thoughts of suicide    Early warning signs can include: increased depression or  "anxiety sleep disturbances increased thoughts or behaviors of suicide or self-harm  increased unusual thinking, such as paranoia or hearing voices    Safety and Wellness:  Take all medicines as directed.  Make no changes unless your doctor suggests them.      Follow treatment recommendations.  Refrain from alcohol and non-prescribed drugs.  Ask your support system to help you reduce your access to items that could harm yourself or others. If there is a concern for safety, call 911.    Resources:   Crisis Intervention: 908.431.2914 or 780-567-3929 (TTY: 533.854.3002).  Call anytime for help.  M[employer] of [place] (www.mentalhealth.org): 801.784.7602 or 365-095-4811  [facility] Crisis (COPE) Response - Adult 429 803-6163  Crisis text line: Text \"[place]\" to 391236. Free, confidential, 24/7.    General Medication Instructions:   See your medication sheet(s) for instructions.   Take all medicines as directed.  Make no changes unless your doctor suggests them.   Go to all your doctor visits.  Be sure to have all your required lab tests. This way, your medicines can be refilled on time.  Do not use any drugs not prescribed by your doctor.  Avoid alcohol.    Advance Directives:   Scanned document on file with Organizer? No scanned doc  Is document scanned? Pt states no documents  Honoring Choices Your Rights Handout: Informed and given  Was more information offered? Materials given    The Treatment team has appreciated the opportunity to work with you. If you have any questions or concerns about your recent admission, you can contact the unit which can receive your call 24 hours a day, 7 days a week. They will be able to get in touch with a Provider if needed. The unit number is [phone] .      "

## 2021-08-12 NOTE — DISCHARGE SUMMARY
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    Hemoglobin A1C 6.3 (H) 0.0 - 5.6 %   EKG 12-lead, tracing only     Status: None   Result Value Ref Range    Systolic Blood Pressure  mmHg    Diastolic Blood Pressure  mmHg    Ventricular Rate 91 BPM    Atrial Rate 91 BPM    NE Interval 152 ms    QRS Duration 86 ms     ms    QTc 455 ms    P Axis 45 degrees    R AXIS 19 degrees    T Axis 48 degrees    Interpretation ECG       Sinus rhythm  Cannot rule out Anterior infarct , age undetermined  Abnormal ECG    Unconfirmed report - interpretation of this ECG is computer generated - see medical record for final interpretation  Confirmed by - EMERGENCY ROOM, PHYSICIAN ([id number]),  ALEX ROWLEY ([id number]) on 8/10/2021 7:28:20 AM     Internal Medicine Adult IP Consult for [facility] in [facility]: Patient to be seen: Routine within 24 hrs; Call back #: [phone]; high blood pressure; potentially starting on prazosin (any interaction with lisinopril?); Consultant may anastasiya...     Status: None ()    Cyn Cha PA-C     8/11/2021  3:51 PM    Internal Medicine Initial Visit    Cristina CHIDI Dean MRN# [id number]    Age: 41 year old YOB: 1980   Date of Admission: 8/10/2021    ADMIT DATE: 8/10/2021  DATE OF CONSULT: 8/11/2021    PCP: Jermain Jaramillo    REQUESTING SERVICE: psychiatry  REASON FOR CONSULT: HTN    CHIEF COMPLAINT: high blood pressure    HPI: Cristina Dean is a 41 year old female with a past   medical history of STEPHENIE, depression, alcohol use d/o, BPD, HTN,   mild intermittent asthma who is admitted to station [facility] for   depression w/ SI.     Medicine was asked to consult for BP. Patient notes she doesn't   check her BP at home. Anxious about this mornings blood pressure.   Notes she is on lisinopril, dose recently increased in 03/2021.   Denies chest pain, dyspnea, HA, vision changes, nausea with   elevated BP. Was reassured it went down this afternoon. Denies   any issues with edema, dysuria, frequency. Denies any   liver/kidney/cardiac issues at baseline. No h/o DM2 per patient.   Has a h/o asthma that is well controlled. No known h/o sleep   apnea.     ROS:   10 point ROS asked and otherwise negative, with exceptions as   noted above in HPI.     PMH:  Past Medical History:   Diagnosis Date     Anxiety      Bipolar affective disorder, remission status unspecified (H)   11/13/2017     Chlamydia      Combinations of opioid type drug with any other drug   dependence, unspecified      Depression      Herpes      Intermittent asthma     triggers include fall and spring allergy seasons     Obesity 8/1/12    BMI 53     Urinary incontinence 2/1/2017       PSH:  Past Surgical History:   Procedure Laterality Date     EXCISE MASS BACK Right 12/28/2017    Procedure: EXCISE MASS BACK;  Excision Right Back Mass;    Surgeon: Vicente Pérez MD;  Location: [facility] OR     TONSILLECTOMY  1984       MEDICATIONS  No current facility-administered medications on file prior to   encounter.  acetaminophen (TYLENOL) 500 MG tablet, Take 1,000 mg by mouth   every 8 hours as needed for pain (right shoulder)  albuterol (PROAIR  HFA/PROVENTIL HFA/VENTOLIN HFA) 108 (90 Base)   MCG/ACT inhaler, Inhale 2 puffs into the lungs every 6 hours  atorvastatin (LIPITOR) 10 MG tablet, TAKE 1 TABLET BY MOUTH EVERY   DAY  FLUoxetine (PROZAC) 20 MG capsule, Take 1 capsule by mouth At   Bedtime  hydrOXYzine (VISTARIL) 50 MG capsule, Take 1 capsule (50 mg) by   mouth 3 times daily as needed for anxiety  lisinopril (ZESTRIL) 10 MG tablet, Take 1 tablet (10 mg) by mouth   daily  lurasidone (LATUDA) 80 MG TABS tablet, Take 1 tablet (80 mg) by   mouth daily with food  omeprazole (PRILOSEC) 20 MG DR capsule, TAKE 1 CAPSULE (20 MG) BY   MOUTH EVERY MORNING (BEFORE BREAKFAST)  topiramate (TOPAMAX) 50 MG tablet, TAKE 1 TABLET BY MOUTH TWICE A   DAY  traZODone (DESYREL) 50 MG tablet, Take 50 mg by mouth as needed   methylphenidate (RITALIN) 10 MG tablet, Take 10 mg by mouth daily   (Patient not taking: Reported on 8/10/2021)        ALLERGIES:     Allergies   Allergen Reactions     Amlodipine Swelling     No Known Drug Allergies        FAMILY HISTORY:  Family History   Problem Relation Age of Onset     Cancer Father         acute leukemia (in remission)     Neurologic Disorder Father         neuropathy from chemotherapy     Diabetes Father         type II DM     Other Cancer Father      Depression Father      Anxiety Disorder Father      Family History Negative Sister      Ovarian Cancer Paternal Aunt      Ovarian Cancer Paternal Aunt      Ovarian Cancer Paternal Aunt        SOCIAL HISTORY:  Social History     Socioeconomic History     Marital status:      Spouse name: None     Number of children: None     Years of education: None     Highest education level: None   Occupational History     None   Tobacco Use     Smoking status: Current Every Day Smoker     Packs/day: 0.50     Years: 15.00     Pack years: 7.50     Types: Cigarettes     Start date: 6/1/2018     Smokeless tobacco: Never Used     Tobacco comment: 1/4 ppd    Substance and Sexual Activity      Alcohol use: Yes     Comment: 1 drink every 2 months      Drug use: Yes     Types: Marijuana     Comment: daily     Sexual activity: Yes     Partners: Male     Birth control/protection: Condom   Other Topics Concern     Parent/sibling w/ CABG, MI or angioplasty before 65F 55M? Not   Asked   Social History Narrative     None     Social Determinants of Health     Financial Resource Strain:      Difficulty of Paying Living Expenses:    Food Insecurity:      Worried About Running Out of Food in the Last Year:      Ran Out of Food in the Last Year:    Transportation Needs:      Lack of Transportation (Medical):      Lack of Transportation (Non-Medical):    Physical Activity:      Days of Exercise per Week:      Minutes of Exercise per Session:    Stress:      Feeling of Stress :    Social Connections:      Frequency of Communication with Friends and Family:      Frequency of Social Gatherings with Friends and Family:      Attends Anabaptism Services:      Active Member of Clubs or Organizations:      Attends Club or Organization Meetings:      Marital Status:    Intimate Partner Violence:      Fear of Current or Ex-Partner:      Emotionally Abused:      Physically Abused:      Sexually Abused:        PHYSICAL EXAM:  Blood pressure (!) 143/90, pulse (!) 122, temperature 99  F (37.2    C), temperature source Oral, resp. rate 16, SpO2 97 %, not   currently breastfeeding.    GENERAL: Alert and orientated x 3. Appears in no acute distress.   HEENT: Anicteric sclera. Mucous membranes moist and without   lesions.   CV: RRR, S1S2, no murmurs appreciated.  RESPIRATORY: Respirations regular, even, and unlabored. Lungs   CTAB with no wheezing, rales, rhonchi.   GI: Soft and non distended with normoactive bowel sounds present   in all quadrants. No tenderness, rebound, guarding.   EXTREMITIES: No peripheral edema.    NEUROLOGIC: No focal deficits.   MUSCULOSKELETAL: No joint swelling or tenderness.   SKIN: No jaundice. No rashes or  lesions.     LABS:  CMP  Recent Labs   Lab 08/10/21  0445      POTASSIUM 3.8   CHLORIDE 108   CO2 27   ANIONGAP 6   *   BUN 13   CR 0.82   GFRESTIMATED 89   REID 8.3*   PROTTOTAL 7.1   ALBUMIN 3.3*   BILITOTAL 0.2   ALKPHOS 67   AST 29   ALT 54*     CBC  Recent Labs   Lab 08/10/21  0445   WBC 9.1   RBC 4.69   HGB 14.3   HCT 44.7   MCV 95   MCH 30.5   MCHC 32.0   RDW 13.5        INRNo lab results found in last 7 days.    IMAGING: None to review from this admission    ASSESSMENT & RECOMMENDATIONS:  #MDD, anxiety  -Management per psychiatry  #HTN: Follows w/ PCP Dr. Jaramillo, last visit in 03/2021   lisinopril increased to 10 mg due to poorly controlled BP (152/94   at that visit). /111 this AM, improved to 143/90 after   medications. Per psych, plan to start prazosin tonight. Normal   renal function and lytes.   -Continue home BP meds  -Would not add additional meds on top of Prazosin as this can   lower BP and afternoon BP improved w/ PTA meds  -Will monitor BP in next few days, if remains elevated despite   prazosin and treating anxiety, will consider increasing   lisinopril  -Agree with OP sleep study referral   #Asthma: Sating well on RA and no wheezing on exam. Continue PRN   albuterol.   #HLD: Continue statin.   #Pre diabetes: Recommend exercise, low carb diet, continued f/u   with PCP.   #Elevated ALT: In isolation, rest of hepatic panel WNL. Has CT of   abd from 07/2021 w/ fatty infiltration of liver. Can f/u with PCP   as OP for further/continued monitoring.     I appreciate the opportunity to participate in the care of this   patient. Medicine will continue to follow BP peripherally. Please   notify on call MIKAL if any intercurrent medical issues arise.     Cyn Gallegos PA-C     Asymptomatic COVID-19 Virus (Coronavirus) by PCR Nasopharyngeal     Status: Normal    Specimen: Nasopharyngeal; Swab    Narrative    The following orders were created for panel order Asymptomatic  COVID-19 Virus (Coronavirus) by PCR Nasopharyngeal.  Procedure                               Abnormality         Status                     ---------                               -----------         ------                     SARS-COV2 (COVID-19) Vir...[[id number]]  Normal              Final result                 Please view results for these tests on the individual orders.

## 2021-08-12 NOTE — PLAN OF CARE
"Pt denies SI.  Pt states she's \"okay.\"  Pt attending groups, eating meals,  staying out of her room.  Pt has not been sobbing the way she was when admitted.  Pt states might be discharged today.    Problem: Adult Inpatient Plan of Care  Goal: Plan of Care Review  Outcome: No Change  Goal: Patient-Specific Goal (Individualized)  Outcome: No Change  Goal: Absence of Hospital-Acquired Illness or Injury  Outcome: No Change  Goal: Optimal Comfort and Wellbeing  Outcome: No Change  Goal: Readiness for Transition of Care  Outcome: No Change     Problem: Behavioral Health Plan of Care  Goal: Plan of Care Review  Outcome: No Change  Goal: Patient-Specific Goal (Individualization)  Outcome: No Change  Goal: Adheres to Safety Considerations for Self and Others  Outcome: No Change  Goal: Absence of New-Onset Illness or Injury  Outcome: No Change  Goal: Optimized Coping Skills in Response to Life Stressors  Outcome: No Change  Goal: Develops/Participates in Therapeutic [place] to Support Successful Transition  Outcome: No Change     Problem: Depressive Symptoms  Goal: Depressive Symptoms  Description: Signs and symptoms of listed problems will be absent or manageable.  Outcome: No Change  Goal: Social and Therapeutic (Depression)  Description: Signs and symptoms of listed problems will be absent or manageable.  Outcome: No Change     Problem: Suicidal Behavior  Goal: Suicidal Behavior is Absent or Managed  Outcome: No Change     "

## 2021-08-12 NOTE — PROGRESS NOTES
Behavioral Health  Note   Behavioral Health  Spirituality Group Note     Unit 20    Name: Cristina Dean    YOB: 1980   MRN: [id number]    Age: 41 year old     Patient attended -led group, which included discussion of spirituality, coping with illness and building resilience.   Patient attended group for 1.0 hrs.   patient demonstrated an appreciation of topic's application for their personal circumstances.     [facility]  Staff    Page [phone]

## 2021-08-12 NOTE — TELEPHONE ENCOUNTER
Stn [facility] staff called to set up  eval for programming.  Scheduled 9/19 by video. Referral created, bens requested

## 2021-08-12 NOTE — PLAN OF CARE
Date of Service: August 12, 2021    Description: Cristina attended 2 occupational therapy groups today.     11:15 - 12:00. 3 total participants. Occupational Therapy Clinic. Purpose: Coping skill exploration, creative expression within personally meaningful activities, and clinical observation of social, cognitive, and kinesthetic performance skills. Pt Response: Chosen activity: coloring. Demonstrated consistent performance and social skills as observed on previous dates. Pt was pleasant and engaged throughout.    1:15 - 2:00. 3 total participants. Pt IND engaged in OT group game focused on interpersonal skills, specifically connecting with and getting to know others on the unit. Educated on the importance of peer support and participation on the unit. Pt Response: actively engaged in activity, maintaining a calm, patient demeanor throughout.    Assessment: Demonstrates benefit from engagement in OT groups that support healthy recovery, specifically exploration of positive coping skills for symptom management, relapse prevention, and resumption of personal roles, routines and daily occupations.    Plan: Pt to discharge tomorrow.     Rosetta Herring on 8/12/2021 at 2:35 PM

## 2021-08-12 NOTE — PLAN OF CARE
Assessment/Intervention/Current Symtoms and Care Coordination  Attended team meeting  Reviewed chart notes.  Met with patient in-person : Sign JORDYN for [facility];  will pick her up at 7pm this evening  Made referral and appointment for assessment at [facility]  Completed AVS     Discharge Plan or Goal  Likely discharge Friday with follow up scheduled with psychiatry and primary care as well as intake for Adult Day Treatment     Barriers to Discharge   Sx stabilization and Medication management     Referral Status  Day Treatment at [facility]     Legal Status  Voluntary

## 2021-08-12 NOTE — PLAN OF CARE
Pt appears to have slept for 7 hours. No prn given this shift. No concerns noted. Will cont to monitor sand offer support.

## 2021-08-13 ENCOUNTER — PATIENT OUTREACH (OUTPATIENT)
Dept: INTERNAL MEDICINE | Facility: CLINIC | Age: 41
End: 2021-08-13

## 2021-08-13 DIAGNOSIS — G47.9 SLEEP DISORDER: Primary | ICD-10-CM

## 2021-08-13 NOTE — DISCHARGE SUMMARY
Patient Discharged @ 1900, compliant with discharge process, belongings and medications with patient at time of discharge,  took patient home, no other known concerns

## 2021-08-13 NOTE — TELEPHONE ENCOUNTER
What type of discharge? Mental Health  Risk of Hospital admission or ED visit: 10%  Is a TCM episode required? No  When should the patient follow up with PCP? 14 days of discharge.    Irma Parsons RN  [facility]

## 2021-08-16 NOTE — TELEPHONE ENCOUNTER
IF pt calls back, please transfer to triage.       ED / Discharge Outreach Protocol    Patient Contact    Attempt # 1    Was call answered?  No.  Left message on voicemail with information to call me back.      Letty Moise, RN  [facility] RN Triage Team

## 2021-08-18 NOTE — TELEPHONE ENCOUNTER
"Called patient for hospital follow up they suggested a sleep study due to sleep apnea, referral pended for you       Patient wanting a sleep study referral, was referred before then pandemic but it was cancelled due to covid and is needing to be referred again. Concerned because they told her she was borderline diabetic in the hospital and wants to discuss that with Dr. Dumont at her visit along with her high blood pressure,       Hospital/TCU/ED for chronic condition Discharge Protocol    \"Hi, my name is Braxton Simmons RN, a registered nurse, and I am calling from [facility].  I am calling to follow up and see how things are going for you after your recent emergency visit/hospital/TCU stay.\"    Tell me how you are doing now that you are home?\"   Doing better is scheduled is wanting a sleep study referral, was referred before then pandemic but it was cancelled due to covid and is needing to be referred again. Concerned because they told her she was borderline diabetic in the hospital and wants to discuss that with Dr. Dumont at her visit along with her high blood pressure,       Discharge Instructions    \"Let's review your discharge instructions.  What is/are the follow-up recommendations?  Pt. Response: I scheduled with my Dr I dont have any questions about it.     \"Has an appointment with your primary care provider been scheduled?\"  Yes    \"When you see the provider, I would recommend that you bring your medications with you.\"    Medications        \"Tell me what changed about your medicines when you discharged?\"    Changes to chronic meds?    0-1    \"What questions do you have about your medications?\"    None     New diagnoses of heart failure, COPD, diabetes, or MI?    No              Post Discharge Medication Reconciliation Status: discharge medications reconciled, continue medications without change.    Was MTM referral placed (*Make sure to put transitions as reason for referral)?   No    Call " "Summary    \"What questions or concerns do you have about your recent visit and your follow-up care?\"     none    \"If you have questions or things don't continue to improve, we encourage you contact us through the main clinic number (give number).  Even if the clinic is not open, triage nurses are available 24/7 to help you.     We would like you to know that our clinic has extended hours (provide information).  We also have urgent care (provide details on closest location and hours/contact info)\"      \"Thank you for your time and take care!\"             "

## 2021-08-24 ENCOUNTER — OFFICE VISIT (OUTPATIENT)
Dept: INTERNAL MEDICINE | Facility: CLINIC | Age: 41
End: 2021-08-24
Payer: COMMERCIAL

## 2021-08-24 VITALS
RESPIRATION RATE: 20 BRPM | OXYGEN SATURATION: 97 % | SYSTOLIC BLOOD PRESSURE: 156 MMHG | TEMPERATURE: 98.1 F | BODY MASS INDEX: 66.22 KG/M2 | WEIGHT: 293 LBS | HEART RATE: 90 BPM | DIASTOLIC BLOOD PRESSURE: 88 MMHG

## 2021-08-24 DIAGNOSIS — R06.83 SNORING: ICD-10-CM

## 2021-08-24 DIAGNOSIS — F33.2 SEVERE EPISODE OF RECURRENT MAJOR DEPRESSIVE DISORDER, WITHOUT PSYCHOTIC FEATURES (H): ICD-10-CM

## 2021-08-24 DIAGNOSIS — I10 BENIGN ESSENTIAL HYPERTENSION: ICD-10-CM

## 2021-08-24 DIAGNOSIS — Z09 HOSPITAL DISCHARGE FOLLOW-UP: Primary | ICD-10-CM

## 2021-08-24 DIAGNOSIS — E66.01 MORBID OBESITY DUE TO EXCESS CALORIES (H): ICD-10-CM

## 2021-08-24 DIAGNOSIS — R73.9 HYPERGLYCEMIA: ICD-10-CM

## 2021-08-24 LAB
ANION GAP SERPL CALCULATED.3IONS-SCNC: 2 MMOL/L (ref 3–14)
BUN SERPL-MCNC: 16 MG/DL (ref 7–30)
CALCIUM SERPL-MCNC: 7.3 MG/DL (ref 8.5–10.1)
CHLORIDE BLD-SCNC: 109 MMOL/L (ref 94–109)
CO2 SERPL-SCNC: 28 MMOL/L (ref 20–32)
CREAT SERPL-MCNC: 0.95 MG/DL (ref 0.52–1.04)
GFR SERPL CREATININE-BSD FRML MDRD: 74 ML/MIN/1.73M2
GLUCOSE BLD-MCNC: 142 MG/DL (ref 70–99)
HBA1C MFR BLD: 6.5 % (ref 0–5.6)
POTASSIUM BLD-SCNC: 4 MMOL/L (ref 3.4–5.3)
SODIUM SERPL-SCNC: 139 MMOL/L (ref 133–144)

## 2021-08-24 PROCEDURE — 80048 BASIC METABOLIC PNL TOTAL CA: CPT | Performed by: INTERNAL MEDICINE

## 2021-08-24 PROCEDURE — 36415 COLL VENOUS BLD VENIPUNCTURE: CPT | Performed by: INTERNAL MEDICINE

## 2021-08-24 PROCEDURE — 83036 HEMOGLOBIN GLYCOSYLATED A1C: CPT | Performed by: INTERNAL MEDICINE

## 2021-08-24 PROCEDURE — 99214 OFFICE O/P EST MOD 30 MIN: CPT | Performed by: INTERNAL MEDICINE

## 2021-08-24 RX ORDER — LISINOPRIL 20 MG/1
20 TABLET ORAL DAILY
Qty: 90 TABLET | Refills: 1 | Status: SHIPPED | OUTPATIENT
Start: 2021-08-24 | End: 2023-12-05

## 2021-08-24 NOTE — PROGRESS NOTES
"    Assessment & Plan     Hospital discharge follow-up  Able post follow-up mental health.    Benign essential hypertension  Just increasing lisinopril to 20 mg daily and continue with prazosin.  - lisinopril (ZESTRIL) 20 MG tablet; Take 1 tablet (20 mg) by mouth daily  - Basic metabolic panel  (Ca, Cl, CO2, Creat, Gluc, K, Na, BUN)    Severe episode of recurrent major depressive disorder, without psychotic features (H)  Follow-up with mental health as directed.    Morbid obesity due to excess calories (H)  Encouraged ongoing weight loss and weight reduction    Hyperglycemia  Recheck fasting metabolic panel with A1c  - Hemoglobin A1c; Future  - Basic metabolic panel  (Ca, Cl, CO2, Creat, Gluc, K, Na, BUN); Future  - Hemoglobin A1c    Snoring  Suggest repeat referral for potential assessment for sleep apnea as patient appears to be at high risk  - SLEEP EVALUATION & MANAGEMENT REFERRAL - ADULT -; Future     BMI:   Estimated body mass index is 66.22 kg/m  as calculated from the following:    Height as of 3/1/21: 1.613 m (5' 3.5\").    Weight as of this encounter: 172.3 kg (379 lb 12.8 oz).   Weight management plan: Discussed healthy diet and exercise guidelines    See Patient Instructions    Encourage follow-up Pap smear    Return in about 2 months (around 10/24/2021) for BP Recheck, mental health provider, f/u with routine sub-specialist.    Jermain Jaramillo MD  [facility]    Davide Evans is a 41 year old who presents for the following health issues  accompanied by her self:    HPI     She is here today after her primary follow-up for her mental health issues.  She has been in contact with her mental health provider and is due to schedule and continue outpatient therapy.  She states she is been taking her medication accordingly.    She is primarily here today to touch base on her blood pressure as it was noted to be elevated within the hospital.  She apparently had prazosin added " to her medical regimen per her mental health provider which helped her blood pressure somewhat.    Of note her blood sugar was slightly high in the hospital but has not been rechecked fasting.  She is fasting today.    She also has been given a referral in the past for sleep apnea assessment due to snoring.  This referral was done about a year ago but she has not followed through as she states her appointment was apparently canceled due to Covid.    Hospital Follow-up Visit:    Hospital/Nursing Home/IP Rehab Facility: [facility]  Date of Admission: 08/10/2021  Date of Discharge: 08/12/2021  Reason(s) for Admission: Suicidal ideation,       Was your hospitalization related to COVID-19? No   Problems taking medications regularly:  None  Medication changes since discharge: None  Problems adhering to non-medication therapy:  None    Summary of hospitalization:  [facility] discharge summary reviewed  Diagnostic Tests/Treatments reviewed.  Follow up needed: mental health  Other Healthcare Providers Involved in Patient s Care:         None  Update since discharge: stable. Post Discharge Medication Reconciliation: discharge medications reconciled, continue medications without change.  Plan of care communicated with patient          #Primary Psychiatric Diagnosis  - MDD, recurrent, without psychotic features     #Secondary Psychiatric Diagnosis  - PTSD  - Generalized Anxiety Disorder       Review of Systems   CONSTITUTIONAL: NEGATIVE for fever, chills, change in weight  EYES: NEGATIVE for vision changes or irritation  ENT/MOUTH: NEGATIVE for ear, mouth and throat problems  RESP: NEGATIVE for significant cough or SOB  CV: NEGATIVE for chest pain, palpitations or peripheral edema  GI: NEGATIVE for nausea, abdominal pain, heartburn, or change in bowel habits  : NEGATIVE for frequency, dysuria, or hematuria  NEURO: NEGATIVE for weakness, dizziness or  paresthesias  HEME: NEGATIVE for bleeding problems      Objective    BP (!) 156/88   Pulse 90   Temp 98.1  F (36.7  C) (Tympanic)   Resp 20   Wt (!) 172.3 kg (379 lb 12.8 oz)   LMP  (LMP Unknown)   SpO2 97%   BMI 66.22 kg/m    Body mass index is 66.22 kg/m .  Physical Exam   GENERAL: healthy, alert and no distress  EYES: Eyes grossly normal to inspection, PERRL and conjunctivae and sclerae normal  HENT: ear canals and TM's normal, nose and mouth without ulcers or lesions  NECK: no adenopathy, no asymmetry, masses, or scars and thyroid normal to palpation  Patient is morbidly obese  RESP: lungs clear to auscultation - no rales, rhonchi or wheezes  CV: regular rate and rhythm, normal S1 S2, no S3 or S4, no click or rub, no peripheral edema and peripheral pulses strong  MS: no gross musculoskeletal defects noted, no edema  NEURO: Normal strength and tone, mentation intact and speech normal  PSYCH: mentation appears normal, affect flat      Creatinine   Date Value Ref Range Status   08/10/2021 0.82 0.52 - 1.04 mg/dL Final   03/01/2021 0.90 0.52 - 1.04 mg/dL Final

## 2021-08-25 ENCOUNTER — TELEPHONE (OUTPATIENT)
Dept: INTERNAL MEDICINE | Facility: CLINIC | Age: 41
End: 2021-08-25
Payer: COMMERCIAL

## 2021-08-25 NOTE — TELEPHONE ENCOUNTER
Diabetes Education Scheduling Outreach #1:    Call to patient to schedule. Left message with phone number to call to schedule.    Plan for 2nd outreach attempt within 1 week.    Tia Whitaker  [place] [facility]  Diabetes and Nutrition Scheduling

## 2021-09-01 NOTE — TELEPHONE ENCOUNTER
Diabetes Education Scheduling Outreach #2:    Call to patient to schedule. Left message with phone number to call to schedule.      Mehrdad Beaulieu  [place] [facility]  Diabetes and Nutrition Scheduling

## 2021-11-03 ENCOUNTER — HOSPITAL ENCOUNTER (EMERGENCY)
Facility: CLINIC | Age: 41
Discharge: HOME | End: 2021-11-03
Attending: PHYSICIAN ASSISTANT | Admitting: PHYSICIAN ASSISTANT
Payer: COMMERCIAL

## 2021-11-03 ENCOUNTER — APPOINTMENT (OUTPATIENT)
Dept: CT IMAGING | Facility: CLINIC | Age: 41
End: 2021-11-03
Attending: PHYSICIAN ASSISTANT
Payer: COMMERCIAL

## 2021-11-03 VITALS
TEMPERATURE: 98.1 F | SYSTOLIC BLOOD PRESSURE: 195 MMHG | DIASTOLIC BLOOD PRESSURE: 135 MMHG | HEART RATE: 100 BPM | OXYGEN SATURATION: 90 % | RESPIRATION RATE: 18 BRPM

## 2021-11-03 DIAGNOSIS — R10.13 EPIGASTRIC PAIN: ICD-10-CM

## 2021-11-03 DIAGNOSIS — K21.9 GERD (GASTROESOPHAGEAL REFLUX DISEASE): ICD-10-CM

## 2021-11-03 DIAGNOSIS — K76.0 FATTY LIVER: ICD-10-CM

## 2021-11-03 LAB
ALBUMIN SERPL-MCNC: 3 G/DL (ref 3.4–5)
ALBUMIN UR-MCNC: NEGATIVE MG/DL
ALP SERPL-CCNC: 62 U/L (ref 40–150)
ALT SERPL W P-5'-P-CCNC: 53 U/L (ref 0–50)
ANION GAP SERPL CALCULATED.3IONS-SCNC: 7 MMOL/L (ref 3–14)
APPEARANCE UR: CLEAR
AST SERPL W P-5'-P-CCNC: 38 U/L (ref 0–45)
B-HCG FREE SERPL-ACNC: <5 IU/L (ref 0–5)
BACTERIA #/AREA URNS HPF: ABNORMAL /HPF
BASOPHILS # BLD AUTO: 0.1 10E3/UL (ref 0–0.2)
BASOPHILS NFR BLD AUTO: 1 %
BILIRUB DIRECT SERPL-MCNC: <0.1 MG/DL (ref 0–0.2)
BILIRUB SERPL-MCNC: 0.5 MG/DL (ref 0.2–1.3)
BILIRUB UR QL STRIP: NEGATIVE
BUN SERPL-MCNC: 10 MG/DL (ref 7–30)
CALCIUM SERPL-MCNC: 8.7 MG/DL (ref 8.5–10.1)
CHLORIDE BLD-SCNC: 103 MMOL/L (ref 94–109)
CO2 SERPL-SCNC: 27 MMOL/L (ref 20–32)
COLOR UR AUTO: ABNORMAL
CREAT SERPL-MCNC: 0.91 MG/DL (ref 0.52–1.04)
EOSINOPHIL # BLD AUTO: 0.3 10E3/UL (ref 0–0.7)
EOSINOPHIL NFR BLD AUTO: 3 %
ERYTHROCYTE [DISTWIDTH] IN BLOOD BY AUTOMATED COUNT: 13.1 % (ref 10–15)
ETHANOL SERPL-MCNC: <0.01 G/DL
GFR SERPL CREATININE-BSD FRML MDRD: 78 ML/MIN/1.73M2
GLUCOSE BLD-MCNC: 115 MG/DL (ref 70–99)
GLUCOSE UR STRIP-MCNC: NEGATIVE MG/DL
HCT VFR BLD AUTO: 44.7 % (ref 35–47)
HGB BLD-MCNC: 14.7 G/DL (ref 11.7–15.7)
HGB UR QL STRIP: NEGATIVE
HOLD SPECIMEN: NORMAL
HOLD SPECIMEN: NORMAL
IMM GRANULOCYTES # BLD: 0 10E3/UL
IMM GRANULOCYTES NFR BLD: 0 %
KETONES UR STRIP-MCNC: NEGATIVE MG/DL
LEUKOCYTE ESTERASE UR QL STRIP: NEGATIVE
LIPASE SERPL-CCNC: 76 U/L (ref 73–393)
LYMPHOCYTES # BLD AUTO: 1.6 10E3/UL (ref 0.8–5.3)
LYMPHOCYTES NFR BLD AUTO: 17 %
MCH RBC QN AUTO: 30.8 PG (ref 26.5–33)
MCHC RBC AUTO-ENTMCNC: 32.9 G/DL (ref 31.5–36.5)
MCV RBC AUTO: 94 FL (ref 78–100)
MONOCYTES # BLD AUTO: 0.5 10E3/UL (ref 0–1.3)
MONOCYTES NFR BLD AUTO: 5 %
MUCOUS THREADS #/AREA URNS LPF: PRESENT /LPF
NEUTROPHILS # BLD AUTO: 6.8 10E3/UL (ref 1.6–8.3)
NEUTROPHILS NFR BLD AUTO: 74 %
NITRATE UR QL: NEGATIVE
NRBC # BLD AUTO: 0 10E3/UL
NRBC BLD AUTO-RTO: 0 /100
PH UR STRIP: 5.5 [PH] (ref 5–7)
PLATELET # BLD AUTO: 278 10E3/UL (ref 150–450)
POTASSIUM BLD-SCNC: 4.3 MMOL/L (ref 3.4–5.3)
PROT SERPL-MCNC: 6.8 G/DL (ref 6.8–8.8)
RBC # BLD AUTO: 4.78 10E6/UL (ref 3.8–5.2)
RBC URINE: <1 /HPF
SODIUM SERPL-SCNC: 137 MMOL/L (ref 133–144)
SP GR UR STRIP: 1.02 (ref 1–1.03)
SQUAMOUS EPITHELIAL: 2 /HPF
TROPONIN I SERPL-MCNC: <0.015 UG/L (ref 0–0.04)
UROBILINOGEN UR STRIP-MCNC: NORMAL MG/DL
WBC # BLD AUTO: 9.2 10E3/UL (ref 4–11)
WBC URINE: 1 /HPF

## 2021-11-03 PROCEDURE — 99285 EMERGENCY DEPT VISIT HI MDM: CPT | Mod: 25

## 2021-11-03 PROCEDURE — 71275 CT ANGIOGRAPHY CHEST: CPT

## 2021-11-03 PROCEDURE — 82040 ASSAY OF SERUM ALBUMIN: CPT | Performed by: PHYSICIAN ASSISTANT

## 2021-11-03 PROCEDURE — 96361 HYDRATE IV INFUSION ADD-ON: CPT

## 2021-11-03 PROCEDURE — 85025 COMPLETE CBC W/AUTO DIFF WBC: CPT | Performed by: EMERGENCY MEDICINE

## 2021-11-03 PROCEDURE — 250N000011 HC RX IP 250 OP 636: Performed by: PHYSICIAN ASSISTANT

## 2021-11-03 PROCEDURE — 96374 THER/PROPH/DIAG INJ IV PUSH: CPT | Mod: 59

## 2021-11-03 PROCEDURE — 93005 ELECTROCARDIOGRAM TRACING: CPT

## 2021-11-03 PROCEDURE — 83690 ASSAY OF LIPASE: CPT | Performed by: PHYSICIAN ASSISTANT

## 2021-11-03 PROCEDURE — 250N000013 HC RX MED GY IP 250 OP 250 PS 637: Performed by: PHYSICIAN ASSISTANT

## 2021-11-03 PROCEDURE — 84484 ASSAY OF TROPONIN QUANT: CPT | Performed by: EMERGENCY MEDICINE

## 2021-11-03 PROCEDURE — 258N000003 HC RX IP 258 OP 636: Performed by: PHYSICIAN ASSISTANT

## 2021-11-03 PROCEDURE — 82077 ASSAY SPEC XCP UR&BREATH IA: CPT | Performed by: PHYSICIAN ASSISTANT

## 2021-11-03 PROCEDURE — 80048 BASIC METABOLIC PNL TOTAL CA: CPT | Performed by: EMERGENCY MEDICINE

## 2021-11-03 PROCEDURE — 84702 CHORIONIC GONADOTROPIN TEST: CPT

## 2021-11-03 PROCEDURE — 250N000009 HC RX 250: Performed by: PHYSICIAN ASSISTANT

## 2021-11-03 PROCEDURE — 36415 COLL VENOUS BLD VENIPUNCTURE: CPT | Performed by: EMERGENCY MEDICINE

## 2021-11-03 PROCEDURE — 96375 TX/PRO/DX INJ NEW DRUG ADDON: CPT

## 2021-11-03 PROCEDURE — 81001 URINALYSIS AUTO W/SCOPE: CPT | Performed by: PHYSICIAN ASSISTANT

## 2021-11-03 RX ORDER — HYDROMORPHONE HYDROCHLORIDE 1 MG/ML
0.5 INJECTION, SOLUTION INTRAMUSCULAR; INTRAVENOUS; SUBCUTANEOUS ONCE
Status: COMPLETED | OUTPATIENT
Start: 2021-11-03 | End: 2021-11-03

## 2021-11-03 RX ORDER — ONDANSETRON 4 MG/1
4 TABLET, ORALLY DISINTEGRATING ORAL EVERY 6 HOURS PRN
Qty: 12 TABLET | Refills: 0 | Status: SHIPPED | OUTPATIENT
Start: 2021-11-03 | End: 2023-12-05

## 2021-11-03 RX ORDER — ONDANSETRON 2 MG/ML
4 INJECTION INTRAMUSCULAR; INTRAVENOUS ONCE
Status: COMPLETED | OUTPATIENT
Start: 2021-11-03 | End: 2021-11-03

## 2021-11-03 RX ORDER — IOPAMIDOL 755 MG/ML
500 INJECTION, SOLUTION INTRAVASCULAR ONCE
Status: COMPLETED | OUTPATIENT
Start: 2021-11-03 | End: 2021-11-03

## 2021-11-03 RX ORDER — SUCRALFATE ORAL 1 G/10ML
1 SUSPENSION ORAL 4 TIMES DAILY PRN
Qty: 420 ML | Refills: 0 | Status: SHIPPED | OUTPATIENT
Start: 2021-11-03 | End: 2023-12-05

## 2021-11-03 RX ADMIN — ONDANSETRON 4 MG: 2 INJECTION INTRAMUSCULAR; INTRAVENOUS at 17:07

## 2021-11-03 RX ADMIN — FAMOTIDINE 20 MG: 10 INJECTION, SOLUTION INTRAVENOUS at 19:07

## 2021-11-03 RX ADMIN — HYDROMORPHONE HYDROCHLORIDE 0.5 MG: 1 INJECTION, SOLUTION INTRAMUSCULAR; INTRAVENOUS; SUBCUTANEOUS at 17:09

## 2021-11-03 RX ADMIN — LIDOCAINE HYDROCHLORIDE 30 ML: 20 SOLUTION ORAL; TOPICAL at 19:07

## 2021-11-03 RX ADMIN — SODIUM CHLORIDE 1000 ML: 9 INJECTION, SOLUTION INTRAVENOUS at 17:07

## 2021-11-03 RX ADMIN — SODIUM CHLORIDE 90 ML: 9 INJECTION, SOLUTION INTRAVENOUS at 18:14

## 2021-11-03 RX ADMIN — IOPAMIDOL 100 ML: 755 INJECTION, SOLUTION INTRAVENOUS at 18:14

## 2021-11-03 ASSESSMENT — ENCOUNTER SYMPTOMS
BLOOD IN STOOL: 0
NAUSEA: 1
VOMITING: 0
ABDOMINAL PAIN: 1
DIARRHEA: 0

## 2021-11-03 NOTE — ED TRIAGE NOTES
PT reports that she has been having increased epigastric abdominal pain for the past day. PT reports that she feels like she is going to vomit but isnt able to. PT reports that she has been having a lot of abdominal issues recently with trouble with constipation. PT VSS and ABC's intact

## 2021-11-03 NOTE — LETTER
November 3, 2021      To Whom It May Concern:      Cristina Dean was seen in our Emergency Department today, 11/03/21.  I expect her condition to improve over the next 1-2 days.  She may return to work/school when improved.    Sincerely,        ADRI Esposito

## 2021-11-03 NOTE — ED PROVIDER NOTES
"  History   Chief Complaint:  Nausea & Vomiting and Abdominal Pain       The history is provided by the patient.      Cristina Dean is a 41 year old female with history of alcohol use disorder, STEPHENIE, obesity who presents with upper abdominal pain. The patient reports that she began experiencing severe upper abdominal pain yesterday. She also notes nausea at this time. The patient states that her stool today, which was the first episode in 4 days, was darker in color, although she notes that this may be due to Pepto Bismol. The patient reports that she has been having heartburn and indigestion recently, but that these symptoms worsened significantly yesterday and have been constant since. Nothing makes them better or worse.  She reportedly has no history of abdominal surgery. The patient has not drank alcohol in the last 2 days, although she states that she drinks \"a few days a week.\" She also denies vomiting, diarrhea, or bloody stools.    Review of Systems   Gastrointestinal: Positive for abdominal pain (upper) and nausea. Negative for blood in stool, diarrhea and vomiting.   All other systems reviewed and are negative.    Allergies:  Amlodipine  Salicylates    Medications:  Atorvastatin  Prozac  Hydroxyzine  Lisinopril  Lurasidone  Omeprazole  Prazosin  Topamax  Trazodone    Past Medical History:     Recurrent depression  Herpes  Asthma  Bipolar affective disorder  Panic disorder with agoraphobia  Chlamydia  Gonorrhea  Trichomonas  STEPHENIE  Morbid obesity  Urinary incontinence  Epicondylitis  Suicidal ideation  Drug overdose  Hypertension      Past Surgical History:    Right back mass excision  Tonsillectomy      Family History:    Father: Leukemia, neuropathy, type 2 diabetes, other cancer    Social History:  Presents to the emergency department with her mother  History of alcohol use disorder  History of cannabis use disorder    Physical Exam     Patient Vitals for the past 24 hrs:   BP Temp Temp src Pulse Resp " SpO2   11/03/21 1625 (!) 195/135 98.1  F (36.7  C) Temporal 100 18 90 %       Physical Exam  General: Awake, alert, uncomfortable appearing.  Head:  Scalp is NC/AT  Eyes:  Conjunctiva normal, PERRL  ENT:  The external nose and ears are normal.   Neck:  Normal range of motion without rigidity.  CV:  Regular rate and rhythm    No pathologic murmur, rubs, or gallops.  Resp:  Breath sounds are clear bilaterally.  No crackles, wheezes, rhonchi, stridor.    Non-labored, no retractions or accessory muscle use  Abdomen: Abdomen is soft, no distension, epigastric tenderness otherwise non-tender, no masses. No CVA tenderness.  MS:  No lower extremity edema or asymmetric calf swelling.   Skin:  Warm and dry, No rash or lesions noted. 2+ peripheral pulses in all extremities  Neuro: Alert and oriented x3.  GCS 15 No gross motor deficits.  No facial asymmetry.  Psych: Awake. Alert. Normal affect. Appropriate interactions.     Emergency Department Course   ECG  ECG obtained at 1717, ECG read at 1723  Normal sinus rhythm   Rate 81 bpm. OR interval 144 ms. QRS duration 84 ms. QT/QTc 386/448 ms. P-R-T axes 28 25 38.     Imaging:  CT Chest (PE) Abdomen Pelvis w Contrast   Final Result   IMPRESSION:   1.  No pulmonary embolism.      2.  Significant fatty infiltration of the liver.      3.  L4 degenerative change.      4.  Mild bronchiolar wall thickening in the lung bases.         Report per radiology    Laboratory:  Labs Ordered and Resulted from Time of ED Arrival to Time of ED Departure   BASIC METABOLIC PANEL - Abnormal       Result Value    Sodium 137      Potassium 4.3      Chloride 103      Carbon Dioxide (CO2) 27      Anion Gap 7      Urea Nitrogen 10      Creatinine 0.91      Calcium 8.7      Glucose 115 (*)     GFR Estimate 78     HEPATIC FUNCTION PANEL - Abnormal    Bilirubin Total 0.5      Bilirubin Direct <0.1      Protein Total 6.8      Albumin 3.0 (*)     Alkaline Phosphatase 62      AST 38      ALT 53 (*)    ROUTINE UA  WITH MICROSCOPIC REFLEX TO CULTURE - Abnormal    Color Urine Light Yellow      Appearance Urine Clear      Glucose Urine Negative      Bilirubin Urine Negative      Ketones Urine Negative      Specific Gravity Urine 1.019      Blood Urine Negative      pH Urine 5.5      Protein Albumin Urine Negative      Urobilinogen Urine Normal      Nitrite Urine Negative      Leukocyte Esterase Urine Negative      Bacteria Urine Few (*)     Mucus Urine Present (*)     RBC Urine <1      WBC Urine 1      Squamous Epithelials Urine 2 (*)    TROPONIN I - Normal    Troponin I <0.015     LIPASE - Normal    Lipase 76     ETHYL ALCOHOL LEVEL - Normal    Alcohol ethyl <0.01     ISTAT HCG QUANTITATIVE PREGNANCY POCT - Normal    HCG QUANTITATIVE POCT <5.0     CBC WITH PLATELETS AND DIFFERENTIAL    WBC Count 9.2      RBC Count 4.78      Hemoglobin 14.7      Hematocrit 44.7      MCV 94      MCH 30.8      MCHC 32.9      RDW 13.1      Platelet Count 278      % Neutrophils 74      % Lymphocytes 17      % Monocytes 5      % Eosinophils 3      % Basophils 1      % Immature Granulocytes 0      NRBCs per 100 WBC 0      Absolute Neutrophils 6.8      Absolute Lymphocytes 1.6      Absolute Monocytes 0.5      Absolute Eosinophils 0.3      Absolute Basophils 0.1      Absolute Immature Granulocytes 0.0      Absolute NRBCs 0.0          Emergency Department Course:  Reviewed:  I reviewed nursing notes, vitals, past medical history and Care Everywhere    Assessments:  1636 I obtained history and examined the patient as noted above.   1848 I rechecked the patient and explained findings. She feels better at this time.    Interventions:  1707 NS bolus 1 L IV  1707 Zofran 4 mg IV  1709 Dilaudid 0.5 mg IV    Disposition:  The patient was discharged to home.     Impression & Plan     CMS Diagnoses: None    Medical Decision Makin yo female presents with severe epigastric pain.  Broad differential considered.  CT chest abdomen pelvis unremarkable except for  fatty liver disease previously noted.  Gallbladder, LFT's and lipase unremarkable no evidence of cholecystitis, choledocholithiasis, cholangitis.  UA bland.  EKG normal troponin negative, pt HEART score of 2 ([id number]) and very low suspicion for ACS.  Feels better after symptomatic treatment.  Likely GERD vs gastritis vs PUD.  Hgb and BUN unremarkable no evidence of upper GI bleed stools consistent with pepto bismal use.  Follow-up with pcp and if not improving discussed need for EGD.  Return if bleeding, worsening pain, fever, etc.  Discussed dietary modifications.      Diagnosis:    ICD-10-CM    1. Epigastric pain  R10.13    2. GERD (gastroesophageal reflux disease)  K21.9    3. Fatty liver  K76.0        Discharge Medications:  Discharge Medication List as of 11/3/2021  7:10 PM      START taking these medications    Details   ondansetron (ZOFRAN ODT) 4 MG ODT tab Take 1 tablet (4 mg) by mouth every 6 hours as needed for nausea or vomiting, Disp-12 tablet, R-0, E-Prescribe      sucralfate (CARAFATE) 1 GM/10ML suspension Take 10 mLs (1 g) by mouth 4 times daily as needed for nausea (Pain), Disp-420 mL, R-0, E-Prescribe             Scribe Disclosure:  I, Hans Contreras, am serving as a scribe at 4:32 PM on 11/3/2021 to document services personally performed by Tae Valle PA-C based on my observations and the provider's statements to me.              Tae Valle PA-C  11/04/21 [id number]

## 2021-11-04 LAB
ATRIAL RATE - MUSE: 81 BPM
DIASTOLIC BLOOD PRESSURE - MUSE: NORMAL MMHG
INTERPRETATION ECG - MUSE: NORMAL
P AXIS - MUSE: 28 DEGREES
PR INTERVAL - MUSE: 144 MS
QRS DURATION - MUSE: 84 MS
QT - MUSE: 386 MS
QTC - MUSE: 448 MS
R AXIS - MUSE: 25 DEGREES
SYSTOLIC BLOOD PRESSURE - MUSE: NORMAL MMHG
T AXIS - MUSE: 38 DEGREES
VENTRICULAR RATE- MUSE: 81 BPM

## 2021-12-09 ENCOUNTER — NURSE TRIAGE (OUTPATIENT)
Dept: NURSING | Facility: CLINIC | Age: 41
End: 2021-12-09
Payer: COMMERCIAL

## 2021-12-09 NOTE — TELEPHONE ENCOUNTER
Two weeks helping with  and patient was exposed to Covid at .      Patient symptoms of fatigue with stuffy nose.    Patient tested at a [place] testing center and had rapid test that was negative, but she would like a PCR test.    Disposition to call provider.  Patient will have virtual visit with provider.  Transferred to schedulers.    Tsering Arrington RN  [facility]    COVID 19 Nurse Triage Plan/Patient Instructions    Please be aware that novel coronavirus (COVID-19) may be circulating in the community. If you develop symptoms such as fever, cough, or SOB or if you have concerns about the presence of another infection including coronavirus (COVID-19), please contact your health care provider or visit [email or web address].     Disposition/Instructions    Virtual Visit with provider recommended. Reference Visit Selection Guide.    Thank you for taking steps to prevent the spread of this virus.  o Limit your contact with others.  o Wear a simple mask to cover your cough.  o Wash your hands well and often.    Resources    [facility]: About COVID-19: [email or web address]    CDC: What to Do If You're Sick: www.cdc.gov/coronavirus/2019-ncov/about/steps-when-sick.html    CDC: Ending Home Isolation: www.cdc.gov/coronavirus/2019-ncov/hcp/disposition-in-home-patients.html     CDC: Caring for Someone: www.cdc.gov/coronavirus/2019-ncov/if-you-are-sick/care-for-someone.html     [facility]: Interim Guidance for Hospital Discharge to Home: www.health.[facility].mn.us/diseases/coronavirus/hcp/hospdischarge.pdf    [facility] clinical trials (COVID-19 research studies): [email or web address]/umn-clinical-trials     Below are the COVID-19 hotlines at the [facility] ([facility]). Interpreters are available.   o For health questions: Call 185-149-2224 or 1-942.150.6553 (7 a.m. to 7 p.m.)  o For questions about schools and childcare: Call 547-102-1771 or  7-437-585-7958 (7 a.m. to 7 p.m.)   Reason for Disposition    [1] COVID-19 infection suspected by caller or triager AND [2] mild symptoms (cough, fever, or others) AND [3] negative COVID-19 rapid test    Additional Information    Negative: SEVERE difficulty breathing (e.g., struggling for each breath, speaks in single words)    Negative: Difficult to awaken or acting confused (e.g., disoriented, slurred speech)    Negative: Bluish (or gray) lips or face now    Negative: Shock suspected (e.g., cold/pale/clammy skin, too weak to stand, low BP, rapid pulse)    Negative: Sounds like a life-threatening emergency to the triager    Negative: SEVERE or constant chest pain or pressure (Exception: mild central chest pain, present only when coughing)    Negative: MODERATE difficulty breathing (e.g., speaks in phrases, SOB even at rest, pulse 100-120)    Negative: [1] Headache AND [2] stiff neck (can't touch chin to chest)    Negative: MILD difficulty breathing (e.g., minimal/no SOB at rest, SOB with walking, pulse <100)    Negative: Chest pain or pressure    Negative: Patient sounds very sick or weak to the triager    Negative: Fever > 103 F (39.4 C)    Negative: [1] Fever > 101 F (38.3 C) AND [2] age > 60 years    Negative: [1] Fever > 100.0 F (37.8 C) AND [2] bedridden (e.g., nursing home patient, CVA, chronic illness, recovering from surgery)    Negative: HIGH RISK for severe COVID complications (e.g., age > 64 years, obesity with BMI > 25, pregnant, chronic lung disease or other chronic medical condition)  (Exception: Already seen by PCP and no new or worsening symptoms.)    Negative: [1] HIGH RISK patient AND [2] influenza is widespread in the community AND [3] ONE OR MORE respiratory symptoms: cough, sore throat, runny or stuffy nose    Negative: [1] HIGH RISK patient AND [2] influenza exposure within the last 7 days AND [3] ONE OR MORE respiratory symptoms: cough, sore throat, runny or stuffy nose    Protocols used:  CORONAVIRUS (COVID-19) DIAGNOSED OR [id number] 8.25.2021

## 2021-12-10 ENCOUNTER — VIRTUAL VISIT (OUTPATIENT)
Dept: FAMILY MEDICINE | Facility: OTHER | Age: 41
End: 2021-12-10
Payer: COMMERCIAL

## 2021-12-10 ENCOUNTER — LAB (OUTPATIENT)
Dept: URGENT CARE | Facility: URGENT CARE | Age: 41
End: 2021-12-10
Attending: PHYSICIAN ASSISTANT
Payer: COMMERCIAL

## 2021-12-10 DIAGNOSIS — R53.83 MALAISE AND FATIGUE: ICD-10-CM

## 2021-12-10 DIAGNOSIS — J45.20 INTERMITTENT ASTHMA WITHOUT COMPLICATION, UNSPECIFIED ASTHMA SEVERITY: ICD-10-CM

## 2021-12-10 DIAGNOSIS — Z20.822 EXPOSURE TO 2019 NOVEL CORONAVIRUS: ICD-10-CM

## 2021-12-10 DIAGNOSIS — R53.81 MALAISE AND FATIGUE: ICD-10-CM

## 2021-12-10 DIAGNOSIS — Z20.822 EXPOSURE TO 2019 NOVEL CORONAVIRUS: Primary | ICD-10-CM

## 2021-12-10 PROCEDURE — U0003 INFECTIOUS AGENT DETECTION BY NUCLEIC ACID (DNA OR RNA); SEVERE ACUTE RESPIRATORY SYNDROME CORONAVIRUS 2 (SARS-COV-2) (CORONAVIRUS DISEASE [COVID-19]), AMPLIFIED PROBE TECHNIQUE, MAKING USE OF HIGH THROUGHPUT TECHNOLOGIES AS DESCRIBED BY CMS-2020-01-R: HCPCS

## 2021-12-10 PROCEDURE — 99213 OFFICE O/P EST LOW 20 MIN: CPT | Mod: 95 | Performed by: PHYSICIAN ASSISTANT

## 2021-12-10 PROCEDURE — U0005 INFEC AGEN DETEC AMPLI PROBE: HCPCS

## 2021-12-10 NOTE — PROGRESS NOTES
Cristina is a 41 year old who is being evaluated via a billable telephone visit.      How would you like to obtain your AVS? Esehart  If the video visit is dropped, the invitation should be resent by: Text to cell phone: [phone]  Will anyone else be joining your video visit? No    Assessment & Plan     Exposure to 2019 novel coronavirus   Malaise and fatigue  Intermittent asthma without complication, unspecified asthma severity    Close contact with a girlfriend and her children tested positive for COVID-19/.  Testing treat by results and signs and symptoms.  Advised that she needs to be back into see her primary care provider in the very near future to have her blood pressure rechecked.  Advised that blood pressure 195/135 is dangerous.  - Symptomatic COVID-19 Virus (Coronavirus) by PCR Nose; Future     No follow-ups on file.    Darryl Varela PA-C  [facility]   Cristina is a 41 year old who presents for the following health issues     HPI       Concern for COVID-19  About how many days ago did these symptoms start? 12/9/21  Is this your first visit for this illness? Yes  In the 14 days before your symptoms started, have you had close contact with someone with COVID-19 (Coronavirus)? Yes, I have been in contact with someone who has COVID-19/Coronavirus (confirmed by lab test).  Do you have a fever or chills? No  Are you having new or worsening difficulty breathing? No  Do you have new or worsening cough? No - says no but coughing on the phone  Have you had any new or unexplained body aches? No    Have you experienced any of the following NEW symptoms?    Headache: YES    Sore throat: No    Loss of taste or smell: No    Chest pain: No    Diarrhea: No    Rash: No  What treatments have you tried? None  Who do you live with?  and god daughter  Are you, or a household member, a healthcare worker or a ? No  Do you live in a nursing home, group home, or shelter? No  Do  you have a way to get food/medications if quarantined? Yes, I have a friend or family member who can help me.    Review of Systems   Constitutional, HEENT, cardiovascular, pulmonary, GI, , musculoskeletal, neuro, skin, endocrine and psych systems are negative, except as otherwise noted.      Objective           Vitals:  No vitals were obtained today due to virtual visit.    Physical Exam   Telephone visit    COVID-19 test ordered and pending    Total time spent in chart review and telephone call is 6 minutes

## 2021-12-11 ENCOUNTER — TELEPHONE (OUTPATIENT)
Dept: URGENT CARE | Facility: URGENT CARE | Age: 41
End: 2021-12-11
Payer: COMMERCIAL

## 2021-12-11 LAB — SARS-COV-2 RNA RESP QL NAA+PROBE: POSITIVE

## 2021-12-11 NOTE — TELEPHONE ENCOUNTER
"Coronavirus (COVID-19) Notification    Caller Name (Patient, parent, daughter/son, grandparent, etc)  patient    Reason for call  Notify of Positive Coronavirus (COVID-19) lab results, assess symptoms,  review  [facility] recommendations    Lab Result    Lab test:  2019-nCoV rRt-PCR or SARS-CoV-2 PCR    Oropharyngeal AND/OR nasopharyngeal swabs is POSITIVE for 2019-nCoV RNA/SARS-COV-2 PCR (COVID-19 virus)    RN Recommendations/Instructions per [facility] Coronavirus COVID-19 recommendations    Brief introduction script  Introduce self then review script:  \"I am calling on behalf of [employer].  We were notified that your Coronavirus test (COVID-19) for was POSITIVE for the virus.  I have some information to relay to you but first I wanted to mention that the [place] Dept of Health will be contacting you shortly [it's possible MD already called Patient] to talk to you more about how you are feeling and other people you have had contact with who might now also have the virus.  Also,  [facility] is Partnering with the [facility] for Covid-19 research, you may be contacted directly by research staff.\"    Patient reports she is vaccinated for Covid with Pfizer.    Assessment (Inquire about Patient's current symptoms)   Assessment   Current Symptoms at time of phone call: (if no symptoms, document No symptoms] Fatigue, headache, runny nose, nasal congestion, loss of taste and smell, diarrhea, nausea, dizzy, shortness of breath, ear pain   Symptoms onset (if applicable) 12/8/2021     If at time of call, Patients symptoms hare worsened, the Patient should contact 911 or have someone drive them to Emergency Dept promptly:      If Patient calling 911, inform 911 personal that you have tested positive for the Coronavirus (COVID-19).  Place mask on and await 911 to arrive.    If Emergency Dept, If possible, please have another adult drive you to the Emergency Dept but you need to wear mask when in " "contact with other people.      Monoclonal Antibody Administration    You may be eligible to receive a new treatment with a monoclonal antibody for preventing hospitalization in patients at high risk for complications from COVID-19.   This medication is still experimental and available on a limited basis; it is given through an IV and must be given at an infusion center. Please note that not all people who are eligible will receive the medication since it is in limited supply.     Are you interested in being considered for this medication?  Yes.   Is the patient symptomatic?  Yes. Is the patient 18 years of age or older? Yes.  Is the patient newly (within the last week) on supplemental oxygen or requiring more oxygen than usual?  No. Patient criteria for selection: Is the patients weight equal to or greater than 40 kg (88 lbs)? Yes.  Is the patient's age 65 years or older?  No. Is the patient 55 years or older and have one or more of the following conditions: Hypertension, CHF, COPD/Chronic pulmonary disease?  No. Is the patient 18 years or older and has one or more of the following conditions: Chronic Kidney Disease, Diabetes Mellitus, BMI equal to or greater than 35, Immunosuppressive Disease or taking Immunosuppressive medications?  Yes.  Patient qualifies, refer to [facility].  Does the patient fit the criteria: Yes: Patient referred to [employer] website, patient or family/friend will complete application.    If patient qualifies based on above criteria:  \"You will be contacted if you are selected to receive this treatment in the next 1-2 business days.   This is time sensitive and if you are not selected in the next 1-2 business days, you will not receive the medication.  If you do not receive a call to schedule, you have not been selected.\"      Review information with Patient    Your result was positive. This means you have COVID-19 (coronavirus).  We have sent you a letter that reviews the information that I'll be " reviewing with you now.    How can I protect others?    If you have symptoms: stay home and away from others (self-isolate) until:    You've had no fever--and no medicine that reduces fever--for 1 full day (24 hours). And       Your other symptoms have gotten better. For example, your cough or breathing has improved. And     At least 10 days have passed since your symptoms started. (If you've been told by a doctor that you have a weak immune system, wait 20 days.)     If you don't have symptoms: Stay home and away from others (self-isolate) until at least 10 days have passed since your first positive COVID-19 test. (Date test collected)    During this time:    Stay in your own room, including for meals. Use your own bathroom if you can.    Stay away from others in your home. No hugging, kissing or shaking hands. No visitors.     Don't go to work, school or anywhere else.     Clean  high touch  surfaces often (doorknobs, counters, handles, etc.). Use a household cleaning spray or wipes. You'll find a full list on the EPA website at www.epa.gov/pesticide-registration/list-n-disinfectants-use-against-sars-cov-2.     Cover your mouth and nose with a mask, tissue or other face covering to avoid spreading germs.    Wash your hands and face often with soap and water.    Make a list of people you have been in close contact with recently, even if either of you wore a face covering.   ; Start your list from 2 days before you became ill or had a positive test.  ; Include anyone that was within 6 feet of you for a cumulative total of 15 minutes or more in 24 hours. (Example: if you sat next to Meir for 5 minutes in the morning and 10 minutes in the afternoon, then you were in close contact for 15 minutes total that day. Meir would be added to your list.)    A public health worker will call or text you. It is important that you answer. They will ask you questions about possible exposures to COVID-19, such as people you have been  in direct contact with and places you have visited.    Tell the people on your list that you have COVID-19; they should stay away from others for 14 days starting from the last time they were in contact with you (unless you are told something different from a public health worker).     Caregivers in these groups are at risk for severe illness due to COVID-19:  o People 65 years and older  o People who live in a nursing home or long-term care facility  o People with chronic disease (lung, heart, cancer, diabetes, kidney, liver, immunologic)  o People who have a weakened immune system, including those who:  - Are in cancer treatment  - Take medicine that weakens the immune system, such as corticosteroids  - Had a bone marrow or organ transplant  - Have an immune deficiency  - Have poorly controlled HIV or AIDS  - Are obese (body mass index of 40 or higher)  - Smoke regularly    Caregivers should wear gloves while washing dishes, handling laundry and cleaning bedrooms and bathrooms.    Wash and dry laundry with special caution. Don't shake dirty laundry, and use the warmest water setting you can.    If you have a weakened immune system, ask your doctor about other actions you should take.    For more tips, go to www.cdc.gov/coronavirus/2019-ncov/downloads/10Things.pdf.    You should not go back to work until you meet the guidelines above for ending your home isolation. You don't need to be retested for COVID-19 before going back to work--studies show that you won't spread the virus if it's been at least 10 days since your symptoms started (or 20 days, if you have a weak immune system).    Employers: This document serves as formal notice of your employee's medical guidelines for going back to work. They must meet the above guidelines before going back to work in person.    How can I take care of myself?    1. Get lots of rest. Drink extra fluids (unless a doctor has told you not to).    2. Take Tylenol (acetaminophen)  for fever or pain. If you have liver or kidney problems, ask your family doctor if it's okay to take Tylenol.     Take either:     650 mg (two 325 mg pills) every 4 to 6 hours, or     1,000 mg (two 500 mg pills) every 8 hours as needed.     Note: Don't take more than 3,000 mg in one day. Acetaminophen is found in many medicines (both prescribed and over-the-counter medicines). Read all labels to be sure you don't take too much.    For children, check the Tylenol bottle for the right dose (based on their age or weight).    3. If you have other health problems (like cancer, heart failure, an organ transplant or severe kidney disease): Call your specialty clinic if you don't feel better in the next 2 days.    4. Know when to call 911: Emergency warning signs include:    Trouble breathing or shortness of breath    Pain or pressure in the chest that doesn't go away    Feeling confused like you haven't felt before, or not being able to wake up    Bluish-colored lips or face    5. Sign up for [employer]. We know it's scary to hear that you have COVID-19. We want to track your symptoms to make sure you're okay over the next 2 weeks. Please look for an email from [employer]--this is a free, online program that we'll use to keep in touch. To sign up, follow the link in the email. Learn more at [email or web address].    Where can I get more information?    [facility]: [email or web address]    Coronavirus Basics: www.health.[facility].[place]/diseases/coronavirus/basics.html    What to Do If You're Sick: www.cdc.gov/coronavirus/2019-ncov/about/steps-when-sick.html    Ending Home Isolation: www.cdc.gov/coronavirus/2019-ncov/hcp/disposition-in-home-patients.html     Caring for Someone with COVID-19: www.cdc.gov/coronavirus/2019-ncov/if-you-are-sick/care-for-someone.html     [facility] clinical trials (COVID-19 research studies): [email or web address]-trials     A Positive COVID-19  letter will be sent via [email or web address] or the mail. (Exception, no letters sent to Presurgerical/Preprocedure Patients)    Emma Juares LPN

## 2021-12-11 NOTE — TELEPHONE ENCOUNTER
Coronavirus (COVID-19) Notification    Reason for call  Notify of POSITIVE  COVID-19 lab result, assess symptoms,  review [facility] recommendations    Lab Result   Lab test for 2019-nCoV rRt-PCR or SARS-COV-2 PCR  Oropharyngeal AND/OR nasopharyngeal swabs were POSITIVE for 2019-nCoV RNA [OR] SARS-COV-2 RNA (COVID-19) RNA     We have been unable to reach Patient by phone at this time to notify of their Positive COVID-19 result.  Left voicemail message requesting a call back to [phone] [facility] for results.        POSITIVE COVID-19 Letter sent.    Emma Juares LPN

## 2021-12-12 ENCOUNTER — E-VISIT (OUTPATIENT)
Dept: URGENT CARE | Facility: URGENT CARE | Age: 41
End: 2021-12-12
Payer: COMMERCIAL

## 2021-12-12 DIAGNOSIS — H10.30 ACUTE BACTERIAL CONJUNCTIVITIS, UNSPECIFIED LATERALITY: Primary | ICD-10-CM

## 2021-12-12 PROCEDURE — 99421 OL DIG E/M SVC 5-10 MIN: CPT | Performed by: FAMILY MEDICINE

## 2021-12-12 RX ORDER — POLYMYXIN B SULFATE AND TRIMETHOPRIM 1; 10000 MG/ML; [USP'U]/ML
1-2 SOLUTION OPHTHALMIC EVERY 4 HOURS
Qty: 10 ML | Refills: 0 | Status: SHIPPED | OUTPATIENT
Start: 2021-12-12 | End: 2021-12-19

## 2021-12-30 ENCOUNTER — OFFICE VISIT (OUTPATIENT)
Dept: URGENT CARE | Facility: URGENT CARE | Age: 41
End: 2021-12-30

## 2021-12-30 ENCOUNTER — ANCILLARY PROCEDURE (OUTPATIENT)
Dept: GENERAL RADIOLOGY | Facility: CLINIC | Age: 41
End: 2021-12-30
Attending: FAMILY MEDICINE

## 2021-12-30 VITALS
HEART RATE: 85 BPM | OXYGEN SATURATION: 96 % | SYSTOLIC BLOOD PRESSURE: 140 MMHG | DIASTOLIC BLOOD PRESSURE: 82 MMHG | RESPIRATION RATE: 24 BRPM | TEMPERATURE: 98.2 F

## 2021-12-30 DIAGNOSIS — M25.561 ACUTE PAIN OF RIGHT KNEE: ICD-10-CM

## 2021-12-30 DIAGNOSIS — M25.562 ACUTE PAIN OF LEFT KNEE: Primary | ICD-10-CM

## 2021-12-30 PROCEDURE — 73562 X-RAY EXAM OF KNEE 3: CPT | Mod: 59 | Performed by: RADIOLOGY

## 2021-12-30 PROCEDURE — 99213 OFFICE O/P EST LOW 20 MIN: CPT | Performed by: FAMILY MEDICINE

## 2021-12-30 NOTE — PROGRESS NOTES
Assessment & Plan     Acute pain of left knee    - XR Knee Left 3 Views; Future  - XR Knee AP Standing Bilateral; Future  - Knee Supplies Order for DME - ONLY FOR DME    Acute pain of right knee    - XR Knee Right 3 Views; Future  - XR Knee AP Standing Bilateral; Future  - Knee Supplies Order for DME - ONLY FOR DME    Unable to fit patient with adequate knee braces today in  due to body habitus.  Recommend patient try [facility] or [facility] or [facility] for braces.  Continue with RICE and NSAIDs/Tylenol prn pain/comfort.  If no change or worsening sx- recommend Follow-up with PCP for further imaging/PT prn.    Stacie Mathur MD  [facility] GILDA Evans is a 41 year old who presents for the following health issues     HPI   Presents today with worsening B knee pain after slipping on ice in parking lot at work and falling onto B knees.  RIGHT knee has been patient's bad knee in past.  No hx of knee surgeries.  Was able to get up and ambulate immediately after.  Today presents with increased anterior knee pain B with R >> L with some pain also located on RIGHT lateral knee as well.      Review of Systems   Constitutional, HEENT, cardiovascular, pulmonary, GI, , musculoskeletal, neuro, skin, endocrine and psych systems are negative, except as otherwise noted.      Objective    BP (!) 140/82   Pulse 85   Temp 98.2  F (36.8  C)   Resp 24   LMP  (LMP Unknown)   SpO2 96%   Breastfeeding No   There is no height or weight on file to calculate BMI.  Physical Exam   GENERAL: healthy, alert and no distress  EYES: Eyes grossly normal to inspection, PERRL and conjunctivae and sclerae normal  MS: no gross musculoskeletal defects noted,   SKIN: no suspicious lesions or rashes  PSYCH: mentation appears normal, affect normal/bright    Xray - Reviewed and interpreted by me.  Negative for acute fracture B knees

## 2022-04-14 ENCOUNTER — HOSPITAL ENCOUNTER (EMERGENCY)
Facility: CLINIC | Age: 42
Discharge: HOME | End: 2022-04-14
Attending: EMERGENCY MEDICINE | Admitting: EMERGENCY MEDICINE
Payer: COMMERCIAL

## 2022-04-14 ENCOUNTER — APPOINTMENT (OUTPATIENT)
Dept: CT IMAGING | Facility: CLINIC | Age: 42
End: 2022-04-14
Attending: EMERGENCY MEDICINE
Payer: COMMERCIAL

## 2022-04-14 VITALS
RESPIRATION RATE: 20 BRPM | BODY MASS INDEX: 65.77 KG/M2 | TEMPERATURE: 97.8 F | OXYGEN SATURATION: 93 % | DIASTOLIC BLOOD PRESSURE: 82 MMHG | SYSTOLIC BLOOD PRESSURE: 143 MMHG | WEIGHT: 293 LBS | HEART RATE: 89 BPM

## 2022-04-14 DIAGNOSIS — R10.9 ABDOMINAL WALL PAIN: ICD-10-CM

## 2022-04-14 LAB
ALBUMIN SERPL-MCNC: 3 G/DL (ref 3.4–5)
ALBUMIN UR-MCNC: 300 MG/DL
ALP SERPL-CCNC: 70 U/L (ref 40–150)
ALT SERPL W P-5'-P-CCNC: 32 U/L (ref 0–50)
ANION GAP SERPL CALCULATED.3IONS-SCNC: 4 MMOL/L (ref 3–14)
APPEARANCE UR: ABNORMAL
AST SERPL W P-5'-P-CCNC: 20 U/L (ref 0–45)
BACTERIA #/AREA URNS HPF: ABNORMAL /HPF
BASOPHILS # BLD AUTO: 0.1 10E3/UL (ref 0–0.2)
BASOPHILS NFR BLD AUTO: 1 %
BILIRUB SERPL-MCNC: 0.5 MG/DL (ref 0.2–1.3)
BILIRUB UR QL STRIP: NEGATIVE
BUN SERPL-MCNC: 13 MG/DL (ref 7–30)
CALCIUM SERPL-MCNC: 8.9 MG/DL (ref 8.5–10.1)
CHLORIDE BLD-SCNC: 106 MMOL/L (ref 94–109)
CO2 SERPL-SCNC: 29 MMOL/L (ref 20–32)
COLOR UR AUTO: ABNORMAL
CREAT SERPL-MCNC: 0.73 MG/DL (ref 0.52–1.04)
EOSINOPHIL # BLD AUTO: 0.4 10E3/UL (ref 0–0.7)
EOSINOPHIL NFR BLD AUTO: 5 %
ERYTHROCYTE [DISTWIDTH] IN BLOOD BY AUTOMATED COUNT: 13.5 % (ref 10–15)
GFR SERPL CREATININE-BSD FRML MDRD: >90 ML/MIN/1.73M2
GLUCOSE BLD-MCNC: 133 MG/DL (ref 70–99)
GLUCOSE UR STRIP-MCNC: NEGATIVE MG/DL
HCG SER QL IA.RAPID: NEGATIVE
HCT VFR BLD AUTO: 45.4 % (ref 35–47)
HGB BLD-MCNC: 14.4 G/DL (ref 11.7–15.7)
HGB UR QL STRIP: ABNORMAL
IMM GRANULOCYTES # BLD: 0 10E3/UL
IMM GRANULOCYTES NFR BLD: 0 %
KETONES UR STRIP-MCNC: NEGATIVE MG/DL
LEUKOCYTE ESTERASE UR QL STRIP: NEGATIVE
LIPASE SERPL-CCNC: 93 U/L (ref 73–393)
LYMPHOCYTES # BLD AUTO: 1.5 10E3/UL (ref 0.8–5.3)
LYMPHOCYTES NFR BLD AUTO: 16 %
MCH RBC QN AUTO: 29.9 PG (ref 26.5–33)
MCHC RBC AUTO-ENTMCNC: 31.7 G/DL (ref 31.5–36.5)
MCV RBC AUTO: 94 FL (ref 78–100)
MONOCYTES # BLD AUTO: 0.6 10E3/UL (ref 0–1.3)
MONOCYTES NFR BLD AUTO: 6 %
NEUTROPHILS # BLD AUTO: 7 10E3/UL (ref 1.6–8.3)
NEUTROPHILS NFR BLD AUTO: 72 %
NITRATE UR QL: NEGATIVE
NRBC # BLD AUTO: 0 10E3/UL
NRBC BLD AUTO-RTO: 0 /100
PH UR STRIP: 6 [PH] (ref 5–7)
PLATELET # BLD AUTO: 315 10E3/UL (ref 150–450)
POTASSIUM BLD-SCNC: 4.2 MMOL/L (ref 3.4–5.3)
PROT SERPL-MCNC: 6.4 G/DL (ref 6.8–8.8)
RBC # BLD AUTO: 4.81 10E6/UL (ref 3.8–5.2)
RBC URINE: >182 /HPF
SODIUM SERPL-SCNC: 139 MMOL/L (ref 133–144)
SP GR UR STRIP: 1.02 (ref 1–1.03)
UROBILINOGEN UR STRIP-MCNC: NORMAL MG/DL
WBC # BLD AUTO: 9.6 10E3/UL (ref 4–11)
WBC URINE: >182 /HPF

## 2022-04-14 PROCEDURE — 250N000011 HC RX IP 250 OP 636: Performed by: EMERGENCY MEDICINE

## 2022-04-14 PROCEDURE — 74177 CT ABD & PELVIS W/CONTRAST: CPT

## 2022-04-14 PROCEDURE — 84702 CHORIONIC GONADOTROPIN TEST: CPT

## 2022-04-14 PROCEDURE — 83690 ASSAY OF LIPASE: CPT | Performed by: EMERGENCY MEDICINE

## 2022-04-14 PROCEDURE — 87086 URINE CULTURE/COLONY COUNT: CPT | Performed by: EMERGENCY MEDICINE

## 2022-04-14 PROCEDURE — 250N000009 HC RX 250: Performed by: EMERGENCY MEDICINE

## 2022-04-14 PROCEDURE — 81003 URINALYSIS AUTO W/O SCOPE: CPT | Performed by: EMERGENCY MEDICINE

## 2022-04-14 PROCEDURE — 99285 EMERGENCY DEPT VISIT HI MDM: CPT | Mod: 25

## 2022-04-14 PROCEDURE — 85004 AUTOMATED DIFF WBC COUNT: CPT | Performed by: EMERGENCY MEDICINE

## 2022-04-14 PROCEDURE — 96374 THER/PROPH/DIAG INJ IV PUSH: CPT | Mod: 59

## 2022-04-14 PROCEDURE — 80053 COMPREHEN METABOLIC PANEL: CPT | Performed by: EMERGENCY MEDICINE

## 2022-04-14 PROCEDURE — 36415 COLL VENOUS BLD VENIPUNCTURE: CPT | Performed by: EMERGENCY MEDICINE

## 2022-04-14 PROCEDURE — 96375 TX/PRO/DX INJ NEW DRUG ADDON: CPT

## 2022-04-14 RX ORDER — IOPAMIDOL 755 MG/ML
500 INJECTION, SOLUTION INTRAVASCULAR ONCE
Status: COMPLETED | OUTPATIENT
Start: 2022-04-14 | End: 2022-04-14

## 2022-04-14 RX ORDER — KETOROLAC TROMETHAMINE 15 MG/ML
15 INJECTION, SOLUTION INTRAMUSCULAR; INTRAVENOUS ONCE
Status: COMPLETED | OUTPATIENT
Start: 2022-04-14 | End: 2022-04-14

## 2022-04-14 RX ORDER — CYCLOBENZAPRINE HCL 10 MG
10 TABLET ORAL 3 TIMES DAILY PRN
Qty: 18 TABLET | Refills: 0 | Status: SHIPPED | OUTPATIENT
Start: 2022-04-14 | End: 2022-04-20

## 2022-04-14 RX ADMIN — KETOROLAC TROMETHAMINE 15 MG: 15 INJECTION, SOLUTION INTRAMUSCULAR; INTRAVENOUS at 11:19

## 2022-04-14 RX ADMIN — IOPAMIDOL 100 ML: 755 INJECTION, SOLUTION INTRAVENOUS at 11:46

## 2022-04-14 RX ADMIN — HYDROMORPHONE HYDROCHLORIDE 1 MG: 1 INJECTION, SOLUTION INTRAMUSCULAR; INTRAVENOUS; SUBCUTANEOUS at 11:19

## 2022-04-14 RX ADMIN — SODIUM CHLORIDE 65 ML: 9 INJECTION, SOLUTION INTRAVENOUS at 11:46

## 2022-04-14 ASSESSMENT — ENCOUNTER SYMPTOMS
BLOOD IN STOOL: 0
CHILLS: 0
HEMATURIA: 0
APPETITE CHANGE: 0
DIARRHEA: 0
ABDOMINAL PAIN: 1
FEVER: 0
CONSTIPATION: 0
VOMITING: 0

## 2022-04-14 NOTE — LETTER
April 14, 2022      To Whom It May Concern:      Cristina Dean was seen in our Emergency Department today, 04/14/22.  I expect her condition to improve over the next 2 days.  She may return to work when improved.    Sincerely,        Sylvester Lira MD

## 2022-04-14 NOTE — ED PROVIDER NOTES
History   Chief Complaint:  Abdominal Pain       The history is provided by the patient.      Cristina Dean is a 41 year old female with history of hypertension who presents with left sided abdominal pain. Symptoms have been ongoing for 2 days. No known trauma, fever, chills, vomiting, diarrhea, constipation, blood in stool, or hematuria (she is currently menstruating). She is eating normally. Her pain is dull. Movement such as laughing, crying, or coughing exacerbate her symptoms. A few months ago she had a similar episode that turned out to be indigestion. No previous abdominal surgeries. No past medical history of kidney stones.     Review of Systems   Constitutional: Negative for appetite change, chills and fever.   Gastrointestinal: Positive for abdominal pain. Negative for blood in stool, constipation, diarrhea and vomiting.   Genitourinary: Negative for hematuria.   All other systems reviewed and are negative.      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   PCP: Jermain Jaramillo      Physical Exam     Patient Vitals for the past 24 hrs:   BP Temp Temp src Pulse Resp SpO2  Weight   04/14/22 1349 (!) 143/82 97.8  F (36.6  C) Oral 89 20 93 % --   04/14/22 0945 (!) 183/116 98.3  F (36.8  C) Oral 99 20 96 % (!) 171.1 kg (377 lb 3.3 oz)       Physical Exam  Constitutional: Well appearing.  HEENT: Atraumatic.   Moist mucous membranes.  Neck: Soft.  Supple.    Cardiac: Regular rate and rhythm.  No murmur or rub.  Respiratory: Clear to auscultation bilaterally.  No respiratory distress.  Abdomen: Soft with minimal left upper quadrant tenderness to palpation.  No rebound or guarding.  Nondistended.  Musculoskeletal: No edema.  Normal range of motion.  Neurologic: Alert and oriented x3.  Normal tone and bulk.   Skin: No rashes.  No edema.  Psych: Normal affect.  Normal behavior.      Emergency Department Course       Imaging:  CT Abdomen Pelvis w Contrast   Final Result   IMPRESSION:    1.  Enlarged, fatty infiltrated liver.   2.  No acute cause for pain demonstrated.      TIFFANY FOFANA MD            SYSTEM ID:  [id number]        Report per radiology    Laboratory:  Labs Ordered and Resulted from Time of ED Arrival to Time of ED Departure   COMPREHENSIVE METABOLIC PANEL - Abnormal       Result Value    Sodium 139      Potassium 4.2      Chloride 106      Carbon Dioxide (CO2) 29      Anion Gap 4      Urea Nitrogen 13      Creatinine 0.73      Calcium 8.9      Glucose 133 (*)     Alkaline Phosphatase 70      AST 20      ALT 32      Protein Total 6.4 (*)     Albumin 3.0 (*)     Bilirubin Total 0.5      GFR Estimate >90     LIPASE - Normal    Lipase 93     ISTAT HCG QUALITATIVE PREGNANCY POCT - Normal    HCG Qualitative POCT Negative     CBC WITH PLATELETS AND DIFFERENTIAL    WBC Count 9.6      RBC Count 4.81      Hemoglobin 14.4      Hematocrit 45.4      MCV 94      MCH 29.9      MCHC 31.7      RDW 13.5      Platelet Count 315      % Neutrophils 72      % Lymphocytes 16      % Monocytes 6      % Eosinophils 5      % Basophils 1      % Immature Granulocytes 0      NRBCs per 100 WBC 0      Absolute  Neutrophils 7.0      Absolute Lymphocytes 1.5      Absolute Monocytes 0.6      Absolute Eosinophils 0.4      Absolute Basophils 0.1      Absolute Immature Granulocytes 0.0      Absolute NRBCs 0.0     ROUTINE UA WITH MICROSCOPIC REFLEX TO CULTURE        Emergency Department Course:         Reviewed:  I reviewed nursing notes, vitals, past medical history and Care Everywhere    Assessments:  1000 I obtained history and examined the patient as noted above.   1328 I rechecked the patient and explained findings.     Interventions:  1119 Dilaudid 1mg IV   1119 Toradol 15mg IV     Disposition:  The patient was discharged to home.     Impression & Plan   Medical Decision Making:  Cristina Dean is a 39-year-old woman who is afebrile and hemodynamically stable.  Her abdomen is soft and benign without peritoneal signs.  Lab work-up as noted as above and is grossly unrevealing.  She is currently having her menstrual cycle and her UA is difficult to interpret.  She has no symptoms of UTI and will hold off on antibiotics until urine culture returns.  She is in agreement with this.  We discussed plan for CT scan and she was in agreement.  CT scan of the abdomen and pelvis demonstrated no acute abnormality to explain her symptoms.  Her pain is worse with movement and seems musculoskeletal in nature and I discussed this with her.  Discussed plan for home with Flexeril and she was given a day or 2 off work.  We discussed supportive care at home and the need for strict follow-up with her primary care physician.  We discussed strict return precautions and her questions were answered.  She was no distress at time of discharge.    Diagnosis:    ICD-10-CM    1. Abdominal wall pain  R10.9        Discharge Medications:  Discharge Medication List as of 4/14/2022  1:36 PM      START taking these medications    Details   cyclobenzaprine (FLEXERIL) 10 MG tablet Take 1 tablet (10 mg) by mouth 3 times daily as needed for muscle spasms,  Disp-18 tablet, R-0, E-Prescribe             Scribe Disclosure:  I, Key Pickens, am serving as a scribe at 9:51 AM on 4/14/2022 to document services personally performed by Sylvester Lira MD based on my observations and the provider's statements to me.            Sylvester Lira MD  04/14/22 2132

## 2022-04-14 NOTE — ED TRIAGE NOTES
Patient presents with LLQ abdominal pain for the last 2 days.  Denies N/V/D. No fevers at home. States the pain is getting hard to deal with and kept her up last night. Trying tylenol and heat with no relief. Last dose tylenol yesterday.

## 2022-04-15 NOTE — RESULT ENCOUNTER NOTE
[facility] Emergency Dept discharge antibiotic (if prescribed): None  No changes in treatment per [facility] ED Lab Result Urine culture protocol.

## 2022-04-16 LAB — BACTERIA UR CULT: NORMAL

## 2022-06-22 ENCOUNTER — HOSPITAL ENCOUNTER (OUTPATIENT)
Facility: CLINIC | Age: 42
Setting detail: OBSERVATION
Discharge: HOME | End: 2022-06-23
Attending: PHYSICIAN ASSISTANT | Admitting: NURSE PRACTITIONER
Payer: COMMERCIAL

## 2022-06-22 DIAGNOSIS — F43.10 PTSD (POST-TRAUMATIC STRESS DISORDER): ICD-10-CM

## 2022-06-22 DIAGNOSIS — F33.9 EPISODE OF RECURRENT MAJOR DEPRESSIVE DISORDER, UNSPECIFIED DEPRESSION EPISODE SEVERITY (H): ICD-10-CM

## 2022-06-22 DIAGNOSIS — F41.1 GAD (GENERALIZED ANXIETY DISORDER): ICD-10-CM

## 2022-06-22 PROBLEM — F41.0 ANXIETY ATTACK: Status: ACTIVE | Noted: 2022-06-22

## 2022-06-22 LAB — SARS-COV-2 RNA RESP QL NAA+PROBE: NEGATIVE

## 2022-06-22 PROCEDURE — G0378 HOSPITAL OBSERVATION PER HR: HCPCS

## 2022-06-22 PROCEDURE — 90791 PSYCH DIAGNOSTIC EVALUATION: CPT

## 2022-06-22 PROCEDURE — 250N000013 HC RX MED GY IP 250 OP 250 PS 637: Performed by: PHYSICIAN ASSISTANT

## 2022-06-22 PROCEDURE — 99285 EMERGENCY DEPT VISIT HI MDM: CPT | Mod: 25

## 2022-06-22 PROCEDURE — C9803 HOPD COVID-19 SPEC COLLECT: HCPCS

## 2022-06-22 PROCEDURE — 250N000013 HC RX MED GY IP 250 OP 250 PS 637: Performed by: NURSE PRACTITIONER

## 2022-06-22 PROCEDURE — U0003 INFECTIOUS AGENT DETECTION BY NUCLEIC ACID (DNA OR RNA); SEVERE ACUTE RESPIRATORY SYNDROME CORONAVIRUS 2 (SARS-COV-2) (CORONAVIRUS DISEASE [COVID-19]), AMPLIFIED PROBE TECHNIQUE, MAKING USE OF HIGH THROUGHPUT TECHNOLOGIES AS DESCRIBED BY CMS-2020-01-R: HCPCS | Performed by: EMERGENCY MEDICINE

## 2022-06-22 RX ORDER — HYDROXYZINE HYDROCHLORIDE 25 MG/1
25-50 TABLET, FILM COATED ORAL EVERY 6 HOURS PRN
Status: DISCONTINUED | OUTPATIENT
Start: 2022-06-22 | End: 2022-06-23 | Stop reason: HOSPADM

## 2022-06-22 RX ORDER — HYDROXYZINE HYDROCHLORIDE 25 MG/1
50 TABLET, FILM COATED ORAL ONCE
Status: COMPLETED | OUTPATIENT
Start: 2022-06-22 | End: 2022-06-22

## 2022-06-22 RX ADMIN — HYDROXYZINE HYDROCHLORIDE 50 MG: 25 TABLET ORAL at 22:33

## 2022-06-22 RX ADMIN — HYDROXYZINE HYDROCHLORIDE 50 MG: 25 TABLET, FILM COATED ORAL at 16:48

## 2022-06-22 ASSESSMENT — ENCOUNTER SYMPTOMS
SLEEP DISTURBANCE: 1
NERVOUS/ANXIOUS: 1

## 2022-06-22 NOTE — ED NOTES
42 year old female with history of depression, anxiety, agoraphobia and Bipolar received from ED due to increased anxiety and depression. Reports that she has been off her medications since last November due to not being able to afford them along with new associated incontinence. Denies SI/HI.     Nursing and risk assessments completed. Assessments reviewed with LMHP and physician. Video monitoring in progress, patient informed.  Admission information reviewed with patient. Patient given a tour of [facility] and instructions on using the facility. Questions regarding [facility] addressed. Pt search completed and belongings inventoried.
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
## 2022-06-22 NOTE — SAFE
"Cristina CHIDI Dean  June 22, 2022  SAFE Note    Critical Safety Issues: none - hx of suicidal ideation, denied any SI currently, \"just feelings of despair\"      Current Suicidal Ideation/Self-Injurious Concerns/Methods: None - N/A      Current or Historical Inappropriate Sexual Behavior: No      Current or Historical Aggression/Homicidal Ideation: None - N/A      Triggers: none identified    Guardianship Status: [facility] Guardian: is her own guardian.. Guardianship paperwork is not required.    This patient is a child/adolescent: No    This patient has additional special visitor precautions: No    Updated care team: Yes: [facility] care team    For additional details see full [facility] assessment.       Jil Meyersins, LGSW      "

## 2022-06-22 NOTE — ED TRIAGE NOTES
Anxiety attacks getting more frequent and cannot sleep. Stopped antianxiety due to financial issues one year ago.      Triage Assessment     Row Name 06/22/22 1322       Triage Assessment (Adult)    Airway WDL WDL       Respiratory WDL    Respiratory WDL WDL       Skin Circulation/Temperature WDL    Skin Circulation/Temperature WDL WDL       Cardiac WDL    Cardiac WDL WDL       Peripheral/Neurovascular WDL    Peripheral Neurovascular WDL WDL       Cognitive/Neuro/Behavioral WDL    Cognitive/Neuro/Behavioral WDL WDL

## 2022-06-22 NOTE — Clinical Note
Cristina Dean was seen and treated in our emergency department on 6/22/2022.  She may return to work on 06/28/2022.       If you have any questions or concerns, please don't hesitate to call.      Zenaida Joaquin, [facility]

## 2022-06-22 NOTE — ED PROVIDER NOTES
"  History   Chief Complaint:  Panic Attack and Psychiatric Evaluation       The history is provided by the patient.      Cristina Dean is a 42 year old female with history of panic disorder with agoraphobia, anxiety, bipolar disorder, depression, suicidal ideation, and hypertension who presents with panic attack and psychiatric evaluation. Patient reports worsening anxiety, so severe she has not been to work since last Friday. She also reports not sleeping more than 1 hour for the past few days. She endorses marijuana use and states she last smoked this morning at 1000. She denies suicidal ideation, other drug use, and daily medication use. She was previously prescribed hydroxyzine, but she stopped taking it \"months ago.\"    Review of Systems   Psychiatric/Behavioral: Positive for sleep disturbance. Negative for suicidal ideas. The patient is nervous/anxious.         Psychiatric evaluation   All other systems reviewed and are negative.      Allergies:  Amlodipine  Aspirin   Salicylates    Medications:  albuterol (intermittent use)  Atorvastatin - not currently taking due to financial reasons   fluoxetine - not currently taking due to financial reasons   hydroxyzine - not currently taking due to financial reasons   lisinopril- not currently taking due to financial reasons   lurasidone- not currently taking due to financial reasons   omeprazole - not currently taking due to financial reasons   prazosin - not currently taking due to financial reasons   sucralfate- not currently taking due to financial reasons   topiramate- not currently taking due to financial reasons   trazodone- not currently taking due to financial reasons     Past Medical History:     Anxiety  Chlamydia   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   Depression  Herpes   Asthma  Obesity   Urinary incontinence  Bipolar affective disorder   Alcohol use disorder   Cannabis use disorder, mild   Lateral epicondylitis of left " "elbow  Suicidal ideation   Acute stress disorder  Drug overdose, undetermined intent, initial encounter   Hypertension   Panic disorder with agoraphobia    Past Surgical History:    Excise mass back   Tonsillectomy    Family History:    Father: leukemia, neuropathy, type II DM, depression, anxiety     Social History:  Presents alone.   PCP: Jermain Jaramillo  Marijuana use.   Seldom alcohol use.     Physical Exam     Patient Vitals for the past 24 hrs:   BP Temp Temp src Pulse Resp SpO2 Height Weight   06/22/22 1321 (!) 162/84 98.1  F (36.7  C) Temporal 84 16 98 % 1.626 m (5' 4\") (!) 171 kg (377 lb)       Physical Exam  General: Tearful, anxious appearing.   Head:  Scalp is atraumatic.               Neck:  Normal range of motion.   CV:  Normal rate. No murmur. 2+ radial pulses  Resp:  Breath sounds are clear bilaterally. Non-labored, no retractions or accessory muscle use.  GI:  Abdomen is soft, no distension, no tenderness.   MS:  Normal range of motion. No acute deformities.   Skin:  Warm and dry. No rash.   Neuro:  Alert. Strength and sensation grossly intact.   Psych:  Awake. Alert.  Tearful, anxious appearing.       Emergency Department Course     Laboratory:  Labs Ordered and Resulted from Time of ED Arrival to Time of ED Departure   COVID-19 VIRUS (CORONAVIRUS) BY PCR - Normal       Result Value    SARS CoV2 PCR Negative        Emergency Department Course:    Reviewed:  I reviewed nursing notes, vitals, past medical history and Care Everywhere    Assessments:  1632 I obtained history and examined the patient as noted above.     Interventions:  1648 Hydroxyzine 50mg Oral    Disposition:  The patient was discharged to home.     Impression & Plan     Medical Decision Making:  Cristina Dean is a 42 year old female with medical history including bipolar affective disorder, anxiety, panic disorder presents to the emergency department with anxiety.  She reports increased anxiety over the last few days.  She is " been unable to work due to anxiety.  She reports difficulty sleeping due to anxiety.  She was prescribed hydroxyzine in the past but does not currently have a prescription for this.  She is acutely anxious on presentation and tearful.  Dose of hydroxyzine given in the emergency department.  She is not currently taking any of her medications.  Patient reports she smoked marijuana earlier today, no other illicit drug use.  No alcohol use today.  She denies suicidal or homicidal ideation.  She is medically cleared and appropriate for [facility].  Patient is cooperative and agrees with plan.  All questions and concerns addressed prior to transfer to [facility]  Diagnosis:    ICD-10-CM    1. Anxiety attack  F41.0        Scribe Disclosure:  I, Key Pickens, am serving as a scribe at 4:23 PM on 6/22/2022 to document services personally performed by Cira Pearson PA-C based on my observations and the provider's statements to me.          Cira Pearson PA-C  06/22/22 3139

## 2022-06-23 VITALS
HEART RATE: 75 BPM | BODY MASS INDEX: 50.02 KG/M2 | SYSTOLIC BLOOD PRESSURE: 145 MMHG | HEIGHT: 64 IN | RESPIRATION RATE: 18 BRPM | TEMPERATURE: 97.7 F | DIASTOLIC BLOOD PRESSURE: 88 MMHG | WEIGHT: 293 LBS | OXYGEN SATURATION: 96 %

## 2022-06-23 PROCEDURE — 250N000013 HC RX MED GY IP 250 OP 250 PS 637: Performed by: NURSE PRACTITIONER

## 2022-06-23 PROCEDURE — G0378 HOSPITAL OBSERVATION PER HR: HCPCS

## 2022-06-23 RX ORDER — VENLAFAXINE HYDROCHLORIDE 37.5 MG/1
37.5 CAPSULE, EXTENDED RELEASE ORAL
Status: DISCONTINUED | OUTPATIENT
Start: 2022-06-24 | End: 2022-06-23

## 2022-06-23 RX ORDER — VENLAFAXINE HYDROCHLORIDE 75 MG/1
75 CAPSULE, EXTENDED RELEASE ORAL DAILY
Qty: 30 CAPSULE | Refills: 0 | Status: SHIPPED | OUTPATIENT
Start: 2022-06-23 | End: 2023-12-05

## 2022-06-23 RX ORDER — ARIPIPRAZOLE 2 MG/1
2 TABLET ORAL DAILY
Status: DISCONTINUED | OUTPATIENT
Start: 2022-06-23 | End: 2022-06-23 | Stop reason: HOSPADM

## 2022-06-23 RX ORDER — VENLAFAXINE HYDROCHLORIDE 37.5 MG/1
37.5 CAPSULE, EXTENDED RELEASE ORAL DAILY
Status: DISCONTINUED | OUTPATIENT
Start: 2022-06-23 | End: 2022-06-23 | Stop reason: HOSPADM

## 2022-06-23 RX ORDER — VENLAFAXINE HYDROCHLORIDE 37.5 MG/1
37.5 CAPSULE, EXTENDED RELEASE ORAL DAILY
Qty: 3 CAPSULE | Refills: 0 | Status: SHIPPED | OUTPATIENT
Start: 2022-06-23 | End: 2022-06-26

## 2022-06-23 RX ORDER — HYDROXYZINE HYDROCHLORIDE 25 MG/1
25-50 TABLET, FILM COATED ORAL 3 TIMES DAILY PRN
Qty: 30 TABLET | Refills: 0 | Status: SHIPPED | OUTPATIENT
Start: 2022-06-23 | End: 2023-12-05

## 2022-06-23 RX ORDER — ARIPIPRAZOLE 2 MG/1
2 TABLET ORAL DAILY
Qty: 30 TABLET | Refills: 0 | Status: SHIPPED | OUTPATIENT
Start: 2022-06-23 | End: 2023-12-05

## 2022-06-23 RX ADMIN — ARIPIPRAZOLE 2 MG: 2 TABLET ORAL at 10:43

## 2022-06-23 RX ADMIN — VENLAFAXINE HYDROCHLORIDE 37.5 MG: 37.5 CAPSULE, EXTENDED RELEASE ORAL at 11:16

## 2022-06-23 NOTE — DISCHARGE INSTRUCTIONS
You were scheduled a therapy appointment: This is TELEHEALTH  Jacinta SERNA [facility] [employer] [place]  [address], [address]  [phone]  6/25/2022 1:00 pm    You were scheduled a psychiatry appointment: This is TELEHEALTH  Corbin Alexandre MA, CNP,RN [facility]  [address] [place] [address], [address]  [phone]  7/20/2022 11:00 am    You were scheduled a Primary Care Appointment per request with Dr Jaramillo for Tuesday the 28th at 3:40.  This is TELEHEALTH    Your medications supply for a month were sent to [facility].        Aftercare Plan    Follow up with established providers and supports as scheduled. Continue taking medications as prescribed. Abstain from drugs and alcohol. Utilize your [facility] mental health crisis team as needed. They are available 24/7. Contact information is listed below.     If I am feeling unsafe or I am in a crisis, I will:   Contact my established care providers   Call the National Suicide Prevention Lifeline: 580.288.8117   Go to the nearest emergency room   Call 91     Warning signs that I or other people might notice when a crisis is developing for me: changes to sleep, appetite or mood, increased anger, agitation or irritability, feeling depressed or hopeless, spending more time alone or talking less, increased crying, decreased productivity, seeing or hearing things that aren't there, thoughts of not wanting to live anymore or of actually killing myself, thoughts of hurting others    Things I am able to do on my own to cope or help me feel better: watching a favorite tv show or movie, listening to music I enjoy, going outside and breathing fresh air, going for a walk or exercising, taking a shower or bath, a cold or hot beverage, a healthy snack, drawing/coloring/painting, journaling, singing or dancing, deep breathing     I can try practicing square breathing when I begin to feel anxious - inhale  through the nose for the count of 4 and the first line on the square. Exhale through the mouth for the count of 4 for the second line of the square. Repeat to complete the square. Repeat the square as many times as needed.    I can also use my five senses to practice mindfulness and grounding. What are five things I can see, four things I can hear, three things I can feel, two things I can smell, and one thing I can taste.     Things that I am able to do with others to cope or help me feel better: sometimes just talking or spending time with someone else, sharing a meal or having coffee, watching a movie or playing a game, going for a walk or exercising    I can also use community resources including mental health hotlines, [facility] crisis teams, or apps.     Things I can use or do for distraction: movies/tv, music, reading, games, drawing/coloring/painting or other art, essential oils, exercise, cleaning/organizing, puzzles, crossword puzzles, word search, Sudoku       I can also download a meditation or relaxation lori, like Calm, Headspace, or Insight Timer (all three offer a free version)    A great website resource is Change to Chill with active coping skills    Changes I can make to support my mental health and wellness: Attend scheduled mental health therapy and psychiatric appointments. Take my medications as prescribed. Maintain a daily schedule/routine. Abstain from all mood altering substances, including drugs, alcohol, or medications not currently prescribed to me. Implement a self-care routine.      People in my life that I can ask for help: friends or family (, god family), co workers, employer, trusted teachers/staff/colleagues, trusted members of my community or place of [place], mental health crisis lines, or 911    Your [facility] has a mental health crisis team you can call 24/7: [facility] Adult, [phone]    Other things that are important when I m in crisis: to remember that the  "feelings I am having right now are temporary, and it won't feel like this forever, and that it is okay and important to ask for help    Crisis Lines  Crisis Text Line  Text 240889  You will be connected with a trained live crisis counselor to provide support.    Por renetta, texto  MARIBEL a 613915 o texto a 442-AYUDAME en WhatsApp    National Hope Line  1.800.SUICIDE [8305887]      Community Resources  Fast Tracker  Linking people to mental health and substance use disorder resources  to-BBB.Comic Rocket     [place] Mental Health Warm Line  Peer to peer support  Monday thru Saturday, 12 pm to 10 pm  097.757.3747 or 2.490.265.7950  Text \"Support\" to 49136    National [place] on Mental Illness (JOE)  573.691.8820 or 1.888.JOE.HELPS      Mental Health Apps  My3  https://Nexx[employer] Financialpp.org/    VirtualHopeBox  https://Help/Systems/apps/virtual-hope-box/      Additional Information  Today you were seen by a licensed mental health professional through Triage and Transition services, Behavioral Healthcare Providers ([facility])  for a crisis assessment in the Emergency Department at [facility].  It is recommended that you follow up with your established providers (psychiatrist, mental health therapist, and/or primary care doctor - as relevant) as soon as possible. Coordinators from [facility] will be calling you in the next 24-48 hours to ensure that you have the resources you need.  You can also contact [facility] coordinators directly at [phone]. You may have been scheduled for or offered an appointment with a mental health provider. [facility] maintains an extensive network of licensed behavioral health providers to connect patients with the services they need.  We do not charge providers a fee to participate in our referral network.  We match patients with providers based on a patient's specific needs, insurance coverage, and location.  Our first effort will be to refer you to a provider within your care system, and will " utilize providers outside your care system as needed.

## 2022-06-23 NOTE — ED NOTES
Pt is feeling depressed and hopeless this morning. Pt ws dominique to sleep and was resting comfortably in her chir. Pt was feeling very tearful this morning when talking about depression. Pt has no psychiatric support and is looking to gain some support from a therapist and psychiatrist. Pt denies SI. Pt was able to start some medications and was dominique to get appointments for psychiatry and therapy. Pt feels that it would be best going home and resting tonight and f/u with the tx plan. Discharge instructions reviewed with patient including follow-up care plan. Educated on medication regime and advised not to stop prescribed medication without consulting their physician. Reviewed safety plan and outpatient resources.Denies SI. All belongings which where brought into the hospital have been returned to patient. Escorted off the unit at 1455. Discharged to home.

## 2022-06-23 NOTE — ED PROVIDER NOTES
[facility] Unit - Psychiatry  Combined Observation Note and Discharge Summary  [facility] Emergency Department  Observation Initiation Date: Jun 22, 2022    Cristina Dean MRN: [id number]   Age: 42 year old YOB: 1980     History     Chief Complaint   Patient presents with     Panic Attack     Psychiatric Evaluation     HPI  Cristina Dean is a 42 year old female with a past history notable for anxiety, depression, PTSD, possible bipolar disorder, agoraphobia, and suicidal ideation who presents to the emergency department for evaluation of following a panic attack. In addition patient endorsing cannabis use and states she last smoked the morning prior to arrival. Patient had previously been taking psychotropic medications but stopped due to financial concerns. Patient was medically evaluated in the emergency department and found to be cleared for transfer to [facility] for further psychiatric assessment.      Per [facility], patient describing increased stress and marital problems. She has not been sleeping well, only around 2 hours per night. Hx of self-injury decades ago, but no current suicidal ideation.     Here at [facility], patient has been observed to be calm and cooperative, seated in her recliner throughout most of her stay. On interview, she endorses ongoing feelings of depression as well as anxiety. She continues to feel hopeless, endorsing low energy, low motivation, poor concentration. Sleep is poor. Per record review, appears she has been prescribed venlafaxine, duloxetine, and citalopram most recently. Patient also endorses taking lurasidone and topiramate at one point, but reports she had developed urinary incontinence. She has not recently been taking any psychotropic medications. She does endorse possible history of hypomanic symptoms, and also reports that she was told she may have borderline personality disorder. No prior psychiatric hospitalizations. She has been working with a therapist.      Past Medical History  Past Medical History:   Diagnosis Date     Anxiety      Bipolar affective disorder, remission status unspecified (H) 11/13/2017     Chlamydia      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, unspecified      Depression      Herpes      Intermittent asthma     triggers include fall and spring allergy seasons     Obesity 8/1/12    BMI 53     Urinary incontinence 2/1/2017     Past Surgical History:   Procedure Laterality Date     EXCISE MASS BACK Right 12/28/2017    Procedure: EXCISE MASS BACK;  Excision Right Back Mass;  Surgeon: Vicente Pérez MD;  Location: [facility] OR     TONSILLECTOMY  1984     acetaminophen (TYLENOL) 500 MG tablet  atorvastatin (LIPITOR) 10 MG tablet  FLUoxetine (PROZAC) 40 MG capsule  hydrOXYzine (VISTARIL) 50 MG capsule  lisinopril (ZESTRIL) 20 MG tablet  lurasidone (LATUDA) 80 MG TABS tablet  omeprazole (PRILOSEC) 20 MG DR capsule  ondansetron (ZOFRAN ODT) 4 MG ODT tab  sucralfate (CARAFATE) 1 GM/10ML suspension  topiramate (TOPAMAX) 50 MG tablet  traZODone (DESYREL) 50 MG tablet  albuterol (PROAIR HFA/PROVENTIL HFA/VENTOLIN HFA) 108 (90 Base) MCG/ACT inhaler  prazosin (MINIPRESS) 1 MG capsule      Allergies   Allergen Reactions     Amlodipine Swelling     No Known Drug Allergies      Family History  Family History   Problem Relation Age of Onset     Cancer Father         acute leukemia (in remission)     Neurologic Disorder Father         neuropathy from chemotherapy     Diabetes Father         type II DM     Other Cancer Father      Depression Father      Anxiety Disorder Father      Family History Negative Sister      Ovarian Cancer Paternal Aunt      Ovarian Cancer Paternal Aunt      Ovarian Cancer Paternal Aunt      Social History   Social History     Tobacco Use     Smoking status: Current Every Day Smoker     Packs/day: 0.50     Years: 15.00     Pack years: 7.50     Types: Cigarettes     Start date: 6/1/2018     Smokeless tobacco: Never Used  "    Tobacco comment: 1/4 ppd    Substance Use Topics     Alcohol use: Yes     Comment: 1 drink every 2 months      Drug use: Yes     Types: Marijuana     Comment: daily      Past medical history, past surgical history, medications, allergies, family history, and social history were reviewed with the patient. No additional pertinent items.       Review of Systems  A complete review of systems was performed with pertinent positives and negatives noted in the HPI, and all other systems negative.    Physical Examination   BP: (!) 162/84  Pulse: 84  Temp: 98.1  F (36.7  C)  Resp: 16  Height: 162.6 cm (5' 4\")  Weight: (!) 171 kg (377 lb)  SpO2: 98 %    Physical Exam  General: Appears stated age.   Neuro: Alert and fully oriented. Extremities appear to demonstrate normal strength on visual inspection.   Integumentary/Skin: no rash visualized, normal color    Psychiatric Examination   Appearance: awake, alert, adequately groomed, appeared as age stated and casually dressed  Attitude:  cooperative  Eye Contact:  fair  Mood:  depressed  Affect:  mood congruent, intensity is blunted and restricted range  Speech:  clear, coherent and normal prosody  Psychomotor Behavior:  no evidence of tardive dyskinesia, dystonia, or tics and intact station, gait and muscle tone  Thought Process:  logical, linear and goal oriented  Associations:  no loose associations  Thought Content:  no evidence of suicidal ideation or homicidal ideation, no evidence of psychotic thought, no auditory hallucinations present and no visual hallucinations present  Insight:  fair  Judgement:  intact  Oriented to:  time, person, and place  Attention Span and Concentration:  intact  Recent and Remote Memory:  intact  Language: able to name/identify objects without impairment  Fund of Knowledge: intact with awareness of current and past events    ED Course        Labs Ordered and Resulted from Time of ED Arrival to Time of ED Departure   COVID-19 VIRUS " (CORONAVIRUS) BY PCR - Normal       Result Value    SARS CoV2 PCR Negative         Assessments & Plan (with Medical Decision Making)   Patient presenting following a panic attack, describing worsening anxiety and unable to work. Nursing notes reviewed noting no acute issues.     I have reviewed the assessment completed by the [facility].     During the observation period, the patient did not require medications for agitation, and did not require restraints/seclusion for patient and/or provider safety.    The patient was found to have a psychiatric condition that would benefit from an observation stay in the emergency department for further psychiatric stabilization and/or coordination of a safe disposition. The observation plan includes serial assessments of psychiatric condition, potential administration of medications if indicated, further disposition pending the patient's psychiatric course during the monitoring period.     Preliminary diagnosis:    ICD-10-CM    1. PTSD (post-traumatic stress disorder)  F43.10    2. STEPHENIE (generalized anxiety disorder)  F41.1    3. Episode of recurrent major depressive disorder, unspecified depression episode severity (H)  F33.9         Treatment Plan:  - Start venlafaxine XR 37.5 mg daily x 4 days, then increase to 75 mg daily targeting symptoms of anxiety and depressed mood  - Start Abilify 2 mg daily for augmentation and mood stabilization given possible history of hypomanic symptoms  - Hydroxyzine 25 to 50 mg q6h PRN for anxiety  - Patient unsure if she is ready to discharge yet. She certainly could go home later today if feeling safe. She currently denies active thoughts or plans to harm herself.  - Patient will be provided with an appointment for psychiatric medication management as well as individual psychotherapy.  - Encourage to avoid any non-prescribed mind or mood altering substances    After the period in observation care, the patient's circumstances and mental state were  safe for outpatient management. After counseling on the diagnosis, work-up, and treatment plan, the patient was discharged. Close follow-up with a psychiatrist and/or therapist was recommended and community psychiatric resources were provided. Patient is to return to the ED if any urgent or potentially life-threatening concerns.       At the time of discharge, the patient's acute suicide risk was determined to be low due to the following factors: Reduction in the intensity of mood/anxiety symptoms that preceded the admission, denial of suicidal thoughts, denies feeling helpless or helpless, not currently under the influence of alcohol or illicit substances, denies experiencing command hallucinations, no immediate access to firearms. The patient's acute risk could be higher if noncompliant with their treatment plan, medications, follow-up appointments or using illicit substances or alcohol. Protective factors include: social supports, children, stable housing.      --  Justin Vivar CNP   [facility] EMERGENCY DEPT  EmPATH Unit  6/22/2022         Justin Vivar CNP  06/23/22 1018       Justin Vivar CNP  06/23/22 1018       Justin Vivar CNP  06/23/22 1106

## 2022-06-23 NOTE — ED NOTES
[facility] Reassessment and Progress Note    Client Name: Cristina Dean  Date: June 23, 2022     Presenting issue that brought patient to the emPATH unit: Pt presented to [facility] due to concerns of increased anxiety and depression.  Pt acknowledge increased medical stressors, financial stressors, and marriage stressors.    Current presentation on the unit: PT presents as flat and tearful, but insightful and engaged.  PT reports that she is willing to reestablish with outpatient providers.  Pt reports that she feels more comfortable at home and doesn't feel like another day will be helpful for her at this time.  PT presents as future and goal oriented.    Current risk to self or others? No.  Pt reports baseline passive suicidal ideation, without planning or intent.  PT denies any self harm or homicidal ideation.  Pt reviewed safety plan and is engaged in discussing protective factors    Summary of therapeutic interventions completed with patient: discharge and safety planning    Treatment objectives addressed in this session: discharge and safety planning    Progress on treatment goals: completed    Additional collateral information: n.a     Mental Status:     Appearance:   Appropriate    Eye Contact:   Fair    Psychomotor Behavior: Normal    Attitude:   Cooperative    Orientation:   All   Speech    Rate / Production: Normal/ Responsive    Volume:  Normal    Mood:    Anxious  Depressed    Affect:    Appropriate  Tearful   Thought Content:  Clear    Thought Form:  Coherent  Goal Directed    Insight:    Fair       Plan: discharge    Disposition: Individual therapy  and Medication management    Rationale for disposition: Although PT endorses passive suicidal ideation, she denies any current active intent or planning.  PT denies any self harm. Pt denies any current homicidal ideation, intent or planning.  PT is able to engage in safety planning. Pt appears future and goal oriented.  PT is able to engage in a  discussion about their protective factors.  PT does not currently appear to be in need of acute stabilization for overt psychosis, yadi, delusional thinking or paranoia.  PT is appropriate to transition into outpatient community services at this time.     At the time of discharge, the patient's acute suicide risk was determined to be low due to the following factors: Reduction in the intensity of mood/anxiety symptoms that preceded the admission, denial of suicidal thoughts, denies feeling helpless or helpless, not currently under the influence of alcohol or illicit substances, denies experiencing command hallucinations, no immediate access to firearms. The patient's acute risk could be higher if noncompliant with their treatment plan, medications, follow-up appointments or using illicit substances or alcohol. Protective factors include: social supports, stable housing      Reviewed assessment with attending provider: Tyrone ASTUDILLO     Diagnosis: 296.30 (F33.9) Major Depressive Disorder, Recurrent Episode, Unspecified _ without psychotic features - by history  309.81 (F43.10) Posttraumatic Stress Disorder (includes Posttraumatic Stress Disorder for Children 6 Years and Younger)  Without dissociative symptoms - by history   300.02 (F41.1) Generalized Anxiety Disorder - by history     Total time spent with patient:.50 hrs     CPT code: 81911 - Psychotherapy (with patient) - 30 (16-37*) min      Zenaida Joaquin [facility]                                                           You were scheduled a therapy appointment: This is TELEHEALTH  Jacinta SERNA Clink, [employer]  [address], [address]  [phone]  6/25/2022 1:00 pm    You were scheduled a psychiatry appointment: This is TELEHEALTH  Corbin Alexandre MA, CNP,RN [facility] [address], [address] [phone]  7/20/2022 11:00 am    You were scheduled a Primary Care Appointment per request with Dr Jaramillo for Tuesday  the 28th at 3:40.  This is TELEHEALTH    Your medications supply for a month were sent to [facility].        Aftercare Plan    Follow up with established providers and supports as scheduled. Continue taking medications as prescribed. Abstain from drugs and alcohol. Utilize your [facility] mental health crisis team as needed. They are available 24/7. Contact information is listed below.     If I am feeling unsafe or I am in a crisis, I will:   Contact my established care providers   Call the [place] Suicide Prevention Lifeline: [phone]   Go to the nearest emergency room   Call 911     Warning signs that I or other people might notice when a crisis is developing for me: changes to sleep, appetite or mood, increased anger, agitation or irritability, feeling depressed or hopeless, spending more time alone or talking less, increased crying, decreased productivity, seeing or hearing things that aren't there, thoughts of not wanting to live anymore or of actually killing myself, thoughts of hurting others    Things I am able to do on my own to cope or help me feel better: watching a favorite tv show or movie, listening to music I enjoy, going outside and breathing fresh air, going for a walk or exercising, taking a shower or bath, a cold or hot beverage, a healthy snack, drawing/coloring/painting, journaling, singing or dancing, deep breathing     I can try practicing square breathing when I begin to feel anxious - inhale through the nose for the count of 4 and the first line on the square. Exhale through the mouth for the count of 4 for the second line of the square. Repeat to complete the square. Repeat the square as many times as needed.    I can also use my five senses to practice mindfulness and grounding. What are five things I can see, four things I can hear, three things I can feel, two things I can smell, and one thing I can taste.     Things that I am able to do with others to cope or help me feel better: sometimes  just talking or spending time with someone else, sharing a meal or having coffee, watching a movie or playing a game, going for a walk or exercising    I can also use community resources including mental health hotlines, [facility] crisis teams, or apps.     Things I can use or do for distraction: movies/tv, music, reading, games, drawing/coloring/painting or other art, essential oils, exercise, cleaning/organizing, puzzles, crossword puzzles, word search, Sudoku       I can also download a meditation or relaxation lori, like Calm, Headspace, or Insight Timer (all three offer a free version)    A great website resource is Change to Chill with active coping skills    Changes I can make to support my mental health and wellness: Attend scheduled mental health therapy and psychiatric appointments. Take my medications as prescribed. Maintain a daily schedule/routine. Abstain from all mood altering substances, including drugs, alcohol, or medications not currently prescribed to me. Implement a self-care routine.      People in my life that I can ask for help: friends or family (, god family), co workers, employer, trusted teachers/staff/colleagues, trusted members of my community or place of [employer], mental health crisis lines, or 911    Your [facility] has a mental health crisis team you can call 24/7: [facility] Adult, [phone]    Other things that are important when I m in crisis: to remember that the feelings I am having right now are temporary, and it won't feel like this forever, and that it is okay and important to ask for help    Crisis Lines  Crisis Text Line  Text 018041  You will be connected with a trained live crisis counselor to provide support.    Por espanol, texto  MARIBEL a 147902 o texto a 442-AYUDAME en WhatsApp    [place] Hope Line  1.800.SUICIDE [7755482]      Community Resources  Fast Tracker  Linking people to mental health and substance use disorder resources  All Web Leadsn.org  "    [facility] Warm Line  Peer to peer support  Monday thru Saturday, 12 pm to 10 pm  900.498.6888 or 4.425.251.4887  Text \"Support\" to 57602    National [place] [employer] Mental [employer]ness (JOE)  597.749.1335 or 1.888.JOE.HELPS      Mental Health Apps  My3  https://[employer]/    VirtualHopeBox  https://[employer]/apps/virtual-hope-box/      Additional Information  Today you were seen by a licensed mental health professional through Triage and Transition services, [facility] ([facility])  for a crisis assessment in the Emergency Department at [facility].  It is recommended that you follow up with your established providers (psychiatrist, mental health therapist, and/or primary care doctor - as relevant) as soon as possible. Coordinators from [facility] will be calling you in the next 24-48 hours to ensure that you have the resources you need.  You can also contact [facility] coordinators directly at [phone]. You may have been scheduled for or offered an appointment with a mental health provider. [facility] maintains an extensive network of licensed behavioral health providers to connect patients with the services they need.  We do not charge providers a fee to participate in our referral network.  We match patients with providers based on a patient's specific needs, insurance coverage, and location.  Our first effort will be to refer you to a provider within your care system, and will utilize providers outside your care system as needed.      "

## 2022-06-24 ENCOUNTER — PATIENT OUTREACH (OUTPATIENT)
Dept: CARE COORDINATION | Facility: CLINIC | Age: 42
End: 2022-06-24

## 2022-06-24 DIAGNOSIS — Z71.89 OTHER SPECIFIED COUNSELING: ICD-10-CM

## 2022-06-24 NOTE — PROGRESS NOTES
Clinic Care Coordination Contact  [facility]/Voicemail       Clinical Data: Care Coordinator Outreach  Outreach attempted x 1.  Left message on patient's voicemail with call back information and requested return call.  Plan: Care Coordinator will attempt to reach patient again in 1-2 business days.    MAGEN Marinelli   Social Work Clinic Care Coordinator   [facility]  PH: [phone]  [email or web address]

## 2022-06-27 NOTE — PROGRESS NOTES
Clinic Care Coordination Contact  [facility]/Voicemail       Clinical Data: Care Coordinator Outreach  Outreach attempted x 2.  Left message on patient's voicemail with call back information and requested return call.    Plan: Care Coordinator will do no further outreaches at this time.    MAGEN Hardy  Connected Care Resource Center  [facility]     *Connected Care Resource Team does NOT follow patient ongoing. Referrals are identified based on internal discharge reports and the outreach is to ensure patient has an understanding of their discharge instructions.

## 2022-09-28 ENCOUNTER — OFFICE VISIT (OUTPATIENT)
Dept: OBGYN | Facility: CLINIC | Age: 42
End: 2022-09-28
Attending: ADVANCED PRACTICE MIDWIFE
Payer: COMMERCIAL

## 2022-09-28 VITALS
SYSTOLIC BLOOD PRESSURE: 142 MMHG | HEART RATE: 76 BPM | BODY MASS INDEX: 50.02 KG/M2 | HEIGHT: 64 IN | WEIGHT: 293 LBS | DIASTOLIC BLOOD PRESSURE: 96 MMHG

## 2022-09-28 DIAGNOSIS — Z00.00 ENCOUNTER FOR PREVENTIVE HEALTH EXAMINATION: Primary | ICD-10-CM

## 2022-09-28 DIAGNOSIS — N76.0 BV (BACTERIAL VAGINOSIS): ICD-10-CM

## 2022-09-28 DIAGNOSIS — B96.89 BV (BACTERIAL VAGINOSIS): ICD-10-CM

## 2022-09-28 DIAGNOSIS — Z11.3 SCREEN FOR STD (SEXUALLY TRANSMITTED DISEASE): ICD-10-CM

## 2022-09-28 DIAGNOSIS — R06.2 WHEEZING: ICD-10-CM

## 2022-09-28 DIAGNOSIS — J45.20 INTERMITTENT ASTHMA WITHOUT COMPLICATION, UNSPECIFIED ASTHMA SEVERITY: ICD-10-CM

## 2022-09-28 DIAGNOSIS — F17.210 CIGARETTE SMOKER ONE HALF PACK A DAY OR LESS: ICD-10-CM

## 2022-09-28 LAB
CLUE CELLS: POSITIVE
TRICHOMONAS (WET PREP): NEGATIVE
WBC (WET PREP): POSITIVE
YEAST (WET PREP): NEGATIVE

## 2022-09-28 PROCEDURE — 90686 IIV4 VACC NO PRSV 0.5 ML IM: CPT

## 2022-09-28 PROCEDURE — 87491 CHLMYD TRACH DNA AMP PROBE: CPT | Performed by: ADVANCED PRACTICE MIDWIFE

## 2022-09-28 PROCEDURE — G0463 HOSPITAL OUTPT CLINIC VISIT: HCPCS | Mod: 25

## 2022-09-28 PROCEDURE — 87210 SMEAR WET MOUNT SALINE/INK: CPT | Performed by: ADVANCED PRACTICE MIDWIFE

## 2022-09-28 PROCEDURE — 87624 HPV HI-RISK TYP POOLED RSLT: CPT | Performed by: ADVANCED PRACTICE MIDWIFE

## 2022-09-28 PROCEDURE — 99386 PREV VISIT NEW AGE 40-64: CPT | Performed by: ADVANCED PRACTICE MIDWIFE

## 2022-09-28 PROCEDURE — G0008 ADMIN INFLUENZA VIRUS VAC: HCPCS

## 2022-09-28 PROCEDURE — 87591 N.GONORRHOEAE DNA AMP PROB: CPT | Performed by: ADVANCED PRACTICE MIDWIFE

## 2022-09-28 PROCEDURE — 250N000011 HC RX IP 250 OP 636

## 2022-09-28 PROCEDURE — G0145 SCR C/V CYTO,THINLAYER,RESCR: HCPCS | Performed by: ADVANCED PRACTICE MIDWIFE

## 2022-09-28 RX ORDER — METRONIDAZOLE 500 MG/1
500 TABLET ORAL 2 TIMES DAILY
Qty: 14 TABLET | Refills: 0 | Status: SHIPPED | OUTPATIENT
Start: 2022-09-28 | End: 2022-10-05

## 2022-09-28 RX ORDER — ALBUTEROL SULFATE 90 UG/1
2 AEROSOL, METERED RESPIRATORY (INHALATION) EVERY 6 HOURS
Qty: 8 G | Refills: 1 | Status: SHIPPED | OUTPATIENT
Start: 2022-09-28 | End: 2023-12-05

## 2022-09-28 ASSESSMENT — ANXIETY QUESTIONNAIRES
GAD7 TOTAL SCORE: 20
6. BECOMING EASILY ANNOYED OR IRRITABLE: NEARLY EVERY DAY
IF YOU CHECKED OFF ANY PROBLEMS ON THIS QUESTIONNAIRE, HOW DIFFICULT HAVE THESE PROBLEMS MADE IT FOR YOU TO DO YOUR WORK, TAKE CARE OF THINGS AT HOME, OR GET ALONG WITH OTHER PEOPLE: EXTREMELY DIFFICULT
2. NOT BEING ABLE TO STOP OR CONTROL WORRYING: MORE THAN HALF THE DAYS
GAD7 TOTAL SCORE: 20
6. BECOMING EASILY ANNOYED OR IRRITABLE: NEARLY EVERY DAY
8. IF YOU CHECKED OFF ANY PROBLEMS, HOW DIFFICULT HAVE THESE MADE IT FOR YOU TO DO YOUR WORK, TAKE CARE OF THINGS AT HOME, OR GET ALONG WITH OTHER PEOPLE?: EXTREMELY DIFFICULT
7. FEELING AFRAID AS IF SOMETHING AWFUL MIGHT HAPPEN: MORE THAN HALF THE DAYS
5. BEING SO RESTLESS THAT IT IS HARD TO SIT STILL: SEVERAL DAYS
2. NOT BEING ABLE TO STOP OR CONTROL WORRYING: NEARLY EVERY DAY
7. FEELING AFRAID AS IF SOMETHING AWFUL MIGHT HAPPEN: NEARLY EVERY DAY
5. BEING SO RESTLESS THAT IT IS HARD TO SIT STILL: MORE THAN HALF THE DAYS
3. WORRYING TOO MUCH ABOUT DIFFERENT THINGS: NEARLY EVERY DAY
3. WORRYING TOO MUCH ABOUT DIFFERENT THINGS: MORE THAN HALF THE DAYS
7. FEELING AFRAID AS IF SOMETHING AWFUL MIGHT HAPPEN: NEARLY EVERY DAY
1. FEELING NERVOUS, ANXIOUS, OR ON EDGE: NEARLY EVERY DAY
GAD7 TOTAL SCORE: 14
1. FEELING NERVOUS, ANXIOUS, OR ON EDGE: MORE THAN HALF THE DAYS
4. TROUBLE RELAXING: NEARLY EVERY DAY

## 2022-09-28 ASSESSMENT — PATIENT HEALTH QUESTIONNAIRE - PHQ9
SUM OF ALL RESPONSES TO PHQ QUESTIONS 1-9: 21
5. POOR APPETITE OR OVEREATING: MORE THAN HALF THE DAYS

## 2022-09-28 NOTE — PROGRESS NOTES
Progress Note    SUBJECTIVE:  Cristina Dean is an 42 year old, , who requests an Annual Preventive Exam.   She is a former patient to the [facility].   LMP 22, regular monthly cycles  Cristina is tearful today discussing her current relationship situation. She has been with the same partner for 11 years. He started to drink excessively during the pandemic and she is now realizing that she needs to leave him. She has been staying with her mother and her friend. She has been sexually active with one person since they broke up. Cristina is interested in talking with a mental health provider to help her through this difficult process. Cristina reports she has a history of bipolar disorder. She is no longer taking her Abilify but is taking her Effexor.  Cristina has a history of alcohol abuse, but now reports she has two drinks a week  She is currently smoking half a pack in one week.   She requests an STD screen today  She has a long history of BV and is requesting a wet prep as well.     Cristina reports she gained 100lbs during the pandemic. Current BMI is 63. 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  Cristina's father was diagnosed with Type II diabetes, he changed his diet and started to exercise and is no longer diabetic.  She is interested in comprehensive weight management referral    The patient reports that there is not domestic violence in her life.      Denies abnormal vaginal discharge, itching, irritation, odor, dyspareunia, or dysuria.      Last pap 10/2017 NILM, due today      Menstrual History:  Menstrual History 2020 3/1/2021 2022   LAST MENSTRUAL PERIOD 3/24/2020 3/1/2021 2022       Last    Lab Results   Component Value Date    PAP NIL 10/09/2017     History of abnormal Pap smear: NO - age 30-65 PAP every 5  years with negative HPV co-testing recommended    Last No results found for: HPV16  Last No results found for: HPV18  Last No results found for: HRHPV    Mammogram current: due for repeat mammogram  Last Mammogram:   *MA Screening Digital Bilateral    Result Date: 3/10/2021  Narrative: SCREENING MAMMOGRAM, BILATERAL, DIGITAL w/CAD - 3/9/2021 11:40 AM BREAST SYMPTOMS: No current breast complaints. COMPARISON:  1/08/2020. BREAST DENSITY: Scattered fibroglandular densities. COMMENTS: No findings of suspicion for malignancy.     *MA Screening Digital Bilateral    Result Date: 1/8/2020  Narrative: SCREENING MAMMOGRAM, BILATERAL, DIGITAL w/CAD - 1/8/2020 3:10 PM BREAST SYMPTOMS: No current breast complaints. COMPARISON:  Baseline. BREAST DENSITY: Scattered fibroglandular densities. COMMENTS: No findings of suspicion for malignancy.        Last Colonoscopy:  No results found for this or any previous visit.      HISTORY:  acetaminophen (TYLENOL) 500 MG tablet, Take 1,000 mg by mouth every 8 hours as needed for pain (right shoulder) (Patient not taking: Reported on 9/28/2022)  ARIPiprazole (ABILIFY) 2 MG tablet, Take 1 tablet (2 mg) by mouth daily (Patient not taking: Reported on 9/28/2022)  atorvastatin (LIPITOR) 10 MG tablet, TAKE 1 TABLET BY MOUTH EVERY DAY (Patient not taking: Reported on 9/28/2022)  hydrOXYzine (ATARAX) 25 MG tablet, Take 1-2 tablets (25-50 mg) by mouth 3 times daily as needed for anxiety (Patient not taking: Reported on 9/28/2022)  hydrOXYzine (VISTARIL) 50 MG capsule, Take 1 capsule (50 mg) by mouth 3 times daily as needed for anxiety (Patient not taking: Reported on 9/28/2022)  lisinopril (ZESTRIL) 20 MG tablet, Take 1 tablet (20 mg) by mouth daily (Patient not taking: Reported on 9/28/2022)  omeprazole (PRILOSEC) 20 MG DR capsule, TAKE 1 CAPSULE (20 MG) BY MOUTH EVERY MORNING (BEFORE BREAKFAST) (Patient not taking: Reported on 9/28/2022)  ondansetron (ZOFRAN ODT) 4 MG ODT tab, Take 1 tablet (4 mg)  by mouth every 6 hours as needed for nausea or vomiting (Patient not taking: Reported on 2022)  sucralfate (CARAFATE) 1 GM/10ML suspension, Take 10 mLs (1 g) by mouth 4 times daily as needed for nausea (Pain) (Patient not taking: Reported on 2022)  traZODone (DESYREL) 50 MG tablet, Take 50 mg by mouth as needed  (Patient not taking: Reported on 2022)  venlafaxine (EFFEXOR XR) 37.5 MG 24 hr capsule, Take 1 capsule (37.5 mg) by mouth daily for 3 days then begin the 75 mg capsule daily  venlafaxine (EFFEXOR XR) 75 MG 24 hr capsule, Take 1 capsule (75 mg) by mouth daily after taking the 37.5 mg capsule for 3 days (Patient not taking: Reported on 2022)  [DISCONTINUED] FLUoxetine (PROZAC) 40 MG capsule, Take 1 capsule (40 mg) by mouth daily  [DISCONTINUED] lurasidone (LATUDA) 80 MG TABS tablet, Take 1 tablet (80 mg) by mouth daily with food  [DISCONTINUED] prazosin (MINIPRESS) 1 MG capsule, Take 1 capsule (1 mg) by mouth At Bedtime  [DISCONTINUED] topiramate (TOPAMAX) 50 MG tablet, TAKE 1 TABLET BY MOUTH TWICE A DAY    No current facility-administered medications on file prior to visit.    Allergies   Allergen Reactions     Amlodipine Swelling     No Known Drug Allergies      Immunization History   Administered Date(s) Administered     COVID-19,,Pfizer (12+ Yrs) 2021, 06/10/2021     Influenza Vaccine IM > 6 months Valent IIV4 (Alfuria,Fluzone) 10/23/2017, 2020, 2022     Pneumococcal 23 valent 2020     TDAP Vaccine (Adacel) 10/23/2017     Tdap (Adacel,Boostrix) 2004       OB History    Para Term  AB Living   1 0 0 0 1 0   SAB IAB Ectopic Multiple Live Births   0 0 0 0 0     Past Medical History:   Diagnosis Date     Anxiety      Bipolar affective disorder, remission status unspecified (H) 2017     Chlamydia      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, unspecified      Depression      Genital herpes      Herpes      Intermittent  asthma     triggers include fall and spring allergy seasons     Obesity 08/01/2012    BMI 53     PID (pelvic inflammatory disease)      Urinary incontinence 02/01/2017     Past Surgical History:   Procedure Laterality Date     EXCISE MASS BACK Right 12/28/2017    Procedure: EXCISE MASS BACK;  Excision Right Back Mass;  Surgeon: Vicente Pérez MD;  Location: [facility] OR     TONSILLECTOMY  1984     Family History   Problem Relation Age of Onset     Cancer Father         acute leukemia (in remission)     Neurologic Disorder Father         neuropathy from chemotherapy     Diabetes Father         type II DM     Other Cancer Father      Depression Father      Anxiety Disorder Father      Family History Negative Sister      Ovarian Cancer Paternal Aunt      Ovarian Cancer Paternal Aunt      Ovarian Cancer Paternal Aunt      Social History     Socioeconomic History     Marital status:    Tobacco Use     Smoking status: Current Every Day Smoker     Packs/day: 0.50     Years: 15.00     Pack years: 7.50     Types: Cigarettes     Start date: 6/1/2018     Smokeless tobacco: Never Used     Tobacco comment: 1/4 ppd    Substance and Sexual Activity     Alcohol use: Yes     Comment: 1 drink every 2 months      Drug use: Yes     Types: Marijuana     Comment: daily     Sexual activity: Yes     Partners: Male     Birth control/protection: Condom       ROS  Review Of Systems  Skin: negative  Eyes: negative  Ears/Nose/Throat: negative  Respiratory: wheezing occasionally  Cardiovascular: negative  Gastrointestinal: negative  Genitourinary: negative  Musculoskeletal: negative  Neurologic: negative  Psychiatric: depression  Hematologic/Lymphatic/Immunologic: negative  Endocrine: weight gain    PHQ-9 SCORE 3/13/2020 5/22/2020 9/28/2022   PHQ-9 Total Score - - -   PHQ-9 Total Score MyChart 17 (Moderately severe depression) - -   PHQ-9 Total Score 17 22 21     STEPHENIE-7 SCORE 3/13/2020 9/28/2022 9/28/2022   Total Score - - -   Total Score 19  "(severe anxiety) - 20 (severe anxiety)   Total Score 19 20 14         EXAM:  Blood pressure (!) 142/96, pulse 76, height 1.626 m (5' 4\"), weight (!) 168 kg (370 lb 4.8 oz), last menstrual period 08/18/2022, not currently breastfeeding. Body mass index is 63.56 kg/m .  General - pleasant female in no acute distress.  Skin - no suspicious lesions or rashes  EENT-  PERRLA, euthyroid with out palpable nodules  Neck - supple without lymphadenopathy.  Lungs - wheezing noted in right upper lobe. Other lobes clear   Heart - regular rate and rhythm without murmur.  Abdomen - soft, nontender, nondistended, no masses or organomegaly noted.  Musculoskeletal - no gross deformities.  Neurological - normal strength, sensation, and mental status.    Breast Exam:  Breast: Without visible skin changes. No dimpling or lesions seen.   Breasts supple, non-tender with palpation, no dominant mass, nodularity, or nipple discharge noted bilaterally. Axillary nodes negative.      Pelvic Exam:  EG/BUS: Normal genital architecture without lesions, erythema or abnormal secretions Bartholin's, Urethra, [place] normal   Urethral meatus: normal   Urethra: no masses, tenderness, or scarring   Bladder: no masses or tenderness   Vagina: moist, pink, rugae with creamy, white and odorless  secretions  Cervix: no lesions, pink, moist, closed, without lesion or CMT pap and GC/CT collected. Long graves speculum used, Dr. Trevino assisted with visualizing pt's cervix  Uterus: midposition, and exam findings compromised by body habitus  Adnexa: Not palpable secondary to body habitus  Rectum:anus normal       ASSESSMENT:  Encounter Diagnoses   Name Primary?     Encounter for preventive health examination Yes     Screen for STD (sexually transmitted disease)      BV (bacterial vaginosis)      Intermittent asthma without complication, unspecified asthma severity      Wheezing      BMI 60.0-69.9, adult (H)      Cigarette smoker one half pack a day or less     "     PLAN:   Orders Placed This Encounter   Procedures     Obtaining, preparing and conveyance of cervical or vaginal smear to laboratory.     MA Screening Digital Bilateral     INFLUENZA VACCINE IM >6 MO VALENT IIV4 (AFLURIA/FLUZONE)     Lipid Profile     Glucose     Hemoglobin A1c     HIV Antigen Antibody Combo     Treponema Abs w Reflex to RPR and Titer     Hepatitis B surface antigen     Hepatitis C antibody     Comprehensive Weight Management     Wet Prep POCT     Orders Placed This Encounter   Medications     metroNIDAZOLE (FLAGYL) 500 MG tablet     Sig: Take 1 tablet (500 mg) by mouth 2 times daily for 7 days     Dispense:  14 tablet     Refill:  0     albuterol (PROAIR HFA/PROVENTIL HFA/VENTOLIN HFA) 108 (90 Base) MCG/ACT inhaler     Sig: Inhale 2 puffs into the lungs every 6 hours     Dispense:  8 g     Refill:  1     Pharmacy may dispense brand covered by insurance (Proair, or proventil or ventolin or generic albuterol inhaler)       -BV- Rx Metronidazole 500mg BID x 7 days  -Referred to Prisca Mallory for mental health support for relationship concerns.   -Mammogram order placed  -f/u pap and STD screen  -Comprehensive Weight Management referral placed  -Encouraged smoking cessation  -Albuterol inhaler referral  -Encouraged establishing care with a PCP (Dr. Schmidt or Dr. Sousa if she chooses to come here)   Return to clinic in one year.  Follow-up as needed.        Answers for HPI/ROS submitted by the patient on 9/28/2022  STEPHENIE 7 TOTAL SCORE: 20

## 2022-09-28 NOTE — LETTER
2022       RE: Cristina Dean  [address]  [place] [address]     Dear Colleague,    Thank you for referring your patient, Cristina Dean, to the [facility] [place] at [facility]. Please see a copy of my visit note below.      Progress Note    SUBJECTIVE:  Cristina Dean is an 42 year old, , who requests an Annual Preventive Exam.   She is a former patient to the [facility].   LMP 22, regular monthly cycles  Cristina is tearful today discussing her current relationship situation. She has been with the same partner for 11 years. He started to drink excessively during the pandemic and she is now realizing that she needs to leave him. She has been staying with her mother and her friend. She has been sexually active with one person since they broke up. Cristina is interested in talking with a mental health provider to help her through this difficult process. Cristina reports she has a history of bipolar disorder. She is no longer taking her Abilify but is taking her Effexor.  Cristina has a history of alcohol abuse, but now reports she has two drinks a week  She is currently smoking half a pack in one week.   She requests an STD screen today  She has a long history of BV and is requesting a wet prep as well.     Cristina reports she gained 100lbs during the pandemic. Current BMI is 63. 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  Cristina's father was diagnosed with Type II diabetes, he changed his diet and started to exercise and is no longer diabetic.  She is interested in comprehensive weight management referral    The patient reports that there is not domestic violence in her life.      Denies abnormal vaginal discharge, itching, irritation, odor,  dyspareunia, or dysuria.      Last pap 10/2017 NILM, due today      Menstrual History:  Menstrual History 4/27/2020 3/1/2021 9/28/2022   LAST MENSTRUAL PERIOD 3/24/2020 3/1/2021 8/18/2022       Last    Lab Results   Component Value Date    PAP NIL 10/09/2017     History of abnormal Pap smear: NO - age 30-65 PAP every 5 years with negative HPV co-testing recommended    Last No results found for: HPV16  Last No results found for: HPV18  Last No results found for: HRHPV    Mammogram current: due for repeat mammogram  Last Mammogram:   *MA Screening Digital Bilateral    Result Date: 3/10/2021  Narrative: SCREENING MAMMOGRAM, BILATERAL, DIGITAL w/CAD - 3/9/2021 11:40 AM BREAST SYMPTOMS: No current breast complaints. COMPARISON:  1/08/2020. BREAST DENSITY: Scattered fibroglandular densities. COMMENTS: No findings of suspicion for malignancy.     *MA Screening Digital Bilateral    Result Date: 1/8/2020  Narrative: SCREENING MAMMOGRAM, BILATERAL, DIGITAL w/CAD - 1/8/2020 3:10 PM BREAST SYMPTOMS: No current breast complaints. COMPARISON:  Baseline. BREAST DENSITY: Scattered fibroglandular densities. COMMENTS: No findings of suspicion for malignancy.        Last Colonoscopy:  No results found for this or any previous visit.      HISTORY:  acetaminophen (TYLENOL) 500 MG tablet, Take 1,000 mg by mouth every 8 hours as needed for pain (right shoulder) (Patient not taking: Reported on 9/28/2022)  ARIPiprazole (ABILIFY) 2 MG tablet, Take 1 tablet (2 mg) by mouth daily (Patient not taking: Reported on 9/28/2022)  atorvastatin (LIPITOR) 10 MG tablet, TAKE 1 TABLET BY MOUTH EVERY DAY (Patient not taking: Reported on 9/28/2022)  hydrOXYzine (ATARAX) 25 MG tablet, Take 1-2 tablets (25-50 mg) by mouth 3 times daily as needed for anxiety (Patient not taking: Reported on 9/28/2022)  hydrOXYzine (VISTARIL) 50 MG capsule, Take 1 capsule (50 mg) by mouth 3 times daily as needed for anxiety (Patient not taking: Reported on  2022)  lisinopril (ZESTRIL) 20 MG tablet, Take 1 tablet (20 mg) by mouth daily (Patient not taking: Reported on 2022)  omeprazole (PRILOSEC) 20 MG DR capsule, TAKE 1 CAPSULE (20 MG) BY MOUTH EVERY MORNING (BEFORE BREAKFAST) (Patient not taking: Reported on 2022)  ondansetron (ZOFRAN ODT) 4 MG ODT tab, Take 1 tablet (4 mg) by mouth every 6 hours as needed for nausea or vomiting (Patient not taking: Reported on 2022)  sucralfate (CARAFATE) 1 GM/10ML suspension, Take 10 mLs (1 g) by mouth 4 times daily as needed for nausea (Pain) (Patient not taking: Reported on 2022)  traZODone (DESYREL) 50 MG tablet, Take 50 mg by mouth as needed  (Patient not taking: Reported on 2022)  venlafaxine (EFFEXOR XR) 37.5 MG 24 hr capsule, Take 1 capsule (37.5 mg) by mouth daily for 3 days then begin the 75 mg capsule daily  venlafaxine (EFFEXOR XR) 75 MG 24 hr capsule, Take 1 capsule (75 mg) by mouth daily after taking the 37.5 mg capsule for 3 days (Patient not taking: Reported on 2022)  [DISCONTINUED] FLUoxetine (PROZAC) 40 MG capsule, Take 1 capsule (40 mg) by mouth daily  [DISCONTINUED] lurasidone (LATUDA) 80 MG TABS tablet, Take 1 tablet (80 mg) by mouth daily with food  [DISCONTINUED] prazosin (MINIPRESS) 1 MG capsule, Take 1 capsule (1 mg) by mouth At Bedtime  [DISCONTINUED] topiramate (TOPAMAX) 50 MG tablet, TAKE 1 TABLET BY MOUTH TWICE A DAY    No current facility-administered medications on file prior to visit.    Allergies   Allergen Reactions     Amlodipine Swelling     No Known Drug Allergies      Immunization History   Administered Date(s) Administered     COVID-BRITTNI Lindsey,Pfizer (12+ Yrs) 2021, 06/10/2021     Influenza Vaccine IM > 6 months Valent IIV4 (Alfuria,Fluzone) 10/23/2017, 2020, 2022     Pneumococcal 23 valent 2020     TDAP Vaccine (Adacel) 10/23/2017     Tdap (Adacel,Boostrix) 2004       OB History    Para Term  AB Living   1 0 0 0 1 0   SAB  IAB Ectopic Multiple Live Births   0 0 0 0 0     Past Medical History:   Diagnosis Date     Anxiety      Bipolar affective disorder, remission status unspecified (H) 11/13/2017     Chlamydia      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, unspecified      Depression      Genital herpes      Herpes      Intermittent asthma     triggers include fall and spring allergy seasons     Obesity 08/01/2012    BMI 53     PID (pelvic inflammatory disease)      Urinary incontinence 02/01/2017     Past Surgical History:   Procedure Laterality Date     EXCISE MASS BACK Right 12/28/2017    Procedure: EXCISE MASS BACK;  Excision Right Back Mass;  Surgeon: Vicente Pérez MD;  Location: [facility] OR     TONSILLECTOMY  1984     Family History   Problem Relation Age of Onset     Cancer Father         acute leukemia (in remission)     Neurologic Disorder Father         neuropathy from chemotherapy     Diabetes Father         type II DM     Other Cancer Father      Depression Father      Anxiety Disorder Father      Family History Negative Sister      Ovarian Cancer Paternal Aunt      Ovarian Cancer Paternal Aunt      Ovarian Cancer Paternal Aunt      Social History     Socioeconomic History     Marital status:    Tobacco Use     Smoking status: Current Every Day Smoker     Packs/day: 0.50     Years: 15.00     Pack years: 7.50     Types: Cigarettes     Start date: 6/1/2018     Smokeless tobacco: Never Used     Tobacco comment: 1/4 ppd    Substance and Sexual Activity     Alcohol use: Yes     Comment: 1 drink every 2 months      Drug use: Yes     Types: Marijuana     Comment: daily     Sexual activity: Yes     Partners: Male     Birth control/protection: Condom       ROS  Review Of Systems  Skin: negative  Eyes: negative  Ears/Nose/Throat: negative  Respiratory: wheezing occasionally  Cardiovascular: negative  Gastrointestinal: negative  Genitourinary: negative  Musculoskeletal: negative  Neurologic:  "negative  Psychiatric: depression  Hematologic/Lymphatic/Immunologic: negative  Endocrine: weight gain    PHQ-9 SCORE 3/13/2020 5/22/2020 9/28/2022   PHQ-9 Total Score - - -   PHQ-9 Total Score MyChart 17 (Moderately severe depression) - -   PHQ-9 Total Score 17 22 21     STEPHENIE-7 SCORE 3/13/2020 9/28/2022 9/28/2022   Total Score - - -   Total Score 19 (severe anxiety) - 20 (severe anxiety)   Total Score 19 20 14         EXAM:  Blood pressure (!) 142/96, pulse 76, height 1.626 m (5' 4\"), weight (!) 168 kg (370 lb 4.8 oz), last menstrual period 08/18/2022, not currently breastfeeding. Body mass index is 63.56 kg/m .  General - pleasant female in no acute distress.  Skin - no suspicious lesions or rashes  EENT-  PERRLA, euthyroid with out palpable nodules  Neck - supple without lymphadenopathy.  Lungs - wheezing noted in right upper lobe. Other lobes clear   Heart - regular rate and rhythm without murmur.  Abdomen - soft, nontender, nondistended, no masses or organomegaly noted.  Musculoskeletal - no gross deformities.  Neurological - normal strength, sensation, and mental status.    Breast Exam:  Breast: Without visible skin changes. No dimpling or lesions seen.   Breasts supple, non-tender with palpation, no dominant mass, nodularity, or nipple discharge noted bilaterally. Axillary nodes negative.      Pelvic Exam:  EG/BUS: Normal genital architecture without lesions, erythema or abnormal secretions Bartholin's, Urethra, [place]'s normal   Urethral meatus: normal   Urethra: no masses, tenderness, or scarring   Bladder: no masses or tenderness   Vagina: moist, pink, rugae with creamy, white and odorless  secretions  Cervix: no lesions, pink, moist, closed, without lesion or CMT pap and GC/CT collected. Long graves speculum used, Dr. Trevino assisted with visualizing pt's cervix  Uterus: midposition, and exam findings compromised by body habitus  Adnexa: Not palpable secondary to body habitus  Rectum:anus normal "       ASSESSMENT:  Encounter Diagnoses   Name Primary?     Encounter for preventive health examination Yes     Screen for STD (sexually transmitted disease)      BV (bacterial vaginosis)      Intermittent asthma without complication, unspecified asthma severity      Wheezing      BMI 60.0-69.9, adult (H)      Cigarette smoker one half pack a day or less         PLAN:   Orders Placed This Encounter   Procedures     Obtaining, preparing and conveyance of cervical or vaginal smear to laboratory.     MA Screening Digital Bilateral     INFLUENZA VACCINE IM >6 MO VALENT IIV4 (AFLURIA/FLUZONE)     Lipid Profile     Glucose     Hemoglobin A1c     HIV Antigen Antibody Combo     Treponema Abs w Reflex to RPR and Titer     Hepatitis B surface antigen     Hepatitis C antibody     Comprehensive Weight Management     Wet Prep POCT     Orders Placed This Encounter   Medications     metroNIDAZOLE (FLAGYL) 500 MG tablet     Sig: Take 1 tablet (500 mg) by mouth 2 times daily for 7 days     Dispense:  14 tablet     Refill:  0     albuterol (PROAIR HFA/PROVENTIL HFA/VENTOLIN HFA) 108 (90 Base) MCG/ACT inhaler     Sig: Inhale 2 puffs into the lungs every 6 hours     Dispense:  8 g     Refill:  1     Pharmacy may dispense brand covered by insurance (Proair, or proventil or ventolin or generic albuterol inhaler)       -BV- Rx Metronidazole 500mg BID x 7 days  -Referred to Prisca Mallory for mental health support for relationship concerns.   -Mammogram order placed  -f/u pap and STD screen  -Comprehensive Weight Management referral placed  -Encouraged smoking cessation  -Albuterol inhaler referral  -Encouraged establishing care with a PCP (Dr. Schmidt or Dr. Sousa if she chooses to come here)   Return to clinic in one year.  Follow-up as needed.        Answers for HPI/ROS submitted by the patient on 9/28/2022  STEPHENIE 7 TOTAL SCORE: 20

## 2022-09-29 PROBLEM — F17.210 CIGARETTE SMOKER ONE HALF PACK A DAY OR LESS: Status: ACTIVE | Noted: 2022-09-29

## 2022-09-29 LAB
C TRACH DNA SPEC QL NAA+PROBE: NEGATIVE
N GONORRHOEA DNA SPEC QL NAA+PROBE: NEGATIVE

## 2022-09-30 ENCOUNTER — VIRTUAL VISIT (OUTPATIENT)
Dept: PSYCHOLOGY | Facility: CLINIC | Age: 42
End: 2022-09-30
Payer: COMMERCIAL

## 2022-09-30 DIAGNOSIS — Z53.8 PROCEDURE NOT CARRIED OUT FOR OTHER REASONS: Primary | ICD-10-CM

## 2022-09-30 PROCEDURE — 99207 PR NO BILLABLE SERVICE THIS VISIT: CPT

## 2022-09-30 NOTE — PROGRESS NOTES
Called pt on 9/28 per referral, Jessy Tapia CNM.  Pt agreed to schedule visit on 9/30.  Called pt for visit 3x, no answer.  Left v/ms: encouraged pt to reschedule, also send mychart as needed

## 2022-10-01 LAB
BKR LAB AP GYN ADEQUACY: NORMAL
BKR LAB AP GYN INTERPRETATION: NORMAL
BKR LAB AP HPV REFLEX: NORMAL
BKR LAB AP LMP: NORMAL
BKR LAB AP PREVIOUS ABNORMAL: NORMAL
PATH REPORT.COMMENTS IMP SPEC: NORMAL
PATH REPORT.COMMENTS IMP SPEC: NORMAL
PATH REPORT.RELEVANT HX SPEC: NORMAL

## 2022-10-04 LAB
HUMAN PAPILLOMA VIRUS 16 DNA: NEGATIVE
HUMAN PAPILLOMA VIRUS 18 DNA: POSITIVE
HUMAN PAPILLOMA VIRUS FINAL DIAGNOSIS: ABNORMAL
HUMAN PAPILLOMA VIRUS OTHER HR: NEGATIVE

## 2022-10-05 ENCOUNTER — PATIENT OUTREACH (OUTPATIENT)
Dept: OBGYN | Facility: CLINIC | Age: 42
End: 2022-10-05

## 2022-10-24 NOTE — PROGRESS NOTES
"[facility]  Follow-Up Visit    S: Ms. Cristina Dean is a 42 year old  here for colposcopy today due to an abnormal Pap screen. She had a Pap on 22 with cytology showing NIL but positive for HR HPV 18 (neg 16, neg other types). She has never previously had any abnormal pap smears or prior colposcopy. She feels like she might have a yeast infection right now as she feels itchy.    O:  /83 (BP Location: Left arm, Patient Position: Chair)   Pulse 76   Ht 1.626 m (5' 4\")   Wt (!) 167.6 kg (369 lb 6.4 oz)   BMI 63.41 kg/m    Gen: Well-appearing, NAD  HEENT: Normocephalic, atraumatic  CV:        Well perfused; no LE edema  Pulm:  Normal respiratory effort and rate    Pelvic:  Normal appearing external female genitalia. Normal hair distribution.  Thick white discharge. Unable to fully insert speculum due to patient discomfort.    A/P:  Ms. Cristina Dean is a 42 year old  here for colposcopy due to finding of +HPV 18 on pap. On insertion of speculum, she reported significant pain and requested to end procedure early. Discussed that patient's sensitivity could be due to possible yeast infection. Will send Diflucan prescription to her pharmacy. Discussed treating infection and having her return for procedure in clinic vs procedure under sedation in the OR. She would prefer to proceed with colposcopy under sedation. Will place orders today.    Staffed with Dr. Ricky Torres MD  OB/GYN PGY-1    The patient was seen in resident continuity clinic by Dr. Torres.  I have reviewed the history.  I was present for the exam.  The assessment and plan were jointly made.     Mallory García MD, FACOG      10/25/2022 1:47 PM      "

## 2022-10-25 ENCOUNTER — OFFICE VISIT (OUTPATIENT)
Dept: OBGYN | Facility: CLINIC | Age: 42
End: 2022-10-25
Payer: COMMERCIAL

## 2022-10-25 ENCOUNTER — PREP FOR PROCEDURE (OUTPATIENT)
Dept: OBGYN | Facility: CLINIC | Age: 42
End: 2022-10-25

## 2022-10-25 VITALS
HEART RATE: 76 BPM | HEIGHT: 64 IN | DIASTOLIC BLOOD PRESSURE: 83 MMHG | SYSTOLIC BLOOD PRESSURE: 134 MMHG | BODY MASS INDEX: 50.02 KG/M2 | WEIGHT: 293 LBS

## 2022-10-25 DIAGNOSIS — Z01.812 PRE-PROCEDURE LAB EXAM: ICD-10-CM

## 2022-10-25 DIAGNOSIS — B37.31 YEAST INFECTION OF THE VAGINA: ICD-10-CM

## 2022-10-25 DIAGNOSIS — R87.810 CERVICAL HIGH RISK HPV (HUMAN PAPILLOMAVIRUS) TEST POSITIVE: Primary | ICD-10-CM

## 2022-10-25 LAB
HCG UR QL: NEGATIVE
INTERNAL QC OK POCT: NORMAL
POCT KIT EXPIRATION DATE: NORMAL
POCT KIT LOT NUMBER: NORMAL

## 2022-10-25 PROCEDURE — 99213 OFFICE O/P EST LOW 20 MIN: CPT | Mod: GC | Performed by: OBSTETRICS & GYNECOLOGY

## 2022-10-25 PROCEDURE — 81025 URINE PREGNANCY TEST: CPT

## 2022-10-25 PROCEDURE — G0463 HOSPITAL OUTPT CLINIC VISIT: HCPCS

## 2022-10-25 RX ORDER — ACETAMINOPHEN 325 MG/1
975 TABLET ORAL ONCE
Status: CANCELLED | OUTPATIENT
Start: 2022-10-25 | End: 2022-10-25

## 2022-10-25 RX ORDER — FLUCONAZOLE 150 MG/1
150 TABLET ORAL ONCE
Qty: 1 TABLET | Refills: 0 | Status: SHIPPED | OUTPATIENT
Start: 2022-10-25 | End: 2022-10-25

## 2022-10-28 ENCOUNTER — TELEPHONE (OUTPATIENT)
Dept: OBGYN | Facility: CLINIC | Age: 42
End: 2022-10-28

## 2022-10-28 NOTE — TELEPHONE ENCOUNTER
Left message for patient to call back and schedule surgery.    Indu Syed  Clinical Services Assistant

## 2022-11-16 ENCOUNTER — ANESTHESIA EVENT (OUTPATIENT)
Dept: SURGERY | Facility: CLINIC | Age: 42
End: 2022-11-16
Payer: COMMERCIAL

## 2022-11-17 ENCOUNTER — HOSPITAL ENCOUNTER (OUTPATIENT)
Facility: CLINIC | Age: 42
Discharge: HOME | End: 2022-11-17
Attending: OBSTETRICS & GYNECOLOGY | Admitting: OBSTETRICS & GYNECOLOGY
Payer: COMMERCIAL

## 2022-11-17 ENCOUNTER — ANESTHESIA (OUTPATIENT)
Dept: SURGERY | Facility: CLINIC | Age: 42
End: 2022-11-17
Payer: COMMERCIAL

## 2022-11-17 VITALS
WEIGHT: 293 LBS | BODY MASS INDEX: 50.02 KG/M2 | SYSTOLIC BLOOD PRESSURE: 137 MMHG | DIASTOLIC BLOOD PRESSURE: 85 MMHG | OXYGEN SATURATION: 97 % | TEMPERATURE: 97.6 F | HEART RATE: 83 BPM | RESPIRATION RATE: 20 BRPM | HEIGHT: 64 IN

## 2022-11-17 DIAGNOSIS — R87.810 CERVICAL HIGH RISK HPV (HUMAN PAPILLOMAVIRUS) TEST POSITIVE: ICD-10-CM

## 2022-11-17 LAB
B-HCG SERPL-ACNC: <1 IU/L (ref 0–5)
GLUCOSE BLDC GLUCOMTR-MCNC: 147 MG/DL (ref 70–99)

## 2022-11-17 PROCEDURE — 710N000012 HC RECOVERY PHASE 2, PER MINUTE: Performed by: OBSTETRICS & GYNECOLOGY

## 2022-11-17 PROCEDURE — 360N000075 HC SURGERY LEVEL 2, PER MIN: Performed by: OBSTETRICS & GYNECOLOGY

## 2022-11-17 PROCEDURE — 250N000009 HC RX 250: Performed by: OBSTETRICS & GYNECOLOGY

## 2022-11-17 PROCEDURE — 250N000013 HC RX MED GY IP 250 OP 250 PS 637: Performed by: ANESTHESIOLOGY

## 2022-11-17 PROCEDURE — 250N000009 HC RX 250: Performed by: ANESTHESIOLOGY

## 2022-11-17 PROCEDURE — 57454 BX/CURETT OF CERVIX W/SCOPE: CPT | Mod: GC | Performed by: OBSTETRICS & GYNECOLOGY

## 2022-11-17 PROCEDURE — 82962 GLUCOSE BLOOD TEST: CPT

## 2022-11-17 PROCEDURE — 250N000013 HC RX MED GY IP 250 OP 250 PS 637: Performed by: OBSTETRICS & GYNECOLOGY

## 2022-11-17 PROCEDURE — 999N000141 HC STATISTIC PRE-PROCEDURE NURSING ASSESSMENT: Performed by: OBSTETRICS & GYNECOLOGY

## 2022-11-17 PROCEDURE — 258N000003 HC RX IP 258 OP 636: Performed by: NURSE ANESTHETIST, CERTIFIED REGISTERED

## 2022-11-17 PROCEDURE — 272N000001 HC OR GENERAL SUPPLY STERILE: Performed by: OBSTETRICS & GYNECOLOGY

## 2022-11-17 PROCEDURE — 250N000009 HC RX 250: Performed by: NURSE ANESTHETIST, CERTIFIED REGISTERED

## 2022-11-17 PROCEDURE — 88305 TISSUE EXAM BY PATHOLOGIST: CPT | Mod: TC | Performed by: OBSTETRICS & GYNECOLOGY

## 2022-11-17 PROCEDURE — 370N000017 HC ANESTHESIA TECHNICAL FEE, PER MIN: Performed by: OBSTETRICS & GYNECOLOGY

## 2022-11-17 PROCEDURE — 250N000011 HC RX IP 250 OP 636: Performed by: NURSE ANESTHETIST, CERTIFIED REGISTERED

## 2022-11-17 PROCEDURE — 84702 CHORIONIC GONADOTROPIN TEST: CPT | Performed by: OBSTETRICS & GYNECOLOGY

## 2022-11-17 PROCEDURE — 36415 COLL VENOUS BLD VENIPUNCTURE: CPT | Performed by: OBSTETRICS & GYNECOLOGY

## 2022-11-17 RX ORDER — SODIUM CHLORIDE, SODIUM LACTATE, POTASSIUM CHLORIDE, CALCIUM CHLORIDE 600; 310; 30; 20 MG/100ML; MG/100ML; MG/100ML; MG/100ML
INJECTION, SOLUTION INTRAVENOUS CONTINUOUS
Status: DISCONTINUED | OUTPATIENT
Start: 2022-11-17 | End: 2022-11-17 | Stop reason: HOSPADM

## 2022-11-17 RX ORDER — ONDANSETRON 2 MG/ML
INJECTION INTRAMUSCULAR; INTRAVENOUS PRN
Status: DISCONTINUED | OUTPATIENT
Start: 2022-11-17 | End: 2022-11-17

## 2022-11-17 RX ORDER — HYDROMORPHONE HCL IN WATER/PF 6 MG/30 ML
0.4 PATIENT CONTROLLED ANALGESIA SYRINGE INTRAVENOUS EVERY 5 MIN PRN
Status: DISCONTINUED | OUTPATIENT
Start: 2022-11-17 | End: 2022-11-17 | Stop reason: HOSPADM

## 2022-11-17 RX ORDER — HYDROMORPHONE HCL IN WATER/PF 6 MG/30 ML
0.2 PATIENT CONTROLLED ANALGESIA SYRINGE INTRAVENOUS EVERY 5 MIN PRN
Status: DISCONTINUED | OUTPATIENT
Start: 2022-11-17 | End: 2022-11-17 | Stop reason: HOSPADM

## 2022-11-17 RX ORDER — ACETAMINOPHEN 325 MG/1
975 TABLET ORAL ONCE
Status: COMPLETED | OUTPATIENT
Start: 2022-11-17 | End: 2022-11-17

## 2022-11-17 RX ORDER — ONDANSETRON 2 MG/ML
4 INJECTION INTRAMUSCULAR; INTRAVENOUS EVERY 30 MIN PRN
Status: DISCONTINUED | OUTPATIENT
Start: 2022-11-17 | End: 2022-11-17 | Stop reason: HOSPADM

## 2022-11-17 RX ORDER — ACETAMINOPHEN 325 MG/1
975 TABLET ORAL ONCE
Status: DISCONTINUED | OUTPATIENT
Start: 2022-11-17 | End: 2022-11-17 | Stop reason: HOSPADM

## 2022-11-17 RX ORDER — SODIUM CHLORIDE, SODIUM LACTATE, POTASSIUM CHLORIDE, CALCIUM CHLORIDE 600; 310; 30; 20 MG/100ML; MG/100ML; MG/100ML; MG/100ML
INJECTION, SOLUTION INTRAVENOUS CONTINUOUS PRN
Status: DISCONTINUED | OUTPATIENT
Start: 2022-11-17 | End: 2022-11-17

## 2022-11-17 RX ORDER — CITRIC ACID/SODIUM CITRATE 334-500MG
30 SOLUTION, ORAL ORAL ONCE
Status: COMPLETED | OUTPATIENT
Start: 2022-11-17 | End: 2022-11-17

## 2022-11-17 RX ORDER — ONDANSETRON 4 MG/1
4 TABLET, ORALLY DISINTEGRATING ORAL EVERY 30 MIN PRN
Status: DISCONTINUED | OUTPATIENT
Start: 2022-11-17 | End: 2022-11-17 | Stop reason: HOSPADM

## 2022-11-17 RX ORDER — FENTANYL CITRATE 50 UG/ML
25 INJECTION, SOLUTION INTRAMUSCULAR; INTRAVENOUS
Status: DISCONTINUED | OUTPATIENT
Start: 2022-11-17 | End: 2022-11-17 | Stop reason: HOSPADM

## 2022-11-17 RX ORDER — KETOROLAC TROMETHAMINE 30 MG/ML
INJECTION, SOLUTION INTRAMUSCULAR; INTRAVENOUS PRN
Status: DISCONTINUED | OUTPATIENT
Start: 2022-11-17 | End: 2022-11-17

## 2022-11-17 RX ORDER — FENTANYL CITRATE 50 UG/ML
50 INJECTION, SOLUTION INTRAMUSCULAR; INTRAVENOUS EVERY 5 MIN PRN
Status: DISCONTINUED | OUTPATIENT
Start: 2022-11-17 | End: 2022-11-17 | Stop reason: HOSPADM

## 2022-11-17 RX ORDER — FENTANYL CITRATE 50 UG/ML
INJECTION, SOLUTION INTRAMUSCULAR; INTRAVENOUS PRN
Status: DISCONTINUED | OUTPATIENT
Start: 2022-11-17 | End: 2022-11-17

## 2022-11-17 RX ORDER — LABETALOL HYDROCHLORIDE 5 MG/ML
10 INJECTION, SOLUTION INTRAVENOUS
Status: DISCONTINUED | OUTPATIENT
Start: 2022-11-17 | End: 2022-11-17 | Stop reason: HOSPADM

## 2022-11-17 RX ORDER — DIMENHYDRINATE 50 MG/ML
25 INJECTION, SOLUTION INTRAMUSCULAR; INTRAVENOUS
Status: DISCONTINUED | OUTPATIENT
Start: 2022-11-17 | End: 2022-11-17 | Stop reason: HOSPADM

## 2022-11-17 RX ORDER — FENTANYL CITRATE 50 UG/ML
25 INJECTION, SOLUTION INTRAMUSCULAR; INTRAVENOUS EVERY 5 MIN PRN
Status: DISCONTINUED | OUTPATIENT
Start: 2022-11-17 | End: 2022-11-17 | Stop reason: HOSPADM

## 2022-11-17 RX ORDER — ALBUTEROL SULFATE 0.83 MG/ML
2.5 SOLUTION RESPIRATORY (INHALATION) ONCE
Status: COMPLETED | OUTPATIENT
Start: 2022-11-17 | End: 2022-11-17

## 2022-11-17 RX ORDER — LIDOCAINE HYDROCHLORIDE 20 MG/ML
INJECTION, SOLUTION INFILTRATION; PERINEURAL PRN
Status: DISCONTINUED | OUTPATIENT
Start: 2022-11-17 | End: 2022-11-17

## 2022-11-17 RX ORDER — ACETIC ACID 100 %
LIQUID (ML) MISCELLANEOUS PRN
Status: DISCONTINUED | OUTPATIENT
Start: 2022-11-17 | End: 2022-11-17 | Stop reason: HOSPADM

## 2022-11-17 RX ADMIN — DEXMEDETOMIDINE HYDROCHLORIDE 6 MCG: 100 INJECTION, SOLUTION INTRAVENOUS at 14:29

## 2022-11-17 RX ADMIN — LIDOCAINE HYDROCHLORIDE 100 MG: 20 INJECTION, SOLUTION INFILTRATION; PERINEURAL at 14:07

## 2022-11-17 RX ADMIN — SODIUM CITRATE AND CITRIC ACID MONOHYDRATE 30 ML: 500; 334 SOLUTION ORAL at 12:42

## 2022-11-17 RX ADMIN — DEXMEDETOMIDINE HYDROCHLORIDE 10 MCG: 100 INJECTION, SOLUTION INTRAVENOUS at 14:19

## 2022-11-17 RX ADMIN — MIDAZOLAM 1 MG: 1 INJECTION INTRAMUSCULAR; INTRAVENOUS at 14:11

## 2022-11-17 RX ADMIN — DEXMEDETOMIDINE HYDROCHLORIDE 20 MCG: 100 INJECTION, SOLUTION INTRAVENOUS at 13:57

## 2022-11-17 RX ADMIN — ACETAMINOPHEN 975 MG: 325 TABLET, FILM COATED ORAL at 11:55

## 2022-11-17 RX ADMIN — ALBUTEROL SULFATE 2.5 MG: 2.5 SOLUTION RESPIRATORY (INHALATION) at 14:52

## 2022-11-17 RX ADMIN — FENTANYL CITRATE 25 MCG: 50 INJECTION, SOLUTION INTRAMUSCULAR; INTRAVENOUS at 14:24

## 2022-11-17 RX ADMIN — ONDANSETRON 4 MG: 2 INJECTION INTRAMUSCULAR; INTRAVENOUS at 14:18

## 2022-11-17 RX ADMIN — MIDAZOLAM 2 MG: 1 INJECTION INTRAMUSCULAR; INTRAVENOUS at 13:57

## 2022-11-17 RX ADMIN — FENTANYL CITRATE 25 MCG: 50 INJECTION, SOLUTION INTRAMUSCULAR; INTRAVENOUS at 14:09

## 2022-11-17 RX ADMIN — SODIUM CHLORIDE, POTASSIUM CHLORIDE, SODIUM LACTATE AND CALCIUM CHLORIDE: 600; 310; 30; 20 INJECTION, SOLUTION INTRAVENOUS at 13:55

## 2022-11-17 RX ADMIN — DEXMEDETOMIDINE HYDROCHLORIDE 10 MCG: 100 INJECTION, SOLUTION INTRAVENOUS at 14:09

## 2022-11-17 RX ADMIN — DEXMEDETOMIDINE HYDROCHLORIDE 10 MCG: 100 INJECTION, SOLUTION INTRAVENOUS at 14:12

## 2022-11-17 RX ADMIN — KETOROLAC TROMETHAMINE 30 MG: 30 INJECTION, SOLUTION INTRAMUSCULAR at 14:31

## 2022-11-17 RX ADMIN — MIDAZOLAM 1 MG: 1 INJECTION INTRAMUSCULAR; INTRAVENOUS at 14:18

## 2022-11-17 ASSESSMENT — ACTIVITIES OF DAILY LIVING (ADL)
ADLS_ACUITY_SCORE: 35
ADLS_ACUITY_SCORE: 35
ADLS_ACUITY_SCORE: 33

## 2022-11-17 NOTE — OP NOTE
[facility]   Operative Note    Patient: Cristina Dean  : 1980  MRN: [id number]    Date of Service: 2022    Pre-operative diagnosis:  1. Pap smear with + HPV 18  2.  Unable to tolerate colposcopy in the office    Post-operative diagnosis:  1. Same, s/p below stated procedures    Procedure:    1. Exam Under Anesthesia  2. Colposcopy with cervical biopsies and endocervical curettage    Surgeon: Mallory García MD  Assistants: Breann Hyman MD, PGY-1; Rivera Jaeger MD MPH, PGY-4    Anesthesia: MAC  EBL: 2mL   Urine Output: Urine not collected   Fluids: 600mL crystalloid    Specimens:   ID Type Source Tests Collected by Time Destination   1 : Cervical biopsy at 4 o'clock Biopsy Cervix SURGICAL PATHOLOGY EXAM Mallory García MD 2022  2:28 PM    2 : Cervical biopsy at 6 o'clock Tissue Cervix SURGICAL PATHOLOGY EXAM Mallory García MD 2022  2:29 PM    3 : Cervical biopsy at 9 o'clock Biopsy Cervix SURGICAL PATHOLOGY EXAM Mallory García MD 2022  2:30 PM    4 : Cervical biopsy at 12 o'clock Biopsy Cervix SURGICAL PATHOLOGY EXAM Mallory García MD 2022  2:30 PM    5 : Endocervical Curettage Tissue Cervix SURGICAL PATHOLOGY EXAM Mallory García MD 2022  2:31 PM      Complications: None apparent    Findings:    EUA: very posterior cervix; on gross examination normal appearing cervix without lesions.  After application of acetic acid, no acetowhite changes seen. Random cervical biopsies at 4, 6, 9, and 12 o'clock. Endocervical curettage performed. Silver nitrate applied for hemostasis. Excellent hemostasis appreciated at end of case     Indications:    Cristina Dean is a 42 year old woman with high risk HPV positive pap smear who presents for EUA, colposcopy, cervical biopsies and endocervical curettage. Risks and benefits discussed with patient including risk of blood loss,  infection, and damage to surrounding structures. Patient had all of her questions answered. Written informed consent obtained.      OPERATIVE PROCEDURE:    The consent was reviewed with the patient in the preoperative setting and confirmed. She was transferred to the operating room and placed in the dorsal supine position. MAC anesthesia was administered in the usual manner. She was then placed in the dorsal lithotomy position in the yellow fin stirru. A time out was performed to confirm the patient and the procedure. An exam under anesthesia was performed with the findings documented above. Speculum exam was performed which revealed the findings above. Acetic acid was applied to cervix and upper vagina and no acetowhite changes were noted.  Four random cervical biopsies were obtained with Tischler forceps. An ECC was performed using an endocervical curette. Hemostasis achieved with holding pressure and applying silver nitrate. Excellent hemostasis was observed at the end of the case. The procedure was then terminated. The patient tolerated the procedure well. Sponge, lap, and needle counts were correct x2. She did not receive antibiotic prophylaxis as it was not indicated for this procedure. She was taken to the PACU in stable condition.      Dr. García was present for the entire procedure.     Breann Hyman MD  OB/Gyn PGY-1  11/17/22 12:37 PM    I was scrubbed and present for the entire procedure.  I have reviewed and edited the above note.      Mallory García MD, FACOG

## 2022-11-17 NOTE — ANESTHESIA POSTPROCEDURE EVALUATION
"Patient: Cristina Dean    Procedure: Procedure(s):  COLPOSCOPY, WITH CERVICAL BIOPSY AND ENDOCERVICAL CURETTAGE       Anesthesia Type:  MAC    Note:  Disposition: Outpatient   Postop Pain Control: Uneventful            Sign Out: Well controlled pain   PONV: No   Neuro/Psych: Uneventful            Sign Out: Acceptable/Baseline neuro status   Airway/Respiratory: Uneventful            Sign Out: Acceptable/Baseline resp. status   CV/Hemodynamics: Uneventful            Sign Out: Acceptable CV status; No obvious hypovolemia; No obvious fluid overload   Other NRE: NONE   DID A NON-ROUTINE EVENT OCCUR? No           Last vitals:  Vitals Value Taken Time   BP 97/49 11/17/22 1438   Temp     Pulse 68 11/17/22 1438   Resp     SpO2 96 % 11/17/22 1450   Vitals shown include unvalidated device data.    Patient Vitals for the past 24 hrs:   BP Temp Temp src Pulse Resp SpO2 Height Weight   11/17/22 1110 131/85 36.7  C (98.1  F) Oral 84 13 94 % 1.626 m (5' 4.02\") (!) 173.1 kg (381 lb 9.9 oz)         Electronically Signed By: Gloria Carias MD  November 17, 2022  2:51 PM  "

## 2022-11-17 NOTE — H&P
[facility]  Benign GYN   Pre-op History and Physical    Cristina Dean MRN# [id number]   Age: 42 year old YOB: 1980     Date of Admission:  2022    Primary care provider: Jermain Jaramillo    HPI:  Cristina Dean is a 42 year old  with PMH of anxiety, depression, PTSD who presents for scheduled colposcopy with cervical biopsy and endocervical curettage with Dr. García.     She is feeling well today and denies recent illness, F/C, loss of taste/smell, cough, sore throat, chest pain, shortness of breath, nausea/vomiting. She endorses acid reflux but denies chest pain. Her health has not changed since her last visit with Dr. Torres and Dr. García on 10/25/22. She denies history of intolerance of anesthesia.       OB/GYN History:  Pap 22 - NIL, HR HPV 18 positive (negative 16, negative other types)  No prior h/o abnormal pap smears, no prior colposcopy    PMH:  Past Medical History:   Diagnosis Date     Anxiety      Bipolar affective disorder, remission status unspecified (H) 2017     Chlamydia      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, unspecified      Depression      Genital herpes      Herpes      Intermittent asthma     triggers include fall and spring allergy seasons     Obesity 2012    BMI 53     PID (pelvic inflammatory disease)      Urinary incontinence 2017       PSH:  Past Surgical History:   Procedure Laterality Date     EXCISE MASS BACK Right 2017    Procedure: EXCISE MASS BACK;  Excision Right Back Mass;  Surgeon: Vicente Pérez MD;  Location:  OR     [facility]       Medications:  No current facility-administered medications on file prior to encounter.  acetaminophen (TYLENOL) 500 MG tablet, Take 1,000 mg by mouth every 8 hours as needed for pain (right shoulder)  albuterol (PROAIR HFA/PROVENTIL HFA/VENTOLIN HFA) 108 (90 Base) MCG/ACT inhaler, Inhale 2 puffs into the lungs every 6 hours  ARIPiprazole  (ABILIFY) 2 MG tablet, Take 1 tablet (2 mg) by mouth daily  atorvastatin (LIPITOR) 10 MG tablet, TAKE 1 TABLET BY MOUTH EVERY DAY  hydrOXYzine (ATARAX) 25 MG tablet, Take 1-2 tablets (25-50 mg) by mouth 3 times daily as needed for anxiety  lisinopril (ZESTRIL) 20 MG tablet, Take 1 tablet (20 mg) by mouth daily  omeprazole (PRILOSEC) 20 MG DR capsule, TAKE 1 CAPSULE (20 MG) BY MOUTH EVERY MORNING (BEFORE BREAKFAST)  ondansetron (ZOFRAN ODT) 4 MG ODT tab, Take 1 tablet (4 mg) by mouth every 6 hours as needed for nausea or vomiting  sucralfate (CARAFATE) 1 GM/10ML suspension, Take 10 mLs (1 g) by mouth 4 times daily as needed for nausea (Pain)  traZODone (DESYREL) 50 MG tablet, Take 50 mg by mouth as needed  venlafaxine (EFFEXOR XR) 75 MG 24 hr capsule, Take 1 capsule (75 mg) by mouth daily after taking the 37.5 mg capsule for 3 days  hydrOXYzine (VISTARIL) 50 MG capsule, Take 1 capsule (50 mg) by mouth 3 times daily as needed for anxiety  venlafaxine (EFFEXOR XR) 37.5 MG 24 hr capsule, Take 1 capsule (37.5 mg) by mouth daily for 3 days then begin the 75 mg capsule daily  [DISCONTINUED] FLUoxetine (PROZAC) 40 MG capsule, Take 1 capsule (40 mg) by mouth daily  [DISCONTINUED] lurasidone (LATUDA) 80 MG TABS tablet, Take 1 tablet (80 mg) by mouth daily with food  [DISCONTINUED] prazosin (MINIPRESS) 1 MG capsule, Take 1 capsule (1 mg) by mouth At Bedtime  [DISCONTINUED] topiramate (TOPAMAX) 50 MG tablet, TAKE 1 TABLET BY MOUTH TWICE A DAY        Social History:  Social History     Tobacco Use     Smoking status: Light Smoker     Packs/day: 0.50     Years: 15.00     Pack years: 7.50     Types: Cigarettes     Start date: 6/1/2018     Smokeless tobacco: Never     Tobacco comments:     1/4 ppd    Substance Use Topics     Alcohol use: Yes     Comment: 1 drink every 2 months      Drug use: Yes     Types: Marijuana     Comment: daily       Family History:  Family History   Problem Relation Age of Onset     Cancer Father          "acute leukemia (in remission)     Neurologic Disorder Father         neuropathy from chemotherapy     Diabetes Father         type II DM     Other Cancer Father      Depression Father      Anxiety Disorder Father      Family History Negative Sister      Ovarian Cancer Paternal Aunt      Ovarian Cancer Paternal Aunt      Ovarian Cancer Paternal Aunt        Allergies:  Allergies   Allergen Reactions     Amlodipine Swelling       Physical Exam    /85 (BP Location: Left arm)   Pulse 84   Temp 98.1  F (36.7  C) (Oral)   Resp 13   Ht 1.626 m (5' 4.02\")   Wt (!) 173.1 kg (381 lb 9.9 oz)   LMP 10/31/2022 (Approximate)   SpO2 94%   BMI 65.47 kg/m      Gen: NAD, laying in bed  CV: RRR, no m/r/g  Resp: CTAB  Abd: Soft, nontender, nondistended  Ext: Warm, well perfused, trace edema  Neuro: Moving all 4 extremeities     Labs:  POCT glucose 147  HCG pending    Assessment/Plan:  Cristina Dean is a 42 year old  with PMH of anxiety, depression, PTSD who presents for scheduled colposcopy with cervical biopsy and endocervical curettage with Dr. García.      Plan to proceed with procedure as scheduled. Informed consent signed and all questions answered. This is a same day procedure and patient will be discharged to home following recovery from anesthesia.     Discussed with Dr. García.    Breann Hyman MD  OB/Gyn PGY-1  22 12:34 PM    The patient was seen and evaluated separately from the team.  I have reviewed and agree with the above note.     Mallory García MD, FACOG      "

## 2022-11-17 NOTE — ANESTHESIA PREPROCEDURE EVALUATION
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, unspecified      Depression      Genital herpes      Herpes      Intermittent asthma     triggers include  and spring allergy seasons     Obesity 2012    BMI 53     PID (pelvic inflammatory disease)      Urinary incontinence 2017      Past Surgical History:   Procedure Laterality Date     EXCISE MASS BACK Right 2017    Procedure: EXCISE MASS BACK;  Excision Right Back Mass;  Surgeon: Vicente Pérez MD;  Location: [facility] OR     TONSILLECTOMY  1984      Allergies   Allergen Reactions     Amlodipine Swelling      Social History     Tobacco Use     Smoking status: Light Smoker     Packs/day: 0.50     Years: 15.00     Pack years: 7.50     Types: Cigarettes     Start date: 2018     Smokeless tobacco: Never     Tobacco comments:      ppd    Substance Use Topics     Alcohol use: Yes     Comment: 1 drink every 2 months       Wt Readings from Last 1 Encounters:   22 (!) 173.1 kg (381 lb 9.9 oz)        Anesthesia Evaluation            ROS/MED HX  ENT/Pulmonary:     (+) LINDY risk factors, hypertension, obese, Intermittent, asthma     Neurologic:       Cardiovascular:     (+) hypertension-----    METS/Exercise Tolerance:     Hematologic:  - neg hematologic  ROS     Musculoskeletal:  - neg musculoskeletal ROS     GI/Hepatic:     (+) GERD, Asymptomatic on medication,     Renal/Genitourinary:  - neg Renal ROS     Endo: Comment: A1C 6.5 elevated 22    (+) Obesity (bmi 65),  (-) Type I DM, Type II DM and thyroid disease   Psychiatric/Substance Use:       Infectious Disease:  - neg  infectious disease ROS     Malignancy:  - neg malignancy ROS     Other:            Physical Exam    Airway        Mallampati: II   TM distance: > 3 FB   Neck ROM: full   Mouth opening: > 3 cm    Respiratory Devices and Support         Dental  no notable dental history         Cardiovascular   cardiovascular exam normal       Rhythm and rate: regular and normal     Pulmonary   pulmonary exam normal        breath sounds clear to auscultation           OUTSIDE LABS:  CBC:   Lab Results   Component Value Date    WBC 9.6 04/14/2022    WBC 9.2 11/03/2021    HGB 14.4 04/14/2022    HGB 14.7 11/03/2021    HCT 45.4 04/14/2022    HCT 44.7 11/03/2021     04/14/2022     11/03/2021     BMP:   Lab Results   Component Value Date     04/14/2022     11/03/2021    POTASSIUM 4.2 04/14/2022    POTASSIUM 4.3 11/03/2021    CHLORIDE 106 04/14/2022    CHLORIDE 103 11/03/2021    CO2 29 04/14/2022    CO2 27 11/03/2021    BUN 13 04/14/2022    BUN 10 11/03/2021    CR 0.73 04/14/2022    CR 0.91 11/03/2021     (H) 11/17/2022     (H) 04/14/2022     COAGS: No results found for: PTT, INR, FIBR  POC:   Lab Results   Component Value Date    HCG Negative 10/25/2022    HCGS Negative 08/10/2021     HEPATIC:   Lab Results   Component Value Date    ALBUMIN 3.0 (L) 04/14/2022    PROTTOTAL 6.4 (L) 04/14/2022    ALT 32 04/14/2022    AST 20 04/14/2022    ALKPHOS 70 04/14/2022    BILITOTAL 0.5 04/14/2022     OTHER:   Lab Results   Component Value Date    A1C 6.5 (H) 08/24/2021    REID 8.9 04/14/2022    LIPASE 93 04/14/2022    TSH 1.92 06/21/2018    T4 0.80 08/01/2012    SED 8 01/15/2019       Anesthesia Plan    ASA Status:  3   NPO Status:  NPO Appropriate    Anesthesia Type: MAC.     - Reason for MAC: straight local not clinically adequate   Induction: Intravenous, Propofol.   Maintenance: TIVA.        Consents    Anesthesia Plan(s) and associated risks, benefits, and realistic alternatives discussed. Questions answered  and patient/representative(s) expressed understanding.    - Discussed:     - Discussed with:  Patient      - Extended Intubation/Ventilatory Support Discussed: No.      - Patient is DNR/DNI Status: No    Use of blood products discussed: No .     Postoperative Care    Pain management: IV analgesics, Oral pain medications, Multi-modal analgesia.   PONV prophylaxis: Ondansetron (or other 5HT-3), Background Propofol Infusion     Comments:                Gloria Carias MD

## 2022-11-17 NOTE — DISCHARGE INSTRUCTIONS
Same-Day Surgery   Adult Discharge Orders & Instructions     For 24 hours after surgery:  Get plenty of rest.  A responsible adult must stay with you for at least 24 hours after you leave the hospital.   Pain medication can slow your reflexes. Do not drive or use heavy equipment.  If you have weakness or tingling, don't drive or use heavy equipment until this feeling goes away.  Mixing alcohol and pain medication can cause dizziness and slow your breathing. It can even be fatal. Do not drink alcohol while taking pain medication.  Avoid strenuous or risky activities.  Ask for help when climbing stairs.   You may feel lightheaded.  If so, sit for a few minutes before standing.  Have someone help you get up.   If you have nausea (feel sick to your stomach), drink only clear liquids such as apple juice, ginger ale, broth or 7-Up.  Rest may also help.  Be sure to drink enough fluids.  Move to a regular diet as you feel able. Take pain medications with a small amount of solid food, such as toast or crackers, to avoid nausea.   A slight fever is normal. Call the doctor if your fever is over 100 F (37.7 C) (taken under the tongue) or lasts longer than 24 hours.  You may have a dry mouth, muscle aches, trouble sleeping or a sore throat.  These symptoms should go away after 24 hours.  Do not make important or legal decisions.   Pain Management:      1. Take pain medication (if prescribed) for pain as directed by your physician.        2. WARNING: If the pain medication you have been prescribed contains Tylenol  (acetaminophen), DO NOT take additional doses of Tylenol (acetaminophen).     Call your doctor for any of the followin.  Signs of infection (fever, growing tenderness at the surgery site, severe pain, a large amount of drainage or bleeding, foul-smelling drainage, redness, swelling).    2.  It has been over 8 to 10 hours since surgery and you are still not able to urinate (pee).    3.  Headache for over 24  hours.    4.  Numbness, tingling or weakness the day after surgery (if you had spinal anesthesia).  To contact a doctor, call Dr. García, OB/GYN clinic at [phone] or:  '   [phone] and ask for the Resident On Call for:          OB/GYN (answered 24 hours a day)  '   Emergency Department:  [place] [facility]: [phone]  [place] [facility]: [phone]               Rev. 10/2014      Last dose of Tylenol was at 12:00pm  Last dose of ibuprofen was at 2:30pm.

## 2022-11-17 NOTE — ANESTHESIA CARE TRANSFER NOTE
Patient: Cristina Dean    Procedure: Procedure(s):  COLPOSCOPY, WITH CERVICAL BIOPSY AND ENDOCERVICAL CURETTAGE       Diagnosis: Cervical high risk HPV (human papillomavirus) test positive [R87.810]  Diagnosis Additional Information: No value filed.    Anesthesia Type:   MAC     Note:      Level of Consciousness: awake  Oxygen Supplementation: room air    Independent Airway: airway patency satisfactory and stable  Dentition: dentition unchanged  Vital Signs Stable: post-procedure vital signs reviewed and stable  Report to RN Given: handoff report given  Patient transferred to: Phase II    Handoff Report: Identifed the Patient, Identified the Reponsible Provider, Reviewed the pertinent medical history, Discussed the surgical course, Reviewed Intra-OP anesthesia mangement and issues during anesthesia, Set expectations for post-procedure period and Allowed opportunity for questions and acknowledgement of understanding      Vitals:  Vitals Value Taken Time   BP 97/49 11/17/22 1438   Temp     Pulse 68 11/17/22 1438   Resp     SpO2 90 % 11/17/22 1442   Vitals shown include unvalidated device data.    Electronically Signed By: ELIN Way CRNA  November 17, 2022  2:44 PM

## 2022-11-18 LAB
PATH REPORT.COMMENTS IMP SPEC: NORMAL
PATH REPORT.COMMENTS IMP SPEC: NORMAL
PATH REPORT.FINAL DX SPEC: NORMAL
PATH REPORT.GROSS SPEC: NORMAL
PATH REPORT.MICROSCOPIC SPEC OTHER STN: NORMAL
PATH REPORT.RELEVANT HX SPEC: NORMAL
PHOTO IMAGE: NORMAL

## 2022-11-18 PROCEDURE — 88305 TISSUE EXAM BY PATHOLOGIST: CPT | Mod: 26 | Performed by: PATHOLOGY

## 2022-11-28 ENCOUNTER — E-VISIT (OUTPATIENT)
Dept: URGENT CARE | Facility: CLINIC | Age: 42
End: 2022-11-28
Payer: COMMERCIAL

## 2022-11-28 DIAGNOSIS — R05.9 COUGH, UNSPECIFIED TYPE: Primary | ICD-10-CM

## 2022-11-28 PROCEDURE — 99207 PR NON-BILLABLE SERV PER CHARTING: CPT | Performed by: FAMILY MEDICINE

## 2022-11-28 NOTE — PATIENT INSTRUCTIONS
Dear Cristina Dean,    We are sorry you are not feeling well. Based on the responses you provided, it is recommended that you be seen in-person in urgent care so we can better evaluate your symptoms. Please click here to find the nearest urgent care location to you.   You will not be charged for this Visit. Thank you for trusting us with your care.    Stacie Mathur MD

## 2022-11-30 ENCOUNTER — PATIENT OUTREACH (OUTPATIENT)
Dept: OBGYN | Facility: CLINIC | Age: 42
End: 2022-11-30

## 2023-10-27 ENCOUNTER — PATIENT OUTREACH (OUTPATIENT)
Dept: OBGYN | Facility: CLINIC | Age: 43
End: 2023-10-27
Payer: COMMERCIAL

## 2023-11-16 ENCOUNTER — OFFICE VISIT (OUTPATIENT)
Dept: INTERNAL MEDICINE | Facility: CLINIC | Age: 43
End: 2023-11-16
Payer: COMMERCIAL

## 2023-11-16 ENCOUNTER — HOSPITAL ENCOUNTER (EMERGENCY)
Facility: CLINIC | Age: 43
Discharge: HOME | End: 2023-11-16
Attending: EMERGENCY MEDICINE | Admitting: EMERGENCY MEDICINE
Payer: COMMERCIAL

## 2023-11-16 ENCOUNTER — APPOINTMENT (OUTPATIENT)
Dept: GENERAL RADIOLOGY | Facility: CLINIC | Age: 43
End: 2023-11-16
Attending: EMERGENCY MEDICINE
Payer: COMMERCIAL

## 2023-11-16 ENCOUNTER — TELEPHONE (OUTPATIENT)
Dept: INTERNAL MEDICINE | Facility: CLINIC | Age: 43
End: 2023-11-16

## 2023-11-16 VITALS
WEIGHT: 293 LBS | DIASTOLIC BLOOD PRESSURE: 97 MMHG | TEMPERATURE: 98.9 F | BODY MASS INDEX: 48.82 KG/M2 | HEIGHT: 65 IN | HEART RATE: 85 BPM | OXYGEN SATURATION: 93 % | SYSTOLIC BLOOD PRESSURE: 157 MMHG

## 2023-11-16 VITALS
HEART RATE: 86 BPM | SYSTOLIC BLOOD PRESSURE: 138 MMHG | TEMPERATURE: 98 F | HEIGHT: 63 IN | BODY MASS INDEX: 51.91 KG/M2 | DIASTOLIC BLOOD PRESSURE: 80 MMHG | RESPIRATION RATE: 24 BRPM | OXYGEN SATURATION: 92 % | WEIGHT: 293 LBS

## 2023-11-16 DIAGNOSIS — I10 BENIGN ESSENTIAL HYPERTENSION: ICD-10-CM

## 2023-11-16 DIAGNOSIS — I10 ACCELERATED HYPERTENSION: ICD-10-CM

## 2023-11-16 DIAGNOSIS — J45.51 SEVERE PERSISTENT ASTHMA WITH EXACERBATION (H): ICD-10-CM

## 2023-11-16 DIAGNOSIS — R60.0 BILATERAL LOWER EXTREMITY EDEMA: ICD-10-CM

## 2023-11-16 DIAGNOSIS — E11.9 TYPE 2 DIABETES MELLITUS WITHOUT COMPLICATION, WITHOUT LONG-TERM CURRENT USE OF INSULIN (H): ICD-10-CM

## 2023-11-16 DIAGNOSIS — J44.1 COPD EXACERBATION (H): Primary | ICD-10-CM

## 2023-11-16 DIAGNOSIS — J45.21 MILD INTERMITTENT ASTHMA WITH ACUTE EXACERBATION: ICD-10-CM

## 2023-11-16 DIAGNOSIS — Z13.9 ENCOUNTER FOR SCREENING INVOLVING SOCIAL DETERMINANTS OF HEALTH (SDOH): ICD-10-CM

## 2023-11-16 LAB
ANION GAP SERPL CALCULATED.3IONS-SCNC: 9 MMOL/L (ref 7–15)
ATRIAL RATE - MUSE: 84 BPM
BASOPHILS # BLD AUTO: 0 10E3/UL (ref 0–0.2)
BASOPHILS NFR BLD AUTO: 1 %
BUN SERPL-MCNC: 12.4 MG/DL (ref 6–20)
CALCIUM SERPL-MCNC: 9.4 MG/DL (ref 8.6–10)
CHLORIDE SERPL-SCNC: 98 MMOL/L (ref 98–107)
CREAT SERPL-MCNC: 0.79 MG/DL (ref 0.51–0.95)
DEPRECATED HCO3 PLAS-SCNC: 30 MMOL/L (ref 22–29)
DIASTOLIC BLOOD PRESSURE - MUSE: NORMAL MMHG
EGFRCR SERPLBLD CKD-EPI 2021: >90 ML/MIN/1.73M2
EOSINOPHIL # BLD AUTO: 0.2 10E3/UL (ref 0–0.7)
EOSINOPHIL NFR BLD AUTO: 3 %
ERYTHROCYTE [DISTWIDTH] IN BLOOD BY AUTOMATED COUNT: 15.1 % (ref 10–15)
FLUAV RNA SPEC QL NAA+PROBE: NEGATIVE
FLUBV RNA RESP QL NAA+PROBE: NEGATIVE
GLUCOSE SERPL-MCNC: 197 MG/DL (ref 70–99)
HCT VFR BLD AUTO: 45.4 % (ref 35–47)
HGB BLD-MCNC: 13.4 G/DL (ref 11.7–15.7)
IMM GRANULOCYTES # BLD: 0 10E3/UL
IMM GRANULOCYTES NFR BLD: 0 %
INTERPRETATION ECG - MUSE: NORMAL
LYMPHOCYTES # BLD AUTO: 1.3 10E3/UL (ref 0.8–5.3)
LYMPHOCYTES NFR BLD AUTO: 15 %
MCH RBC QN AUTO: 27.6 PG (ref 26.5–33)
MCHC RBC AUTO-ENTMCNC: 29.5 G/DL (ref 31.5–36.5)
MCV RBC AUTO: 94 FL (ref 78–100)
MONOCYTES # BLD AUTO: 0.5 10E3/UL (ref 0–1.3)
MONOCYTES NFR BLD AUTO: 5 %
NEUTROPHILS # BLD AUTO: 6.6 10E3/UL (ref 1.6–8.3)
NEUTROPHILS NFR BLD AUTO: 76 %
NRBC # BLD AUTO: 0 10E3/UL
NRBC BLD AUTO-RTO: 0 /100
NT-PROBNP SERPL-MCNC: 44 PG/ML (ref 0–450)
P AXIS - MUSE: 28 DEGREES
PLATELET # BLD AUTO: 345 10E3/UL (ref 150–450)
POTASSIUM SERPL-SCNC: 4.4 MMOL/L (ref 3.4–5.3)
PR INTERVAL - MUSE: 150 MS
QRS DURATION - MUSE: 80 MS
QT - MUSE: 366 MS
QTC - MUSE: 432 MS
R AXIS - MUSE: 8 DEGREES
RBC # BLD AUTO: 4.85 10E6/UL (ref 3.8–5.2)
RSV RNA SPEC NAA+PROBE: NEGATIVE
SARS-COV-2 RNA RESP QL NAA+PROBE: NEGATIVE
SODIUM SERPL-SCNC: 137 MMOL/L (ref 135–145)
SYSTOLIC BLOOD PRESSURE - MUSE: NORMAL MMHG
T AXIS - MUSE: 42 DEGREES
TROPONIN T SERPL HS-MCNC: 8 NG/L
TROPONIN T SERPL HS-MCNC: 8 NG/L
VENTRICULAR RATE- MUSE: 84 BPM
WBC # BLD AUTO: 8.6 10E3/UL (ref 4–11)

## 2023-11-16 PROCEDURE — 94640 AIRWAY INHALATION TREATMENT: CPT

## 2023-11-16 PROCEDURE — 99215 OFFICE O/P EST HI 40 MIN: CPT | Mod: 25 | Performed by: INTERNAL MEDICINE

## 2023-11-16 PROCEDURE — 250N000012 HC RX MED GY IP 250 OP 636 PS 637: Performed by: EMERGENCY MEDICINE

## 2023-11-16 PROCEDURE — 93005 ELECTROCARDIOGRAM TRACING: CPT

## 2023-11-16 PROCEDURE — 87637 SARSCOV2&INF A&B&RSV AMP PRB: CPT | Performed by: EMERGENCY MEDICINE

## 2023-11-16 PROCEDURE — 250N000013 HC RX MED GY IP 250 OP 250 PS 637: Performed by: EMERGENCY MEDICINE

## 2023-11-16 PROCEDURE — 82310 ASSAY OF CALCIUM: CPT | Performed by: EMERGENCY MEDICINE

## 2023-11-16 PROCEDURE — 85025 COMPLETE CBC W/AUTO DIFF WBC: CPT | Performed by: EMERGENCY MEDICINE

## 2023-11-16 PROCEDURE — 250N000011 HC RX IP 250 OP 636: Mod: JZ | Performed by: EMERGENCY MEDICINE

## 2023-11-16 PROCEDURE — 71046 X-RAY EXAM CHEST 2 VIEWS: CPT

## 2023-11-16 PROCEDURE — 84484 ASSAY OF TROPONIN QUANT: CPT | Mod: 91 | Performed by: EMERGENCY MEDICINE

## 2023-11-16 PROCEDURE — 99285 EMERGENCY DEPT VISIT HI MDM: CPT | Mod: 25

## 2023-11-16 PROCEDURE — 36415 COLL VENOUS BLD VENIPUNCTURE: CPT | Performed by: EMERGENCY MEDICINE

## 2023-11-16 PROCEDURE — 83880 ASSAY OF NATRIURETIC PEPTIDE: CPT | Performed by: EMERGENCY MEDICINE

## 2023-11-16 PROCEDURE — 94640 AIRWAY INHALATION TREATMENT: CPT | Performed by: INTERNAL MEDICINE

## 2023-11-16 PROCEDURE — 96374 THER/PROPH/DIAG INJ IV PUSH: CPT

## 2023-11-16 PROCEDURE — 250N000009 HC RX 250: Performed by: EMERGENCY MEDICINE

## 2023-11-16 RX ORDER — IPRATROPIUM BROMIDE AND ALBUTEROL SULFATE 2.5; .5 MG/3ML; MG/3ML
3 SOLUTION RESPIRATORY (INHALATION) ONCE
Status: COMPLETED | OUTPATIENT
Start: 2023-11-16 | End: 2023-11-16

## 2023-11-16 RX ORDER — PREDNISONE 20 MG/1
TABLET ORAL
Qty: 10 TABLET | Refills: 0 | Status: SHIPPED | OUTPATIENT
Start: 2023-11-16 | End: 2023-12-05

## 2023-11-16 RX ORDER — LISINOPRIL 20 MG/1
20 TABLET ORAL DAILY
Qty: 30 TABLET | Refills: 0 | Status: SHIPPED | OUTPATIENT
Start: 2023-11-16 | End: 2023-12-07

## 2023-11-16 RX ORDER — ALBUTEROL SULFATE 90 UG/1
2 AEROSOL, METERED RESPIRATORY (INHALATION) EVERY 6 HOURS PRN
Qty: 8 G | Refills: 0 | Status: SHIPPED | OUTPATIENT
Start: 2023-11-16 | End: 2023-12-05

## 2023-11-16 RX ORDER — PREDNISONE 20 MG/1
40 TABLET ORAL ONCE
Status: COMPLETED | OUTPATIENT
Start: 2023-11-16 | End: 2023-11-16

## 2023-11-16 RX ORDER — ALBUTEROL SULFATE 0.83 MG/ML
2.5 SOLUTION RESPIRATORY (INHALATION) EVERY 4 HOURS PRN
Qty: 90 ML | Refills: 0 | Status: SHIPPED | OUTPATIENT
Start: 2023-11-16 | End: 2023-12-05

## 2023-11-16 RX ORDER — HYDRALAZINE HYDROCHLORIDE 20 MG/ML
10 INJECTION INTRAMUSCULAR; INTRAVENOUS ONCE
Status: COMPLETED | OUTPATIENT
Start: 2023-11-16 | End: 2023-11-16

## 2023-11-16 RX ORDER — LISINOPRIL 10 MG/1
20 TABLET ORAL ONCE
Status: COMPLETED | OUTPATIENT
Start: 2023-11-16 | End: 2023-11-16

## 2023-11-16 RX ADMIN — IPRATROPIUM BROMIDE AND ALBUTEROL SULFATE 3 ML: 2.5; .5 SOLUTION RESPIRATORY (INHALATION) at 07:50

## 2023-11-16 RX ADMIN — IPRATROPIUM BROMIDE AND ALBUTEROL SULFATE 3 ML: .5; 3 SOLUTION RESPIRATORY (INHALATION) at 09:39

## 2023-11-16 RX ADMIN — HYDRALAZINE HYDROCHLORIDE 10 MG: 20 INJECTION INTRAMUSCULAR; INTRAVENOUS at 09:35

## 2023-11-16 RX ADMIN — PREDNISONE 40 MG: 20 TABLET ORAL at 11:24

## 2023-11-16 RX ADMIN — LISINOPRIL 20 MG: 10 TABLET ORAL at 09:38

## 2023-11-16 ASSESSMENT — ASTHMA QUESTIONNAIRES: ACT_TOTALSCORE: 8

## 2023-11-16 ASSESSMENT — PATIENT HEALTH QUESTIONNAIRE - PHQ9
SUM OF ALL RESPONSES TO PHQ QUESTIONS 1-9: 9
SUM OF ALL RESPONSES TO PHQ QUESTIONS 1-9: 9
10. IF YOU CHECKED OFF ANY PROBLEMS, HOW DIFFICULT HAVE THESE PROBLEMS MADE IT FOR YOU TO DO YOUR WORK, TAKE CARE OF THINGS AT HOME, OR GET ALONG WITH OTHER PEOPLE: EXTREMELY DIFFICULT

## 2023-11-16 ASSESSMENT — ANXIETY QUESTIONNAIRES
7. FEELING AFRAID AS IF SOMETHING AWFUL MIGHT HAPPEN: SEVERAL DAYS
1. FEELING NERVOUS, ANXIOUS, OR ON EDGE: NEARLY EVERY DAY
2. NOT BEING ABLE TO STOP OR CONTROL WORRYING: NEARLY EVERY DAY
6. BECOMING EASILY ANNOYED OR IRRITABLE: MORE THAN HALF THE DAYS
4. TROUBLE RELAXING: MORE THAN HALF THE DAYS
5. BEING SO RESTLESS THAT IT IS HARD TO SIT STILL: MORE THAN HALF THE DAYS
GAD7 TOTAL SCORE: 16
GAD7 TOTAL SCORE: 16
3. WORRYING TOO MUCH ABOUT DIFFERENT THINGS: NEARLY EVERY DAY
IF YOU CHECKED OFF ANY PROBLEMS ON THIS QUESTIONNAIRE, HOW DIFFICULT HAVE THESE PROBLEMS MADE IT FOR YOU TO DO YOUR WORK, TAKE CARE OF THINGS AT HOME, OR GET ALONG WITH OTHER PEOPLE: VERY DIFFICULT

## 2023-11-16 ASSESSMENT — ACTIVITIES OF DAILY LIVING (ADL)
ADLS_ACUITY_SCORE: 35
ADLS_ACUITY_SCORE: 33

## 2023-11-16 NOTE — COMMUNITY RESOURCES LIST (ENGLISH)
11/16/2023   [facility]  N/A  For questions about this resource list or additional care needs, please contact your primary care clinic or care manager.  Phone: [phone]   Email: N/A   Address: [address] [place] [address]   Hours: N/A        Financial Stability       Utility payment assistance  1  [facility] Distance: 2.35 miles      In-Person, Phone/Virtual   [address] [place] [address]  Language: English, Japanese  Hours: Mon - Fri 9:00 AM - 4:30 PM  Fees: Free   Phone: (786) 841-3462 Ext.113 Email: info@[employer] Website: https://[employer]at     2  [facility] - Utility payment assistance Distance: 8.22 miles      In-Person, Phone/Virtual   [address] [place] [address]  Language: English  Hours: Mon - Thu 9:00 AM - 3:00 PM  Fees: Free   Phone: (750) 603-2624 Email: [facility].org Website: http://www.[facility].org/care-ministries/          Food and Nutrition       Food pantry  3  [facility] - [facility] for All Distance: 1.5 miles      Pickup   [address] [place] [address]  Language: English, Japanese  Hours: Fri 10:00 AM - 1:00 PM  Fees: Self Pay   Phone: (337) 321-5967 Email: [facility] Website: https://www.[facility]/xyxtgxero-gsjikbfde-kyquem     4  [facility] Distance: 2.35 miles      In-Person, Pickup   [address] [place] [address]  Language: English, Japanese  Hours: Mon - Wed 9:00 AM - 4:30 PM Appt. Only, Thu 9:00 AM - 6:30 PM Appt. Only, Fri 9:00 AM - 4:30 PM Appt. Only  Fees: Free   Phone: (304) 519-3461 Ext.100 Email: info@[employer].org Website: https://[employer]     SNAP application assistance  5  [facility] Distance: 2.35 miles      In-Person, Phone/Virtual   [address] [place] [address]  Language: English, Japanese  Hours: Mon - Fri 9:00 AM -  4:30 PM  Fees: Free   Phone: (767) 346-6073 Ext.113 Email: info@[employer].org Website: https://veap.org     6  [employer] Distance: 9.94 miles      In-Person   [address] [place] [address]  Language: English, German  Hours: Mon - Fri 8:30 AM - 5:00 PM  Fees: Free   Phone: (646) 301-5730 Email: info@[employer].org Website: http://www.clues.org/     Soup kitchen or free meals  7  [facility] [employer] Distance: 2.95 miles      Pickup   [address] [place] [address]  Language: English  Hours: Mon - Fri 5:00 PM - 6:00 PM  Fees: Free   Phone: (247) 241-3798 Email: contactus@[employer] Website: https://www.[employer]/     8  [facility] [place] Distance: 3.9 miles      Pickup   [address] [place] [address]  Language: English  Hours: Sat 5:00 PM - 7:00 PM , Sun 5:30 PM - 6:30 PM  Fees: Free   Phone: (289) 146-7100 Email: office@[employer] Website: http://[employer]/          Transportation       Free or low-cost transportation  9  [facility] Distance: 9.74 miles      In-Person   [address] [place] [address]  Language: English  Hours: Mon - Fri 9:00 AM - 12:00 PM , Mon - Fri 1:00 PM - 3:00 PM  Fees: Self Pay   Phone: (257) 999-8115 Email: info@[employer].org Website: https://www.voamnwi.org/[place]     10  [facility] Distance: 9.85 miles      In-Person   [address] [place] [address]  Language: English  Hours: Mon - Fri 8:00 AM - 3:00 PM  Fees: Free   Phone: (500) 715-8397 Ext.14 Email: neighborhood@[facility] Website: http://www.[facility].org     Transportation to medical appointments  11  [facility] [place] Distance: 3.3 miles      In-Person   [address] [place] [address]  Language: English  Hours: Mon - Sun Open 24 Hours  Fees: Insurance, Self Pay   Phone:  (521) 519-8122 Website: https://www.[employer]/southTuniiraj/     12  [facility] [place] Distance: 4.11 miles      In-Person   [address] [facility] [address] [place] [address]  Language: English  Hours: Mon - Sun Open 24 Hours  Fees: Insurance, Self Pay   Phone: (786) 469-4406 Website: https://www.[employer]/george/?L=true          Important Numbers & Websites       Emergency Services   [address]  [place] [address]  Poison Control   (273) 675-5683  Suicide Prevention Lifeline   (781) 208-5046 (TALK)  Child Abuse Hotline   (134) 890-5384 (4-A-Child)  Sexual Assault Hotline   (787) 914-7948 (HOPE)  National Runaway Safeline   (797) 918-9397 (RUNAWAY)  All-Options Talkline   (500) 379-4227  Substance Abuse Referral   (144) 568-2251 (HELP)

## 2023-11-16 NOTE — TELEPHONE ENCOUNTER
Pt was transferred to RN triage. Pt teary eyed, fast paced speech, yelling at times. Pt reporting difficulty handling scheduling due to her mental health issues.     Pt scheduled with PCP in first available hosp follow up appointment. Pt worried that she will run out of bp meds. Reassured that johanna refill is an option, and to notify MD for request.     Pt agrees to plan of care and will notify MD of new or worsening symptoms.

## 2023-11-16 NOTE — ED TRIAGE NOTES
Pt has sob for the past week  Pt has been off of her bp meds for a few weeks   Pt also has new swelling in her feet

## 2023-11-16 NOTE — PROGRESS NOTES
"  Assessment & Plan     COPD exacerbation (H)  Severe persistent asthma with exacerbation (H28)  Patient was given nebulizer treatment with DuoNeb in the clinic 1 time.  She felt a little better after that with improvement in wheezing  However she needs further evaluation and treatment in the hospital  Also need to rule out CHF due to her edema    Patient was sent to the [facility] emergency room  Her friend will drive her to the ER, she declined ambulance          Type 2 diabetes mellitus without complication, without long-term current use of insulin (H)  Diet management    Benign essential hypertension  Out of medications, uncontrolled  Patient to be seen after hospital evaluation for medication management               Nicotine/Tobacco Cessation:  She reports that she has been smoking cigarettes. She started smoking about 5 years ago. She has never used smokeless tobacco.  Nicotine/Tobacco Cessation Plan:         BMI:   Estimated body mass index is 69.13 kg/m  as calculated from the following:    Height as of this encounter: 1.651 m (5' 5\").    Weight as of this encounter: 188.4 kg (415 lb 6.4 oz).           Josselin Polanco MD  [facility]    Davide Evans is a 43 year old, presenting for the following health issues:  Consult  Mainly shortness of breath and a cough. Handicap parking form     History of Present Illness       Mental Health Follow-up:  Patient presents to follow-up on Depression & Anxiety.Patient's depression since last visit has been:  No change  The patient is having other symptoms associated with depression.  Patient's anxiety since last visit has been:  Worse  The patient is having other symptoms associated with anxiety.  Any significant life events: job concerns, financial concerns, grief or loss, health concerns and other  Patient is feeling anxious or having panic attacks.  Patient has no concerns about alcohol or drug use.    Reason for visit:  " "Shirtness of breath, swollen fert, head ache, constantly  urination, possibilkity of handi capped parking,head aches, blood pressure pills, sleep study  Symptom onset:  More than a month  Symptom intensity:  Moderate  Symptom progression:  Staying the same  Had these symptoms before:  No    She eats 2-3 servings of fruits and vegetables daily.She consumes 1 sweetened beverage(s) daily.She exercises with enough effort to increase her heart rate 9 or less minutes per day.  She exercises with enough effort to increase her heart rate 3 or less days per week.   She is taking medications regularly.           Complaining of shortness of breath for at least 2 months, worsening in the last 2 weeks.  Unable to walk around the house without sitting down.  Has been having leg swelling for 2 months.  Has chest tightness on walking.  Has had asthma as a child but does not have a nebulizer.    Has been having cough for a few weeks.  Denies fevers or chills.    Has a history of hypertension and depression and anxiety.  Stopped taking all medications over a year ago.    History of smoking which she quit 14 months ago.    Currently stressed out, her  is admitted in [facility], his defibrillator fired recently    Patient has no known history of coronary artery disease.      Review of Systems   Constitutional, HEENT, cardiovascular, pulmonary, gi and gu systems are negative, except as otherwise noted.      Objective    BP (!) 157/97   Pulse 85   Temp 98.9  F (37.2  C) (Temporal)   Ht 1.651 m (5' 5\")   Wt (!) 188.4 kg (415 lb 6.4 oz)   SpO2 93%   BMI 69.13 kg/m    Body mass index is 69.13 kg/m .  Physical Exam   GENERAL: alert, respiratory distress, and obese  NECK: no adenopathy, no asymmetry, masses, or scars and thyroid normal to palpation  RESP: inspiratory wheezes bilateral  CV: regular rate and rhythm, normal S1 S2, no S3 or S4, no murmur, click or rub, no peripheral edema and peripheral pulses " strong  ABDOMEN: soft, nontender, no hepatosplenomegaly, no masses and bowel sounds normal  MS: Pitting edema to both legs

## 2023-11-16 NOTE — ED PROVIDER NOTES
"  History     Chief Complaint:  Breathing Problem       HPI   Cristina Dean is a 43 year old female who present to the ED for breathing problem. Patient reports she's been having shortness of breath for the past week. Patient assumed it was her asthma causing her to breath heavily.  noticed her legs were getting swollen so asked her to go see her primary. This morning she did, and they noticed her blood pressure has been low. Patient states she stopped taking her blood pressure medications due to financial aid. At the clinic, she had a nebulizer at the clinic before arriving here to the ED. For asthma she takes albuterol. Denies fevers and runny nose. Quit smoking 14 months ago.     Independent Historian:   None - Patient Only    Review of External Notes:   Progress note from earlier today      Medications:    Albuterol     Past Medical History:    STEPHENIE   Morbid obesity   Herpes   Asthma   BV  STD  Anxiety attack   Suicidal ideation   Drug overdose   Acute stress disorder   Lateral epicondylitis   Type 2 diabetes   Chlamydia   Urinary incontinence   Hypertension   PID    Past Surgical History:    Tonsillectomy   Excise mass back      Physical Exam   Patient Vitals for the past 24 hrs:   BP Temp Temp src Pulse Resp SpO2 Height Weight   11/16/23 1030 (!) 157/88 -- -- 79 19 93 % -- --   11/16/23 1000 (!) 148/84 -- -- 86 22 92 % -- --   11/16/23 0935 (!) 151/102 -- -- -- -- -- -- --   11/16/23 0841 (!) 223/109 -- -- -- -- -- -- --   11/16/23 0840 -- 98  F (36.7  C) Oral 78 30 94 % 1.6 m (5' 3\") (!) 188.2 kg (415 lb)        Physical Exam  General: Patient is alert and normal appearing.  HEENT: Head atraumatic    Eyes: pupils equal and reactive. Conjunctiva clear   Nares: patent   Oropharynx: no lesions, uvula midline, no palatal draping, normal voice, no trismus  Neck: Supple without lymphadenopathy, no meningismus  Chest: Heart regular rate and rhythm.   Lungs: Equal clear to auscultation with occasional end " expiratory wheeze.  Abdomen: Soft, obese, non tender, nondistended, normal bowel sounds  Back: No costovertebral angle tenderness, no midline C, T or L spine tenderness  Neuro: Grossly nonfocal, normal speech, strength equal bilaterally, CN 2-12 intact  Extremities: No deformities, equal radial and DP pulses. No clubbing, cyanosis.  Lateral lower extremity edema without erythema or warmth  Skin: Warm and dry with no rash.       Emergency Department Course   ECG  ECG taken at 0852, ECG read at 0855  Normal sinus rhythm   Inferior infarct, age undetermined   Cannot rule out  anterior infarct, age undetermined   No changes as compared to prior, dated 11/3/2023.  Rate 84 bpm. MS interval 150 ms. QRS duration 80 ms. QT/QTc 366/432 ms. P-R-T axes 28 8 42.     Imaging:  XR Chest 2 Views   Final Result   IMPRESSION: No focal consolidation, pleural effusion or pneumothorax.   Heart size is at the upper limits of normal. Mild degenerative changes   of the spine.       MENDOZA DASILVA MD            SYSTEM ID:  [id number]         Laboratory:  Labs Ordered and Resulted from Time of ED Arrival to Time of ED Departure   BASIC METABOLIC PANEL - Abnormal       Result Value    Sodium 137      Potassium 4.4      Chloride 98      Carbon Dioxide (CO2) 30 (*)     Anion Gap 9      Urea Nitrogen 12.4      Creatinine 0.79      GFR Estimate >90      Calcium 9.4      Glucose 197 (*)    CBC WITH PLATELETS AND DIFFERENTIAL - Abnormal    WBC Count 8.6      RBC Count 4.85      Hemoglobin 13.4      Hematocrit 45.4      MCV 94      MCH 27.6      MCHC 29.5 (*)     RDW 15.1 (*)     Platelet Count 345      % Neutrophils 76      % Lymphocytes 15      % Monocytes 5      % Eosinophils 3      % Basophils 1      % Immature Granulocytes 0      NRBCs per 100 WBC 0      Absolute Neutrophils 6.6      Absolute Lymphocytes 1.3      Absolute Monocytes 0.5      Absolute Eosinophils 0.2      Absolute Basophils 0.0      Absolute Immature Granulocytes 0.0       Absolute NRBCs 0.0     TROPONIN T, HIGH SENSITIVITY - Normal    Troponin T, High Sensitivity 8     NT PROBNP INPATIENT - Normal    N terminal Pro BNP Inpatient 44     INFLUENZA A/B, RSV, & SARS-COV2 PCR - Normal    Influenza A PCR Negative      Influenza B PCR Negative      RSV PCR Negative      SARS CoV2 PCR Negative     TROPONIN T, HIGH SENSITIVITY - Normal    Troponin T, High Sensitivity 8          Emergency Department Course & Assessments:      Interventions:  Medications   ipratropium - albuterol 0.5 mg/2.5 mg/3 mL (DUONEB) neb solution 3 mL (3 mLs Nebulization $Given 11/16/23 0939)   lisinopril (ZESTRIL) tablet 20 mg (20 mg Oral $Given 11/16/23 0938)   hydrALAZINE (APRESOLINE) injection 10 mg (10 mg Intravenous $Given 11/16/23 0935)   predniSONE (DELTASONE) tablet 40 mg (40 mg Oral $Given 11/16/23 1124)        Assessments:  0850 I obtained history and examined the patient as noted above.   1152 I rechecked and updated the patient.   Independent Interpretation (X-rays, CTs, rhythm strip):  None    Consultations/Discussion of Management or Tests:  None     Social Determinants of Health affecting care:   None    Disposition:  The patient was discharged to home.     Impression & Plan    CMS Diagnoses: None      Medical Decision Making:  Cristina Dean is a 43 year old female with a PMH of asthma who presents for evaluation of shortness of breath and wheezing.  Signs and symptoms are consistent with asthma exacerbation.  A broad differential was considered including foreign body, asthma, pneumonia, bronchitis, reactive airway disease, pneumothorax, cardiac equivalent, viral induced wheezing, allergic phenomena, etc.  She feels improved after interventions here in ED.  There are no signs at this point of any serious etiologies including those mentioned above.  Given her lower extremity edema and being out of her blood pressure medications for some time medical work-up was undertaken.  EKG without acute ischemic  changes.  Troponin and delta troponin normal making acute coronary syndrome unlikely.  BNP within normal limits and no sign of heart failure.  Do not suspect pulmonary embolism.  Completely improved following DuoNeb.  We will place her on steroids for 5 days, refill her albuterol inhaler and neb solution for home.  No indication for hospitalization at this time including no hypoxia, no marked increase in respiratory rate, minimal to no retractions.   Supportive outpatient management is indicated, medications for discharge noted above.  Close followup with primary care physician.  Return if increased wheezing, progressive shortness of breath, develops fever greater than 102.        Diagnosis:    ICD-10-CM    1. Mild intermittent asthma with acute exacerbation  J45.21       2. Accelerated hypertension  I10       3. Bilateral lower extremity edema  R60.0            Discharge Medications:  New Prescriptions    ALBUTEROL (PROAIR HFA/PROVENTIL HFA/VENTOLIN HFA) 108 (90 BASE) MCG/ACT INHALER    Inhale 2 puffs into the lungs every 6 hours as needed for shortness of breath or wheezing    ALBUTEROL (PROVENTIL) (2.5 MG/3ML) 0.083% NEB SOLUTION    Take 1 vial (2.5 mg) by nebulization every 4 hours as needed for shortness of breath    PREDNISONE (DELTASONE) 20 MG TABLET    Take two tablets (= 40mg) each day for 5 (five) days        Scribe Disclosure:  Sana RUGGIERO, am serving as a scribe at 8:54 AM on 11/16/2023 to document services personally performed by Maggie Valle MD based on my observations and the provider's statements to me.     11/16/2023   Maggie Valle MD Neuner, Maria Bea, MD  11/16/23 1970

## 2023-11-20 NOTE — TELEPHONE ENCOUNTER
Appointments in Next Year      Dec 05, 2023  9:00 AM  (Arrive by 8:40 AM)  Provider Visit with Jermain Jaramillo MD  [facility] ([facility] ) [phone]     Jan 02, 2024  9:00 AM  (Arrive by 8:40 AM)  Provider Visit with Joesph Olson MD  [facility] ([facility] ) [phone]          Called pt and reviewed PCP response below     Sirisha Pickens RN  [facility]

## 2023-12-04 NOTE — PROGRESS NOTES
Assessment & Plan     Intermittent asthma without complication, unspecified asthma severity  Refilled the patient's inhalers and nebulizer solution per request.  I also extended the patient's steroid as directed.  Pulmonary referral has been placed.  Patient was informed that if she does not start to feel better or turnaround or worsen that she may benefit again from being seen in the ER if this is after hours  - predniSONE (DELTASONE) 20 MG tablet; Take 1 tablet (20 mg) by mouth 2 times daily For 5 days then 1 po Day(20mg) for 5 days then stop then 1/2 tablet or 10mg for 5 days then stop  - Adult Pulmonary Medicine  Referral; Future  - albuterol (PROAIR HFA/PROVENTIL HFA/VENTOLIN HFA) 108 (90 Base) MCG/ACT inhaler; Inhale 2 puffs into the lungs every 6 hours as needed for shortness of breath or wheezing  - albuterol (PROVENTIL) (2.5 MG/3ML) 0.083% neb solution; Take 1 vial (2.5 mg) by nebulization every 4 hours as needed for shortness of breath  - albuterol (PROAIR HFA/PROVENTIL HFA/VENTOLIN HFA) 108 (90 Base) MCG/ACT inhaler; Inhale 2 puffs into the lungs every 6 hours    Morbid obesity due to excess calories (H)  Encouraged ongoing weight loss and weight reduction.    Benign essential hypertension  Restarting lisinopril.  Noted creatinine  Creatinine   Date Value Ref Range Status   11/16/2023 0.79 0.51 - 0.95 mg/dL Final   03/01/2021 0.90 0.52 - 1.04 mg/dL Final       - lisinopril (ZESTRIL) 20 MG tablet; Take 1 tablet (20 mg) by mouth daily    Type 2 diabetes mellitus without complication, without long-term current use of insulin (H)  Recheck A1c and lipid panel.  - HEMOGLOBIN A1C; Future  - Lipid panel reflex to direct LDL Non-fasting; Future    CARDIOVASCULAR SCREENING; LDL GOAL LESS THAN 130  Continue with statin therapy  - atorvastatin (LIPITOR) 10 MG tablet; Take 1 tablet (10 mg) by mouth daily    Cervical cancer screening  Patient will be following up with her gynecologist to obtain her Pap  "smear    Visit for screening mammogram  Routine orders placed for mammography.  - *MA Screening Digital Bilateral; Future    STEPHENIE (generalized anxiety disorder)  Reviewed patient's mental health scores.  She states that overall she is stable.  She would like a refill of her trazodone.  - traZODone (DESYREL) 50 MG tablet; Take 1 tablet (50 mg) by mouth at bedtime     MED REC REQUIRED  Post Medication Reconciliation Status: discharge medications reconciled and changed, per note/orders  BMI:   Estimated body mass index is 72.17 kg/m  as calculated from the following:    Height as of this encounter: 1.6 m (5' 3\").    Weight as of this encounter: 184.8 kg (407 lb 6.4 oz).   Weight management plan: Discussed healthy diet and exercise guidelines    See Patient Instructions    Jermain Jaramillo MD  [facility]    Davide Evans is a 43 year old, presenting for the following health issues:  ER F/U    Cristina reports to me that she felt incredibly well when she was taking the prednisone.  Her breathing was better.  She was able to do all of her work.  She and she is tapered off she is noted that she started to wheeze and cough a little bit more again.  She has started and stopped a lot of her medications and would like to consider getting back on board with some of them.  He is very held on her behalf due to cost.    History of Present Illness     Asthma:  She presents for follow up of asthma.  She has some cough, some wheezing, and some shortness of breath.  She is using a relief medication 2-3 times per day. She does not have a controller medication. Patient is aware of the following triggers: unaware of any triggers. The patient has not had a visit to the Emergency Room, Urgent Care or Hospital due to asthma since the last clinic visit.     Hyperlipidemia:  She presents for follow up of hyperlipidemia.   She is not taking medication to lower cholesterol. She is not having myalgia or other side " effects to statin medications.    Hypertension: She presents for follow up of hypertension.  She does not check blood pressure  regularly outside of the clinic. Outside blood pressures have been over 140/90. She follows a low salt diet.     She eats 2-3 servings of fruits and vegetables daily.She consumes 1 sweetened beverage(s) daily.She exercises with enough effort to increase her heart rate 9 or less minutes per day.  She exercises with enough effort to increase her heart rate 3 or less days per week.   She is taking medications regularly.       Patient is here today for follow-up having recently been seen in the emergency room.  She has not been taking many of her regularly prescribed medications.  Discharge summary is listed below from the emergency room.  She was sent home and told to follow-up.  Chest x-ray demonstrated no focal changes.  EKG without acute ischemic changes.  Troponin and delta troponin normal making acute coronary syndrome unlikely.  BNP within normal limits and no sign of heart failure.  Her symptoms improved after nebulization and she was discharged.    ED/ Followup:    Facility:  [facility] ER  Date of visit: 11/16/23  Reason for visit: Mild intermittent asthma with acute exacerbation   Current Status: Improvement when taking prednisone but now symptoms have returned. Experiencing back and chest discomfort when coughing/ deep breathing. Patient also interested in restarting routine medications that she has been off for 1+ yr due to finances          History      Chief Complaint:  Breathing Problem       Imaging:  XR Chest 2 Views   Final Result   IMPRESSION: No focal consolidation, pleural effusion or pneumothorax.   Heart size is at the upper limits of normal. Mild degenerative changes   of the spine.             Medical Decision Making:  Cristina Dean is a 43 year old female with a PMH of asthma who presents for evaluation of shortness of breath and wheezing.  Signs and symptoms are  consistent with asthma exacerbation.  A broad differential was considered including foreign body, asthma, pneumonia, bronchitis, reactive airway disease, pneumothorax, cardiac equivalent, viral induced wheezing, allergic phenomena, etc.  She feels improved after interventions here in ED.  There are no signs at this point of any serious etiologies including those mentioned above.  Given her lower extremity edema and being out of her blood pressure medications for some time medical work-up was undertaken.  EKG without acute ischemic changes.  Troponin and delta troponin normal making acute coronary syndrome unlikely.  BNP within normal limits and no sign of heart failure.  Do not suspect pulmonary embolism.  Completely improved following DuoNeb.  We will place her on steroids for 5 days, refill her albuterol inhaler and neb solution for home.  No indication for hospitalization at this time including no hypoxia, no marked increase in respiratory rate, minimal to no retractions.   Supportive outpatient management is indicated, medications for discharge noted above.  Close followup with primary care physician.  Return if increased wheezing, progressive shortness of breath, develops fever greater than 102.          Diagnosis:      ICD-10-CM     1. Mild intermittent asthma with acute exacerbation  J45.21         2. Accelerated hypertension  I10         3. Bilateral lower extremity edema                Depression and Anxiety Follow-Up  How are you doing with your depression since your last visit? No change  How are you doing with your anxiety since your last visit?  No change  Are you having other symptoms that might be associated with depression or anxiety? No  Have you had a significant life event? Financial Concerns   Do you have any concerns with your use of alcohol or other drugs? No    Social History     Tobacco Use    Smoking status: Former     Packs/day: 0.00     Years: 15.00     Additional pack years: 0.00      "Total pack years: 0.00     Types: Cigarettes     Start date: 2018     Quit date: 2022     Years since quittin.0    Smokeless tobacco: Never    Tobacco comments:      ppd    Substance Use Topics    Alcohol use: Yes     Comment: 1 drink every 2 months     Drug use: Yes     Types: Marijuana     Comment: daily         2022     3:28 PM 2023     6:45 AM 2023     8:32 AM   PHQ   PHQ-9 Total Score 21 9 14   Q9: Thoughts of better off dead/self-harm past 2 weeks Not at all Not at all Not at all         2022    11:23 AM 2022     3:28 PM 2023     6:50 AM   STEPHENIE-7 SCORE   Total Score 20 (severe anxiety)  16 (severe anxiety)   Total Score 20 14 16       Review of Systems   CONSTITUTIONAL: NEGATIVE for fever, chills, + change in weight  EYES: NEGATIVE for vision changes or irritation  ENT/MOUTH: NEGATIVE for ear, mouth and throat problems  GI: NEGATIVE for nausea, abdominal pain, heartburn, or change in bowel habits  : NEGATIVE for frequency, dysuria, or hematuria  MUSCULOSKELETAL: NEGATIVE for significant arthralgias or myalgia  NEURO: NEGATIVE for weakness, dizziness or paresthesias  HEME: NEGATIVE for bleeding problems        Objective    BP (!) 152/92   Pulse 86   Temp 96.8  F (36  C) (Temporal)   Resp 24   Ht 1.6 m (5' 3\")   Wt (!) 184.8 kg (407 lb 6.4 oz)   LMP 2023   SpO2 93%   BMI 72.17 kg/m    Body mass index is 72.17 kg/m .  Physical Exam   GENERAL: alert and no distress  EYES: Eyes grossly normal to inspection, PERRL and conjunctivae and sclerae normal  HENT: ear canals and TM's normal, nose and mouth without ulcers or lesions  RESP: lungs not clear to auscultation with wheezing bilaterally- no rales, rhonchi  CV: regular rate and rhythm, normal S1 S2, no S3 or S4, no murmur, click or rub  Obese  MS: no gross musculoskeletal defects noted  NEURO: No focal changes  PSYCH: mentation appears normal, affect normal/bright                  "

## 2023-12-05 ENCOUNTER — OFFICE VISIT (OUTPATIENT)
Dept: INTERNAL MEDICINE | Facility: CLINIC | Age: 43
End: 2023-12-05
Payer: COMMERCIAL

## 2023-12-05 VITALS
WEIGHT: 293 LBS | BODY MASS INDEX: 51.91 KG/M2 | SYSTOLIC BLOOD PRESSURE: 152 MMHG | DIASTOLIC BLOOD PRESSURE: 92 MMHG | RESPIRATION RATE: 24 BRPM | HEIGHT: 63 IN | HEART RATE: 86 BPM | TEMPERATURE: 96.8 F | OXYGEN SATURATION: 93 %

## 2023-12-05 DIAGNOSIS — E66.01 MORBID OBESITY DUE TO EXCESS CALORIES (H): ICD-10-CM

## 2023-12-05 DIAGNOSIS — Z13.6 CARDIOVASCULAR SCREENING; LDL GOAL LESS THAN 130: ICD-10-CM

## 2023-12-05 DIAGNOSIS — J45.20 INTERMITTENT ASTHMA WITHOUT COMPLICATION, UNSPECIFIED ASTHMA SEVERITY: Primary | ICD-10-CM

## 2023-12-05 DIAGNOSIS — I10 BENIGN ESSENTIAL HYPERTENSION: ICD-10-CM

## 2023-12-05 DIAGNOSIS — J45.20 INTERMITTENT ASTHMA WITHOUT COMPLICATION, UNSPECIFIED ASTHMA SEVERITY: ICD-10-CM

## 2023-12-05 DIAGNOSIS — E11.9 TYPE 2 DIABETES MELLITUS WITHOUT COMPLICATION, WITHOUT LONG-TERM CURRENT USE OF INSULIN (H): ICD-10-CM

## 2023-12-05 DIAGNOSIS — Z12.31 VISIT FOR SCREENING MAMMOGRAM: ICD-10-CM

## 2023-12-05 DIAGNOSIS — Z12.4 CERVICAL CANCER SCREENING: ICD-10-CM

## 2023-12-05 DIAGNOSIS — F41.1 GAD (GENERALIZED ANXIETY DISORDER): ICD-10-CM

## 2023-12-05 LAB
CHOLEST SERPL-MCNC: 192 MG/DL
HBA1C MFR BLD: 9.5 % (ref 0–5.6)
HDLC SERPL-MCNC: 30 MG/DL
LDLC SERPL CALC-MCNC: 113 MG/DL
NONHDLC SERPL-MCNC: 162 MG/DL
TRIGL SERPL-MCNC: 244 MG/DL

## 2023-12-05 PROCEDURE — 99214 OFFICE O/P EST MOD 30 MIN: CPT | Performed by: INTERNAL MEDICINE

## 2023-12-05 PROCEDURE — 80061 LIPID PANEL: CPT | Performed by: INTERNAL MEDICINE

## 2023-12-05 PROCEDURE — 36415 COLL VENOUS BLD VENIPUNCTURE: CPT | Performed by: INTERNAL MEDICINE

## 2023-12-05 PROCEDURE — 83036 HEMOGLOBIN GLYCOSYLATED A1C: CPT | Performed by: INTERNAL MEDICINE

## 2023-12-05 RX ORDER — TRAZODONE HYDROCHLORIDE 50 MG/1
50 TABLET, FILM COATED ORAL AT BEDTIME
Qty: 90 TABLET | Refills: 0 | Status: SHIPPED | OUTPATIENT
Start: 2023-12-05 | End: 2024-03-01

## 2023-12-05 RX ORDER — ALBUTEROL SULFATE 90 UG/1
2 AEROSOL, METERED RESPIRATORY (INHALATION) EVERY 6 HOURS
Qty: 8 G | Refills: 1 | Status: SHIPPED | OUTPATIENT
Start: 2023-12-05

## 2023-12-05 RX ORDER — PREDNISONE 20 MG/1
TABLET ORAL
Qty: 20 TABLET | Refills: 0 | Status: SHIPPED | OUTPATIENT
Start: 2023-12-05

## 2023-12-05 RX ORDER — ALBUTEROL SULFATE 0.83 MG/ML
2.5 SOLUTION RESPIRATORY (INHALATION) EVERY 4 HOURS PRN
Qty: 90 ML | Refills: 0 | Status: SHIPPED | OUTPATIENT
Start: 2023-12-05

## 2023-12-05 RX ORDER — PREDNISONE 20 MG/1
20 TABLET ORAL 2 TIMES DAILY
Qty: 20 TABLET | Refills: 0 | Status: SHIPPED | OUTPATIENT
Start: 2023-12-05 | End: 2023-12-05

## 2023-12-05 RX ORDER — LISINOPRIL 20 MG/1
20 TABLET ORAL DAILY
Qty: 90 TABLET | Refills: 1 | Status: SHIPPED | OUTPATIENT
Start: 2023-12-05 | End: 2023-12-05

## 2023-12-05 RX ORDER — ATORVASTATIN CALCIUM 10 MG/1
10 TABLET, FILM COATED ORAL DAILY
Qty: 90 TABLET | Refills: 3 | Status: SHIPPED | OUTPATIENT
Start: 2023-12-05 | End: 2023-12-07 | Stop reason: DRUGHIGH

## 2023-12-05 RX ORDER — ALBUTEROL SULFATE 90 UG/1
2 AEROSOL, METERED RESPIRATORY (INHALATION) EVERY 6 HOURS PRN
Qty: 8 G | Refills: 0 | Status: SHIPPED | OUTPATIENT
Start: 2023-12-05

## 2023-12-05 ASSESSMENT — PATIENT HEALTH QUESTIONNAIRE - PHQ9
10. IF YOU CHECKED OFF ANY PROBLEMS, HOW DIFFICULT HAVE THESE PROBLEMS MADE IT FOR YOU TO DO YOUR WORK, TAKE CARE OF THINGS AT HOME, OR GET ALONG WITH OTHER PEOPLE: EXTREMELY DIFFICULT
SUM OF ALL RESPONSES TO PHQ QUESTIONS 1-9: 14
SUM OF ALL RESPONSES TO PHQ QUESTIONS 1-9: 14

## 2023-12-05 NOTE — COMMUNITY RESOURCES LIST (ENGLISH)
12/05/2023   [facility]  N/A  For questions about this resource list or additional care needs, please contact your primary care clinic or care manager.  Phone: [phone]   Email: N/A   Address: [facility] [place] [address]   Hours: N/A        Hotlines and Helplines       Hotline - Housing crisis  1  [facility] (Main Office) Distance: 4.73 miles      Phone/Virtual   [address] [place] [address]  Language: English  Hours: Mon - Sun Open 24 Hours   Phone: (313) 291-5171 Email: info@eMeter.SQMOS Website: http://eMeter.SQMOS     2  [facility] Distance: 10.17 miles      Phone/Virtual   [address] [place] [address]  Language: English  Hours: Mon - Sun Open 24 Hours   Phone: (192) 299-8839 Email: info@eMeter.SQMOS Website: http://www.eMeter.org          Housing       Coordinated Entry access point  3  [facility] [employer] [place] ([facility] - [facility] Services Distance: 10.56 miles      In-Person   [address] [place] [address]  Language: English  Hours: Mon - Fri 9:00 AM - 5:00 PM  Fees: Free   Phone: (431) 728-3965 Email: housing@[facility].org Website: http://www.[facility].org/housing     4  [facility] - Adult senior living [facility] Distance: 11.53 miles      In-Person   [address] [place] [address]  Language: English  Hours: Mon - Sat 10:00 PM - 5:00 PM  Fees: Free   Phone: (553) 220-9693 Email: info@saintola.SQMOS Website: http://www.[facility].org/     Drop-in center or day shelter  5  [facility] Distance: 10.92 miles      In-Person   [address] [place] [address]  Language: English  Hours: Mon - Fri 12:00 PM - 3:00 PM  Fees: Free   Phone: (165) 231-4951 Email: [employer] Workout@[employer]ner.[employer] Website: http://Bionym.org/     6  [facility] - [facility] Distance: 11.12 miles      In-Person   [address] [place] [address]  Language: English, Chinese, Armenian  Hours: Mon - Sat 7:00 AM - 3:00 PM  Fees: Free, Self Pay   Phone: (649) 533-8451 Email: info@[employer]Art.S[employer]Power Website: https://www.t-Art.org/locations/opportunity-center/     Housing search assistance  7  [facility] - Rental Homes for Future Homebuyers Program Distance: 1.73 miles      Phone/Virtual   [address] [place] [address]  Language: English  Hours: Mon - Fri 8:00 AM - 4:30 PM  Fees: Free   Phone: (529) 613-2822 Email: hra@[facility] Website: https://www.[facility]/hra/[place]-housing-and-vezexzwjuvrhh-ybnvlrcjk-qyu     8  [employer] Distance: 10.18 miles      In-Person   [address] [facility] [address] [place] [address]  Language: English  Hours: Mon - Fri 8:00 AM - 5:00 PM  Fees: Insurance, Self Pay   Phone: (637) 852-1898 Email: Nitin@[employer] Website: http://www.[facility]/     Shelter for individuals  9  [facility] Distance: 10.72 miles      In-Person   [address] [place] [address]  Language: English  Hours: Mon - Sun Open 24 Hours  Fees: Free   Phone: (471) 895-9223 Email: communications@[facility].org Website: https://[facility]s-[place].org/oursaviourshousing/     10  [facility] Distance: 11.68 miles      In-Person   [address] [place] [address]  Language: English  Hours: Mon - Fri 4:00 PM - 9:00 AM  Fees: Free   Phone: (679) 382-1474 Email: rudolph@[facility].org Website: https://centralusa.[facility].org/northern/[facility]/          Transportation       Free or low-cost transportation  11  [facility] Distance: 9.74 miles      In-Person   [address] [place] [address]  Language: English  Hours: Mon - Fri 9:00 AM - 12:00 PM , Mon - Fri 1:00 PM - 3:00 PM  Fees: Self Pay   Phone: (971) 946-6865 Email: info@[facility].[employer] Website:  https://www.voamnwi.org/[place]     12  [facility] Distance: 9.85 miles      In-Person   [address] [place] [address]  Language: English  Hours: Mon - Fri 8:00 AM - 3:00 PM  Fees: Free   Phone: (288) 868-9599 Ext.14 Email: neighborhood@[facility] Website: http://www.[facility].Offline Media     Transportation to medical appointments  13  [facility] Distance: 4.11 miles      In-Person   [address] [facility] [facility] [address] [place] [address]  Language: English  Hours: Mon - Sun Open 24 Hours  Fees: Insurance, Self Pay   Phone: (818) 161-7219 Website: https://www.OctaneNation/george/?L=true     14  [place] [employer] Distance: 4.38 miles      In-Person   [address] [facility] [address] [place] [address]  Language: Macedonian, English, Oromo, Citizen of [place]  Hours: Mon - Sat 5:00 AM - 9:00 PM  Fees: Insurance, Self Pay   Phone: (730) 412-4992 Email: info@[employer] Website: http://www.Medius/          Important Numbers & Websites       Emergency Services   911  City Services   311  Poison Control   (347) 785-8603  Suicide Prevention Lifeline   (293) 621-1788 (TALK)  Child Abuse Hotline   (235) 272-3550 (4-A-Child)  Sexual Assault Hotline   (556) 551-7170 (HOPE)  National Runaway Safeline   (673) 470-7567 (RUNAWAY)  All-Options Talkline   (716) 282-4877  Substance Abuse Referral   (553) 555-4737 (HELP)

## 2023-12-07 ENCOUNTER — VIRTUAL VISIT (OUTPATIENT)
Dept: INTERNAL MEDICINE | Facility: CLINIC | Age: 43
End: 2023-12-07
Payer: COMMERCIAL

## 2023-12-07 DIAGNOSIS — Z13.6 CARDIOVASCULAR SCREENING; LDL GOAL LESS THAN 130: ICD-10-CM

## 2023-12-07 DIAGNOSIS — J44.1 COPD EXACERBATION (H): ICD-10-CM

## 2023-12-07 DIAGNOSIS — E11.9 TYPE 2 DIABETES MELLITUS WITHOUT COMPLICATION, WITHOUT LONG-TERM CURRENT USE OF INSULIN (H): Primary | ICD-10-CM

## 2023-12-07 PROCEDURE — 99214 OFFICE O/P EST MOD 30 MIN: CPT | Mod: VID | Performed by: INTERNAL MEDICINE

## 2023-12-07 RX ORDER — ATORVASTATIN CALCIUM 20 MG/1
20 TABLET, FILM COATED ORAL DAILY
Start: 2023-12-07 | End: 2024-03-12 | Stop reason: DRUGHIGH

## 2023-12-07 RX ORDER — LISINOPRIL 20 MG/1
20 TABLET ORAL DAILY
Qty: 90 TABLET | Refills: 1 | Status: SHIPPED | OUTPATIENT
Start: 2023-12-07 | End: 2024-06-06

## 2023-12-07 NOTE — PROGRESS NOTES
Cristina is a 43 year old who is being evaluated via a billable video visit.      How would you like to obtain your AVS? MyChart  If the video visit is dropped, the invitation should be resent by: Text to cell phone: [phone]  Will anyone else be joining your video visit? No        Assessment & Plan     Type 2 diabetes mellitus without complication, without long-term current use of insulin (H)  Suggest starting metformin 1 tablet twice daily with meals.  Recheck A1c in 3 months.  Discussed dosing of med and side effect profile with med.  - Hemoglobin A1c; Future  - lisinopril (ZESTRIL) 20 MG tablet; Take 1 tablet (20 mg) by mouth daily  - [facility] Adult Diabetes Educator Referral; Future  - metFORMIN (GLUCOPHAGE) 500 MG tablet; Take 1 tablet (500 mg) by mouth 2 times daily (with meals)  - Basic metabolic panel  (Ca, Cl, CO2, Creat, Gluc, K, Na, BUN); Future    It is possible that some of her hyperglycemia over the last several months has been related to her need for intermittent prednisone use due to her underlying lung disease.    COPD exacerbation (H)  Much better on steroid at current dose.  Continue with tapering as directed.  Patient scheduled to see pulmonary in upcoming few weeks.    CARDIOVASCULAR SCREENING; LDL GOAL LESS THAN 100  Increase Lipitor to 20 mg daily and recheck lipid panel in 3 months.  Patient has 10 mg tablets at home and will double dose until completed  - atorvastatin (LIPITOR) 20 MG tablet; Take 1 tablet (20 mg) by mouth daily  - Lipid Profile; Future  - Hepatic function panel; Future     See Patient Instructions    Jermain Jaramillo MD  [facility]    Davide Evans is a 43 year old, presenting for the following health issues:  Diabetes      HPI     Patient was only seen for issues of her underlying lung disease and given a longer steroid trial.  She reports now she feels remarkably better on the prednisone.  On virtual video visit she also looks incredibly  improved.  She has made a follow-up appointment with the lung specialist and is scheduled accordingly and is continuing with her inhalers also.    She is here today to follow up 12/5/23 A1C results     Lab Results   Component Value Date    A1C 9.5 12/05/2023    A1C 6.5 08/24/2021    A1C 6.3 08/11/2021    A1C 5.6 12/31/2003     LDL Cholesterol Calculated   Date Value Ref Range Status   12/05/2023 113 (H) <=100 mg/dL Final   03/01/2021 111 (H) <100 mg/dL Final     Comment:     Above desirable:  100-129 mg/dl  Borderline High:  130-159 mg/dL  High:             160-189 mg/dL  Very high:       >189 mg/dl       Creatinine   Date Value Ref Range Status   11/16/2023 0.79 0.51 - 0.95 mg/dL Final   03/01/2021 0.90 0.52 - 1.04 mg/dL Final     Lab Results   Component Value Date    ALT 32 04/14/2022    ALT 44 03/01/2021         Diabetes Follow-up    How often are you checking your blood sugar? Not at all  What concerns do you have today about your diabetes? Other: A1c    Do you have any of these symptoms? (Select all that apply)  No numbness or tingling in feet.  No redness, sores or blisters on feet.  No complaints of excessive thirst.  No reports of blurry vision.  No significant changes to weight.  Have you had a diabetic eye exam in the last 12 months? No        BP Readings from Last 2 Encounters:   12/05/23 (!) 152/92   11/16/23 138/80     Hemoglobin A1C (%)   Date Value   12/05/2023 9.5 (H)   08/24/2021 6.5 (H)   12/31/2003 5.6     LDL Cholesterol Calculated (mg/dL)   Date Value   12/05/2023 113 (H)   03/01/2021 111 (H)   01/08/2020 131 (H)         Review of Systems   CONSTITUTIONAL: NEGATIVE for fever, chills, mild change in weight  EYES: NEGATIVE for vision changes or irritation  ENT/MOUTH: NEGATIVE for ear, mouth and throat problems  CV: NEGATIVE for chest pain, palpitations or peripheral edema  GI: NEGATIVE for nausea, abdominal pain, heartburn, or change in bowel habits  : NEGATIVE for frequency, dysuria, or  hematuria  MUSCULOSKELETAL: NEGATIVE for significant arthralgias or myalgia  NEURO: NEGATIVE for weakness, dizziness or paresthesias  HEME: NEGATIVE for bleeding problems  PSYCHIATRIC: NEGATIVE for changes in mood or affect      Objective         Vitals:  No vitals were obtained today due to virtual visit.    Physical Exam   GENERAL: alert and no distress  EYES: Eyes grossly normal to inspection.  No discharge or erythema, or obvious scleral/conjunctival abnormalities.  RESP: No audible wheeze, cough, or visible cyanosis.  No visible retractions or increased work of breathing.    NEURO: Cranial nerves grossly intact.  Mentation and speech appropriate for age.  PSYCH: Mentation appears normal, affect normal/bright, judgement and insight intact, normal speech and appearance well-groomed.        Video-Visit Details    Type of service:  Video Visit     Originating Location (pt. Location): Home    Distant Location (provider location):  On-site  Platform used for Video Visit: Jobaline

## 2023-12-11 ENCOUNTER — ANCILLARY PROCEDURE (OUTPATIENT)
Dept: MAMMOGRAPHY | Facility: CLINIC | Age: 43
End: 2023-12-11
Attending: INTERNAL MEDICINE
Payer: COMMERCIAL

## 2023-12-11 DIAGNOSIS — Z12.31 VISIT FOR SCREENING MAMMOGRAM: ICD-10-CM

## 2023-12-11 PROCEDURE — 77067 SCR MAMMO BI INCL CAD: CPT | Mod: TC | Performed by: RADIOLOGY

## 2023-12-13 ENCOUNTER — NURSE TRIAGE (OUTPATIENT)
Dept: INTERNAL MEDICINE | Facility: CLINIC | Age: 43
End: 2023-12-13

## 2023-12-13 ENCOUNTER — OFFICE VISIT (OUTPATIENT)
Dept: URGENT CARE | Facility: URGENT CARE | Age: 43
End: 2023-12-13
Payer: COMMERCIAL

## 2023-12-13 ENCOUNTER — ANCILLARY PROCEDURE (OUTPATIENT)
Dept: GENERAL RADIOLOGY | Facility: CLINIC | Age: 43
End: 2023-12-13
Attending: PHYSICIAN ASSISTANT
Payer: COMMERCIAL

## 2023-12-13 VITALS
HEART RATE: 71 BPM | RESPIRATION RATE: 22 BRPM | SYSTOLIC BLOOD PRESSURE: 160 MMHG | OXYGEN SATURATION: 97 % | DIASTOLIC BLOOD PRESSURE: 87 MMHG | BODY MASS INDEX: 69.44 KG/M2 | TEMPERATURE: 98.2 F | WEIGHT: 293 LBS

## 2023-12-13 DIAGNOSIS — J45.41 MODERATE PERSISTENT ASTHMA WITH ACUTE EXACERBATION: ICD-10-CM

## 2023-12-13 DIAGNOSIS — J45.41 MODERATE PERSISTENT ASTHMA WITH ACUTE EXACERBATION: Primary | ICD-10-CM

## 2023-12-13 PROCEDURE — 99214 OFFICE O/P EST MOD 30 MIN: CPT | Mod: 25 | Performed by: PHYSICIAN ASSISTANT

## 2023-12-13 PROCEDURE — 71046 X-RAY EXAM CHEST 2 VIEWS: CPT | Mod: TC | Performed by: RADIOLOGY

## 2023-12-13 PROCEDURE — 94640 AIRWAY INHALATION TREATMENT: CPT | Performed by: PHYSICIAN ASSISTANT

## 2023-12-13 RX ORDER — ALBUTEROL SULFATE 0.83 MG/ML
2.5 SOLUTION RESPIRATORY (INHALATION) EVERY 6 HOURS PRN
Qty: 90 ML | Refills: 0 | Status: SHIPPED | OUTPATIENT
Start: 2023-12-13

## 2023-12-13 RX ORDER — AZITHROMYCIN 250 MG/1
TABLET, FILM COATED ORAL
Qty: 6 TABLET | Refills: 0 | Status: SHIPPED | OUTPATIENT
Start: 2023-12-13 | End: 2023-12-18

## 2023-12-13 RX ORDER — PREDNISONE 20 MG/1
TABLET ORAL
Qty: 20 TABLET | Refills: 0 | Status: SHIPPED | OUTPATIENT
Start: 2023-12-13

## 2023-12-13 RX ORDER — IPRATROPIUM BROMIDE AND ALBUTEROL SULFATE 2.5; .5 MG/3ML; MG/3ML
3 SOLUTION RESPIRATORY (INHALATION) ONCE
Status: COMPLETED | OUTPATIENT
Start: 2023-12-13 | End: 2023-12-13

## 2023-12-13 RX ORDER — ALBUTEROL SULFATE 90 UG/1
2 AEROSOL, METERED RESPIRATORY (INHALATION) EVERY 6 HOURS PRN
Qty: 18 G | Refills: 0 | Status: SHIPPED | OUTPATIENT
Start: 2023-12-13

## 2023-12-13 RX ADMIN — IPRATROPIUM BROMIDE AND ALBUTEROL SULFATE 3 ML: 2.5; .5 SOLUTION RESPIRATORY (INHALATION) at 17:04

## 2023-12-13 NOTE — TELEPHONE ENCOUNTER
Suggest that an e-visit is not appropriate for this patient in regards to her symptoms and risk factors.  If symptoms are acute would suggest urgent care if not scheduled with provider for routine follow-up.

## 2023-12-13 NOTE — TELEPHONE ENCOUNTER
"Called and spoke with pt. Relayed Dr Jaramillo's message from below. Pt stated \"ok I guess I will have to make an appt\". Before writer could offer assistance with scheduling, pt hung up.   "

## 2023-12-13 NOTE — TELEPHONE ENCOUNTER
"Nurse Triage SBAR    Is this a 2nd Level Triage? YES, LICENSED PRACTITIONER REVIEW IS REQUIRED    Situation: Received a call from the patient in relation to the [employer] message received today...        Background: Patient has been seen multiple times over the past month for asthma exacerbation (11/16/23, 12/5/23, and 12/7/23). Patient states she is on day 8 out of 15 for the prednisone taper. Over the past week is when she has noticed the milky, brown phlegm and she feels like it is \"getting heavier and worse.\"     Assessment: Upon conversing with the patient, the patient states she is having to do nebs every 4 hours to help clear out the phlegm in her lungs. Patient also states the nebs have been helping the wheezing she has been experiencing. No fever. No chest pain.     Protocol Recommended Disposition:   Go To Office Now    Recommendation: Triage protocol recommending patient go to the office now. Patient is wondering (since she has been seen multiple times recently) if PCP would prescribe an antibiotic given the color of her phlegm? Will route to PCP for review.     Routed to provider    Does the patient meet one of the following criteria for ADS visit consideration? 16+ years old, with an [facility] PCP     TIP  Providers, please consider if this condition is appropriate for management at one of our Acute and Diagnostic Services sites.     If patient is a good candidate, please use dotphrase <dot>triageresponse and select Refer to ADS to document.    1. ONSET: \"When did the cough begin?\"       Has been seen multiple times for asthma: currently on Prednisone  2. SEVERITY: \"How bad is the cough today?\"       Moving around or laying down the cough gets worse  3. SPUTUM: \"Describe the color of your sputum\" (none, dry cough; clear, white, yellow, green)      Milky, brown  4. HEMOPTYSIS: \"Are you coughing up any blood?\" If so ask: \"How much?\" (flecks, streaks, tablespoons, etc.)      No blood  5. DIFFICULTY BREATHING: " "\"Are you having difficulty breathing?\" If Yes, ask: \"How bad is it?\" (e.g., mild, moderate, severe)     - MODERATE: SOB at rest, SOB with minimal exertion and prefers to sit, cannot lie down flat, speaks in phrases, mild retractions, audible wheezing, pulse 100-120.       \"Doing nebs every 4 hours\": wheezing  6. FEVER: \"Do you have a fever?\" If Yes, ask: \"What is your temperature, how was it measured, and when did it start?\"      No fever  7. CARDIAC HISTORY: \"Do you have any history of heart disease?\" (e.g., heart attack, congestive heart failure)       HTN  8. LUNG HISTORY: \"Do you have any history of lung disease?\"  (e.g., pulmonary embolus, asthma, emphysema)      Asthma: sees pulmonary at the end of January  9. PE RISK FACTORS: \"Do you have a history of blood clots?\" (or: recent major surgery, recent prolonged travel, bedridden)      No  10. OTHER SYMPTOMS: \"Do you have any other symptoms?\" (e.g., runny nose, wheezing, chest pain)        Wheezing, no chest pain  11. PREGNANCY: \"Is there any chance you are pregnant?\" \"When was your last menstrual period?\"        No  12. TRAVEL: \"Have you traveled out of the country in the last month?\" (e.g., travel history, exposures)        No    Can we leave a detailed message on this number? YES  Phone number patient can be reached at: Cell number on file:    Telephone Information:   Mobile [phone]     Reason for Disposition   Wheezing is present    Additional Information   Negative: Bluish (or gray) lips or face   Negative: SEVERE difficulty breathing (e.g., struggling for each breath, speaks in single words)   Negative: Rapid onset of cough and has hives   Negative: Coughing started suddenly after medicine, an allergic food or bee sting   Negative: Difficulty breathing after exposure to flames, smoke, or fumes   Negative: Sounds like a life-threatening emergency to the triager   Negative: Previous asthma attacks and this feels like asthma attack   Negative: Dry cough " (non-productive; no sputum or minimal clear sputum) and within 14 days of COVID-19 Exposure   Negative: MODERATE difficulty breathing (e.g., speaks in phrases, SOB even at rest, pulse 100-120) and still present when not coughing   Negative: Chest pain present when not coughing   Negative: Passed out (i.e., fainted, collapsed and was not responding)   Negative: Patient sounds very sick or weak to the triager   Negative: MILD difficulty breathing (e.g., minimal/no SOB at rest, SOB with walking, pulse <100) and still present when not coughing   Negative: Coughed up > 1 tablespoon (15 ml) blood (Exception: Blood-tinged sputum.)   Negative: Fever > 103 F (39.4 C)   Negative: Fever > 101 F (38.3 C) and over 60 years of age   Negative: Fever > 100.0 F (37.8 C) and has diabetes mellitus or a weak immune system (e.g., HIV positive, cancer chemotherapy, organ transplant, splenectomy, chronic steroids)   Negative: Fever > 100.0 F (37.8 C) and bedridden (e.g., CVA, chronic illness, recovering from surgery)   Negative: Increasing ankle swelling    Protocols used: Cough-A-OH    Daylin Galvez RN

## 2024-01-08 NOTE — PROGRESS NOTES
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, unspecified     Depression     Diabetes mellitus, type 2 (H) 11/16/2023    Genital herpes     Herpes     Hypertension     Intermittent asthma     triggers include fall and spring allergy seasons    Obesity 08/01/2012    BMI 53    PID (pelvic inflammatory disease)     Urinary incontinence 02/01/2017     Current Outpatient Medications   Medication Sig Dispense Refill    albuterol (PROAIR HFA/PROVENTIL HFA/VENTOLIN HFA) 108 (90 Base) MCG/ACT inhaler Inhale 2 puffs into the lungs every 6 hours as needed 18 g 0    albuterol (PROAIR HFA/PROVENTIL HFA/VENTOLIN HFA) 108 (90 Base) MCG/ACT inhaler Inhale 2 puffs into the lungs every 6 hours as needed for shortness of breath or wheezing 8 g 0    albuterol (PROAIR HFA/PROVENTIL HFA/VENTOLIN HFA) 108 (90 Base) MCG/ACT inhaler Inhale 2 puffs into the lungs every 6 hours 8 g 1    albuterol (PROVENTIL) (2.5 MG/3ML) 0.083% neb solution Take 1 vial (2.5 mg) by nebulization every 6 hours as needed for shortness of breath, wheezing or cough 90 mL 0    albuterol (PROVENTIL) (2.5 MG/3ML) 0.083% neb solution Take 1 vial (2.5 mg) by nebulization every 4 hours as needed for shortness of breath 90 mL 0    atorvastatin (LIPITOR) 20 MG tablet Take 1 tablet (20 mg) by mouth daily      lisinopril (ZESTRIL) 20 MG tablet Take 1 tablet (20 mg) by mouth daily 90 tablet 1    metFORMIN (GLUCOPHAGE) 500 MG tablet Take 1 tablet (500 mg) by mouth 2 times daily (with meals) 180 tablet 1    predniSONE (DELTASONE) 20 MG tablet Take 3 tabs by mouth daily x 3 days, then 2  tabs daily x 3 days, then 1 tab daily x 3 days, then 1/2 tab daily x 3 days. 20 tablet 0    predniSONE (DELTASONE) 20 MG tablet Take 1 tablet twice daily for 5 days then 1 tablet daily for 5 days then 1/2 tablet daily or 10 mg for 5 days then discontinue 20 tablet 0    traZODone (DESYREL) 50 MG tablet Take 1 tablet (50 mg) by mouth at bedtime 90 tablet 0     Social History     Tobacco Use    Smoking status: Former     Packs/day: 0.00     Years: 15.00     Additional pack years: 0.00     Total pack years: 0.00     Types: Cigarettes     Start date: 2018     Quit date: 2022     Years since quittin.1    Smokeless tobacco: Never    Tobacco comments:      ppd      Marijuana -anxiety ptsd   Substance Use Topics    Alcohol use: Yes     Comment: 1 drink every 2 months        ROS:  Review of systems negative except as stated above.    OBJECTIVE:  BP (!) 160/87 (BP Location: Right arm, Patient Position: Sitting, Cuff Size: Adult Large)   Pulse 71   Temp 98.2  F (36.8  C) (Tympanic)   Resp 22   Wt (!) 177.8 kg (392 lb)   LMP 2023   SpO2 97%   Breastfeeding No   BMI 69.44 kg/m    GENERAL APPEARANCE: healthy, alert and no distress  EYES: EOMI,  PERRL, conjunctiva clear  HENT: ear canals and TM's normal.  Nose and mouth without ulcers, erythema or lesions  NECK: supple, nontender, no lymphadenopathy  RESP: audible wheeze, oterhwise lungs clear to auscultation - no rales, rhonchi   CV: regular rates and rhythm, normal S1 S2, no murmur noted  NEURO: Normal strength and tone, sensory exam grossly normal,  normal speech and mentation  SKIN: no suspicious lesions or rashes      Results for orders placed or performed in visit on 23   XR Chest 2 Views     Status: None    Narrative    EXAM: XR CHEST 2 VIEWS  LOCATION: [facility]  DATE: 2023    INDICATION:  Moderate persistent asthma with acute exacerbation.  COMPARISON: None.      Impression    IMPRESSION: Negative chest. Lungs  clear.       ASSESSMENT:  (J45.41) Moderate persistent asthma with acute exacerbation  (primary encounter diagnosis)  Comment: more aggressive therapy warranted  Plan: XR Chest 2 Views, ipratropium - albuterol 0.5         mg/2.5 mg/3 mL (DUONEB) neb solution 3 mL,         azithromycin (ZITHROMAX) 250 MG tablet,         predniSONE (DELTASONE) 20 MG tablet, albuterol         (PROVENTIL) (2.5 MG/3ML) 0.083% neb solution,         albuterol (PROAIR HFA/PROVENTIL HFA/VENTOLIN         HFA) 108 (90 Base) MCG/ACT inhaler            Red flags and emergent follow up discussed, and understood by patient  Follow up with PCP if symptoms worsen or fail to improve  In 2-3 days

## 2024-01-26 PROBLEM — R87.810 CERVICAL HIGH RISK HPV (HUMAN PAPILLOMAVIRUS) TEST POSITIVE: Status: ACTIVE | Noted: 2022-09-28

## 2024-01-31 ENCOUNTER — OFFICE VISIT (OUTPATIENT)
Dept: PULMONOLOGY | Facility: CLINIC | Age: 44
End: 2024-01-31
Payer: COMMERCIAL

## 2024-01-31 DIAGNOSIS — J45.20 INTERMITTENT ASTHMA WITHOUT COMPLICATION, UNSPECIFIED ASTHMA SEVERITY: Primary | ICD-10-CM

## 2024-01-31 PROCEDURE — 94729 DIFFUSING CAPACITY: CPT | Performed by: INTERNAL MEDICINE

## 2024-01-31 PROCEDURE — 94060 EVALUATION OF WHEEZING: CPT | Performed by: INTERNAL MEDICINE

## 2024-01-31 PROCEDURE — 94726 PLETHYSMOGRAPHY LUNG VOLUMES: CPT | Performed by: INTERNAL MEDICINE

## 2024-02-01 LAB
DLCOUNC-%PRED-PRE: 130 %
DLCOUNC-PRE: 26.8 ML/MIN/MMHG
DLCOUNC-PRED: 20.55 ML/MIN/MMHG
ERV-%PRED-PRE: 16 %
ERV-PRE: 0.2 L
ERV-PRED: 1.16 L
EXPTIME-PRE: 5.46 SEC
FEF2575-%PRED-POST: 59 %
FEF2575-%PRED-PRE: 45 %
FEF2575-POST: 1.65 L/SEC
FEF2575-PRE: 1.26 L/SEC
FEF2575-PRED: 2.76 L/SEC
FEFMAX-%PRED-PRE: 58 %
FEFMAX-PRE: 3.92 L/SEC
FEFMAX-PRED: 6.73 L/SEC
FEV1-%PRED-PRE: 71 %
FEV1-PRE: 1.86 L
FEV1FEV6-PRE: 69 %
FEV1FEV6-PRED: 83 %
FEV1FVC-PRE: 69 %
FEV1FVC-PRED: 82 %
FEV1SVC-PRE: 74 %
FEV1SVC-PRED: 78 %
FIFMAX-PRE: 2.23 L/SEC
FRCPLETH-%PRED-PRE: 89 %
FRCPLETH-PRE: 2.34 L
FRCPLETH-PRED: 2.63 L
FVC-%PRED-PRE: 84 %
FVC-PRE: 2.69 L
FVC-PRED: 3.18 L
IC-%PRED-PRE: 100 %
IC-PRE: 2.32 L
IC-PRED: 2.32 L
RVPLETH-%PRED-PRE: 132 %
RVPLETH-PRE: 2.14 L
RVPLETH-PRED: 1.61 L
TLCPLETH-%PRED-PRE: 97 %
TLCPLETH-PRE: 4.66 L
TLCPLETH-PRED: 4.77 L
VA-%PRED-PRE: 89 %
VA-PRE: 4.18 L
VC-%PRED-PRE: 75 %
VC-PRE: 2.51 L
VC-PRED: 3.35 L

## 2024-02-21 NOTE — TELEPHONE ENCOUNTER
FYI to provider - Patient is lost to pap tracking follow-up. Attempts to contact pt have been made per reminder process and there has been no reply and/or no appt scheduled. Contact hx listed below.     12/31/03 NIL  2005, 2006 NIL paps per Care Everywhere  4/15/11 NIL  10/9/17 NIL  9/28/22 NIL pap, + HR HPV 18. Plan: colposcopy due before 12/28/22 11/17/22 COLP and ECC- negative for dysplasia. Plan cotest in 1 year due by 11/17/23  10/27/23 Reminder mychart  1/2/24 appt- cancelled  1/26/24 Reminder call-spoke with pt  2/21/24 Lost to follow up

## 2024-03-01 ENCOUNTER — VIRTUAL VISIT (OUTPATIENT)
Dept: PULMONOLOGY | Facility: CLINIC | Age: 44
End: 2024-03-01
Attending: INTERNAL MEDICINE
Payer: COMMERCIAL

## 2024-03-01 DIAGNOSIS — J45.50 SEVERE PERSISTENT ASTHMA WITHOUT COMPLICATION (H): Primary | ICD-10-CM

## 2024-03-01 DIAGNOSIS — K21.00 GASTROESOPHAGEAL REFLUX DISEASE WITH ESOPHAGITIS, UNSPECIFIED WHETHER HEMORRHAGE: ICD-10-CM

## 2024-03-01 DIAGNOSIS — F41.1 GAD (GENERALIZED ANXIETY DISORDER): ICD-10-CM

## 2024-03-01 PROCEDURE — 99204 OFFICE O/P NEW MOD 45 MIN: CPT | Mod: 95 | Performed by: INTERNAL MEDICINE

## 2024-03-01 RX ORDER — MONTELUKAST SODIUM 10 MG/1
10 TABLET ORAL AT BEDTIME
Qty: 30 TABLET | Refills: 5 | Status: SHIPPED | OUTPATIENT
Start: 2024-03-01

## 2024-03-01 RX ORDER — OMEPRAZOLE 40 MG/1
40 CAPSULE, DELAYED RELEASE ORAL DAILY
Qty: 30 CAPSULE | Refills: 5 | Status: SHIPPED | OUTPATIENT
Start: 2024-03-01 | End: 2024-08-28

## 2024-03-01 RX ORDER — BUDESONIDE AND FORMOTEROL FUMARATE DIHYDRATE 160; 4.5 UG/1; UG/1
2 AEROSOL RESPIRATORY (INHALATION) 2 TIMES DAILY
Qty: 1 G | Refills: 5 | Status: SHIPPED | OUTPATIENT
Start: 2024-03-01

## 2024-03-01 RX ORDER — TRAZODONE HYDROCHLORIDE 50 MG/1
50 TABLET, FILM COATED ORAL AT BEDTIME
Qty: 90 TABLET | Refills: 2 | Status: SHIPPED | OUTPATIENT
Start: 2024-03-01

## 2024-03-01 ASSESSMENT — ASTHMA QUESTIONNAIRES: ACT_TOTALSCORE: 14

## 2024-03-01 NOTE — PROGRESS NOTES
Cristina is a 43 year old who is being evaluated via a billable video visit.      How would you like to obtain your AVS? MyChart  If the video visit is dropped, the invitation should be resent by: Text to cell phone: [phone]  Will anyone else be joining your video visit? No      Video-Visit Details    Type of service:  Video Visit   Joined the call at 3/1/2024, 2:33:56 pm.  Left the call at 3/1/2024, 3:03:31 pm.  Originating Location (pt. Location): Home    Distant Location (provider location):  Off-site  Platform used for Video Visit: Vashti  Signed Electronically by: Farrukh Sexton MD         [facility]     Pulmonary Clinic Note       PCP: Jermain Jaramillo    Reason for visit: New patient for asthma    Pulmonary HPI:   Cristina Dean is a 43 year old  female w/ h/o severe obesity, GERD, essential hypertension, and type 2 diabetes mellitus, who presents for evaluation of uncontrolled asthma.  The patient has been dealing with asthma for years, she always had inhaler to use for her asthma, this has been getting much worse over the last year ago, she has required 4-5 steroids courses with nebulized albuterol, she required multiple visits to the emergency department due to breathing difficulties and asthma exacerbation.  The last exacerbation was few months ago, and at that time she required 15-day course of steroid taper, she felt better after that, and now she felt her symptoms are partially better controlled compared to last fall.    The patient has been having dyspnea, and wheezing 2-3 times a week, she requires rescue inhaler about 3 times a week, and she has night symptoms with cough and shortness of breath several times a week, at night she does not use her rescue inhaler, she improves while she is awake.  She does have cough that is chronic and dry, she does have some postnasal drip, but no significant chronic sinus congestion.  She does have chronic heartburn, she takes over-the-counter  omeprazole, she wakes up every morning with hoarseness and change in her voice, she also feels she has to clear her throat regularly every morning.  Her symptoms are triggered by cold dry air.  She also feels her symptoms are triggered by being around her cat.    Patient has never been a smoker.  No excessive EtOH use.  She smokes marijuana every night.    Patient has a cat.      Patient's outside records were reviewed via [facility] Everywhere &/or available paper records (sent for scanning).     Review of systems: a complete 12-point ROS conducted, & found to be negative w/ exceptions as noted in the HPI.    Past medical history:  Past Medical History:   Diagnosis Date    Anxiety     Bipolar affective disorder, remission status unspecified (H) 2017    Chlamydia     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, unspecified     Depression     Diabetes mellitus, type 2 (H) 2023    Genital herpes     Herpes     Hypertension     Intermittent asthma     triggers include fall and spring allergy seasons    Obesity 2012    BMI 53    PID (pelvic inflammatory disease)     Urinary incontinence 2017       Past Surgical History:   Procedure Laterality Date    BIOPSY  10/23    Hpv    EXCISE MASS BACK Right 2017    Procedure: EXCISE MASS BACK;  Excision Right Back Mass;  Surgeon: Vicente Pérez MD;  Location: [facility] OR    TONSILLECTOMY         Social history:  Social History     Tobacco Use    Smoking status: Former     Packs/day: 0.00     Years: 15.00     Additional pack years: 0.00     Total pack years: 0.00     Types: Cigarettes     Start date: 2018     Quit date: 2022     Years since quittin.2    Smokeless tobacco: Never    Tobacco comments:      ppd      Marijuana -anxiety ptsd   Vaping Use    Vaping Use: Never used   Substance Use Topics    Alcohol use: Yes     Comment: 1 drink every 2 months     Drug use: Yes     Types: Marijuana     Comment: daily       Family  history:  Family History   Problem Relation Age of Onset    Cancer Father         acute leukemia (in remission)    Neurologic Disorder Father         neuropathy from chemotherapy    Diabetes Father         type II DM    Other Cancer Father     Depression Father     Anxiety Disorder Father     Family History Negative Sister     Ovarian Cancer Paternal Aunt     Ovarian Cancer Paternal Aunt     Ovarian Cancer Paternal Aunt        Medications:  Current Outpatient Medications   Medication    albuterol (PROAIR HFA/PROVENTIL HFA/VENTOLIN HFA) 108 (90 Base) MCG/ACT inhaler    albuterol (PROAIR HFA/PROVENTIL HFA/VENTOLIN HFA) 108 (90 Base) MCG/ACT inhaler    albuterol (PROAIR HFA/PROVENTIL HFA/VENTOLIN HFA) 108 (90 Base) MCG/ACT inhaler    albuterol (PROVENTIL) (2.5 MG/3ML) 0.083% neb solution    albuterol (PROVENTIL) (2.5 MG/3ML) 0.083% neb solution    atorvastatin (LIPITOR) 20 MG tablet    lisinopril (ZESTRIL) 20 MG tablet    metFORMIN (GLUCOPHAGE) 500 MG tablet    traZODone (DESYREL) 50 MG tablet    predniSONE (DELTASONE) 20 MG tablet    predniSONE (DELTASONE) 20 MG tablet     No current facility-administered medications for this visit.       Allergies:  Allergies   Allergen Reactions    Amlodipine Swelling       Physical examination:    Vitals:  No vitals were obtained today due to virtual visit.    Physical Exam   GENERAL: alert and no distress  EYES: Eyes grossly normal to inspection.  No discharge or erythema, or obvious scleral/conjunctival abnormalities.  RESP: No audible wheeze, cough, or visible cyanosis.    SKIN: Visible skin clear. No significant rash, abnormal pigmentation or lesions.  NEURO: Cranial nerves grossly intact.  Mentation and speech appropriate for age.  PSYCH: Appropriate affect, tone, and pace of words    Labs: reviewed in [facility] & personally interpreted.     CBC RESULTS:   Recent Labs   Lab Test 11/16/23  0910   WBC 8.6   RBC 4.85   HGB 13.4   HCT 45.4   MCV 94   MCH 27.6   MCHC 29.5*   RDW 15.1*         Absolute eosinophils 200    Imaging: reviewed in [facility] & personally interpreted. Below are the interpretations from the formal Radiology review.  Chest x-ray 12/13/2023  MPRESSION: Negative chest. Lungs clear.     PFT:  1/31/2024: Test should be interpreted with caution due to the difficulty with testing.  The patient has mild obstruction, sizable but not significant response to bronchodilator, increased DLCO.      Impression & recommendations:    Cristina was seen today for consult.    Diagnoses and all orders for this visit:    Intermittent asthma without complication, unspecified asthma severity  -     Adult Pulmonary Medicine  Referral          3/1/2021     8:00 AM 11/16/2023     6:52 AM 3/1/2024     2:41 PM   ACT Total Scores   ACT TOTAL SCORE (Goal Greater than or Equal to 20) 15 8 14   In the past 12 months, how many times did you visit the emergency room for your asthma without being admitted to the hospital? 0 0 3   In the past 12 months, how many times were you hospitalized overnight because of your asthma? 0 0 0       43 year old female patient with past medical history significant for asthma, hypertension, obesity, and type 2 diabetes mellitus.  The patient was referred to the pulmonary clinic due to poorly controlled asthma.  I reviewed the PFT with the patient, she does have evidence for obstructive lung disease, graded as mild, although her response was not significant she does have a sizable response to bronchodilator with 6% increase in the FVC, remarkably she does have elevated DLCO which also fits asthma diagnosis but can be seen in obesity, the patient history is typical for asthma clinically, I think we should make an effort to optimize her asthma management at this point, so I am going to initiate her on ICS/LABA in addition to Singulair.  She can continue her albuterol as needed.  Her asthma might be also complicated by ongoing silent reflux and sinusitis, 7 addition to the  Singulair I will add prescribed omeprazole 40 mg once daily in addition to lifestyle modification for GERD.  I will follow-up with the patient in 3 months.  Certainly daily marijuana smoking could be complicating for her symptoms, I would recommend she stops that.      Severe persistent asthma  GERD  Postnasal drip    -Start Symbicort high-dose ICS/LABA twice daily  -Start Singulair 10 mg once daily  -Continue albuterol as needed  -Start omeprazole 40 mg once daily, lifestyle modification for GERD management  -Follow-up to reassess in 3 months, if she continues to have difficult control will consider more ancillary testing to determine the patient asthma phenotype considering further targeted treatment for asthma.      Chart documentation was completed, in part, with aitainment voice-recognition software. Even though reviewed, some grammatical, spelling, and word errors may remain.    I spent 52 minutes reviewing chart, reviewing test results, talking with and examining patient, formulating plan, and documentation on the day of the encounter.          Farrukh Sexton MD   Pulmonary and Critical Care

## 2024-03-01 NOTE — PATIENT INSTRUCTIONS
Start Symbicort 2 puffs twice daily, use spacer with the Symbicort, I also recommend you rinse and gargle with water after each Symbicort usage.  Please start Singulair 10 mg once daily.  Please start omeprazole 40 mg once daily, I also recommend lifestyle modification, avoid spicy food, avoid large meals 2 to 3 hours before bedtime, and elevate the head of the bed by 10 to 30 degrees  Continue albuterol as needed  Follow-up in 3 months

## 2024-03-04 ENCOUNTER — APPOINTMENT (OUTPATIENT)
Dept: GENERAL RADIOLOGY | Facility: CLINIC | Age: 44
End: 2024-03-04
Attending: EMERGENCY MEDICINE
Payer: COMMERCIAL

## 2024-03-04 ENCOUNTER — HOSPITAL ENCOUNTER (EMERGENCY)
Facility: CLINIC | Age: 44
Discharge: HOME | End: 2024-03-04
Attending: PHYSICIAN ASSISTANT | Admitting: PHYSICIAN ASSISTANT
Payer: COMMERCIAL

## 2024-03-04 VITALS
HEIGHT: 63 IN | HEART RATE: 97 BPM | DIASTOLIC BLOOD PRESSURE: 80 MMHG | TEMPERATURE: 97.5 F | BODY MASS INDEX: 51.91 KG/M2 | RESPIRATION RATE: 20 BRPM | OXYGEN SATURATION: 94 % | SYSTOLIC BLOOD PRESSURE: 133 MMHG | WEIGHT: 293 LBS

## 2024-03-04 DIAGNOSIS — S89.92XA KNEE INJURY, LEFT, INITIAL ENCOUNTER: ICD-10-CM

## 2024-03-04 PROCEDURE — 99284 EMERGENCY DEPT VISIT MOD MDM: CPT | Mod: 25

## 2024-03-04 PROCEDURE — 250N000011 HC RX IP 250 OP 636: Performed by: PHYSICIAN ASSISTANT

## 2024-03-04 PROCEDURE — 99284 EMERGENCY DEPT VISIT MOD MDM: CPT

## 2024-03-04 PROCEDURE — 250N000013 HC RX MED GY IP 250 OP 250 PS 637: Performed by: PHYSICIAN ASSISTANT

## 2024-03-04 PROCEDURE — 73560 X-RAY EXAM OF KNEE 1 OR 2: CPT | Mod: LT

## 2024-03-04 PROCEDURE — 96372 THER/PROPH/DIAG INJ SC/IM: CPT | Performed by: PHYSICIAN ASSISTANT

## 2024-03-04 RX ORDER — KETOROLAC TROMETHAMINE 10 MG/1
10 TABLET, FILM COATED ORAL EVERY 6 HOURS PRN
Qty: 20 TABLET | Refills: 0 | Status: SHIPPED | OUTPATIENT
Start: 2024-03-04 | End: 2024-03-14

## 2024-03-04 RX ORDER — HYDROCODONE BITARTRATE AND ACETAMINOPHEN 5; 325 MG/1; MG/1
2 TABLET ORAL ONCE
Status: COMPLETED | OUTPATIENT
Start: 2024-03-04 | End: 2024-03-04

## 2024-03-04 RX ORDER — KETOROLAC TROMETHAMINE 15 MG/ML
15 INJECTION, SOLUTION INTRAMUSCULAR; INTRAVENOUS ONCE
Status: COMPLETED | OUTPATIENT
Start: 2024-03-04 | End: 2024-03-04

## 2024-03-04 RX ADMIN — HYDROCODONE BITARTRATE AND ACETAMINOPHEN 2 TABLET: 5; 325 TABLET ORAL at 16:02

## 2024-03-04 RX ADMIN — KETOROLAC TROMETHAMINE 15 MG: 15 INJECTION, SOLUTION INTRAMUSCULAR; INTRAVENOUS at 16:03

## 2024-03-04 ASSESSMENT — ACTIVITIES OF DAILY LIVING (ADL)
ADLS_ACUITY_SCORE: 35
ADLS_ACUITY_SCORE: 33
ADLS_ACUITY_SCORE: 33

## 2024-03-04 NOTE — ED PROVIDER NOTES
"  History     Chief Complaint:  Knee Pain       HPI   Cristina Dean is a 43 year old female who presents with left knee pain.  Patient states she was doing laundry today when she heard a loud \"pop\" to her left knee.  She experienced immediate pain to the lateral and posterior left knee.  She had difficulty bearing weight and ambulating since hearing this pop. Patient called EMS who gave her fentanyl on the way to the ED.  She reports chronic bilateral knee pain but no prior surgeries surgeries.  Patient notes that she had a similar episode with her right knee several years ago.  Patient states that she has seen orthopedics under [facility] for knee pain in the past. Reports no fevers, chills, vomiting.     Independent Historian:   None - Patient Only    Review of External Notes:   ED 04/2/2020 when patient presented for similar complaint to right knee.      Medications:    ketorolac (TORADOL) 10 MG tablet  albuterol (PROAIR HFA/PROVENTIL HFA/VENTOLIN HFA) 108 (90 Base) MCG/ACT inhaler  albuterol (PROAIR HFA/PROVENTIL HFA/VENTOLIN HFA) 108 (90 Base) MCG/ACT inhaler  albuterol (PROAIR HFA/PROVENTIL HFA/VENTOLIN HFA) 108 (90 Base) MCG/ACT inhaler  albuterol (PROVENTIL) (2.5 MG/3ML) 0.083% neb solution  albuterol (PROVENTIL) (2.5 MG/3ML) 0.083% neb solution  atorvastatin (LIPITOR) 20 MG tablet  budesonide-formoterol (SYMBICORT) 160-4.5 MCG/ACT Inhaler  lisinopril (ZESTRIL) 20 MG tablet  metFORMIN (GLUCOPHAGE) 500 MG tablet  montelukast (SINGULAIR) 10 MG tablet  omeprazole (PRILOSEC) 40 MG DR capsule  predniSONE (DELTASONE) 20 MG tablet  predniSONE (DELTASONE) 20 MG tablet  traZODone (DESYREL) 50 MG tablet        Past Medical History:    Past Medical History:   Diagnosis Date    Anxiety     Bipolar affective disorder, remission status unspecified (H) 11/13/2017    Chlamydia     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, unspecified     Depression     Diabetes mellitus, type 2 (H) 11/16/2023    Genital " "herpes     Herpes     Hypertension     Intermittent asthma     Obesity 08/01/2012    PID (pelvic inflammatory disease)     Urinary incontinence 02/01/2017       Past Surgical History:    Past Surgical History:   Procedure Laterality Date    BIOPSY  10/23    Hpv    EXCISE MASS BACK Right 12/28/2017    Procedure: EXCISE MASS BACK;  Excision Right Back Mass;  Surgeon: Vicente Pérez MD;  Location: [facility] OR    TONSILLECTOMY  1984        Physical Exam   Patient Vitals for the past 24 hrs:   BP Temp Pulse Resp SpO2 Height Weight   03/04/24 1600 133/80 -- 97 20 94 % -- --   03/04/24 1337 (!) 148/86 97.5  F (36.4  C) 89 18 94 % 1.6 m (5' 3\") (!) 172.4 kg (380 lb)        Physical Exam  Constitutional: Alert, attentive, GCS 15  HENT:    Nose: Nose normal.    Mouth/Throat: Oropharynx is clear, mucous membranes are moist   Eyes: EOM are normal.   CV: regular rate and rhythm; no murmurs, rubs or gallups  Chest: Effort normal and breath sounds normal.   MSK: Limited range of motion of left knee but patient is able to flex to 90 degrees and extend to 120 degrees.  No significant erythema, effusion, fluctuance to knee.  Popliteal and posterior tibialis pulse normal.  No skin changes to left knee.  Pain with both varus and valgus stress.  Unable to perform anterior drawer test.  Unable to perform Apley test.  Neurological: Alert, attentive  Skin: Skin is warm and dry.      Emergency Department Course     Imaging:  XR Knee Left 1/2 Views   Final Result   IMPRESSION:   1. Mild patellofemoral osteoarthrosis, unchanged.   2. Probable small effusion again noted.   3. Tiny lateral compartment osteophytes, slightly increased.   4. No evidence of an acute or subacute fracture.       EKATERINA JOHNSON MD            SYSTEM ID:  [id number]             Laboratory:  Labs Ordered and Resulted from Time of ED Arrival to Time of ED Departure - No data to display         Emergency Department Course & Assessments:    Interventions:  Medications " "  ketorolac (TORADOL) injection 15 mg (15 mg Intramuscular $Given 3/4/24 1603)   HYDROcodone-acetaminophen (NORCO) 5-325 MG per tablet 2 tablet (2 tablets Oral $Given 3/4/24 1602)        Assessments:  1510  I obtained patient history and performed physical examination as above.      Independent Interpretation (X-rays, CTs, rhythm strip):  Knee x-ray shows no acute fracture or dislocation.    Consultations/Discussion of Management or Tests:  None        Social Determinants of Health affecting care:   None    Disposition:  The patient was discharged.     Impression & Plan    CMS Diagnoses: None    Medical Decision Making:  Patient is a well-appearing 42 yo F who presents for left knee pain after hearing \"pop\" at home today. Vital signs are appropriate and stable. Physical examination reveals no significant swelling to left knee with tenderness along lateral and posterior joint joint and limited ROM secondary to pain.  Differential diagnosis includes ligamentous or tendon injury, meniscal injury, septic arthritis, DVT.  X-ray s are negative for fracture or malalignment. There is evidence of arthritis within the joint and a small effusion. The patient is neurovascularly intact. Quad and patellar tendon function intact. The knee is grossly stable to stressing without obvious laxity, though this is somewhat a limited test given the patient pain and voluntary guarding. Given the exam and imaging, I suspect internal derangement of the knee including possible ligamentous and/or meniscal injury. Other etiologies are considered including DVT or septic joint. The patient remains afebrile with limited ROM and without significant swelling/effusion or erythema to the joint. No calf pain or thigh pain to suggest DVT.  Recommendation included knee immobilization and limited weight bearing with walker as patient declines crutches. Patient should follow-up with primary care and was given contact information for [facility] as further imaging " such as MRI may be indicated. Supportive cares were discussed including RICE and OTC pain relief. Return for increasing pain, extremity weakness, numbness, or for any other questions or concerns discussed. The patient voiced an understanding of plan and was ready for discharge.    Diagnosis:    ICD-10-CM    1. Knee injury, left, initial encounter  S89.92XA Walker Order     Ankle/Knee Bracing Supplies Order Knee Immobilizer; Left           Discharge Medications:  Discharge Medication List as of 3/4/2024  4:17 PM        START taking these medications    Details   ketorolac (TORADOL) 10 MG tablet Take 1 tablet (10 mg) by mouth every 6 hours as needed for moderate pain, Disp-20 tablet, R-0, Local Print                  3/4/2024   Sofia Juarez PA-C Steinbrueck, Emily, PA-C  03/04/24 5461

## 2024-03-04 NOTE — ED TRIAGE NOTES
Pt arrived via EMS from home for a report of hearing a popping sound on her left knee side.   10/10 pain and cannot walk on it  per EMS .  No Iv - given intranasal fentanyl x 2 50mcg each time      Triage Assessment (Adult)       Row Name 03/04/24 1245          Triage Assessment    Airway WDL WDL        Respiratory WDL    Respiratory WDL WDL        Skin Circulation/Temperature WDL    Skin Circulation/Temperature WDL WDL        Cardiac WDL    Cardiac WDL X  htn  per EMS        Peripheral/Neurovascular WDL    Peripheral Neurovascular WDL WDL        Cognitive/Neuro/Behavioral WDL    Cognitive/Neuro/Behavioral WDL WDL

## 2024-03-05 ENCOUNTER — PATIENT OUTREACH (OUTPATIENT)
Dept: INTERNAL MEDICINE | Facility: CLINIC | Age: 44
End: 2024-03-05
Payer: COMMERCIAL

## 2024-03-05 NOTE — TELEPHONE ENCOUNTER
What type of discharge? Emergency Department  Risk of Hospital admission or ED visit: 64.7%  Is a TCM episode required? No  When should the patient follow up with PCP? HERON Whittaker RN  [facility]

## 2024-03-11 ENCOUNTER — MYC REFILL (OUTPATIENT)
Dept: INTERNAL MEDICINE | Facility: CLINIC | Age: 44
End: 2024-03-11
Payer: COMMERCIAL

## 2024-03-11 DIAGNOSIS — Z13.6 CARDIOVASCULAR SCREENING; LDL GOAL LESS THAN 130: ICD-10-CM

## 2024-03-12 RX ORDER — ATORVASTATIN CALCIUM 20 MG/1
20 TABLET, FILM COATED ORAL DAILY
Qty: 90 TABLET | Refills: 0 | Status: CANCELLED | OUTPATIENT
Start: 2024-03-12

## 2024-03-12 RX ORDER — ATORVASTATIN CALCIUM 40 MG/1
40 TABLET, FILM COATED ORAL DAILY
Qty: 90 TABLET | Refills: 1 | Status: SHIPPED | OUTPATIENT
Start: 2024-03-12 | End: 2024-09-16

## 2024-03-12 NOTE — TELEPHONE ENCOUNTER
See mychart note from pt asking for 40mg.   20mg is current dose but was no print out status      the last dose notes I see were from the visit 12/7/23  based on the 12/5/23 labs - copied below    Pended 90 of the 20mg, change if appropriate

## 2024-04-10 ENCOUNTER — TRANSFERRED RECORDS (OUTPATIENT)
Dept: MULTI SPECIALTY CLINIC | Facility: CLINIC | Age: 44
End: 2024-04-10

## 2024-04-10 LAB — RETINOPATHY: NORMAL

## 2024-04-24 NOTE — ED TRIAGE NOTES
Jonny Rayo is a 56 year old male who presents to the office with the complaint of groin pain.     The patient developed, about 3 weeks ago, after riding bike, dull burning sensation in groin area between scrotum and anus.     No dysuria.  No hematuria.   Sometimes discomfort after BM.     ROS:  The patient denies any fever/chills, n/v/d, lightheadedness/dizziness, tinnitus, sore throat, cough, sob, wheezing, chest pain, abd pain, or rash.      Nurse's notes reviewed and agreed with.    Past Medical History:   Diagnosis Date    Allergy     ED (erectile dysfunction)     Elevated blood-pressure reading without diagnosis of hypertension 12/4/2018       Past Surgical History:   Procedure Laterality Date    Appendectomy  1980    Tympanostomy tube placement Bilateral     As young child       ALLERGIES:   Allergen Reactions    Penicillins RASH    Sulfa Antibiotics RASH    Sulfasalazine RASH       Current Outpatient Medications   Medication Sig Dispense Refill    Tadalafil (CIALIS PO)       Multiple Vitamins-Minerals (MENS MULTI VITAMIN & MINERAL) Tab Take by mouth daily.       No current facility-administered medications for this visit.       Family History   Problem Relation Age of Onset    Coronary Artery Disease Mother     Cancer, Breast Mother     Dementia/Alzheimers Father 78    Patient is unaware of any medical problems Sister        Social History     Tobacco Use    Smoking status: Never    Smokeless tobacco: Never   Substance Use Topics    Alcohol use: Yes     Comment: ocassionally       Recent PHQ 2/9 Score    PHQ 2:  PHQ 2 Score Adult PHQ 2 Score Adult PHQ 2 Interpretation Little interest or pleasure in activity?   4/24/2024  10:32 AM 0 No further screening needed 0       PHQ 9:       DEPRESSION ASSESSMENT/PLAN:  Depression screening is negative no further plan needed.      PE:  GEN - NAD, A&OX3, generally healthy appearance  HEART - RRR without M/R/C/G, PMI nondisplaced.  LUNGS - CTA B, good AE, no wz, no  pt comes in after twistring to take shoe off, hearing a pop and having severe pain to right knee. pt got a PIV to left hand 20 50 fent given for pain. for 10/10 pain vss   retractions.   - Vinnie V male; B testes descended with L inguinal hernia; rectal - tender prostate.   ALL EXT - FROM, muscle strength 5/5 all ext and symmetric;       2/4 distal pulses all ext, no edema, good cap refill,       intact sensation, negative Rosalia's.  SKIN - No rashes/lesions.    A -  1) Prostatitis    P -  1) Prostatitis - new - Discussed treatment options. Discussed potential side effects of the medications/procedures with the patient.  Will check labs (see orders).   If labs confirm, will treat with Levaquin 500 mg daily x 4 - 6 weeks.   If urine culture positive, will need repeat urine culture in 7 days.      Addendum:  Labs support Prostatitis.   Rx approved  Patient to RTC if no improvement or if sx's worsen.

## 2024-06-05 ENCOUNTER — LAB (OUTPATIENT)
Dept: LAB | Facility: CLINIC | Age: 44
End: 2024-06-05
Payer: COMMERCIAL

## 2024-06-05 DIAGNOSIS — Z13.6 CARDIOVASCULAR SCREENING; LDL GOAL LESS THAN 130: ICD-10-CM

## 2024-06-05 DIAGNOSIS — Z11.59 NEED FOR HEPATITIS C SCREENING TEST: ICD-10-CM

## 2024-06-05 DIAGNOSIS — E11.9 DIABETES MELLITUS, TYPE 2 (H): Primary | ICD-10-CM

## 2024-06-05 LAB — HBA1C MFR BLD: 7 % (ref 0–5.6)

## 2024-06-05 PROCEDURE — 80061 LIPID PANEL: CPT

## 2024-06-05 PROCEDURE — 82570 ASSAY OF URINE CREATININE: CPT

## 2024-06-05 PROCEDURE — 86803 HEPATITIS C AB TEST: CPT

## 2024-06-05 PROCEDURE — 36415 COLL VENOUS BLD VENIPUNCTURE: CPT

## 2024-06-05 PROCEDURE — 83036 HEMOGLOBIN GLYCOSYLATED A1C: CPT

## 2024-06-05 PROCEDURE — 80076 HEPATIC FUNCTION PANEL: CPT

## 2024-06-05 PROCEDURE — 82043 UR ALBUMIN QUANTITATIVE: CPT

## 2024-06-06 ENCOUNTER — MYC REFILL (OUTPATIENT)
Dept: INTERNAL MEDICINE | Facility: CLINIC | Age: 44
End: 2024-06-06

## 2024-06-06 DIAGNOSIS — E11.9 TYPE 2 DIABETES MELLITUS WITHOUT COMPLICATION, WITHOUT LONG-TERM CURRENT USE OF INSULIN (H): ICD-10-CM

## 2024-06-06 LAB
ALBUMIN SERPL BCG-MCNC: 4 G/DL (ref 3.5–5.2)
ALP SERPL-CCNC: 56 U/L (ref 40–150)
ALT SERPL W P-5'-P-CCNC: 19 U/L (ref 0–50)
AST SERPL W P-5'-P-CCNC: 20 U/L (ref 0–45)
BILIRUB DIRECT SERPL-MCNC: <0.2 MG/DL (ref 0–0.3)
BILIRUB SERPL-MCNC: 0.5 MG/DL
CHOLEST SERPL-MCNC: 173 MG/DL
CREAT UR-MCNC: 121 MG/DL
FASTING STATUS PATIENT QL REPORTED: YES
HCV AB SERPL QL IA: NONREACTIVE
HDLC SERPL-MCNC: 49 MG/DL
LDLC SERPL CALC-MCNC: 81 MG/DL
MICROALBUMIN UR-MCNC: 27.4 MG/L
MICROALBUMIN/CREAT UR: 22.64 MG/G CR (ref 0–25)
NONHDLC SERPL-MCNC: 124 MG/DL
PROT SERPL-MCNC: 6.8 G/DL (ref 6.4–8.3)
TRIGL SERPL-MCNC: 215 MG/DL

## 2024-06-06 RX ORDER — LISINOPRIL 20 MG/1
20 TABLET ORAL DAILY
Qty: 90 TABLET | Refills: 1 | OUTPATIENT
Start: 2024-06-06

## 2024-06-06 RX ORDER — LISINOPRIL 20 MG/1
20 TABLET ORAL DAILY
Qty: 90 TABLET | Refills: 0 | Status: SHIPPED | OUTPATIENT
Start: 2024-06-06 | End: 2024-09-16

## 2024-09-14 DIAGNOSIS — E11.9 TYPE 2 DIABETES MELLITUS WITHOUT COMPLICATION, WITHOUT LONG-TERM CURRENT USE OF INSULIN (H): ICD-10-CM

## 2024-09-14 DIAGNOSIS — Z13.6 CARDIOVASCULAR SCREENING; LDL GOAL LESS THAN 130: ICD-10-CM

## 2024-09-16 RX ORDER — LISINOPRIL 20 MG/1
20 TABLET ORAL DAILY
Qty: 90 TABLET | Refills: 0 | Status: SHIPPED | OUTPATIENT
Start: 2024-09-16

## 2024-09-16 RX ORDER — ATORVASTATIN CALCIUM 40 MG/1
40 TABLET, FILM COATED ORAL DAILY
Qty: 90 TABLET | Refills: 0 | Status: SHIPPED | OUTPATIENT
Start: 2024-09-16

## 2024-11-06 ASSESSMENT — ANXIETY QUESTIONNAIRES: GAD7 TOTAL SCORE: 14

## 2024-12-01 ENCOUNTER — HOSPITAL ENCOUNTER (EMERGENCY)
Facility: CLINIC | Age: 44
Discharge: HOME | End: 2024-12-01
Attending: EMERGENCY MEDICINE | Admitting: EMERGENCY MEDICINE

## 2024-12-01 ENCOUNTER — APPOINTMENT (OUTPATIENT)
Dept: CT IMAGING | Facility: CLINIC | Age: 44
End: 2024-12-01
Attending: BEHAVIOR TECHNICIAN

## 2024-12-01 ENCOUNTER — NURSE TRIAGE (OUTPATIENT)
Dept: NURSING | Facility: CLINIC | Age: 44
End: 2024-12-01

## 2024-12-01 VITALS
HEIGHT: 63 IN | OXYGEN SATURATION: 100 % | HEART RATE: 80 BPM | SYSTOLIC BLOOD PRESSURE: 137 MMHG | WEIGHT: 293 LBS | DIASTOLIC BLOOD PRESSURE: 85 MMHG | BODY MASS INDEX: 51.91 KG/M2 | TEMPERATURE: 98.6 F | RESPIRATION RATE: 16 BRPM

## 2024-12-01 DIAGNOSIS — R10.31 RLQ ABDOMINAL PAIN: ICD-10-CM

## 2024-12-01 DIAGNOSIS — R11.2 NAUSEA AND VOMITING, UNSPECIFIED VOMITING TYPE: ICD-10-CM

## 2024-12-01 DIAGNOSIS — R19.7 DIARRHEA: ICD-10-CM

## 2024-12-01 DIAGNOSIS — E88.89 KETOSIS (H): ICD-10-CM

## 2024-12-01 LAB
ALBUMIN SERPL BCG-MCNC: 3.9 G/DL (ref 3.5–5.2)
ALBUMIN UR-MCNC: 10 MG/DL
ALP SERPL-CCNC: 55 U/L (ref 40–150)
ALT SERPL W P-5'-P-CCNC: 19 U/L (ref 0–50)
ANION GAP SERPL CALCULATED.3IONS-SCNC: 12 MMOL/L (ref 7–15)
APPEARANCE UR: CLEAR
AST SERPL W P-5'-P-CCNC: 18 U/L (ref 0–45)
B-OH-BUTYR SERPL-SCNC: 0.48 MMOL/L
BASE EXCESS BLDV CALC-SCNC: 4.7 MMOL/L (ref -3–3)
BASOPHILS # BLD AUTO: 0 10E3/UL (ref 0–0.2)
BASOPHILS NFR BLD AUTO: 1 %
BILIRUB SERPL-MCNC: 0.5 MG/DL
BILIRUB UR QL STRIP: NEGATIVE
BUN SERPL-MCNC: 9 MG/DL (ref 6–20)
CALCIUM SERPL-MCNC: 9.1 MG/DL (ref 8.8–10.4)
CHLORIDE SERPL-SCNC: 99 MMOL/L (ref 98–107)
COLOR UR AUTO: YELLOW
CREAT SERPL-MCNC: 0.71 MG/DL (ref 0.51–0.95)
EGFRCR SERPLBLD CKD-EPI 2021: >90 ML/MIN/1.73M2
EOSINOPHIL # BLD AUTO: 0.1 10E3/UL (ref 0–0.7)
EOSINOPHIL NFR BLD AUTO: 2 %
ERYTHROCYTE [DISTWIDTH] IN BLOOD BY AUTOMATED COUNT: 13.9 % (ref 10–15)
GLUCOSE BLDC GLUCOMTR-MCNC: 128 MG/DL (ref 70–99)
GLUCOSE SERPL-MCNC: 114 MG/DL (ref 70–99)
GLUCOSE UR STRIP-MCNC: NEGATIVE MG/DL
HCG SERPL QL: NEGATIVE
HCO3 BLDV-SCNC: 30 MMOL/L (ref 21–28)
HCO3 SERPL-SCNC: 26 MMOL/L (ref 22–29)
HCT VFR BLD AUTO: 43.1 % (ref 35–47)
HGB BLD-MCNC: 14 G/DL (ref 11.7–15.7)
HGB UR QL STRIP: NEGATIVE
IMM GRANULOCYTES # BLD: 0 10E3/UL
IMM GRANULOCYTES NFR BLD: 0 %
KETONES UR STRIP-MCNC: ABNORMAL MG/DL
LACTATE SERPL-SCNC: 1.1 MMOL/L (ref 0.7–2)
LEUKOCYTE ESTERASE UR QL STRIP: NEGATIVE
LIPASE SERPL-CCNC: 21 U/L (ref 13–60)
LYMPHOCYTES # BLD AUTO: 1.5 10E3/UL (ref 0.8–5.3)
LYMPHOCYTES NFR BLD AUTO: 18 %
MAGNESIUM SERPL-MCNC: 1.6 MG/DL (ref 1.7–2.3)
MCH RBC QN AUTO: 29.4 PG (ref 26.5–33)
MCHC RBC AUTO-ENTMCNC: 32.5 G/DL (ref 31.5–36.5)
MCV RBC AUTO: 91 FL (ref 78–100)
MONOCYTES # BLD AUTO: 0.6 10E3/UL (ref 0–1.3)
MONOCYTES NFR BLD AUTO: 6 %
MUCOUS THREADS #/AREA URNS LPF: PRESENT /LPF
NEUTROPHILS # BLD AUTO: 6.4 10E3/UL (ref 1.6–8.3)
NEUTROPHILS NFR BLD AUTO: 74 %
NITRATE UR QL: NEGATIVE
NRBC # BLD AUTO: 0 10E3/UL
NRBC BLD AUTO-RTO: 0 /100
O2/TOTAL GAS SETTING VFR VENT: 21 %
OXYHGB MFR BLDV: 88 % (ref 70–75)
PCO2 BLDV: 46 MM HG (ref 40–50)
PH BLDV: 7.42 [PH] (ref 7.32–7.43)
PH UR STRIP: 5.5 [PH] (ref 5–7)
PLATELET # BLD AUTO: 255 10E3/UL (ref 150–450)
PO2 BLDV: 58 MM HG (ref 25–47)
POTASSIUM SERPL-SCNC: 4.1 MMOL/L (ref 3.4–5.3)
PROT SERPL-MCNC: 6.5 G/DL (ref 6.4–8.3)
RBC # BLD AUTO: 4.76 10E6/UL (ref 3.8–5.2)
RBC URINE: <1 /HPF
SAO2 % BLDV: 91.2 % (ref 70–75)
SODIUM SERPL-SCNC: 137 MMOL/L (ref 135–145)
SP GR UR STRIP: 1.02 (ref 1–1.03)
SQUAMOUS EPITHELIAL: 7 /HPF
TROPONIN T SERPL HS-MCNC: 8 NG/L
UROBILINOGEN UR STRIP-MCNC: NORMAL MG/DL
WBC # BLD AUTO: 8.7 10E3/UL (ref 4–11)
WBC URINE: 1 /HPF

## 2024-12-01 PROCEDURE — 250N000011 HC RX IP 250 OP 636: Performed by: BEHAVIOR TECHNICIAN

## 2024-12-01 PROCEDURE — 84703 CHORIONIC GONADOTROPIN ASSAY: CPT | Performed by: BEHAVIOR TECHNICIAN

## 2024-12-01 PROCEDURE — 83735 ASSAY OF MAGNESIUM: CPT | Performed by: BEHAVIOR TECHNICIAN

## 2024-12-01 PROCEDURE — 82962 GLUCOSE BLOOD TEST: CPT

## 2024-12-01 PROCEDURE — 96376 TX/PRO/DX INJ SAME DRUG ADON: CPT

## 2024-12-01 PROCEDURE — 99285 EMERGENCY DEPT VISIT HI MDM: CPT | Mod: 25

## 2024-12-01 PROCEDURE — 83690 ASSAY OF LIPASE: CPT | Performed by: BEHAVIOR TECHNICIAN

## 2024-12-01 PROCEDURE — 81001 URINALYSIS AUTO W/SCOPE: CPT | Performed by: BEHAVIOR TECHNICIAN

## 2024-12-01 PROCEDURE — 84484 ASSAY OF TROPONIN QUANT: CPT | Performed by: BEHAVIOR TECHNICIAN

## 2024-12-01 PROCEDURE — 82805 BLOOD GASES W/O2 SATURATION: CPT | Performed by: BEHAVIOR TECHNICIAN

## 2024-12-01 PROCEDURE — 74177 CT ABD & PELVIS W/CONTRAST: CPT

## 2024-12-01 PROCEDURE — 96375 TX/PRO/DX INJ NEW DRUG ADDON: CPT

## 2024-12-01 PROCEDURE — 82040 ASSAY OF SERUM ALBUMIN: CPT | Performed by: BEHAVIOR TECHNICIAN

## 2024-12-01 PROCEDURE — 96365 THER/PROPH/DIAG IV INF INIT: CPT | Mod: 59

## 2024-12-01 PROCEDURE — 84132 ASSAY OF SERUM POTASSIUM: CPT | Performed by: BEHAVIOR TECHNICIAN

## 2024-12-01 PROCEDURE — 85025 COMPLETE CBC W/AUTO DIFF WBC: CPT | Performed by: BEHAVIOR TECHNICIAN

## 2024-12-01 PROCEDURE — 82010 KETONE BODYS QUAN: CPT | Performed by: BEHAVIOR TECHNICIAN

## 2024-12-01 PROCEDURE — 84520 ASSAY OF UREA NITROGEN: CPT | Performed by: BEHAVIOR TECHNICIAN

## 2024-12-01 PROCEDURE — 83605 ASSAY OF LACTIC ACID: CPT | Performed by: BEHAVIOR TECHNICIAN

## 2024-12-01 PROCEDURE — 93005 ELECTROCARDIOGRAM TRACING: CPT

## 2024-12-01 PROCEDURE — 36415 COLL VENOUS BLD VENIPUNCTURE: CPT | Performed by: BEHAVIOR TECHNICIAN

## 2024-12-01 RX ORDER — IOPAMIDOL 755 MG/ML
100 INJECTION, SOLUTION INTRAVASCULAR ONCE
Status: COMPLETED | OUTPATIENT
Start: 2024-12-01 | End: 2024-12-01

## 2024-12-01 RX ORDER — ONDANSETRON 4 MG/1
4 TABLET, ORALLY DISINTEGRATING ORAL EVERY 6 HOURS PRN
Qty: 20 TABLET | Refills: 0 | Status: SHIPPED | OUTPATIENT
Start: 2024-12-01

## 2024-12-01 RX ORDER — MORPHINE SULFATE 4 MG/ML
4 INJECTION, SOLUTION INTRAMUSCULAR; INTRAVENOUS ONCE
Status: COMPLETED | OUTPATIENT
Start: 2024-12-01 | End: 2024-12-01

## 2024-12-01 RX ORDER — MAGNESIUM SULFATE HEPTAHYDRATE 40 MG/ML
2 INJECTION, SOLUTION INTRAVENOUS ONCE
Status: COMPLETED | OUTPATIENT
Start: 2024-12-01 | End: 2024-12-01

## 2024-12-01 RX ORDER — ONDANSETRON 2 MG/ML
4 INJECTION INTRAMUSCULAR; INTRAVENOUS EVERY 30 MIN PRN
Status: DISCONTINUED | OUTPATIENT
Start: 2024-12-01 | End: 2024-12-01 | Stop reason: HOSPADM

## 2024-12-01 RX ADMIN — ONDANSETRON 4 MG: 2 INJECTION INTRAMUSCULAR; INTRAVENOUS at 14:49

## 2024-12-01 RX ADMIN — MORPHINE SULFATE 4 MG: 4 INJECTION, SOLUTION INTRAMUSCULAR; INTRAVENOUS at 12:20

## 2024-12-01 RX ADMIN — IOPAMIDOL 100 ML: 755 INJECTION, SOLUTION INTRAVENOUS at 13:35

## 2024-12-01 RX ADMIN — MAGNESIUM SULFATE HEPTAHYDRATE 2 G: 40 INJECTION, SOLUTION INTRAVENOUS at 13:59

## 2024-12-01 RX ADMIN — ONDANSETRON 4 MG: 2 INJECTION INTRAMUSCULAR; INTRAVENOUS at 12:14

## 2024-12-01 ASSESSMENT — ACTIVITIES OF DAILY LIVING (ADL)
ADLS_ACUITY_SCORE: 41

## 2024-12-01 ASSESSMENT — COLUMBIA-SUICIDE SEVERITY RATING SCALE - C-SSRS
1. IN THE PAST MONTH, HAVE YOU WISHED YOU WERE DEAD OR WISHED YOU COULD GO TO SLEEP AND NOT WAKE UP?: NO
6. HAVE YOU EVER DONE ANYTHING, STARTED TO DO ANYTHING, OR PREPARED TO DO ANYTHING TO END YOUR LIFE?: NO
2. HAVE YOU ACTUALLY HAD ANY THOUGHTS OF KILLING YOURSELF IN THE PAST MONTH?: NO

## 2024-12-01 NOTE — ED PROVIDER NOTES
Emergency Department Note      History of Present Illness     Chief Complaint   Abdominal Pain and Nausea, Vomiting, & Diarrhea      HPI   Cristina Dean is a 44 year old female with history of hypertension, type 2 diabetes who presents to the ED for evaluation of abdominal pain, nausea, vomiting, and diarrhea.  Patient reports gradually worsening upper abdominal pain started 3 days ago.  She also endorses nausea and vomiting.  She states that the symptoms are mainly worse at night.  She reports diarrhea with 4-5 liquidy stools a day.  She is also noticed intermittent hematochezia during her bowel movements.  She denies any rectal pain.  No black tarry stools.  She states that the pain was worse this morning.  Pain does not radiate into her back.  She has not been able to take her medications due to persistent nausea and vomiting.  No fevers.  No chest pain.  She reports shortness of breath but mainly related to pain.  No urinary symptoms.  Denies previous abdominal surgeries.  Denies cardiac history.  No history of blood clots.  She reports occasional alcohol use and daily marijuana use.  She reports chronic cough and wheezing due to asthma.  She has albuterol inhaler daily.    Independent Historian   None    Review of External Notes   Reviewed nurse triage call from today.  Patient called regarding upper abdominal pain, weakness, dizziness, bloody diarrhea.    Past Medical History     Medical History and Problem List   Past Medical History:   Diagnosis Date    Anxiety     Bipolar affective disorder, remission status unspecified (H) 11/13/2017    Chlamydia     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, unspecified     Depression     Diabetes mellitus, type 2 (H) 11/16/2023    Genital herpes     Herpes     Hypertension     Intermittent asthma     Obesity 08/01/2012    PID (pelvic inflammatory disease)     Urinary incontinence 02/01/2017       Medications   ondansetron (ZOFRAN ODT) 4 MG ODT  "tab  albuterol (PROAIR HFA/PROVENTIL HFA/VENTOLIN HFA) 108 (90 Base) MCG/ACT inhaler  albuterol (PROAIR HFA/PROVENTIL HFA/VENTOLIN HFA) 108 (90 Base) MCG/ACT inhaler  albuterol (PROAIR HFA/PROVENTIL HFA/VENTOLIN HFA) 108 (90 Base) MCG/ACT inhaler  albuterol (PROVENTIL) (2.5 MG/3ML) 0.083% neb solution  albuterol (PROVENTIL) (2.5 MG/3ML) 0.083% neb solution  atorvastatin (LIPITOR) 40 MG tablet  budesonide-formoterol (SYMBICORT) 160-4.5 MCG/ACT Inhaler  lisinopril (ZESTRIL) 20 MG tablet  metFORMIN (GLUCOPHAGE) 500 MG tablet  montelukast (SINGULAIR) 10 MG tablet  predniSONE (DELTASONE) 20 MG tablet  predniSONE (DELTASONE) 20 MG tablet  traZODone (DESYREL) 50 MG tablet        Surgical History   Past Surgical History:   Procedure Laterality Date    BIOPSY  10/23    Hpv    EXCISE MASS BACK Right 12/28/2017    Procedure: EXCISE MASS BACK;  Excision Right Back Mass;  Surgeon: Vicente Pérez MD;  Location: [facility] OR    TONSILLECTOMY  1984       Physical Exam     Patient Vitals for the past 24 hrs:   BP Temp Temp src Pulse Resp SpO2 Height Weight   12/01/24 1605 137/85 -- -- 80 -- 100 % -- --   12/01/24 1600 137/85 -- -- 76 -- 96 % -- --   12/01/24 1538 -- -- -- -- -- 96 % -- --   12/01/24 1537 -- -- -- -- -- 91 % -- --   12/01/24 1536 -- -- -- -- -- (!) 86 % -- --   12/01/24 1535 -- -- -- -- -- (!) 88 % -- --   12/01/24 1531 (!) 155/82 -- -- 82 -- (!) 88 % -- --   12/01/24 1235 -- -- -- 80 -- 92 % -- --   12/01/24 1231 -- -- -- 73 -- 91 % -- --   12/01/24 1230 -- -- -- 84 -- (!) 85 % -- --   12/01/24 1229 -- -- -- 82 -- (!) 88 % -- --   12/01/24 1228 -- -- -- 84 -- (!) 86 % -- --   12/01/24 1227 -- -- -- 81 -- (!) 86 % -- --   12/01/24 1226 -- -- -- 79 -- (!) 87 % -- --   12/01/24 1154 (!) 152/80 -- -- 85 -- 94 % -- --   12/01/24 1136 (!) 231/123 98.6  F (37  C) Temporal 86 16 97 % 1.6 m (5' 3\") (!) 177.4 kg (391 lb 1.5 oz)     Physical Exam  Physical Exam:  General: Obese female, tearful  Head: normocephalic, " atraumatic  Eyes: PERRLA, EOMI  Ears: External ears appear normal.   Nose: no signs of bleeding   Throat: moist mucous membranes  Neck: No JVD  CV: regular rate and rhythm  Pulm: lungs clear to ausculation bilaterally, normal respiratory effort, normal chest expansion with breathing   Abdomen: soft, upper abdominal and right-sided tenderness.  No rigidity, guarding, or rebound tenderness.  MSK: No midline tenderness  Ext: normal range of motion of all extremities. No gross deformities  Skin: warm, dry, no rashes  Neuro: alert and oriented  Psych: Appropriate mood. Cooperative      Diagnostics     Lab Results   Labs Ordered and Resulted from Time of ED Arrival to Time of ED Departure   GLUCOSE BY METER - Abnormal       Result Value    GLUCOSE BY METER POCT 128 (*)    COMPREHENSIVE METABOLIC PANEL - Abnormal    Sodium 137      Potassium 4.1      Carbon Dioxide (CO2) 26      Anion Gap 12      Urea Nitrogen 9.0      Creatinine 0.71      GFR Estimate >90      Calcium 9.1      Chloride 99      Glucose 114 (*)     Alkaline Phosphatase 55      AST 18      ALT 19      Protein Total 6.5      Albumin 3.9      Bilirubin Total 0.5     ROUTINE UA WITH MICROSCOPIC REFLEX TO CULTURE - Abnormal    Color Urine Yellow      Appearance Urine Clear      Glucose Urine Negative      Bilirubin Urine Negative      Ketones Urine Trace (*)     Specific Gravity Urine 1.020      Blood Urine Negative      pH Urine 5.5      Protein Albumin Urine 10 (*)     Urobilinogen Urine Normal      Nitrite Urine Negative      Leukocyte Esterase Urine Negative      Mucus Urine Present (*)     RBC Urine <1      WBC Urine 1      Squamous Epithelials Urine 7 (*)    MAGNESIUM - Abnormal    Magnesium 1.6 (*)    BLOOD GAS VENOUS - Abnormal    pH Venous 7.42      pCO2 Venous 46      pO2 Venous 58 (*)     Bicarbonate Venous 30 (*)     Base Excess/Deficit Venous 4.7 (*)     FIO2 21      Oxyhemoglobin Venous 88 (*)     O2 Sat, Venous 91.2 (*)    KETONE  BETA-HYDROXYBUTYRATE QUANTITATIVE, RAPID - Abnormal    Ketone (Beta-Hydroxybutyrate) Quantitative 0.48 (*)    TROPONIN T, HIGH SENSITIVITY - Normal    Troponin T, High Sensitivity 8     LIPASE - Normal    Lipase 21     LACTIC ACID WHOLE BLOOD WITH 1X REPEAT IN 2 HR WHEN >2 - Normal    Lactic Acid, Initial 1.1     HCG QUALITATIVE PREGNANCY - Normal    hCG Serum Qualitative Negative     CBC WITH PLATELETS AND DIFFERENTIAL    WBC Count 8.7      RBC Count 4.76      Hemoglobin 14.0      Hematocrit 43.1      MCV 91      MCH 29.4      MCHC 32.5      RDW 13.9      Platelet Count 255      % Neutrophils 74      % Lymphocytes 18      % Monocytes 6      % Eosinophils 2      % Basophils 1      % Immature Granulocytes 0      NRBCs per 100 WBC 0      Absolute Neutrophils 6.4      Absolute Lymphocytes 1.5      Absolute Monocytes 0.6      Absolute Eosinophils 0.1      Absolute Basophils 0.0      Absolute Immature Granulocytes 0.0      Absolute NRBCs 0.0         Imaging   CT Abdomen Pelvis w Contrast   Final Result   IMPRESSION:    1.  No evidence of bowel obstruction nor acute inflammatory bowel process. Normal appendix.   2.  Stable diffuse hepatic steatosis and mild hepatosplenomegaly.          EKG   ECG taken at 11:44, ECG read at 11:49  Normal Sinus Rhythm   Rate 87 bpm. KY interval 144 ms. QRS duration 80 ms. QT/QTc 354/425 ms. P-R-T axes 36 46 62.  Reviewed by Awais Eden MD.     Independent Interpretation   None    ED Course      Medications Administered   Medications   morphine (PF) injection 4 mg (4 mg Intravenous $Given 12/1/24 1220)   magnesium sulfate 2 g in 50 mL sterile water intermittent infusion (0 g Intravenous Stopped 12/1/24 1459)   iopamidol (ISOVUE-370) solution 100 mL (100 mLs Intravenous $Given 12/1/24 1335)   sodium chloride (PF) 0.9% PF flush 65 mL (65 mLs Intravenous $Given 12/1/24 1335)       Procedures   Procedures     Discussion of Management   None    ED Course   ED Course as of 12/01/24 1445    Sun Dec 01, 2024   1152 I evaluated patient and obtained history.   1445 Reassessed patient.       Additional Documentation  None    Medical Decision Making / Diagnosis     CMS Diagnoses: None    MIPS       None    [facility]   Cristina Dean is a 44 year old female who presents to the ED for evaluation of abdominal pain, nausea, vomiting, and diarrhea for 3 days.  Broad differential was considered including appendicitis, cholecystitis, choledocholithiasis, diverticulitis, pancreatitis, IBD, colitis, enteritis, ACS equivalent.  On exam, patient is tearful due to pain.  Vital signs are reassuring.  Exam is notable for upper abdominal and right sided tenderness, however exam is limited due to patient's body habitus.  No peritonitic signs.  Laboratory workup reveals hyperglycemia with glucose glucose of 114, mild ketosis likely secondary to dehydration in the setting of nausea and vomiting, and hypomagnesemia.  No acidosis.  Low suspicion for DKA.  Normal lipase.  Low suspicion for pancreatitis.  Normal LFTs.  Low suspicion for biliary etiology.  Negative hCG.  UA is not concerning for infection.  No hematuria to suggest kidney stone.  No leukocytosis.  Normal lactic.  No evidence of sepsis or septic shock.  No evidence of acute renal dysfunction.  Given inconclusive abdominal exam, CT abdomen/pelvis was ordered.  CT does not show evidence of bowel obstruction, appendicitis, or other acute inflammatory bowel process.  It does show evidence of stable diffuse hepatic steatosis and mild hepatosplenomegaly.  Patient was made aware of this finding.  Suspect viral gastroenteritis as the cause of the patient's symptoms.  Patient was given pain meds, Zofran, and magnesium replacement.  On reassessment, patient still feels nauseated but the pain is improved.  Patient was given more Zofran.  She was signed out to oncoming colleague pending more Zofran and p.o. challenge.  If patient is able to successfully p.o. challenge, she can  discharge home with close PCP follow-up.  Zofran was ordered to her pharmacy.  Patient feels comfortable with this plan.  Discussed that she should return to the ED with ongoing abdominal pain, persistent nausea vomiting, fever greater than 100.4, or any other new or worsening symptoms.      Disposition   Patient was signed out to Dr. Alvarado pending p.o. challenge.     Diagnosis     ICD-10-CM    1. Nausea and vomiting, unspecified vomiting type  R11.2       2. RLQ abdominal pain  R10.31       3. Diarrhea  R19.7       4. Ketosis (H)  E88.89            Discharge Medications   Discharge Medication List as of 12/1/2024  4:00 PM        START taking these medications    Details   ondansetron (ZOFRAN ODT) 4 MG ODT tab Take 1 tablet (4 mg) by mouth every 6 hours as needed for nausea., Disp-20 tablet, R-0, E-Prescribe               OLIVIA Guerrier Kausar, PA-C  12/01/24 9726

## 2024-12-01 NOTE — ED PROVIDER NOTES
Emergency Department Attending Supervision Note  12/1/2024  1:32 PM      I evaluated this patient in conjunction with Shane Stover PA-C       Briefly, the patient presented with complaints of nausea vomiting diarrhea and suprapubic and right lower quadrant abdominal pain.  Symptoms started the day before Thanksgiving have been present since that time.  For the last several days she has been unable to take her metformin for diabetes her medications for blood pressure the patient says that she has not felt well has had diarrhea there is been some blood in it she was concerned about her symptoms and they are present to the ER.      On my exam,   General: The patient is alert, in no respiratory distress.    HENT: Mucous membranes dry    Cardiovascular: Regular rate and rhythm. Good pulses     Respiratory: Slight wheezing present    Gastrointestinal: Abdomen soft.  Tenderness to palpation along the lower abdomen no guarding    Musculoskeletal: No gross deformity.     Skin: No rashes or petechiae.     Neurologic: The patient is alert with adequate strength in her extremities      Psychiatric: The patient is non-tearful.     Results: Her white blood cell count is normal, CMP demonstrates a glucose only mildly up at 114, troponin is normal at 8, hCG is negative    ED course: Patient was evaluated for DKA while her ketones are elevated likely secondary to dehydration there is no acidosis.  The patient was hydrated    My impression is abdominal pain with associated with nausea and vomiting this is likely due to the gastroenteritis CT scan was ordered to look for signs of bowel obstruction or diverticulitis these were not visualized by my read the official read is pending and was signed out.        Diagnosis    ICD-10-CM    1. Nausea and vomiting, unspecified vomiting type  R11.2       2. RLQ abdominal pain  R10.31             Awais Eden MD Farnan, Christopher M, MD  12/01/24 1416

## 2024-12-01 NOTE — TELEPHONE ENCOUNTER
Nurse Triage SBAR    Is this a 2nd Level Triage? NO    Situation: Diarrhea for the last week with occasional vomiting.    Background: Diabetic but says she has not been told to check blood sugars.    Assessment: Abdominal pain upper, 7/10, feeling weak and dizzy, diagnosed with diabetes and is not checking blood sugar. Takes Metformin 500 mg twice a day. Diarrhea is bloody.    Protocol Recommended Disposition:   Go to ED Now    Recommendation: Patient verbalizes understanding of care advice and agrees with plan.  She has called an uber.     Tsering Arrington RN  [facility]    Reason for Disposition   [1] Blood in the stool AND [2] moderate or large amount of blood    Additional Information   Negative: Shock suspected (e.g., cold/pale/clammy skin, too weak to stand, low BP, rapid pulse)   Negative: Difficult to awaken or acting confused (e.g., disoriented, slurred speech)   Negative: Sounds like a life-threatening emergency to the triager   Negative: [1] SEVERE abdominal pain AND [2] age > 60 years   Negative: [1] SEVERE abdominal pain (e.g., excruciating) AND [2] present > 1 hour    Protocols used: Diarrhea-A-

## 2024-12-01 NOTE — ED TRIAGE NOTES
Patient reports for the last 6 days NVD, abdominal pain and fatigue. Patient is also a diabetic, but does not know what her sugars have been as she has never been told to check it at home. Unable to take antihypertensive and antidiabetic meds for 3 days. Triage

## 2024-12-01 NOTE — DISCHARGE INSTRUCTIONS
Discharge Instructions  Abdominal Pain    Abdominal pain (belly pain) can be caused by many things. Your evaluation today does not show the exact cause for your pain. Your provider today has decided that it is unlikely your pain is due to a life threatening problem, or a problem requiring surgery or hospital admission. Sometimes those problems cannot be found right away, so it is very important that you follow up as directed.  Sometimes only the changes which occur over time allow the cause of your pain to be found.    Generally, every Emergency Department visit should have a follow-up clinic visit with either a primary or a specialty clinic/provider. Please follow-up as instructed by your emergency provider today. With abdominal pain, we often recommend very close follow-up, such as the following day.    ADULTS:  Return to the Emergency Department right away if:    You get an oral temperature above 102oF or as directed by your provider.  You have blood in your stools. This may be bright red or appear as black, tarry stools.    You keep vomiting (throwing up) or cannot drink liquids.  You see blood when you vomit.   You cannot have a bowel movement or you cannot pass gas.  Your stomach gets bloated or bigger.  Your skin or the whites of your eyes look yellow.  You faint.  You have bloody, frequent or painful urination (peeing).  You have new symptoms or anything that worries you.    CHILDREN:  Return to the Emergency Department right away if your child has any of the above-listed symptoms or the following:    Pushes your hand away or screams/cries when his/her belly is touched.  You notice your child is very fussy or weak.  Your child is very tired and is too tired to eat or drink.  Your child is dehydrated.  Signs of dehydration can be:  Significant change in the amount of wet diapers/urine.  Your infant or child starts to have dry mouth and lips, or no saliva (spit) or tears.    PREGNANT WOMEN:  Return to the  Emergency Department right away if you have any of the above-listed symptoms or the following:    You have bleeding, leaking fluid or passing tissue from the vagina.  You have worse pain or cramping, or pain in your shoulder or back.  You have vomiting that will not stop.  You have a temperature of 100oF or more.  Your baby is not moving as much as usual.  You faint.  You get a bad headache with or without eye problems and abdominal pain.  You have a seizure.  You have unusual discharge from your vagina and abdominal pain.    Abdominal pain is pretty common during pregnancy.  Your pain may or may not be related to your pregnancy. You should follow-up closely with your OB provider so they can evaluate you and your baby.  Until you follow-up with your regular provider, do the following:     Avoid sex and do not put anything in your vagina.  Drink clear fluids.  Only take medications approved by your provider.    MORE INFORMATION:    Appendicitis:  A possible cause of abdominal pain in any person who still has their appendix is acute appendicitis. Appendicitis is often hard to diagnose.  Testing does not always rule out early appendicitis or other causes of abdominal pain. Close follow-up with your provider and re-evaluations may be needed to figure out the reason for your abdominal pain.    Follow-up:  It is very important that you make an appointment with your clinic and go to the appointment.  If you do not follow-up with your primary provider, it may result in missing an important development which could result in permanent injury or disability and/or lasting pain.  If there is any problem keeping your appointment, call your provider or return to the Emergency Department.    Medications:  Take your medications as directed by your provider today.  Before using over-the-counter medications, ask your provider and make sure to take the medications as directed.  If you have any questions about medications, ask your  "provider.    Diet:  Resume your normal diet as much as possible, but do not eat fried, fatty or spicy foods while you have pain.  Do not drink alcohol or have caffeine.  Do not smoke tobacco.    Probiotics: If you have been given an antibiotic, you may want to also take a probiotic pill or eat yogurt with live cultures. Probiotics have \"good bacteria\" to help your intestines stay healthy. Studies have shown that probiotics help prevent diarrhea (loose stools) and other intestine problems (including C. diff infection) when you take antibiotics. You can buy these without a prescription in the pharmacy section of the store.     If you were given a prescription for medicine here today, be sure to read all of the information (including the package insert) that comes with your prescription.  This will include important information about the medicine, its side effects, and any warnings that you need to know about.  The pharmacist who fills the prescription can provide more information and answer questions you may have about the medicine.  If you have questions or concerns that the pharmacist cannot address, please call or return to the Emergency Department.       Remember that you can always come back to the Emergency Department if you are not able to see your regular provider in the amount of time listed above, if you get any new symptoms, or if there is anything that worries you.      Discharge Instructions  Vomiting    You have been seen today for vomiting (throwing up). This is usually caused by a virus, but some bacteria, parasites, medicines or other medical conditions can cause similar symptoms. At this time your provider does not find that your vomiting is a sign of anything dangerous or life-threatening. However, sometimes the signs of serious illness do not show up right away. If you have new or worse symptoms, you may need to be seen again in the Emergency Department or by your primary provider. Remember that " serious problems like appendicitis can start as vomiting.    Generally, every Emergency Department visit should have a follow-up clinic visit with either a primary or a specialty clinic/provider. Please follow-up as instructed by your emergency provider today.    Return to the Emergency Department if:  You keep vomiting and you are not able to keep liquids down.   You feel you are getting dehydrated, such as being very thirsty, not urinating (peeing) at least every 8-12 hours, or feeling faint or lightheaded.   You develop a new fever, or your fever continues for more than 2 days.   You have abdominal (belly pain) that seems worse than cramps, is in one spot, or is getting worse over time. Appendicitis usually causes pain in the right lower abdomen (to the right and below your belly button) so watch for pain in this location.  You have blood in your vomit or stools.   You feel very weak.  You are not starting to improve within 24 hours of your visit here.     What can I do to help myself?  The most important thing to do is to drink clear liquids. If you have been vomiting a lot, it is best to have only small, frequent sips of liquids. Drinking too much at once may cause more vomiting. If you are vomiting often, you must replace minerals, sodium and potassium lost with your illness. Pedialyte  is the best available rehydration liquid but some find that it doesn t taste good so sports drinks are an alterative. You can also drink clear liquids such as water, weak tea, apple juice, and 7-Up . Avoid acid liquids (orange), caffeine (coffee) or alcohol. Do not drink milk until you no longer have diarrhea (loose stools).   After liquids are staying down, you may start eating mild foods. Soda crackers, toast, plain noodles, gelatin, applesauce and bananas are good first choices. Avoid foods that have acid, are spicy, fatty or have a lot of fiber (such as meats, coarse grains, vegetables). You may start eating these foods  again in about 3 days when you are better.   Sometimes treatment includes prescription medicine to prevent nausea (sick to your stomach) and vomiting. If your provider prescribes these for you, take them as directed.   Do not take ibuprofen, naproxen, or other nonsteroidal anti-inflammatory (NSAID) medicines without checking with your healthcare provider.     If you were given a prescription for medicine here today, be sure to read all of the information (including the package insert) that comes with your prescription.  This will include important information about the medicine, its side effects, and any warnings that you need to know about.  The pharmacist who fills the prescription can provide more information and answer questions you may have about the medicine.  If you have questions or concerns that the pharmacist cannot address, please call or return to the Emergency Department.     Remember that you can always come back to the Emergency Department if you are not able to see your regular provider in the amount of time listed above, if you get any new symptoms, or if there is anything that worries you.

## 2024-12-02 ENCOUNTER — PATIENT OUTREACH (OUTPATIENT)
Dept: INTERNAL MEDICINE | Facility: CLINIC | Age: 44
End: 2024-12-02

## 2024-12-02 LAB
ATRIAL RATE - MUSE: 87 BPM
DIASTOLIC BLOOD PRESSURE - MUSE: NORMAL MMHG
INTERPRETATION ECG - MUSE: NORMAL
P AXIS - MUSE: 36 DEGREES
PR INTERVAL - MUSE: 144 MS
QRS DURATION - MUSE: 80 MS
QT - MUSE: 354 MS
QTC - MUSE: 425 MS
R AXIS - MUSE: 46 DEGREES
SYSTOLIC BLOOD PRESSURE - MUSE: NORMAL MMHG
T AXIS - MUSE: 62 DEGREES
VENTRICULAR RATE- MUSE: 87 BPM

## 2025-04-16 ENCOUNTER — PATIENT OUTREACH (OUTPATIENT)
Dept: CARE COORDINATION | Facility: CLINIC | Age: 45
End: 2025-04-16

## 2025-07-05 DIAGNOSIS — E11.9 TYPE 2 DIABETES MELLITUS WITHOUT COMPLICATION, WITHOUT LONG-TERM CURRENT USE OF INSULIN (H): ICD-10-CM

## 2025-07-05 DIAGNOSIS — Z13.6 CARDIOVASCULAR SCREENING; LDL GOAL LESS THAN 130: ICD-10-CM

## 2025-07-07 RX ORDER — LISINOPRIL 20 MG/1
20 TABLET ORAL DAILY
Qty: 90 TABLET | Refills: 0 | Status: SHIPPED | OUTPATIENT
Start: 2025-07-07

## 2025-07-07 RX ORDER — ATORVASTATIN CALCIUM 40 MG/1
40 TABLET, FILM COATED ORAL DAILY
Qty: 90 TABLET | Refills: 0 | Status: SHIPPED | OUTPATIENT
Start: 2025-07-07

## 2025-07-22 ENCOUNTER — TELEPHONE (OUTPATIENT)
Dept: INTERNAL MEDICINE | Facility: CLINIC | Age: 45
End: 2025-07-22

## 2025-07-22 NOTE — TELEPHONE ENCOUNTER
Patient Quality Outreach    Patient is due for the following:   Asthma  -  ACT needed  Hypertension -  BP check  Colon Cancer Screening  Breast Cancer Screening - Mammogram  Cervical Cancer Screening - PAP Needed  Depression  -  PHQ-9 needed  Physical Preventive Adult Physical    Action(s) Taken:   Patient has upcoming appointment, these items will be addressed at that time.  Patient was assigned appropriate questionnaire to complete    Type of outreach:    Sent [employer] message.    Questions for provider review:    None         Lisa Hooks  Chart routed to None.

## (undated) DEVICE — ESU ELEC BLADE 2.75" COATED/INSULATED E1455

## (undated) DEVICE — LINEN HALF SHEET 5512

## (undated) DEVICE — DRAPE LAP W/ARMBOARD 29410

## (undated) DEVICE — DRSG ABDOMINAL 07 1/2X8" 7197D

## (undated) DEVICE — PACK MINOR CUSTOM RIDGES SBA32RMRMA

## (undated) DEVICE — SUCTION CANISTER MEDIVAC LINER 3000ML W/LID 65651-530

## (undated) DEVICE — PANTIES MESH LG/XLG 2PK 706M2

## (undated) DEVICE — SU VICRYL 2-0 SH 27" J317H

## (undated) DEVICE — DRSG COTTON BALL 6PK LCB62

## (undated) DEVICE — GLOVE BIOGEL PI MICRO INDICATOR UNDERGLOVE SZ 7.0 48970

## (undated) DEVICE — BAG CLEAR TRASH 1.3M 39X33" P4040C

## (undated) DEVICE — GLOVE BIOGEL PI MICRO SZ 6.5 48565

## (undated) DEVICE — LINEN TOWEL PACK X10 5473

## (undated) DEVICE — DRAIN JACKSON PRATT 15FR ROUND SIL LF JP-2229

## (undated) DEVICE — SOL WATER IRRIG 1000ML BOTTLE 2F7114

## (undated) DEVICE — STRAP KNEE/BODY 31143004

## (undated) DEVICE — TUBING SUCTION 12"X1/4" N612

## (undated) DEVICE — GLOVE PROTEXIS POWDER FREE 8.0 ORTHOPEDIC 2D73ET80

## (undated) DEVICE — Device

## (undated) DEVICE — GLOVE PROTEXIS POWDER FREE SMT 7.5  2D72PT75X

## (undated) DEVICE — DRSG TELFA 3X8" 1238

## (undated) DEVICE — PREP DYNA-HEX 4% CHG SCRUB 4OZ BOTTLE MDS098710

## (undated) DEVICE — SOL NACL 0.9% IRRIG 1000ML BOTTLE 2F7124

## (undated) DEVICE — PAD CHUX UNDERPAD 30X36" P3036C

## (undated) DEVICE — SPONGE LAP 18X18" X8435

## (undated) DEVICE — ESU GROUND PAD ADULT W/CORD E7507

## (undated) DEVICE — SUCTION TIP YANKAUER W/O VENT K86

## (undated) DEVICE — LINEN GOWN X4 5410

## (undated) DEVICE — ESU ELEC BLADE 6" COATED/INSULATED E1455-6

## (undated) DEVICE — LINEN FULL SHEET 5511

## (undated) DEVICE — GOWN IMPERVIOUS ZONED XLG 9041

## (undated) DEVICE — SWAB PROCTO 16" 2/PK 32-046

## (undated) DEVICE — PREP CHLORAPREP 26ML TINTED ORANGE  260815

## (undated) DEVICE — DRAIN JACKSON PRATT RESERVOIR 100ML SU130-1305

## (undated) DEVICE — SU DERMABOND MINI DHVM12

## (undated) DEVICE — LINEN TOWEL PACK X5 5464

## (undated) DEVICE — SU ETHILON 2-0 PS 18" 585H

## (undated) DEVICE — SU VICRYL 3-0 SH 27" UND J416H

## (undated) RX ORDER — ONDANSETRON 2 MG/ML
INJECTION INTRAMUSCULAR; INTRAVENOUS
Status: DISPENSED
Start: 2022-11-17

## (undated) RX ORDER — LIDOCAINE HYDROCHLORIDE 10 MG/ML
INJECTION, SOLUTION EPIDURAL; INFILTRATION; INTRACAUDAL; PERINEURAL
Status: DISPENSED
Start: 2017-12-28

## (undated) RX ORDER — FENTANYL CITRATE 50 UG/ML
INJECTION, SOLUTION INTRAMUSCULAR; INTRAVENOUS
Status: DISPENSED
Start: 2017-12-28

## (undated) RX ORDER — NEOSTIGMINE METHYLSULFATE 1 MG/ML
VIAL (ML) INJECTION
Status: DISPENSED
Start: 2017-12-28

## (undated) RX ORDER — ACETAMINOPHEN 325 MG/1
TABLET ORAL
Status: DISPENSED
Start: 2017-12-28

## (undated) RX ORDER — FENTANYL CITRATE 50 UG/ML
INJECTION, SOLUTION INTRAMUSCULAR; INTRAVENOUS
Status: DISPENSED
Start: 2022-11-17

## (undated) RX ORDER — DEXAMETHASONE SODIUM PHOSPHATE 4 MG/ML
INJECTION, SOLUTION INTRA-ARTICULAR; INTRALESIONAL; INTRAMUSCULAR; INTRAVENOUS; SOFT TISSUE
Status: DISPENSED
Start: 2017-12-28

## (undated) RX ORDER — ONDANSETRON 2 MG/ML
INJECTION INTRAMUSCULAR; INTRAVENOUS
Status: DISPENSED
Start: 2017-12-28

## (undated) RX ORDER — LIDOCAINE HYDROCHLORIDE 20 MG/ML
INJECTION, SOLUTION EPIDURAL; INFILTRATION; INTRACAUDAL; PERINEURAL
Status: DISPENSED
Start: 2022-11-17

## (undated) RX ORDER — BUPIVACAINE HYDROCHLORIDE 5 MG/ML
INJECTION, SOLUTION EPIDURAL; INTRACAUDAL
Status: DISPENSED
Start: 2017-12-28

## (undated) RX ORDER — OXYCODONE HYDROCHLORIDE 5 MG/1
TABLET ORAL
Status: DISPENSED
Start: 2017-12-28

## (undated) RX ORDER — GLYCOPYRROLATE 0.2 MG/ML
INJECTION INTRAMUSCULAR; INTRAVENOUS
Status: DISPENSED
Start: 2017-12-28

## (undated) RX ORDER — ALBUTEROL SULFATE 0.83 MG/ML
SOLUTION RESPIRATORY (INHALATION)
Status: DISPENSED
Start: 2024-01-31

## (undated) RX ORDER — PROPOFOL 10 MG/ML
INJECTION, EMULSION INTRAVENOUS
Status: DISPENSED
Start: 2017-12-28

## (undated) RX ORDER — CEFAZOLIN SODIUM 1 G/50ML
SOLUTION INTRAVENOUS
Status: DISPENSED
Start: 2017-12-28

## (undated) RX ORDER — ALBUTEROL SULFATE 0.83 MG/ML
SOLUTION RESPIRATORY (INHALATION)
Status: DISPENSED
Start: 2022-11-17

## (undated) RX ORDER — DEXAMETHASONE SODIUM PHOSPHATE 4 MG/ML
INJECTION, SOLUTION INTRA-ARTICULAR; INTRALESIONAL; INTRAMUSCULAR; INTRAVENOUS; SOFT TISSUE
Status: DISPENSED
Start: 2022-11-17

## (undated) RX ORDER — ACETAMINOPHEN 325 MG/1
TABLET ORAL
Status: DISPENSED
Start: 2022-11-17

## (undated) RX ORDER — CITRIC ACID/SODIUM CITRATE 334-500MG
SOLUTION, ORAL ORAL
Status: DISPENSED
Start: 2022-11-17